# Patient Record
Sex: MALE | Race: WHITE | NOT HISPANIC OR LATINO | Employment: PART TIME | URBAN - METROPOLITAN AREA
[De-identification: names, ages, dates, MRNs, and addresses within clinical notes are randomized per-mention and may not be internally consistent; named-entity substitution may affect disease eponyms.]

---

## 2022-01-03 ENCOUNTER — HOSPITAL ENCOUNTER (INPATIENT)
Facility: HOSPITAL | Age: 57
LOS: 3 days | Discharge: HOME | DRG: 812 | End: 2022-01-06
Attending: FAMILY MEDICINE | Admitting: INTERNAL MEDICINE
Payer: MEDICAID

## 2022-01-03 DIAGNOSIS — D64.9 SYMPTOMATIC ANEMIA: ICD-10-CM

## 2022-01-03 DIAGNOSIS — D64.9 ANEMIA, UNSPECIFIED TYPE: Primary | ICD-10-CM

## 2022-01-03 DIAGNOSIS — R07.9 CHEST PAIN: ICD-10-CM

## 2022-01-03 LAB
ALBUMIN SERPL BCP-MCNC: 3.8 G/DL (ref 3.5–5.2)
ALP SERPL-CCNC: 46 U/L (ref 55–135)
ALT SERPL W/O P-5'-P-CCNC: 17 U/L (ref 10–44)
ANION GAP SERPL CALC-SCNC: 11 MMOL/L (ref 8–16)
ANISOCYTOSIS BLD QL SMEAR: SLIGHT
AST SERPL-CCNC: 16 U/L (ref 10–40)
BASOPHILS # BLD AUTO: 0.04 K/UL (ref 0–0.2)
BASOPHILS NFR BLD: 0.4 % (ref 0–1.9)
BILIRUB SERPL-MCNC: 0.9 MG/DL (ref 0.1–1)
BNP SERPL-MCNC: 102 PG/ML (ref 0–99)
BUN SERPL-MCNC: 19 MG/DL (ref 6–20)
BURR CELLS BLD QL SMEAR: ABNORMAL
CALCIUM SERPL-MCNC: 9.1 MG/DL (ref 8.7–10.5)
CHLORIDE SERPL-SCNC: 106 MMOL/L (ref 95–110)
CO2 SERPL-SCNC: 21 MMOL/L (ref 23–29)
CREAT SERPL-MCNC: 0.9 MG/DL (ref 0.5–1.4)
CTP QC/QA: YES
DIFFERENTIAL METHOD: ABNORMAL
EOSINOPHIL # BLD AUTO: 0 K/UL (ref 0–0.5)
EOSINOPHIL NFR BLD: 0.2 % (ref 0–8)
ERYTHROCYTE [DISTWIDTH] IN BLOOD BY AUTOMATED COUNT: 20.8 % (ref 11.5–14.5)
EST. GFR  (AFRICAN AMERICAN): >60 ML/MIN/1.73 M^2
EST. GFR  (NON AFRICAN AMERICAN): >60 ML/MIN/1.73 M^2
GLUCOSE SERPL-MCNC: 97 MG/DL (ref 70–110)
HCT VFR BLD AUTO: 12.5 % (ref 40–54)
HGB BLD-MCNC: 3 G/DL (ref 14–18)
HYPOCHROMIA BLD QL SMEAR: ABNORMAL
IMM GRANULOCYTES # BLD AUTO: 0.04 K/UL (ref 0–0.04)
IMM GRANULOCYTES NFR BLD AUTO: 0.4 % (ref 0–0.5)
LYMPHOCYTES # BLD AUTO: 2.2 K/UL (ref 1–4.8)
LYMPHOCYTES NFR BLD: 23.9 % (ref 18–48)
MCH RBC QN AUTO: 15 PG (ref 27–31)
MCHC RBC AUTO-ENTMCNC: 24 G/DL (ref 32–36)
MCV RBC AUTO: 63 FL (ref 82–98)
MONOCYTES # BLD AUTO: 0.7 K/UL (ref 0.3–1)
MONOCYTES NFR BLD: 7.7 % (ref 4–15)
NEUTROPHILS # BLD AUTO: 6.2 K/UL (ref 1.8–7.7)
NEUTROPHILS NFR BLD: 67.4 % (ref 38–73)
NRBC BLD-RTO: 1 /100 WBC
OVALOCYTES BLD QL SMEAR: ABNORMAL
PLATELET # BLD AUTO: 358 K/UL (ref 150–450)
PLATELET BLD QL SMEAR: ABNORMAL
PMV BLD AUTO: 10 FL (ref 9.2–12.9)
POIKILOCYTOSIS BLD QL SMEAR: SLIGHT
POTASSIUM SERPL-SCNC: 3.8 MMOL/L (ref 3.5–5.1)
PROT SERPL-MCNC: 7.7 G/DL (ref 6–8.4)
RBC # BLD AUTO: 2 M/UL (ref 4.6–6.2)
SARS-COV-2 RDRP RESP QL NAA+PROBE: NEGATIVE
SODIUM SERPL-SCNC: 138 MMOL/L (ref 136–145)
TARGETS BLD QL SMEAR: ABNORMAL
TROPONIN I SERPL DL<=0.01 NG/ML-MCNC: <0.006 NG/ML (ref 0–0.03)
TROPONIN I SERPL DL<=0.01 NG/ML-MCNC: <0.006 NG/ML (ref 0–0.03)
WBC # BLD AUTO: 9.13 K/UL (ref 3.9–12.7)

## 2022-01-03 PROCEDURE — 93010 EKG 12-LEAD: ICD-10-PCS | Mod: ,,, | Performed by: INTERNAL MEDICINE

## 2022-01-03 PROCEDURE — 86901 BLOOD TYPING SEROLOGIC RH(D): CPT | Performed by: FAMILY MEDICINE

## 2022-01-03 PROCEDURE — 86920 COMPATIBILITY TEST SPIN: CPT | Performed by: FAMILY MEDICINE

## 2022-01-03 PROCEDURE — 83880 ASSAY OF NATRIURETIC PEPTIDE: CPT | Performed by: NURSE PRACTITIONER

## 2022-01-03 PROCEDURE — 83540 ASSAY OF IRON: CPT | Performed by: NURSE PRACTITIONER

## 2022-01-03 PROCEDURE — 85025 COMPLETE CBC W/AUTO DIFF WBC: CPT | Performed by: NURSE PRACTITIONER

## 2022-01-03 PROCEDURE — 93010 ELECTROCARDIOGRAM REPORT: CPT | Mod: ,,, | Performed by: INTERNAL MEDICINE

## 2022-01-03 PROCEDURE — 86900 BLOOD TYPING SEROLOGIC ABO: CPT | Performed by: FAMILY MEDICINE

## 2022-01-03 PROCEDURE — U0002 COVID-19 LAB TEST NON-CDC: HCPCS | Performed by: NURSE PRACTITIONER

## 2022-01-03 PROCEDURE — 99285 EMERGENCY DEPT VISIT HI MDM: CPT | Mod: 25

## 2022-01-03 PROCEDURE — 93005 ELECTROCARDIOGRAM TRACING: CPT

## 2022-01-03 PROCEDURE — 82728 ASSAY OF FERRITIN: CPT | Performed by: NURSE PRACTITIONER

## 2022-01-03 PROCEDURE — 11000001 HC ACUTE MED/SURG PRIVATE ROOM

## 2022-01-03 PROCEDURE — 80053 COMPREHEN METABOLIC PANEL: CPT | Performed by: NURSE PRACTITIONER

## 2022-01-03 PROCEDURE — 36415 COLL VENOUS BLD VENIPUNCTURE: CPT | Performed by: FAMILY MEDICINE

## 2022-01-03 PROCEDURE — 84484 ASSAY OF TROPONIN QUANT: CPT | Mod: 91 | Performed by: NURSE PRACTITIONER

## 2022-01-03 PROCEDURE — 82272 OCCULT BLD FECES 1-3 TESTS: CPT | Performed by: FAMILY MEDICINE

## 2022-01-03 RX ORDER — HYDROCODONE BITARTRATE AND ACETAMINOPHEN 500; 5 MG/1; MG/1
TABLET ORAL
Status: DISCONTINUED | OUTPATIENT
Start: 2022-01-03 | End: 2022-01-06 | Stop reason: HOSPADM

## 2022-01-04 PROBLEM — D64.9 SYMPTOMATIC ANEMIA: Status: ACTIVE | Noted: 2022-01-04

## 2022-01-04 LAB
ABO + RH BLD: NORMAL
ALBUMIN SERPL BCP-MCNC: 3.9 G/DL (ref 3.5–5.2)
ALP SERPL-CCNC: 46 U/L (ref 55–135)
ALT SERPL W/O P-5'-P-CCNC: 13 U/L (ref 10–44)
ANION GAP SERPL CALC-SCNC: 10 MMOL/L (ref 8–16)
AST SERPL-CCNC: 15 U/L (ref 10–40)
BASOPHILS # BLD AUTO: 0.05 K/UL (ref 0–0.2)
BASOPHILS # BLD AUTO: 0.05 K/UL (ref 0–0.2)
BASOPHILS NFR BLD: 0.6 % (ref 0–1.9)
BASOPHILS NFR BLD: 0.7 % (ref 0–1.9)
BILIRUB SERPL-MCNC: 1.8 MG/DL (ref 0.1–1)
BLD GP AB SCN CELLS X3 SERPL QL: NORMAL
BLD PROD TYP BPU: NORMAL
BLOOD UNIT EXPIRATION DATE: NORMAL
BLOOD UNIT TYPE CODE: 5100
BLOOD UNIT TYPE: NORMAL
BUN SERPL-MCNC: 14 MG/DL (ref 6–20)
CALCIUM SERPL-MCNC: 8.5 MG/DL (ref 8.7–10.5)
CHLORIDE SERPL-SCNC: 108 MMOL/L (ref 95–110)
CO2 SERPL-SCNC: 19 MMOL/L (ref 23–29)
CODING SYSTEM: NORMAL
CREAT SERPL-MCNC: 0.8 MG/DL (ref 0.5–1.4)
DIFFERENTIAL METHOD: ABNORMAL
DIFFERENTIAL METHOD: ABNORMAL
DISPENSE STATUS: NORMAL
EOSINOPHIL # BLD AUTO: 0 K/UL (ref 0–0.5)
EOSINOPHIL # BLD AUTO: 0 K/UL (ref 0–0.5)
EOSINOPHIL NFR BLD: 0.4 % (ref 0–8)
EOSINOPHIL NFR BLD: 0.5 % (ref 0–8)
ERYTHROCYTE [DISTWIDTH] IN BLOOD BY AUTOMATED COUNT: 25.5 % (ref 11.5–14.5)
ERYTHROCYTE [DISTWIDTH] IN BLOOD BY AUTOMATED COUNT: 25.8 % (ref 11.5–14.5)
EST. GFR  (AFRICAN AMERICAN): >60 ML/MIN/1.73 M^2
EST. GFR  (NON AFRICAN AMERICAN): >60 ML/MIN/1.73 M^2
FERRITIN SERPL-MCNC: 2 NG/ML (ref 20–300)
GLUCOSE SERPL-MCNC: 86 MG/DL (ref 70–110)
HCT VFR BLD AUTO: 23.3 % (ref 40–54)
HCT VFR BLD AUTO: 23.6 % (ref 40–54)
HGB BLD-MCNC: 6.7 G/DL (ref 14–18)
HGB BLD-MCNC: 6.8 G/DL (ref 14–18)
IMM GRANULOCYTES # BLD AUTO: 0.08 K/UL (ref 0–0.04)
IMM GRANULOCYTES # BLD AUTO: 0.1 K/UL (ref 0–0.04)
IMM GRANULOCYTES NFR BLD AUTO: 1.1 % (ref 0–0.5)
IMM GRANULOCYTES NFR BLD AUTO: 1.3 % (ref 0–0.5)
INR PPP: 1 (ref 0.8–1.2)
IRON SERPL-MCNC: 11 UG/DL (ref 45–160)
LYMPHOCYTES # BLD AUTO: 1.4 K/UL (ref 1–4.8)
LYMPHOCYTES # BLD AUTO: 1.6 K/UL (ref 1–4.8)
LYMPHOCYTES NFR BLD: 18.2 % (ref 18–48)
LYMPHOCYTES NFR BLD: 20.7 % (ref 18–48)
MCH RBC QN AUTO: 21 PG (ref 27–31)
MCH RBC QN AUTO: 21.1 PG (ref 27–31)
MCHC RBC AUTO-ENTMCNC: 28.8 G/DL (ref 32–36)
MCHC RBC AUTO-ENTMCNC: 28.8 G/DL (ref 32–36)
MCV RBC AUTO: 73 FL (ref 82–98)
MCV RBC AUTO: 73 FL (ref 82–98)
MONOCYTES # BLD AUTO: 0.6 K/UL (ref 0.3–1)
MONOCYTES # BLD AUTO: 0.7 K/UL (ref 0.3–1)
MONOCYTES NFR BLD: 8 % (ref 4–15)
MONOCYTES NFR BLD: 9.4 % (ref 4–15)
NEUTROPHILS # BLD AUTO: 5.2 K/UL (ref 1.8–7.7)
NEUTROPHILS # BLD AUTO: 5.4 K/UL (ref 1.8–7.7)
NEUTROPHILS NFR BLD: 69 % (ref 38–73)
NEUTROPHILS NFR BLD: 70.1 % (ref 38–73)
NRBC BLD-RTO: 1 /100 WBC
NRBC BLD-RTO: 1 /100 WBC
NUM UNITS TRANS PACKED RBC: NORMAL
OB PNL STL: NEGATIVE
PLATELET # BLD AUTO: 320 K/UL (ref 150–450)
PLATELET # BLD AUTO: 323 K/UL (ref 150–450)
PMV BLD AUTO: 9.6 FL (ref 9.2–12.9)
PMV BLD AUTO: 9.8 FL (ref 9.2–12.9)
POTASSIUM SERPL-SCNC: 3.9 MMOL/L (ref 3.5–5.1)
PROT SERPL-MCNC: 7.4 G/DL (ref 6–8.4)
PROTHROMBIN TIME: 11.4 SEC (ref 9–12.5)
RBC # BLD AUTO: 3.19 M/UL (ref 4.6–6.2)
RBC # BLD AUTO: 3.23 M/UL (ref 4.6–6.2)
SODIUM SERPL-SCNC: 137 MMOL/L (ref 136–145)
WBC # BLD AUTO: 7.43 K/UL (ref 3.9–12.7)
WBC # BLD AUTO: 7.77 K/UL (ref 3.9–12.7)

## 2022-01-04 PROCEDURE — 25000003 PHARM REV CODE 250: Performed by: PHYSICIAN ASSISTANT

## 2022-01-04 PROCEDURE — 99223 1ST HOSP IP/OBS HIGH 75: CPT | Mod: ,,, | Performed by: INTERNAL MEDICINE

## 2022-01-04 PROCEDURE — 80053 COMPREHEN METABOLIC PANEL: CPT | Performed by: NURSE PRACTITIONER

## 2022-01-04 PROCEDURE — 85025 COMPLETE CBC W/AUTO DIFF WBC: CPT | Mod: 91 | Performed by: NURSE PRACTITIONER

## 2022-01-04 PROCEDURE — 36415 COLL VENOUS BLD VENIPUNCTURE: CPT | Performed by: NURSE PRACTITIONER

## 2022-01-04 PROCEDURE — G0378 HOSPITAL OBSERVATION PER HR: HCPCS

## 2022-01-04 PROCEDURE — 99223 PR INITIAL HOSPITAL CARE,LEVL III: ICD-10-PCS | Mod: ,,, | Performed by: INTERNAL MEDICINE

## 2022-01-04 PROCEDURE — P9016 RBC LEUKOCYTES REDUCED: HCPCS | Performed by: FAMILY MEDICINE

## 2022-01-04 PROCEDURE — 21400001 HC TELEMETRY ROOM

## 2022-01-04 PROCEDURE — 85610 PROTHROMBIN TIME: CPT | Performed by: NURSE PRACTITIONER

## 2022-01-04 RX ORDER — ACETAMINOPHEN 325 MG/1
650 TABLET ORAL EVERY 4 HOURS PRN
Status: DISCONTINUED | OUTPATIENT
Start: 2022-01-04 | End: 2022-01-06 | Stop reason: HOSPADM

## 2022-01-04 RX ORDER — ONDANSETRON 2 MG/ML
4 INJECTION INTRAMUSCULAR; INTRAVENOUS EVERY 8 HOURS PRN
Status: DISCONTINUED | OUTPATIENT
Start: 2022-01-04 | End: 2022-01-06 | Stop reason: HOSPADM

## 2022-01-04 RX ORDER — IPRATROPIUM BROMIDE AND ALBUTEROL SULFATE 2.5; .5 MG/3ML; MG/3ML
3 SOLUTION RESPIRATORY (INHALATION) EVERY 4 HOURS PRN
Status: DISCONTINUED | OUTPATIENT
Start: 2022-01-04 | End: 2022-01-06 | Stop reason: HOSPADM

## 2022-01-04 RX ORDER — TALC
6 POWDER (GRAM) TOPICAL NIGHTLY PRN
Status: DISCONTINUED | OUTPATIENT
Start: 2022-01-04 | End: 2022-01-06 | Stop reason: HOSPADM

## 2022-01-04 RX ORDER — POLYETHYLENE GLYCOL 3350, SODIUM SULFATE ANHYDROUS, SODIUM BICARBONATE, SODIUM CHLORIDE, POTASSIUM CHLORIDE 236; 22.74; 6.74; 5.86; 2.97 G/4L; G/4L; G/4L; G/4L; G/4L
4000 POWDER, FOR SOLUTION ORAL ONCE
Status: COMPLETED | OUTPATIENT
Start: 2022-01-04 | End: 2022-01-04

## 2022-01-04 RX ORDER — SODIUM CHLORIDE 0.9 % (FLUSH) 0.9 %
10 SYRINGE (ML) INJECTION EVERY 8 HOURS PRN
Status: DISCONTINUED | OUTPATIENT
Start: 2022-01-04 | End: 2022-01-06 | Stop reason: HOSPADM

## 2022-01-04 RX ORDER — NALOXONE HCL 0.4 MG/ML
0.02 VIAL (ML) INJECTION
Status: DISCONTINUED | OUTPATIENT
Start: 2022-01-04 | End: 2022-01-06 | Stop reason: HOSPADM

## 2022-01-04 RX ORDER — BISACODYL 5 MG
20 TABLET, DELAYED RELEASE (ENTERIC COATED) ORAL ONCE
Status: COMPLETED | OUTPATIENT
Start: 2022-01-04 | End: 2022-01-04

## 2022-01-04 RX ADMIN — BISACODYL 20 MG: 5 TABLET, COATED ORAL at 12:01

## 2022-01-04 RX ADMIN — POLYETHYLENE GLYCOL 3350, SODIUM SULFATE ANHYDROUS, SODIUM BICARBONATE, SODIUM CHLORIDE, POTASSIUM CHLORIDE 4000 ML: 236; 22.74; 6.74; 5.86; 2.97 POWDER, FOR SOLUTION ORAL at 03:01

## 2022-01-04 NOTE — H&P
[facility]  [facility]  History & Physical    Patient Name: Shwan Carreno  MRN: [id number]  Patient Class: OP- Observation  Admission Date: 1/3/2022  Attending Physician: Nicolasa Freitas MD   Primary Care Provider: No primary care provider on file.         Patient information was obtained from patient and ER records.     Subjective:     Principal Problem:Symptomatic anemia    Chief Complaint:   Chief Complaint   Patient presents with    Shortness of Breath     Pt has been c/o of SOB, neck tightness and bilateral arm tightness for the past three days.         HPI: 55 yo male with no known significant past medical history presented to ED with progressively worsening SOB. Onset 4 days ago associated with generalized weakness, fatigue, chest tightness, dizziness, near syncope and 10-15 lb unintentional weight loss within the last month. No exacerbating/alleviating factors. Pt denies cough, congestion and fever. He reports 1 episode of emesis PTA. No known sick contact. Initial labs remarkable for Hbg/Hct 3/12. Occult stool negative, hemodynamics stable. Pt denies hematemesis. Reports stools appear dark. Pt reports history of lap tevin 4 months ago. He does not have a primary care physician. No previous colonoscopy. 3 units PRBC transfusion initiated in ED. Patient placed in observation for further evaluation and treatment.       No past medical history on file.    No past surgical history on file.    Review of patient's allergies indicates:  No Known Allergies    No current facility-administered medications on file prior to encounter.     No current outpatient medications on file prior to encounter.     Family History    None       Tobacco Use    Smoking status: Not on file    Smokeless tobacco: Not on file   Substance and Sexual Activity    Alcohol use: Not on file    Drug use: Not on file    Sexual activity: Not on file     Review of Systems   Constitutional: Positive for fatigue.  Negative for chills and fever.   Eyes: Negative for photophobia.   Respiratory: Positive for shortness of breath. Negative for cough, chest tightness and wheezing.    Cardiovascular: Positive for chest pain and palpitations. Negative for leg swelling.   Gastrointestinal: Positive for nausea and vomiting. Negative for abdominal pain and diarrhea.   Genitourinary: Negative for dysuria, flank pain and hematuria.   Musculoskeletal: Negative for back pain, myalgias and neck pain.   Skin: Negative for rash and wound.   Neurological: Positive for dizziness and weakness. Negative for syncope and headaches.   Psychiatric/Behavioral: Negative for agitation.     Objective:     Vital Signs (Most Recent):  Temp: 98.1 °F (36.7 °C) (01/04/22 0324)  Pulse: 100 (01/04/22 0324)  Resp: 18 (01/04/22 0324)  BP: 121/63 (01/04/22 0324)  SpO2: 100 % (01/04/22 0324) Vital Signs (24h Range):  Temp:  [97.8 °F (36.6 °C)-98.1 °F (36.7 °C)] 98.1 °F (36.7 °C)  Pulse:  [100-117] 100  Resp:  [18-20] 18  SpO2:  [100 %] 100 %  BP: (113-150)/(59-66) 121/63     Weight:  (needs bed weight)  There is no height or weight on file to calculate BMI.    Physical Exam  Vitals and nursing note reviewed.   Constitutional:       General: He is not in acute distress.  HENT:      Head: Normocephalic and atraumatic.      Nose: No congestion.      Mouth/Throat:      Mouth: Mucous membranes are moist.   Eyes:      Conjunctiva/sclera: Conjunctivae normal.      Pupils: Pupils are equal, round, and reactive to light.   Cardiovascular:      Rate and Rhythm: Normal rate.      Pulses: Normal pulses.   Pulmonary:      Effort: Pulmonary effort is normal.   Abdominal:      General: Bowel sounds are normal. There is no distension.      Tenderness: There is no abdominal tenderness. There is no guarding.   Musculoskeletal:         General: Normal range of motion.      Cervical back: Normal range of motion.   Skin:     General: Skin is warm and dry.      Capillary Refill:  Capillary refill takes less than 2 seconds.      Coloration: Skin is pale.   Neurological:      General: No focal deficit present.      Mental Status: He is alert and oriented to person, place, and time.   Psychiatric:         Mood and Affect: Mood normal.           CRANIAL NERVES     CN III, IV, VI   Pupils are equal, round, and reactive to light.       Significant Labs:   All pertinent labs within the past 24 hours have been reviewed.  CBC:   Recent Labs   Lab 01/03/22 2201   WBC 9.13   HGB 3.0*   HCT 12.5*        CMP:   Recent Labs   Lab 01/03/22 2201      K 3.8      CO2 21*   GLU 97   BUN 19   CREATININE 0.9   CALCIUM 9.1   PROT 7.7   ALBUMIN 3.8   BILITOT 0.9   ALKPHOS 46*   AST 16   ALT 17   ANIONGAP 11   EGFRNONAA >60     Troponin:   Recent Labs   Lab 01/03/22 2201 01/03/22 2256   TROPONINI <0.006 <0.006       Significant Imaging: I have reviewed all pertinent imaging results/findings within the past 24 hours.    Assessment/Plan:     * Symptomatic anemia  - Initial Hbg/Hct 3/12   - Occult stool negative   - Type and screen, transfuse 3 units PRBC   - Keep NPO   - check iron, TIBC and ferritin  - GI eval          VTE Risk Mitigation (From admission, onward)         Ordered     IP VTE LOW RISK PATIENT  Once         01/04/22 0019     Place sequential compression device  Until discontinued         01/04/22 0019                   Shirin Mosely NP  Department of Hospital Medicine   [facility] - Emergency Dept.

## 2022-01-04 NOTE — ED PROVIDER NOTES
SCRIBE #1 NOTE: I, Gladis Fleming, am scribing for, and in the presence of, Nicolasa Freitas MD. I have scribed the entire note.       History     Chief Complaint   Patient presents with    Shortness of Breath     Pt has been c/o of SOB, neck tightness and bilateral arm tightness for the past three days.      Review of patient's allergies indicates:  No Known Allergies      History of Present Illness     HPI    1/3/2022, 10:59 PM  History obtained from the patient      History of Present Illness: Shawn Carreno is a 56 y.o. male patient who presents to the Emergency Department for evaluation of SOB which onset gradually 3 days ago. Symptoms are constant and moderate in severity. No mitigating or exacerbating factors reported. Associated sxs include neck tightness and bilateral arm tightness. Patient denies any cough, congestion, CP, leg swelling, sore throat, fever, chills, n/v, dizziness, HA, and all other sxs at this time. No prior tx reported. No further complaints or concerns at this time.       Arrival mode: Personal vehicle    PCP: To Obtain Unable        Past Medical History:  History reviewed. No pertinent past medical history.    Past Surgical History:  Past Surgical History:   Procedure Laterality Date    CHOLECYSTECTOMY  2020    COLONOSCOPY N/A 1/5/2022    Procedure: COLONOSCOPY;  Surgeon: Hannah Alvarado MD;  Location: [facility];  Service: Endoscopy;  Laterality: N/A;    ESOPHAGOGASTRODUODENOSCOPY N/A 1/5/2022    Procedure: EGD (ESOPHAGOGASTRODUODENOSCOPY);  Surgeon: Hannah Alvarado MD;  Location: [facility];  Service: Endoscopy;  Laterality: N/A;         Family History:  History reviewed. No pertinent family history.    Social History:  Social History     Tobacco Use    Smoking status: Never Smoker    Smokeless tobacco: Never Used   Substance and Sexual Activity    Alcohol use: Never    Drug use: Never    Sexual activity: Not on file        Review of Systems     Review of  Systems   Constitutional: Negative for chills and fever.   HENT: Negative for congestion and sore throat.    Respiratory: Positive for shortness of breath. Negative for cough.    Cardiovascular: Negative for chest pain and leg swelling.   Gastrointestinal: Negative for nausea and vomiting.   Genitourinary: Negative for dysuria.   Musculoskeletal: Negative for back pain.        + neck tightness       + bilateral arm tightness   Skin: Negative for rash.   Neurological: Negative for dizziness, weakness and headaches.   Hematological: Does not bruise/bleed easily.   All other systems reviewed and are negative.     Physical Exam     Initial Vitals [01/03/22 1947]   BP Pulse Resp Temp SpO2   (!) 150/66 (!) 117 20 97.8 °F (36.6 °C) 100 %      MAP       --          Physical Exam  Nursing Notes and Vital Signs Reviewed.  Constitutional: Patient is in no acute distress. Well-developed and well-nourished. Pt is pale.   Head: Atraumatic. Normocephalic.  Eyes: PERRL. EOM intact. Conjunctivae are not pale. No scleral icterus.  ENT: Mucous membranes are pale. Oropharynx is clear and symmetric.    Neck: Supple. Full ROM. No lymphadenopathy.  Cardiovascular: Regular rate. Regular rhythm. No murmurs, rubs, or gallops. Distal pulses are 2+ and symmetric.  Pulmonary/Chest: No respiratory distress. Clear to auscultation bilaterally. No wheezing or rales.  Abdominal: Soft and non-distended.  There is no tenderness.  No rebound, guarding, or rigidity. Good bowel sounds.  Genitourinary: No CVA tenderness  Rectal: Fe/male chaperone present for the duration of the rectal exam.There is no tenderness. No masses or hemorrhoids. Normal sphincter tone. No obvious melena or hematochezia.  Musculoskeletal: Moves all extremities. No obvious deformities. No edema. No calf tenderness.  Skin: Warm and dry.  Neurological:  Alert, awake, and appropriate.  Normal speech.  No acute focal neurological deficits are appreciated.  Psychiatric: Normal affect.  Good eye contact. Appropriate in content.     ED Course   Procedures  ED Vital Signs:  Vitals:    01/05/22 2303 01/05/22 2320 01/05/22 2335 01/06/22 0100   BP: 110/60 (!) 118/55 (!) 117/59    Pulse: 100 99 95 90   Resp: 18 18 18    Temp: 98.9 °F (37.2 °C) 100 °F (37.8 °C) 99.3 °F (37.4 °C)    TempSrc: Oral Oral Oral    SpO2: 95% 96% 96%    Weight:       Height:        01/06/22 0150 01/06/22 0230 01/06/22 0300 01/06/22 0446   BP: 135/77 133/66  127/77   Pulse: 89 87 72 87   Resp: 18 18  18   Temp: 98.5 °F (36.9 °C) 98 °F (36.7 °C)  98.1 °F (36.7 °C)   TempSrc: Oral Oral     SpO2: 99% 100%  98%   Weight:    88.7 kg (195 lb 8.8 oz)   Height:        01/06/22 0500 01/06/22 0705 01/06/22 0749 01/06/22 0900   BP:   120/64    Pulse: 88 82 88 86   Resp:   18    Temp:   97.5 °F (36.4 °C)    TempSrc:       SpO2:   98%    Weight:       Height:        01/06/22 0902 01/06/22 1100 01/06/22 1128   BP:   136/65   Pulse:  84 83   Resp:   18   Temp:      TempSrc:      SpO2: 100%  100%   Weight:      Height:          Abnormal Lab Results:  Labs Reviewed   CBC W/ AUTO DIFFERENTIAL - Abnormal; Notable for the following components:       Result Value    RBC 2.00 (*)     Hemoglobin 3.0 (*)     Hematocrit 12.5 (*)     MCV 63 (*)     MCH 15.0 (*)     MCHC 24.0 (*)     RDW 20.8 (*)     nRBC 1 (*)     All other components within normal limits    Narrative:       HCT HGB  critical result(s) called and verbal readback obtained   from DC ARELLANO RN by BRANDON 01/03/2022 22:46   COMPREHENSIVE METABOLIC PANEL - Abnormal; Notable for the following components:    CO2 21 (*)     Alkaline Phosphatase 46 (*)     All other components within normal limits   B-TYPE NATRIURETIC PEPTIDE - Abnormal; Notable for the following components:     (*)     All other components within normal limits   COMPREHENSIVE METABOLIC PANEL - Abnormal; Notable for the following components:    CO2 19 (*)     Calcium 8.5 (*)     Total Bilirubin 1.8 (*)     Alkaline  Phosphatase 46 (*)     All other components within normal limits   CBC W/ AUTO DIFFERENTIAL - Abnormal; Notable for the following components:    RBC 3.19 (*)     Hemoglobin 6.7 (*)     Hematocrit 23.3 (*)     MCV 73 (*)     MCH 21.0 (*)     MCHC 28.8 (*)     RDW 25.8 (*)     Immature Granulocytes 1.1 (*)     Immature Grans (Abs) 0.08 (*)     nRBC 1 (*)     All other components within normal limits   FERRITIN - Abnormal; Notable for the following components:    Ferritin 2 (*)     All other components within normal limits   CBC W/ AUTO DIFFERENTIAL - Abnormal; Notable for the following components:    RBC 3.23 (*)     Hemoglobin 6.8 (*)     Hematocrit 23.6 (*)     MCV 73 (*)     MCH 21.1 (*)     MCHC 28.8 (*)     RDW 25.5 (*)     Immature Granulocytes 1.3 (*)     Immature Grans (Abs) 0.10 (*)     nRBC 1 (*)     All other components within normal limits   TROPONIN I   TROPONIN I   OCCULT BLOOD X 1, STOOL   FERRITIN   IRON AND TIBC   PROTIME-INR   SARS-COV-2 RDRP GENE    Narrative:     This test utilizes isothermal nucleic acid amplification   technology to detect the SARS-CoV-2 RdRp nucleic acid segment.   The analytical sensitivity (limit of detection) is 125 genome   equivalents/mL.   A POSITIVE result implies infection with the SARS-CoV-2 virus;   the patient is presumed to be contagious.     A NEGATIVE result means that SARS-CoV-2 nucleic acids are not   present above the limit of detection. A NEGATIVE result should be   treated as presumptive. It does not rule out the possibility of   COVID-19 and should not be the sole basis for treatment decisions.   If COVID-19 is strongly suspected based on clinical and exposure   history, re-testing using an alternate molecular assay should be   considered.   This test is only for use under the Food and Drug   Administration s Emergency Use Authorization (EUA).   Commercial kits are provided by [employer].   Performance characteristics of the EUA have been  independently   verified by [facility] Department of   Pathology and Laboratory Medicine.   _________________________________________________________________   The authorized Fact Sheet for Healthcare Providers and the authorized Fact   Sheet for Patients of the ID NOW COVID-19 are available on the FDA   website:     https://www.fda.gov/media/521063/download  https://www.fda.gov/media/571703/download         TYPE & SCREEN   PREPARE RBC SOFT        All Lab Results:  Results for orders placed or performed during the hospital encounter of 01/03/22   CBC auto differential   Result Value Ref Range    WBC 9.13 3.90 - 12.70 K/uL    RBC 2.00 (L) 4.60 - 6.20 M/uL    Hemoglobin 3.0 (LL) 14.0 - 18.0 g/dL    Hematocrit 12.5 (LL) 40.0 - 54.0 %    MCV 63 (L) 82 - 98 fL    MCH 15.0 (L) 27.0 - 31.0 pg    MCHC 24.0 (L) 32.0 - 36.0 g/dL    RDW 20.8 (H) 11.5 - 14.5 %    Platelets 358 150 - 450 K/uL    MPV 10.0 9.2 - 12.9 fL    Immature Granulocytes 0.4 0.0 - 0.5 %    Gran # (ANC) 6.2 1.8 - 7.7 K/uL    Immature Grans (Abs) 0.04 0.00 - 0.04 K/uL    Lymph # 2.2 1.0 - 4.8 K/uL    Mono # 0.7 0.3 - 1.0 K/uL    Eos # 0.0 0.0 - 0.5 K/uL    Baso # 0.04 0.00 - 0.20 K/uL    nRBC 1 (A) 0 /100 WBC    Gran % 67.4 38.0 - 73.0 %    Lymph % 23.9 18.0 - 48.0 %    Mono % 7.7 4.0 - 15.0 %    Eosinophil % 0.2 0.0 - 8.0 %    Basophil % 0.4 0.0 - 1.9 %    Platelet Estimate Appears normal     Aniso Slight     Poik Slight     Hypo Moderate     Ovalocytes Occasional     Target Cells Occasional     Araceli Cells Occasional     Differential Method Automated    Comprehensive metabolic panel   Result Value Ref Range    Sodium 138 136 - 145 mmol/L    Potassium 3.8 3.5 - 5.1 mmol/L    Chloride 106 95 - 110 mmol/L    CO2 21 (L) 23 - 29 mmol/L    Glucose 97 70 - 110 mg/dL    BUN 19 6 - 20 mg/dL    Creatinine 0.9 0.5 - 1.4 mg/dL    Calcium 9.1 8.7 - 10.5 mg/dL    Total Protein 7.7 6.0 - 8.4 g/dL    Albumin 3.8 3.5 - 5.2 g/dL    Total Bilirubin 0.9 0.1 - 1.0  mg/dL    Alkaline Phosphatase 46 (L) 55 - 135 U/L    AST 16 10 - 40 U/L    ALT 17 10 - 44 U/L    Anion Gap 11 8 - 16 mmol/L    eGFR if African American >60 >60 mL/min/1.73 m^2    eGFR if non African American >60 >60 mL/min/1.73 m^2   Troponin I #1   Result Value Ref Range    Troponin I <0.006 0.000 - 0.026 ng/mL   BNP   Result Value Ref Range     (H) 0 - 99 pg/mL   Troponin I #2   Result Value Ref Range    Troponin I <0.006 0.000 - 0.026 ng/mL   Occult blood x 1, stool    Specimen: Stool   Result Value Ref Range    Occult Blood Negative Negative   Protime-INR   Result Value Ref Range    Prothrombin Time 11.4 9.0 - 12.5 sec    INR 1.0 0.8 - 1.2   Comprehensive Metabolic Panel (CMP)   Result Value Ref Range    Sodium 137 136 - 145 mmol/L    Potassium 3.9 3.5 - 5.1 mmol/L    Chloride 108 95 - 110 mmol/L    CO2 19 (L) 23 - 29 mmol/L    Glucose 86 70 - 110 mg/dL    BUN 14 6 - 20 mg/dL    Creatinine 0.8 0.5 - 1.4 mg/dL    Calcium 8.5 (L) 8.7 - 10.5 mg/dL    Total Protein 7.4 6.0 - 8.4 g/dL    Albumin 3.9 3.5 - 5.2 g/dL    Total Bilirubin 1.8 (H) 0.1 - 1.0 mg/dL    Alkaline Phosphatase 46 (L) 55 - 135 U/L    AST 15 10 - 40 U/L    ALT 13 10 - 44 U/L    Anion Gap 10 8 - 16 mmol/L    eGFR if African American >60 >60 mL/min/1.73 m^2    eGFR if non African American >60 >60 mL/min/1.73 m^2   CBC auto differential   Result Value Ref Range    WBC 7.43 3.90 - 12.70 K/uL    RBC 3.19 (L) 4.60 - 6.20 M/uL    Hemoglobin 6.7 (L) 14.0 - 18.0 g/dL    Hematocrit 23.3 (L) 40.0 - 54.0 %    MCV 73 (L) 82 - 98 fL    MCH 21.0 (L) 27.0 - 31.0 pg    MCHC 28.8 (L) 32.0 - 36.0 g/dL    RDW 25.8 (H) 11.5 - 14.5 %    Platelets 320 150 - 450 K/uL    MPV 9.8 9.2 - 12.9 fL    Immature Granulocytes 1.1 (H) 0.0 - 0.5 %    Gran # (ANC) 5.2 1.8 - 7.7 K/uL    Immature Grans (Abs) 0.08 (H) 0.00 - 0.04 K/uL    Lymph # 1.4 1.0 - 4.8 K/uL    Mono # 0.7 0.3 - 1.0 K/uL    Eos # 0.0 0.0 - 0.5 K/uL    Baso # 0.05 0.00 - 0.20 K/uL    nRBC 1 (A) 0 /100 WBC     Gran % 70.1 38.0 - 73.0 %    Lymph % 18.2 18.0 - 48.0 %    Mono % 9.4 4.0 - 15.0 %    Eosinophil % 0.5 0.0 - 8.0 %    Basophil % 0.7 0.0 - 1.9 %    Differential Method Automated    Ferritin   Result Value Ref Range    Ferritin 2 (L) 20.0 - 300.0 ng/mL   Iron   Result Value Ref Range    Iron 11 (L) 45 - 160 ug/dL   CBC auto differential   Result Value Ref Range    WBC 7.77 3.90 - 12.70 K/uL    RBC 3.23 (L) 4.60 - 6.20 M/uL    Hemoglobin 6.8 (L) 14.0 - 18.0 g/dL    Hematocrit 23.6 (L) 40.0 - 54.0 %    MCV 73 (L) 82 - 98 fL    MCH 21.1 (L) 27.0 - 31.0 pg    MCHC 28.8 (L) 32.0 - 36.0 g/dL    RDW 25.5 (H) 11.5 - 14.5 %    Platelets 323 150 - 450 K/uL    MPV 9.6 9.2 - 12.9 fL    Immature Granulocytes 1.3 (H) 0.0 - 0.5 %    Gran # (ANC) 5.4 1.8 - 7.7 K/uL    Immature Grans (Abs) 0.10 (H) 0.00 - 0.04 K/uL    Lymph # 1.6 1.0 - 4.8 K/uL    Mono # 0.6 0.3 - 1.0 K/uL    Eos # 0.0 0.0 - 0.5 K/uL    Baso # 0.05 0.00 - 0.20 K/uL    nRBC 1 (A) 0 /100 WBC    Gran % 69.0 38.0 - 73.0 %    Lymph % 20.7 18.0 - 48.0 %    Mono % 8.0 4.0 - 15.0 %    Eosinophil % 0.4 0.0 - 8.0 %    Basophil % 0.6 0.0 - 1.9 %    Differential Method Automated    Comprehensive Metabolic Panel (CMP)   Result Value Ref Range    Sodium 141 136 - 145 mmol/L    Potassium 3.8 3.5 - 5.1 mmol/L    Chloride 108 95 - 110 mmol/L    CO2 22 (L) 23 - 29 mmol/L    Glucose 80 70 - 110 mg/dL    BUN 13 6 - 20 mg/dL    Creatinine 0.8 0.5 - 1.4 mg/dL    Calcium 8.4 (L) 8.7 - 10.5 mg/dL    Total Protein 6.7 6.0 - 8.4 g/dL    Albumin 3.5 3.5 - 5.2 g/dL    Total Bilirubin 1.2 (H) 0.1 - 1.0 mg/dL    Alkaline Phosphatase 43 (L) 55 - 135 U/L    AST 18 10 - 40 U/L    ALT 14 10 - 44 U/L    Anion Gap 11 8 - 16 mmol/L    eGFR if African American >60 >60 mL/min/1.73 m^2    eGFR if non African American >60 >60 mL/min/1.73 m^2   CBC Auto Differential   Result Value Ref Range    WBC 6.99 3.90 - 12.70 K/uL    RBC 3.08 (L) 4.60 - 6.20 M/uL    Hemoglobin 6.6 (L) 14.0 - 18.0 g/dL    Hematocrit  22.8 (L) 40.0 - 54.0 %    MCV 74 (L) 82 - 98 fL    MCH 21.4 (L) 27.0 - 31.0 pg    MCHC 28.9 (L) 32.0 - 36.0 g/dL    RDW 25.2 (H) 11.5 - 14.5 %    Platelets 306 150 - 450 K/uL    MPV 9.9 9.2 - 12.9 fL    Immature Granulocytes 0.9 (H) 0.0 - 0.5 %    Gran # (ANC) 4.5 1.8 - 7.7 K/uL    Immature Grans (Abs) 0.06 (H) 0.00 - 0.04 K/uL    Lymph # 1.5 1.0 - 4.8 K/uL    Mono # 0.8 0.3 - 1.0 K/uL    Eos # 0.1 0.0 - 0.5 K/uL    Baso # 0.07 0.00 - 0.20 K/uL    nRBC 1 (A) 0 /100 WBC    Gran % 63.9 38.0 - 73.0 %    Lymph % 21.9 18.0 - 48.0 %    Mono % 10.9 4.0 - 15.0 %    Eosinophil % 1.4 0.0 - 8.0 %    Basophil % 1.0 0.0 - 1.9 %    Differential Method Automated    Hemoglobin   Result Value Ref Range    Hemoglobin 7.8 (L) 14.0 - 18.0 g/dL   Hematocrit   Result Value Ref Range    Hematocrit 26.1 (L) 40.0 - 54.0 %   Comprehensive Metabolic Panel (CMP)   Result Value Ref Range    Sodium 141 136 - 145 mmol/L    Potassium 4.0 3.5 - 5.1 mmol/L    Chloride 108 95 - 110 mmol/L    CO2 25 23 - 29 mmol/L    Glucose 82 70 - 110 mg/dL    BUN 12 6 - 20 mg/dL    Creatinine 0.9 0.5 - 1.4 mg/dL    Calcium 8.4 (L) 8.7 - 10.5 mg/dL    Total Protein 7.1 6.0 - 8.4 g/dL    Albumin 3.6 3.5 - 5.2 g/dL    Total Bilirubin 1.1 (H) 0.1 - 1.0 mg/dL    Alkaline Phosphatase 48 (L) 55 - 135 U/L    AST 17 10 - 40 U/L    ALT 14 10 - 44 U/L    Anion Gap 8 8 - 16 mmol/L    eGFR if African American >60 >60 mL/min/1.73 m^2    eGFR if non African American >60 >60 mL/min/1.73 m^2   CBC auto differential   Result Value Ref Range    WBC 7.60 3.90 - 12.70 K/uL    RBC 4.00 (L) 4.60 - 6.20 M/uL    Hemoglobin 9.1 (L) 14.0 - 18.0 g/dL    Hematocrit 30.8 (L) 40.0 - 54.0 %    MCV 77 (L) 82 - 98 fL    MCH 22.8 (L) 27.0 - 31.0 pg    MCHC 29.5 (L) 32.0 - 36.0 g/dL    RDW 24.7 (H) 11.5 - 14.5 %    Platelets 317 150 - 450 K/uL    MPV 9.8 9.2 - 12.9 fL    Immature Granulocytes 0.7 (H) 0.0 - 0.5 %    Gran # (ANC) 4.9 1.8 - 7.7 K/uL    Immature Grans (Abs) 0.05 (H) 0.00 - 0.04 K/uL     Lymph # 1.6 1.0 - 4.8 K/uL    Mono # 0.9 0.3 - 1.0 K/uL    Eos # 0.2 0.0 - 0.5 K/uL    Baso # 0.06 0.00 - 0.20 K/uL    nRBC 1 (A) 0 /100 WBC    Gran % 64.7 38.0 - 73.0 %    Lymph % 20.5 18.0 - 48.0 %    Mono % 11.3 4.0 - 15.0 %    Eosinophil % 2.0 0.0 - 8.0 %    Basophil % 0.8 0.0 - 1.9 %    Differential Method Automated    POCT COVID-19 Rapid Screening   Result Value Ref Range    POC Rapid COVID Negative Negative     Acceptable Yes    Type & Screen   Result Value Ref Range    Group & Rh O POS     Indirect Miguel NEG    Prepare RBC 3 Units; transfusion   Result Value Ref Range    UNIT NUMBER [id number]     Product Code S1339I38     DISPENSE STATUS TRANSFUSED     CODING SYSTEM [id number]     Unit Blood Type Code 5100     Unit Blood Type O POS     Unit Expiration [id number]     UNIT NUMBER [id number]     Product Code M8854I10     DISPENSE STATUS TRANSFUSED     CODING SYSTEM YLRM332     Unit Blood Type Code 5100     Unit Blood Type O POS     Unit Expiration [id number]     UNIT NUMBER [id number]     Product Code O7060N12     DISPENSE STATUS TRANSFUSED     CODING SYSTEM PJPY658     Unit Blood Type Code 5100     Unit Blood Type O POS     Unit Expiration [id number]    Prepare RBC 1 Unit   Result Value Ref Range    UNIT NUMBER [id number]     Product Code Q9568K15     DISPENSE STATUS TRANSFUSED     CODING SYSTEM RIRL992     Unit Blood Type Code 5100     Unit Blood Type O POS     Unit Expiration [id number]    Prepare RBC 1 Unit   Result Value Ref Range    UNIT NUMBER [id number]     Product Code P6553O82     DISPENSE STATUS TRANSFUSED     CODING SYSTEM DKPW072     Unit Blood Type Code 5100     Unit Blood Type O POS     Unit Expiration [id number]        Imaging Results:  Imaging Results          X-Ray Chest AP Portable (Final result)  Result time 01/03/22 20:51:43    Final result by Huseyin Wise MD (01/03/22 20:51:43)                 Impression:      No acute  abnormality.      Electronically signed by: Frandy Fontanez  Date:    01/03/2022  Time:    20:51             Narrative:    EXAMINATION:  XR CHEST AP PORTABLE    CLINICAL HISTORY:  Chest Pain;    TECHNIQUE:  Single frontal view of the chest was performed.    COMPARISON:  None    FINDINGS:  The lungs are clear, with normal appearance of pulmonary vasculature and no pleural effusion or pneumothorax.    The cardiac silhouette is enlarged.  The hilar and mediastinal contours are unremarkable.    Bones are intact.                                 The EKG was ordered, reviewed, and independently interpreted by the ED provider.  Interpretation time: 1946  Rate: 108 BPM  Rhythm: sinus tachycardia  Interpretation: Cannot rule out anterior infarct, age undetermined. No STEMI.           The Emergency Provider reviewed the vital signs and test results, which are outlined above.     ED Discussion     12:16 AM: Discussed case with Shirin Mosley NP ([facility]). Dr. Reid agrees with current care and management of pt and accepts admission.   Admitting Service: [facility]  Admitting Physician: Dr. Reid  Admit to: obs med tele    12:21 AM: Re-evaluated pt. I have discussed test results, shared treatment plan, and the need for admission with patient and family at bedside. Pt and family express understanding at this time and agree with all information. All questions answered. Pt and family have no further questions or concerns at this time. Pt is ready for admit.       Medical Decision Making:   Clinical Tests:   Lab Tests: Ordered and Reviewed  Radiological Study: Ordered and Reviewed  Medical Tests: Ordered and Reviewed           ED Medication(s):  Medications   bisacodyL EC tablet 20 mg (20 mg Oral Given 1/4/22 1244)   polyethylene glycol (GoLYTELY) solution (4,000 mLs Oral Given 1/4/22 9182)       Discharge Medication List as of 1/6/2022 11:52 AM      START taking these medications    Details   ferrous sulfate  324 mg (65 mg iron) TbEC Take 1 tablet (324 mg total) by mouth once daily., Starting Thu 1/6/2022, OTC              Follow-up Information     [facility] [place]  On 1/10/2022.    Why: Establish Care  Appointment scheduled for 1/10/22 at 3PM   Contact information:  [facility] [place]   [address],  [place] [address] [phone]           Hannah Alvarado MD In 1 week.    Specialty: Gastroenterology  Why: capsule endoscopy  Contact information:  [address] [place] [address] [address] [place] [address] [phone]             Rafael Cast MD In 2 weeks.    Specialty: Hematology and Oncology  Why: iron deficiency   Contact information:  [address]  [place] [address] [phone]                             Scribe Attestation:   Scribe #1: I performed the above scribed service and the documentation accurately describes the services I performed. I attest to the accuracy of the note.     Attending:   Physician Attestation Statement for Scribe #1: I, Nicolasa Freitas MD, personally performed the services described in this documentation, as scribed by Gladis Fleming, in my presence, and it is both accurate and complete.           Clinical Impression       ICD-10-CM ICD-9-CM   1. Anemia, unspecified type  D64.9 285.9   2. Chest pain  R07.9 786.50   3. Symptomatic anemia  D64.9 285.9       Disposition:   Disposition: Placed in Observation  Condition: Fair         Nicolasa Freitas MD  01/07/22 6333

## 2022-01-04 NOTE — FIRST PROVIDER EVALUATION
"Medical screening exam completed.  I have conducted a focused provider triage encounter, findings are as follows:    Brief history of present illness:  01/03/2022,7:54 PM  History was obtained from the patient.           History of Present Illness: Shawn Carreno 56 y.o. male patient who presents to Emergency Department for SOB that has worsened over the past 3 days. Pt reports bilateral arm tightness and neck pain. He also reports N/V onset today.       7:54 PM: I, Esperanza Pacheco NP have performed the initial provider exam in triage, which included initial history, focused physical exam, and initial orders. The patinet was informed that there is currently a longer than normal wait time and verbalized agreement with the initial treatment plan. The patient verbalized understanding that they will be seen and dispositioned by the next available physician. I am removing myself from the care of pt and pt will now be assigned to the next available physician.      Esperanza Pacheco NP       Vitals:    01/03/22 1947   BP: (!) 150/66   BP Location: Left arm   Patient Position: Sitting   Pulse: (!) 117   Resp: 20   Temp: 97.8 °F (36.6 °C)   TempSrc: Oral   SpO2: 100%   Weight: Comment: needs bed weight   Height: 5' 7" (1.702 m)         Brief workup plan:  EKG, Labs     Preliminary workup initiated; this workup will be continued and followed by the physician or advanced practice provider that is assigned to the patient when roomed.  "

## 2022-01-04 NOTE — ASSESSMENT & PLAN NOTE
-Discussed with Dr. Carter. Will plan on EGD/colonoscopy tomorrow due to severe anemia.   -NPO midnight  -Continue to monitor H/H  -Has been transfused x 3 units. Transfuse to keep >7.

## 2022-01-04 NOTE — ASSESSMENT & PLAN NOTE
- Initial Hbg/Hct 3/12   - Occult stool negative   - Type and screen, transfuse 3 units PRBC   - Keep NPO   - check iron, TIBC and ferritin  - GI eval

## 2022-01-04 NOTE — SUBJECTIVE & OBJECTIVE
No past medical history on file.    No past surgical history on file.    Review of patient's allergies indicates:  No Known Allergies    No current facility-administered medications on file prior to encounter.     No current outpatient medications on file prior to encounter.     Family History    None       Tobacco Use    Smoking status: Not on file    Smokeless tobacco: Not on file   Substance and Sexual Activity    Alcohol use: Not on file    Drug use: Not on file    Sexual activity: Not on file     Review of Systems   Constitutional: Positive for fatigue. Negative for chills and fever.   Eyes: Negative for photophobia.   Respiratory: Positive for shortness of breath. Negative for cough, chest tightness and wheezing.    Cardiovascular: Positive for chest pain and palpitations. Negative for leg swelling.   Gastrointestinal: Positive for nausea and vomiting. Negative for abdominal pain and diarrhea.   Genitourinary: Negative for dysuria, flank pain and hematuria.   Musculoskeletal: Negative for back pain, myalgias and neck pain.   Skin: Negative for rash and wound.   Neurological: Positive for dizziness and weakness. Negative for syncope and headaches.   Psychiatric/Behavioral: Negative for agitation.     Objective:     Vital Signs (Most Recent):  Temp: 98.1 °F (36.7 °C) (01/04/22 0324)  Pulse: 100 (01/04/22 0324)  Resp: 18 (01/04/22 0324)  BP: 121/63 (01/04/22 0324)  SpO2: 100 % (01/04/22 0324) Vital Signs (24h Range):  Temp:  [97.8 °F (36.6 °C)-98.1 °F (36.7 °C)] 98.1 °F (36.7 °C)  Pulse:  [100-117] 100  Resp:  [18-20] 18  SpO2:  [100 %] 100 %  BP: (113-150)/(59-66) 121/63     Weight:  (needs bed weight)  There is no height or weight on file to calculate BMI.    Physical Exam  Vitals and nursing note reviewed.   Constitutional:       General: He is not in acute distress.  HENT:      Head: Normocephalic and atraumatic.      Nose: No congestion.      Mouth/Throat:      Mouth: Mucous membranes are moist.   Eyes:       Conjunctiva/sclera: Conjunctivae normal.      Pupils: Pupils are equal, round, and reactive to light.   Cardiovascular:      Rate and Rhythm: Normal rate.      Pulses: Normal pulses.   Pulmonary:      Effort: Pulmonary effort is normal.   Abdominal:      General: Bowel sounds are normal. There is no distension.      Tenderness: There is no abdominal tenderness. There is no guarding.   Musculoskeletal:         General: Normal range of motion.      Cervical back: Normal range of motion.   Skin:     General: Skin is warm and dry.      Capillary Refill: Capillary refill takes less than 2 seconds.      Coloration: Skin is pale.   Neurological:      General: No focal deficit present.      Mental Status: He is alert and oriented to person, place, and time.   Psychiatric:         Mood and Affect: Mood normal.           CRANIAL NERVES     CN III, IV, VI   Pupils are equal, round, and reactive to light.       Significant Labs:   All pertinent labs within the past 24 hours have been reviewed.  CBC:   Recent Labs   Lab 01/03/22 2201   WBC 9.13   HGB 3.0*   HCT 12.5*        CMP:   Recent Labs   Lab 01/03/22 2201      K 3.8      CO2 21*   GLU 97   BUN 19   CREATININE 0.9   CALCIUM 9.1   PROT 7.7   ALBUMIN 3.8   BILITOT 0.9   ALKPHOS 46*   AST 16   ALT 17   ANIONGAP 11   EGFRNONAA >60     Troponin:   Recent Labs   Lab 01/03/22 2201 01/03/22  2256   TROPONINI <0.006 <0.006       Significant Imaging: I have reviewed all pertinent imaging results/findings within the past 24 hours.

## 2022-01-04 NOTE — CONSULTS
[facility] - Emergency Dept.  Gastroenterology  Consult Note    Patient Name: Shawn Carreno  MRN: [id number]  Admission Date: 1/3/2022  Hospital Length of Stay: 0 days  Code Status: Full Code   Attending Provider: Alejandro Zhang MD   Consulting Provider: Arianne Cox PA-C  Primary Care Physician: No primary care provider on file.  Principal Problem:Symptomatic anemia    Inpatient consult to Gastroenterology  Consult performed by: Arianne Cox PA-C  Consult ordered by: Nicolasa Freitas MD  Reason for consult: severe anemia        Subjective:     HPI:  Patient is a 55yo male who reported to the ED for worsening SOB over this past week. Upon arrival to the ED, he was noted to have a hemoglobin of 3. Denies any overt bleeding such as hematemesis, hematochezia, rectal bleeding, hematuria, melena, epistaxis. Occult blood in the ED negative. He was transfused 3 units and reports much improvement in symptoms. Denies any current complaints. Denies ever having had scopes in the past. Iron processing. Xray completed and normal.      No past medical history on file.    No past surgical history on file.    Review of patient's allergies indicates:  No Known Allergies  Family History    None       Tobacco Use    Smoking status: Not on file    Smokeless tobacco: Not on file   Substance and Sexual Activity    Alcohol use: Not on file    Drug use: Not on file    Sexual activity: Not on file     Review of Systems   Constitutional: Negative for appetite change, chills, diaphoresis, fatigue, fever and unexpected weight change.   HENT: Negative for trouble swallowing.    Eyes: Negative for visual disturbance.   Respiratory: Positive for shortness of breath (now resolved since transfusion). Negative for cough.    Cardiovascular: Negative for chest pain.   Gastrointestinal: Negative for abdominal distention, abdominal pain, anal bleeding, blood in stool, constipation, diarrhea, nausea and vomiting.   Genitourinary: Negative for  dysuria and hematuria.   Skin: Positive for pallor. Negative for color change.   Neurological: Negative for dizziness, syncope and weakness.   Psychiatric/Behavioral: Negative for confusion and dysphoric mood. The patient is nervous/anxious.      Objective:     Vital Signs (Most Recent):  Temp: 98.1 °F (36.7 °C) (01/04/22 0551)  Pulse: 81 (01/04/22 0932)  Resp: 20 (01/04/22 0932)  BP: 110/60 (01/04/22 0932)  SpO2: 100 % (01/04/22 0932) Vital Signs (24h Range):  Temp:  [97.8 °F (36.6 °C)-98.1 °F (36.7 °C)] 98.1 °F (36.7 °C)  Pulse:  [] 81  Resp:  [14-20] 20  SpO2:  [100 %] 100 %  BP: (104-150)/(56-68) 110/60     Weight:  (needs bed weight) (01/03/22 1947)  There is no height or weight on file to calculate BMI.      Intake/Output Summary (Last 24 hours) at 1/4/2022 1012  Last data filed at 1/4/2022 0743  Gross per 24 hour   Intake 1200 ml   Output --   Net 1200 ml       Lines/Drains/Airways     Peripheral Intravenous Line                 Peripheral IV - Single Lumen 01/03/22 2254 20 G Right Antecubital <1 day                Physical Exam  Constitutional:       General: He is not in acute distress.     Appearance: Normal appearance. He is not ill-appearing, toxic-appearing or diaphoretic.   HENT:      Head: Normocephalic and atraumatic.   Eyes:      General: No scleral icterus.     Extraocular Movements: Extraocular movements intact.   Cardiovascular:      Rate and Rhythm: Normal rate and regular rhythm.   Pulmonary:      Effort: Pulmonary effort is normal. No respiratory distress.   Abdominal:      General: Bowel sounds are normal. There is no distension.      Palpations: Abdomen is soft. There is no mass.      Tenderness: There is no abdominal tenderness. There is no guarding.   Musculoskeletal:         General: Normal range of motion.      Cervical back: Normal range of motion.      Right lower leg: No edema.      Left lower leg: No edema.   Skin:     General: Skin is warm and dry.      Coloration: Skin is  pale. Skin is not jaundiced.   Neurological:      General: No focal deficit present.      Mental Status: He is alert and oriented to person, place, and time.   Psychiatric:         Mood and Affect: Mood normal.         Behavior: Behavior normal.         Thought Content: Thought content normal.          Significant Labs:  CBC:   Recent Labs   Lab 01/03/22  2201 01/04/22  0919   WBC 9.13 7.43   HGB 3.0* 6.7*   HCT 12.5* 23.3*    320     CMP:   Recent Labs   Lab 01/04/22  0919   GLU 86   CALCIUM 8.5*   ALBUMIN 3.9   PROT 7.4      K 3.9   CO2 19*      BUN 14   CREATININE 0.8   ALKPHOS 46*   ALT 13   AST 15   BILITOT 1.8*     Coagulation:   Recent Labs   Lab 01/04/22  0054   INR 1.0       Significant Imaging:  Imaging results within the past 24 hours have been reviewed.    Assessment/Plan:     * Symptomatic anemia  -Discussed with Dr. Carter. Will plan on EGD/colonoscopy tomorrow due to severe anemia.   -NPO midnight  -Continue to monitor H/H  -Has been transfused x 3 units. Transfuse to keep >7.          Thank you for your consult. I will follow-up with patient. Please contact us if you have any additional questions.    Arianne Cox PA-C  Gastroenterology  [facility] - Emergency Dept.

## 2022-01-04 NOTE — HPI
Patient is a 55yo male who reported to the ED for worsening SOB over this past week. Upon arrival to the ED, he was noted to have a hemoglobin of 3. Denies any overt bleeding such as hematemesis, hematochezia, rectal bleeding, hematuria, melena, epistaxis. Occult blood in the ED negative. He was transfused 3 units and reports much improvement in symptoms. Denies any current complaints. Denies ever having had scopes in the past. Iron processing. Xray completed and normal.

## 2022-01-04 NOTE — PHARMACY MED REC
"      Admission Medication History     The home medication history was taken by Sandeep Pinon.    You may go to "Admission" then "Reconcile Home Medications" tabs to review and/or act upon these items.      The home medication list has been updated by the Pharmacy department.    Please read ALL comments highlighted in yellow.    Please address this information as you see fit.     Feel free to contact us if you have any questions or require assistance.      The patient is currently not taking any prescription or over-the-counter medications.    Medications listed below were obtained from: Patient/family and Analytic software- [employer]rion  (Not in a hospital admission)      Potential issues to be addressed PRIOR TO DISCHARGE: NONE      Sandeep Pinon, [facility] [phone]      No current outpatient medications on file prior to encounter.                     .          "

## 2022-01-04 NOTE — HPI
55 yo male with no known significant past medical history presented to ED with progressively worsening SOB. Onset 4 days ago associated with generalized weakness, fatigue, chest tightness, dizziness, near syncope and 10-15 lb unintentional weight loss within the last month. No exacerbating/alleviating factors. Pt denies cough, congestion and fever. He reports 1 episode of emesis PTA. No known sick contact. Initial labs remarkable for Hbg/Hct 3/12. Occult stool negative, hemodynamics stable. Pt denies hematemesis. Reports stools appear dark. Pt reports history of lap tevin 4 months ago. He does not have a primary care physician. No previous colonoscopy. 3 units PRBC transfusion initiated in ED. Patient placed in observation for further evaluation and treatment.

## 2022-01-04 NOTE — SUBJECTIVE & OBJECTIVE
No past medical history on file.    No past surgical history on file.    Review of patient's allergies indicates:  No Known Allergies  Family History    None       Tobacco Use    Smoking status: Not on file    Smokeless tobacco: Not on file   Substance and Sexual Activity    Alcohol use: Not on file    Drug use: Not on file    Sexual activity: Not on file     Review of Systems   Constitutional: Negative for appetite change, chills, diaphoresis, fatigue, fever and unexpected weight change.   HENT: Negative for trouble swallowing.    Eyes: Negative for visual disturbance.   Respiratory: Positive for shortness of breath (now resolved since transfusion). Negative for cough.    Cardiovascular: Negative for chest pain.   Gastrointestinal: Negative for abdominal distention, abdominal pain, anal bleeding, blood in stool, constipation, diarrhea, nausea and vomiting.   Genitourinary: Negative for dysuria and hematuria.   Skin: Positive for pallor. Negative for color change.   Neurological: Negative for dizziness, syncope and weakness.   Psychiatric/Behavioral: Negative for confusion and dysphoric mood. The patient is nervous/anxious.      Objective:     Vital Signs (Most Recent):  Temp: 98.1 °F (36.7 °C) (01/04/22 0551)  Pulse: 81 (01/04/22 0932)  Resp: 20 (01/04/22 0932)  BP: 110/60 (01/04/22 0932)  SpO2: 100 % (01/04/22 0932) Vital Signs (24h Range):  Temp:  [97.8 °F (36.6 °C)-98.1 °F (36.7 °C)] 98.1 °F (36.7 °C)  Pulse:  [] 81  Resp:  [14-20] 20  SpO2:  [100 %] 100 %  BP: (104-150)/(56-68) 110/60     Weight:  (needs bed weight) (01/03/22 1947)  There is no height or weight on file to calculate BMI.      Intake/Output Summary (Last 24 hours) at 1/4/2022 1012  Last data filed at 1/4/2022 0743  Gross per 24 hour   Intake 1200 ml   Output --   Net 1200 ml       Lines/Drains/Airways     Peripheral Intravenous Line                 Peripheral IV - Single Lumen 01/03/22 2254 20 G Right Antecubital <1 day                 Physical Exam  Constitutional:       General: He is not in acute distress.     Appearance: Normal appearance. He is not ill-appearing, toxic-appearing or diaphoretic.   HENT:      Head: Normocephalic and atraumatic.   Eyes:      General: No scleral icterus.     Extraocular Movements: Extraocular movements intact.   Cardiovascular:      Rate and Rhythm: Normal rate and regular rhythm.   Pulmonary:      Effort: Pulmonary effort is normal. No respiratory distress.   Abdominal:      General: Bowel sounds are normal. There is no distension.      Palpations: Abdomen is soft. There is no mass.      Tenderness: There is no abdominal tenderness. There is no guarding.   Musculoskeletal:         General: Normal range of motion.      Cervical back: Normal range of motion.      Right lower leg: No edema.      Left lower leg: No edema.   Skin:     General: Skin is warm and dry.      Coloration: Skin is pale. Skin is not jaundiced.   Neurological:      General: No focal deficit present.      Mental Status: He is alert and oriented to person, place, and time.   Psychiatric:         Mood and Affect: Mood normal.         Behavior: Behavior normal.         Thought Content: Thought content normal.          Significant Labs:  CBC:   Recent Labs   Lab 01/03/22  2201 01/04/22  0919   WBC 9.13 7.43   HGB 3.0* 6.7*   HCT 12.5* 23.3*    320     CMP:   Recent Labs   Lab 01/04/22  0919   GLU 86   CALCIUM 8.5*   ALBUMIN 3.9   PROT 7.4      K 3.9   CO2 19*      BUN 14   CREATININE 0.8   ALKPHOS 46*   ALT 13   AST 15   BILITOT 1.8*     Coagulation:   Recent Labs   Lab 01/04/22  0054   INR 1.0       Significant Imaging:  Imaging results within the past 24 hours have been reviewed.

## 2022-01-04 NOTE — H&P (VIEW-ONLY)
[facility] - Emergency Dept.  Gastroenterology  Consult Note    Patient Name: Shawn Carreno  MRN: [id number]  Admission Date: 1/3/2022  Hospital Length of Stay: 0 days  Code Status: Full Code   Attending Provider: Alejandro Zhang MD   Consulting Provider: Arianne Cox PA-C  Primary Care Physician: No primary care provider on file.  Principal Problem:Symptomatic anemia    Inpatient consult to Gastroenterology  Consult performed by: Arianne Cox PA-C  Consult ordered by: Nicolasa Freitas MD  Reason for consult: severe anemia        Subjective:     HPI:  Patient is a 57yo male who reported to the ED for worsening SOB over this past week. Upon arrival to the ED, he was noted to have a hemoglobin of 3. Denies any overt bleeding such as hematemesis, hematochezia, rectal bleeding, hematuria, melena, epistaxis. Occult blood in the ED negative. He was transfused 3 units and reports much improvement in symptoms. Denies any current complaints. Denies ever having had scopes in the past. Iron processing. Xray completed and normal.      No past medical history on file.    No past surgical history on file.    Review of patient's allergies indicates:  No Known Allergies  Family History    None       Tobacco Use    Smoking status: Not on file    Smokeless tobacco: Not on file   Substance and Sexual Activity    Alcohol use: Not on file    Drug use: Not on file    Sexual activity: Not on file     Review of Systems   Constitutional: Negative for appetite change, chills, diaphoresis, fatigue, fever and unexpected weight change.   HENT: Negative for trouble swallowing.    Eyes: Negative for visual disturbance.   Respiratory: Positive for shortness of breath (now resolved since transfusion). Negative for cough.    Cardiovascular: Negative for chest pain.   Gastrointestinal: Negative for abdominal distention, abdominal pain, anal bleeding, blood in stool, constipation, diarrhea, nausea and vomiting.   Genitourinary: Negative for  dysuria and hematuria.   Skin: Positive for pallor. Negative for color change.   Neurological: Negative for dizziness, syncope and weakness.   Psychiatric/Behavioral: Negative for confusion and dysphoric mood. The patient is nervous/anxious.      Objective:     Vital Signs (Most Recent):  Temp: 98.1 °F (36.7 °C) (01/04/22 0551)  Pulse: 81 (01/04/22 0932)  Resp: 20 (01/04/22 0932)  BP: 110/60 (01/04/22 0932)  SpO2: 100 % (01/04/22 0932) Vital Signs (24h Range):  Temp:  [97.8 °F (36.6 °C)-98.1 °F (36.7 °C)] 98.1 °F (36.7 °C)  Pulse:  [] 81  Resp:  [14-20] 20  SpO2:  [100 %] 100 %  BP: (104-150)/(56-68) 110/60     Weight:  (needs bed weight) (01/03/22 1947)  There is no height or weight on file to calculate BMI.      Intake/Output Summary (Last 24 hours) at 1/4/2022 1012  Last data filed at 1/4/2022 0743  Gross per 24 hour   Intake 1200 ml   Output --   Net 1200 ml       Lines/Drains/Airways     Peripheral Intravenous Line                 Peripheral IV - Single Lumen 01/03/22 2254 20 G Right Antecubital <1 day                Physical Exam  Constitutional:       General: He is not in acute distress.     Appearance: Normal appearance. He is not ill-appearing, toxic-appearing or diaphoretic.   HENT:      Head: Normocephalic and atraumatic.   Eyes:      General: No scleral icterus.     Extraocular Movements: Extraocular movements intact.   Cardiovascular:      Rate and Rhythm: Normal rate and regular rhythm.   Pulmonary:      Effort: Pulmonary effort is normal. No respiratory distress.   Abdominal:      General: Bowel sounds are normal. There is no distension.      Palpations: Abdomen is soft. There is no mass.      Tenderness: There is no abdominal tenderness. There is no guarding.   Musculoskeletal:         General: Normal range of motion.      Cervical back: Normal range of motion.      Right lower leg: No edema.      Left lower leg: No edema.   Skin:     General: Skin is warm and dry.      Coloration: Skin is  pale. Skin is not jaundiced.   Neurological:      General: No focal deficit present.      Mental Status: He is alert and oriented to person, place, and time.   Psychiatric:         Mood and Affect: Mood normal.         Behavior: Behavior normal.         Thought Content: Thought content normal.          Significant Labs:  CBC:   Recent Labs   Lab 01/03/22  2201 01/04/22  0919   WBC 9.13 7.43   HGB 3.0* 6.7*   HCT 12.5* 23.3*    320     CMP:   Recent Labs   Lab 01/04/22  0919   GLU 86   CALCIUM 8.5*   ALBUMIN 3.9   PROT 7.4      K 3.9   CO2 19*      BUN 14   CREATININE 0.8   ALKPHOS 46*   ALT 13   AST 15   BILITOT 1.8*     Coagulation:   Recent Labs   Lab 01/04/22  0054   INR 1.0       Significant Imaging:  Imaging results within the past 24 hours have been reviewed.    Assessment/Plan:     * Symptomatic anemia  -Discussed with Dr. Carter. Will plan on EGD/colonoscopy tomorrow due to severe anemia.   -NPO midnight  -Continue to monitor H/H  -Has been transfused x 3 units. Transfuse to keep >7.          Thank you for your consult. I will follow-up with patient. Please contact us if you have any additional questions.    Arianne Cox PA-C  Gastroenterology  [facility] - Emergency Dept.

## 2022-01-05 ENCOUNTER — ANESTHESIA (OUTPATIENT)
Dept: ENDOSCOPY | Facility: HOSPITAL | Age: 57
DRG: 812 | End: 2022-01-05
Payer: MEDICAID

## 2022-01-05 ENCOUNTER — ANESTHESIA EVENT (OUTPATIENT)
Dept: ENDOSCOPY | Facility: HOSPITAL | Age: 57
DRG: 812 | End: 2022-01-05
Payer: MEDICAID

## 2022-01-05 LAB
ALBUMIN SERPL BCP-MCNC: 3.5 G/DL (ref 3.5–5.2)
ALP SERPL-CCNC: 43 U/L (ref 55–135)
ALT SERPL W/O P-5'-P-CCNC: 14 U/L (ref 10–44)
ANION GAP SERPL CALC-SCNC: 11 MMOL/L (ref 8–16)
AST SERPL-CCNC: 18 U/L (ref 10–40)
BASOPHILS # BLD AUTO: 0.07 K/UL (ref 0–0.2)
BASOPHILS NFR BLD: 1 % (ref 0–1.9)
BILIRUB SERPL-MCNC: 1.2 MG/DL (ref 0.1–1)
BLD PROD TYP BPU: NORMAL
BLD PROD TYP BPU: NORMAL
BLOOD UNIT EXPIRATION DATE: NORMAL
BLOOD UNIT EXPIRATION DATE: NORMAL
BLOOD UNIT TYPE CODE: 5100
BLOOD UNIT TYPE CODE: 5100
BLOOD UNIT TYPE: NORMAL
BLOOD UNIT TYPE: NORMAL
BUN SERPL-MCNC: 13 MG/DL (ref 6–20)
CALCIUM SERPL-MCNC: 8.4 MG/DL (ref 8.7–10.5)
CHLORIDE SERPL-SCNC: 108 MMOL/L (ref 95–110)
CO2 SERPL-SCNC: 22 MMOL/L (ref 23–29)
CODING SYSTEM: NORMAL
CODING SYSTEM: NORMAL
CREAT SERPL-MCNC: 0.8 MG/DL (ref 0.5–1.4)
DIFFERENTIAL METHOD: ABNORMAL
DISPENSE STATUS: NORMAL
DISPENSE STATUS: NORMAL
EOSINOPHIL # BLD AUTO: 0.1 K/UL (ref 0–0.5)
EOSINOPHIL NFR BLD: 1.4 % (ref 0–8)
ERYTHROCYTE [DISTWIDTH] IN BLOOD BY AUTOMATED COUNT: 25.2 % (ref 11.5–14.5)
EST. GFR  (AFRICAN AMERICAN): >60 ML/MIN/1.73 M^2
EST. GFR  (NON AFRICAN AMERICAN): >60 ML/MIN/1.73 M^2
GLUCOSE SERPL-MCNC: 80 MG/DL (ref 70–110)
HCT VFR BLD AUTO: 22.8 % (ref 40–54)
HCT VFR BLD AUTO: 26.1 % (ref 40–54)
HGB BLD-MCNC: 6.6 G/DL (ref 14–18)
HGB BLD-MCNC: 7.8 G/DL (ref 14–18)
IMM GRANULOCYTES # BLD AUTO: 0.06 K/UL (ref 0–0.04)
IMM GRANULOCYTES NFR BLD AUTO: 0.9 % (ref 0–0.5)
LYMPHOCYTES # BLD AUTO: 1.5 K/UL (ref 1–4.8)
LYMPHOCYTES NFR BLD: 21.9 % (ref 18–48)
MCH RBC QN AUTO: 21.4 PG (ref 27–31)
MCHC RBC AUTO-ENTMCNC: 28.9 G/DL (ref 32–36)
MCV RBC AUTO: 74 FL (ref 82–98)
MONOCYTES # BLD AUTO: 0.8 K/UL (ref 0.3–1)
MONOCYTES NFR BLD: 10.9 % (ref 4–15)
NEUTROPHILS # BLD AUTO: 4.5 K/UL (ref 1.8–7.7)
NEUTROPHILS NFR BLD: 63.9 % (ref 38–73)
NRBC BLD-RTO: 1 /100 WBC
NUM UNITS TRANS PACKED RBC: NORMAL
NUM UNITS TRANS PACKED RBC: NORMAL
PLATELET # BLD AUTO: 306 K/UL (ref 150–450)
PMV BLD AUTO: 9.9 FL (ref 9.2–12.9)
POTASSIUM SERPL-SCNC: 3.8 MMOL/L (ref 3.5–5.1)
PROT SERPL-MCNC: 6.7 G/DL (ref 6–8.4)
RBC # BLD AUTO: 3.08 M/UL (ref 4.6–6.2)
SODIUM SERPL-SCNC: 141 MMOL/L (ref 136–145)
WBC # BLD AUTO: 6.99 K/UL (ref 3.9–12.7)

## 2022-01-05 PROCEDURE — 43239 EGD BIOPSY SINGLE/MULTIPLE: CPT | Mod: 51,,, | Performed by: INTERNAL MEDICINE

## 2022-01-05 PROCEDURE — 36415 COLL VENOUS BLD VENIPUNCTURE: CPT | Performed by: NURSE PRACTITIONER

## 2022-01-05 PROCEDURE — 80053 COMPREHEN METABOLIC PANEL: CPT | Performed by: NURSE PRACTITIONER

## 2022-01-05 PROCEDURE — 63600175 PHARM REV CODE 636 W HCPCS: Performed by: FAMILY MEDICINE

## 2022-01-05 PROCEDURE — 88342 IMHCHEM/IMCYTCHM 1ST ANTB: CPT | Performed by: PATHOLOGY

## 2022-01-05 PROCEDURE — 88342 IMHCHEM/IMCYTCHM 1ST ANTB: CPT | Mod: 26,,, | Performed by: PATHOLOGY

## 2022-01-05 PROCEDURE — 45385 COLONOSCOPY W/LESION REMOVAL: CPT | Performed by: INTERNAL MEDICINE

## 2022-01-05 PROCEDURE — 88305 TISSUE EXAM BY PATHOLOGIST: ICD-10-PCS | Mod: 26,,, | Performed by: PATHOLOGY

## 2022-01-05 PROCEDURE — 36430 TRANSFUSION BLD/BLD COMPNT: CPT

## 2022-01-05 PROCEDURE — 37000009 HC ANESTHESIA EA ADD 15 MINS: Performed by: INTERNAL MEDICINE

## 2022-01-05 PROCEDURE — 25000003 PHARM REV CODE 250: Performed by: NURSE ANESTHETIST, CERTIFIED REGISTERED

## 2022-01-05 PROCEDURE — 36415 COLL VENOUS BLD VENIPUNCTURE: CPT | Performed by: FAMILY MEDICINE

## 2022-01-05 PROCEDURE — 21400001 HC TELEMETRY ROOM

## 2022-01-05 PROCEDURE — 85018 HEMOGLOBIN: CPT | Performed by: FAMILY MEDICINE

## 2022-01-05 PROCEDURE — 43239 EGD BIOPSY SINGLE/MULTIPLE: CPT | Performed by: INTERNAL MEDICINE

## 2022-01-05 PROCEDURE — P9016 RBC LEUKOCYTES REDUCED: HCPCS | Performed by: FAMILY MEDICINE

## 2022-01-05 PROCEDURE — 45385 PR COLONOSCOPY,REMV LESN,SNARE: ICD-10-PCS | Mod: ,,, | Performed by: INTERNAL MEDICINE

## 2022-01-05 PROCEDURE — 45385 COLONOSCOPY W/LESION REMOVAL: CPT | Mod: ,,, | Performed by: INTERNAL MEDICINE

## 2022-01-05 PROCEDURE — 63600175 PHARM REV CODE 636 W HCPCS: Performed by: NURSE ANESTHETIST, CERTIFIED REGISTERED

## 2022-01-05 PROCEDURE — 85014 HEMATOCRIT: CPT | Performed by: FAMILY MEDICINE

## 2022-01-05 PROCEDURE — 85025 COMPLETE CBC W/AUTO DIFF WBC: CPT | Performed by: FAMILY MEDICINE

## 2022-01-05 PROCEDURE — 43239 PR EGD, FLEX, W/BIOPSY, SGL/MULTI: ICD-10-PCS | Mod: 51,,, | Performed by: INTERNAL MEDICINE

## 2022-01-05 PROCEDURE — 94761 N-INVAS EAR/PLS OXIMETRY MLT: CPT

## 2022-01-05 PROCEDURE — 37000008 HC ANESTHESIA 1ST 15 MINUTES: Performed by: INTERNAL MEDICINE

## 2022-01-05 PROCEDURE — 88342 CHG IMMUNOCYTOCHEMISTRY: ICD-10-PCS | Mod: 26,,, | Performed by: PATHOLOGY

## 2022-01-05 PROCEDURE — 88305 TISSUE EXAM BY PATHOLOGIST: CPT | Performed by: PATHOLOGY

## 2022-01-05 PROCEDURE — 88305 TISSUE EXAM BY PATHOLOGIST: CPT | Mod: 26,,, | Performed by: PATHOLOGY

## 2022-01-05 PROCEDURE — 27201012 HC FORCEPS, HOT/COLD, DISP: Performed by: INTERNAL MEDICINE

## 2022-01-05 PROCEDURE — 27201089 HC SNARE, DISP (ANY): Performed by: INTERNAL MEDICINE

## 2022-01-05 RX ORDER — PROPOFOL 10 MG/ML
VIAL (ML) INTRAVENOUS
Status: DISCONTINUED | OUTPATIENT
Start: 2022-01-05 | End: 2022-01-05

## 2022-01-05 RX ORDER — LIDOCAINE HYDROCHLORIDE 10 MG/ML
INJECTION, SOLUTION EPIDURAL; INFILTRATION; INTRACAUDAL; PERINEURAL
Status: DISCONTINUED | OUTPATIENT
Start: 2022-01-05 | End: 2022-01-05

## 2022-01-05 RX ORDER — SODIUM CHLORIDE, SODIUM LACTATE, POTASSIUM CHLORIDE, CALCIUM CHLORIDE 600; 310; 30; 20 MG/100ML; MG/100ML; MG/100ML; MG/100ML
INJECTION, SOLUTION INTRAVENOUS CONTINUOUS PRN
Status: DISCONTINUED | OUTPATIENT
Start: 2022-01-05 | End: 2022-01-05

## 2022-01-05 RX ADMIN — PROPOFOL 30 MG: 10 INJECTION, EMULSION INTRAVENOUS at 10:01

## 2022-01-05 RX ADMIN — PROPOFOL 50 MG: 10 INJECTION, EMULSION INTRAVENOUS at 10:01

## 2022-01-05 RX ADMIN — LIDOCAINE HYDROCHLORIDE 50 MG: 10 INJECTION, SOLUTION EPIDURAL; INFILTRATION; INTRACAUDAL; PERINEURAL at 10:01

## 2022-01-05 RX ADMIN — IRON SUCROSE 200 MG: 20 INJECTION, SOLUTION INTRAVENOUS at 10:01

## 2022-01-05 RX ADMIN — SODIUM CHLORIDE, SODIUM LACTATE, POTASSIUM CHLORIDE, AND CALCIUM CHLORIDE: 600; 310; 30; 20 INJECTION, SOLUTION INTRAVENOUS at 10:01

## 2022-01-05 NOTE — PROGRESS NOTES
Upper endoscopy and colonoscopy showed no source of bleeding.  Will plan for outpatient video capsule with the absence of overt bleeding going on I recommend a Hematology consult.  This was removed to the patient and Dr. Valdivia    Will arrange outpatient follow-up in GI clinic as well.    Hannah Alvarado MD  Gastroenterology  Director of Advanced Endoscopy at [facility]

## 2022-01-05 NOTE — PLAN OF CARE
15 minute check complete for blood administration. Patient transferred to procedure room with RN.

## 2022-01-05 NOTE — HOSPITAL COURSE
Patient presented for chest pain and sob. Troponin WNL however patient found severely anemic with Hgb: 3. He was transfused prbcs and workup showed BRITTANI. EGD and C-scope by gi did not reveal source of bleeding. Outpatient capsule endoscopy recommended. Hematology consulted and will f/u with patient outpatient. He was given 2 doses of venofer and po iron was started upon discharge. Patient was stable without any overt signs of bleeding. He was discharged home.

## 2022-01-05 NOTE — PROVATION PATIENT INSTRUCTIONS
Discharge Summary/Instructions after an Endoscopic Procedure  Patient Name: Shawn Carreno  Patient MRN: [id number]  Patient YOB: 1965 Wednesday, January 5, 2022 Hannah Alvarado MD  Dear patient,  As a result of recent federal legislation (The Federal Cures Act), you may   receive lab or pathology results from your procedure in your [facility]   account before your physician is able to contact you. Your physician or   their representative will relay the results to you with their   recommendations at their soonest availability.  Thank you,  RESTRICTIONS:  During your procedure today, you received medications for sedation.  These   medications may affect your judgment, balance and coordination.  Therefore,   for 24 hours, you have the following restrictions:   - DO NOT drive a car, operate machinery, make legal/financial decisions,   sign important papers or drink alcohol.    ACTIVITY:  Today: no heavy lifting, straining or running due to procedural   sedation/anesthesia.  The following day: return to full activity including work.  DIET:  Eat and drink normally unless instructed otherwise.     TREATMENT FOR COMMON SIDE EFFECTS:  - Mild abdominal pain, nausea, belching, bloating or excessive gas:  rest,   eat lightly and use a heating pad.  - Sore Throat: treat with throat lozenges and/or gargle with warm salt   water.  - Because air was used during the procedure, expelling large amounts of air   from your rectum or belching is normal.  - If a bowel prep was taken, you may not have a bowel movement for 1-3 days.    This is normal.  SYMPTOMS TO WATCH FOR AND REPORT TO YOUR PHYSICIAN:  1. Abdominal pain or bloating, other than gas cramps.  2. Chest pain.  3. Back pain.  4. Signs of infection such as: chills or fever occurring within 24 hours   after the procedure.  5. Rectal bleeding, which would show as bright red, maroon, or black stools.   (A tablespoon of blood from the rectum is not serious,  especially if   hemorrhoids are present.)  6. Vomiting.  7. Weakness or dizziness.  GO DIRECTLY TO THE NEAREST EMERGENCY ROOM IF YOU HAVE ANY OF THE FOLLOWING:      Difficulty breathing              Chills and/or fever over 101 F   Persistent vomiting and/or vomiting blood   Severe abdominal pain   Severe chest pain   Black, tarry stools   Bleeding- more than one tablespoon   Any other symptom or condition that you feel may need urgent attention  Your doctor recommends these additional instructions:  If any biopsies were taken, your doctors clinic will contact you in 1 to 2   weeks with any results.  - Discharge patient to home.   - Resume previous diet.   - Continue present medications.   - Await pathology results.   - Repeat colonoscopy in 5 years for surveillance based on pathology   results.  For questions, problems or results please call your physician Hannah Alvarado MD at Work:  [phone]  If you have any questions about the above instructions, call the GI   department at [phone] or call the endoscopy unit at [phone]   from 7am until 3 pm.  [facility], EMERGENCY ROOM PHONE NUMBER:   [phone]  IF A COMPLICATION OR EMERGENCY SITUATION ARISES AND YOU ARE UNABLE TO REACH   YOUR PHYSICIAN - GO DIRECTLY TO THE EMERGENCY ROOM.  I have read or have had read to me these discharge instructions for my   procedure and have received a written copy.  I understand these   instructions and will follow-up with my physician if I have any questions.     __________________________________       _____________________________________  Nurse Signature                                          Patient/Designated   Responsible Party Signature  MD Hannah Rico MD  1/5/2022 11:22:10 AM  This report has been verified and signed electronically.  Dear patient,  As a result of recent federal legislation (The Federal Cures Act), you may    receive lab or pathology results from your procedure in your [facility]   account before your physician is able to contact you. Your physician or   their representative will relay the results to you with their   recommendations at their soonest availability.  Thank you,  PROVATION

## 2022-01-05 NOTE — PROVATION PATIENT INSTRUCTIONS
Discharge Summary/Instructions after an Endoscopic Procedure  Patient Name: Shawn Carreno  Patient MRN: [id number]  Patient YOB: 1965 Wednesday, January 5, 2022 Hannah Alvarado MD  Dear patient,  As a result of recent federal legislation (The Federal Cures Act), you may   receive lab or pathology results from your procedure in your [facility]   account before your physician is able to contact you. Your physician or   their representative will relay the results to you with their   recommendations at their soonest availability.  Thank you,  RESTRICTIONS:  During your procedure today, you received medications for sedation.  These   medications may affect your judgment, balance and coordination.  Therefore,   for 24 hours, you have the following restrictions:   - DO NOT drive a car, operate machinery, make legal/financial decisions,   sign important papers or drink alcohol.    ACTIVITY:  Today: no heavy lifting, straining or running due to procedural   sedation/anesthesia.  The following day: return to full activity including work.  DIET:  Eat and drink normally unless instructed otherwise.     TREATMENT FOR COMMON SIDE EFFECTS:  - Mild abdominal pain, nausea, belching, bloating or excessive gas:  rest,   eat lightly and use a heating pad.  - Sore Throat: treat with throat lozenges and/or gargle with warm salt   water.  - Because air was used during the procedure, expelling large amounts of air   from your rectum or belching is normal.  - If a bowel prep was taken, you may not have a bowel movement for 1-3 days.    This is normal.  SYMPTOMS TO WATCH FOR AND REPORT TO YOUR PHYSICIAN:  1. Abdominal pain or bloating, other than gas cramps.  2. Chest pain.  3. Back pain.  4. Signs of infection such as: chills or fever occurring within 24 hours   after the procedure.  5. Rectal bleeding, which would show as bright red, maroon, or black stools.   (A tablespoon of blood from the rectum is not serious,  especially if   hemorrhoids are present.)  6. Vomiting.  7. Weakness or dizziness.  GO DIRECTLY TO THE NEAREST EMERGENCY ROOM IF YOU HAVE ANY OF THE FOLLOWING:      Difficulty breathing              Chills and/or fever over 101 F   Persistent vomiting and/or vomiting blood   Severe abdominal pain   Severe chest pain   Black, tarry stools   Bleeding- more than one tablespoon   Any other symptom or condition that you feel may need urgent attention  Your doctor recommends these additional instructions:  If any biopsies were taken, your doctors clinic will contact you in 1 to 2   weeks with any results.  - Return patient to hospital moe for ongoing care.   - Resume previous diet.   - Continue present medications.   - Await pathology results.   - Perform a colonoscopy.  For questions, problems or results please call your physician Hannah Alvarado MD at Work:  [phone]  If you have any questions about the above instructions, call the GI   department at [phone] or call the endoscopy unit at [phone]   from 7am until 3 pm.  [facility], EMERGENCY ROOM PHONE NUMBER:   [phone]  IF A COMPLICATION OR EMERGENCY SITUATION ARISES AND YOU ARE UNABLE TO REACH   YOUR PHYSICIAN - GO DIRECTLY TO THE EMERGENCY ROOM.  I have read or have had read to me these discharge instructions for my   procedure and have received a written copy.  I understand these   instructions and will follow-up with my physician if I have any questions.     __________________________________       _____________________________________  Nurse Signature                                          Patient/Designated   Responsible Party Signature  MD Hannah Rico MD  1/5/2022 10:48:09 AM  This report has been verified and signed electronically.  Dear patient,  As a result of recent federal legislation (The Federal Cures Act), you may   receive lab or pathology results  from your procedure in your [facility]   account before your physician is able to contact you. Your physician or   their representative will relay the results to you with their   recommendations at their soonest availability.  Thank you,  PROVATION

## 2022-01-05 NOTE — NURSING
Patient admitted from ED via stretcher with Alejo prep. POC reviewed with patient and he verbalized understanding. Denies pain/discomfort. Safety precautions in place. NPO after midnight for EGD/colonoscopy in AM. Call bell within reach. Reminded to call for assistance.

## 2022-01-05 NOTE — TRANSFER OF CARE
"Anesthesia Transfer of Care Note    Patient: Shawn Carreno    Procedure(s) Performed: Procedure(s) (LRB):  EGD (ESOPHAGOGASTRODUODENOSCOPY) (N/A)  COLONOSCOPY (N/A)    Patient location: GI    Anesthesia Type: MAC    Transport from OR: Transported from OR on room air with adequate spontaneous ventilation    Post pain: adequate analgesia    Post assessment: no apparent anesthetic complications and tolerated procedure well    Post vital signs: stable    Level of consciousness: awake, alert and oriented    Nausea/Vomiting: no nausea/vomiting    Complications: none    Transfer of care protocol was followed      Last vitals:   Visit Vitals  /79   Pulse 100   Temp 36.8 °C (98.3 °F) (Temporal)   Resp 20   Ht 5' 7" (1.702 m)   Wt 88.1 kg (194 lb 3.6 oz)   SpO2 98%   BMI 30.42 kg/m²     "

## 2022-01-05 NOTE — SUBJECTIVE & OBJECTIVE
Interval History: s/p EGD and cscope. Tolerated procedure well. No overt signs of bleeding.     Review of Systems   Constitutional: Positive for fatigue. Negative for fever.   HENT: Negative for sinus pressure.    Eyes: Negative for visual disturbance.   Respiratory: Positive for shortness of breath.    Cardiovascular: Negative for chest pain.   Gastrointestinal: Negative for nausea and vomiting.   Genitourinary: Negative for difficulty urinating.   Musculoskeletal: Negative for back pain.   Skin: Negative for rash.   Neurological: Positive for weakness. Negative for headaches.   Psychiatric/Behavioral: Negative for confusion.     Objective:     Vital Signs (Most Recent):  Temp: 98.8 °F (37.1 °C) (01/05/22 1445)  Pulse: 96 (01/05/22 1445)  Resp: 16 (01/05/22 1445)  BP: (!) 123/56 (01/05/22 1445)  SpO2: 97 % (01/05/22 1445) Vital Signs (24h Range):  Temp:  [97.5 °F (36.4 °C)-98.8 °F (37.1 °C)] 98.8 °F (37.1 °C)  Pulse:  [] 96  Resp:  [16-20] 16  SpO2:  [93 %-100 %] 97 %  BP: (103-153)/(55-85) 123/56     Weight: 88.1 kg (194 lb 3.6 oz)  Body mass index is 30.42 kg/m².    Intake/Output Summary (Last 24 hours) at 1/5/2022 2984  Last data filed at 1/5/2022 1400  Gross per 24 hour   Intake 3590 ml   Output --   Net 3590 ml      Physical Exam  Constitutional:       General: He is not in acute distress.     Appearance: He is well-developed and well-nourished. He is not diaphoretic.   HENT:      Head: Normocephalic and atraumatic.   Eyes:      Pupils: Pupils are equal, round, and reactive to light.      Comments: Pale conjunctivae   Cardiovascular:      Rate and Rhythm: Normal rate and regular rhythm.      Heart sounds: Normal heart sounds. No murmur heard.  No friction rub. No gallop.    Pulmonary:      Effort: Pulmonary effort is normal. No respiratory distress.      Breath sounds: Normal breath sounds. No stridor. No wheezing or rales.   Abdominal:      General: Bowel sounds are normal. There is no distension.       Palpations: Abdomen is soft. There is no mass.      Tenderness: There is no abdominal tenderness. There is no guarding.   Musculoskeletal:      Right lower leg: No edema.      Left lower leg: No edema.   Skin:     General: Skin is warm.      Findings: No erythema.   Neurological:      Mental Status: He is alert and oriented to person, place, and time.         Significant Labs: All pertinent labs within the past 24 hours have been reviewed.    Significant Imaging: I have reviewed all pertinent imaging results/findings within the past 24 hours.

## 2022-01-05 NOTE — INTERVAL H&P NOTE
The patient has been examined and the H&P has been reviewed:    I concur with the findings and no changes have occurred since H&P was written.    Procedure risks, benefits and alternative options discussed and understood by patient/family.          Active Hospital Problems    Diagnosis  POA    *Symptomatic anemia [D64.9]  Yes      Resolved Hospital Problems   No resolved problems to display.

## 2022-01-05 NOTE — ANESTHESIA POSTPROCEDURE EVALUATION
Anesthesia Post Evaluation    Patient: Shawn Carreno    Procedure(s) Performed: Procedure(s) (LRB):  EGD (ESOPHAGOGASTRODUODENOSCOPY) (N/A)  COLONOSCOPY (N/A)    Final Anesthesia Type: MAC      Patient location during evaluation: GI PACU  Patient participation: Yes- Able to Participate  Level of consciousness: awake and alert and oriented  Post-procedure vital signs: reviewed and stable  Pain management: adequate  Airway patency: patent  KJ mitigation strategies: Multimodal analgesia  PONV status at discharge: No PONV  Anesthetic complications: no      Cardiovascular status: hemodynamically stable  Respiratory status: unassisted, spontaneous ventilation and room air  Hydration status: euvolemic  Follow-up not needed.          Vitals Value Taken Time   /65 01/05/22 1106   Temp  01/05/22 1108   Pulse 86 01/05/22 1106   Resp 18 01/05/22 1106   SpO2 94 % 01/05/22 1106         No case tracking events are documented in the log.      Pain/Samia Score: No data recorded

## 2022-01-05 NOTE — PLAN OF CARE
Pt AAO4. VSS. Pt remained free of falls this shift. No complaints of pain or discomfort. Medications administered as ordered. Pt drank go lightly bowel prep. Pt is normal sinus on monitor. Hourly rounding completed. Pt instructed to call for assistance. POC reviewed. Pt verbalized understanding. Pt understood to be NPO after midnight for today's procedure.

## 2022-01-05 NOTE — OR NURSING
Pt adequately sedated. Blood infusing. Timeout completed with all staff. See Anesthesia notes for all vital signs and medications.

## 2022-01-05 NOTE — ANESTHESIA RELEASE NOTE
"Anesthesia Release from PACU Note    Patient: Shawn Carreno    Procedure(s) Performed: Procedure(s) (LRB):  EGD (ESOPHAGOGASTRODUODENOSCOPY) (N/A)  COLONOSCOPY (N/A)    Anesthesia type: MAC    Post pain: Adequate analgesia    Post assessment: no apparent anesthetic complications and tolerated procedure well    Last Vitals:   Visit Vitals  /65 (BP Location: Left arm, Patient Position: Lying)   Pulse 86   Temp 36.8 °C (98.3 °F) (Temporal)   Resp 18   Ht 5' 7" (1.702 m)   Wt 88.1 kg (194 lb 3.6 oz)   SpO2 (!) 94%   BMI 30.42 kg/m²       Post vital signs: stable    Level of consciousness: awake, alert  and oriented    Nausea/Vomiting: no nausea/no vomiting    Complications: none    Airway Patency: patent    Respiratory: unassisted, spontaneous ventilation, room air    Cardiovascular: stable and blood pressure at baseline    Hydration: euvolemic  "

## 2022-01-06 ENCOUNTER — TELEPHONE (OUTPATIENT)
Dept: GASTROENTEROLOGY | Facility: CLINIC | Age: 57
End: 2022-01-06
Payer: MEDICAID

## 2022-01-06 VITALS
OXYGEN SATURATION: 100 % | RESPIRATION RATE: 18 BRPM | BODY MASS INDEX: 30.69 KG/M2 | HEART RATE: 83 BPM | WEIGHT: 195.56 LBS | TEMPERATURE: 98 F | SYSTOLIC BLOOD PRESSURE: 136 MMHG | DIASTOLIC BLOOD PRESSURE: 65 MMHG | HEIGHT: 67 IN

## 2022-01-06 LAB
ALBUMIN SERPL BCP-MCNC: 3.6 G/DL (ref 3.5–5.2)
ALP SERPL-CCNC: 48 U/L (ref 55–135)
ALT SERPL W/O P-5'-P-CCNC: 14 U/L (ref 10–44)
ANION GAP SERPL CALC-SCNC: 8 MMOL/L (ref 8–16)
AST SERPL-CCNC: 17 U/L (ref 10–40)
BASOPHILS # BLD AUTO: 0.06 K/UL (ref 0–0.2)
BASOPHILS NFR BLD: 0.8 % (ref 0–1.9)
BILIRUB SERPL-MCNC: 1.1 MG/DL (ref 0.1–1)
BUN SERPL-MCNC: 12 MG/DL (ref 6–20)
CALCIUM SERPL-MCNC: 8.4 MG/DL (ref 8.7–10.5)
CHLORIDE SERPL-SCNC: 108 MMOL/L (ref 95–110)
CO2 SERPL-SCNC: 25 MMOL/L (ref 23–29)
CREAT SERPL-MCNC: 0.9 MG/DL (ref 0.5–1.4)
DIFFERENTIAL METHOD: ABNORMAL
EOSINOPHIL # BLD AUTO: 0.2 K/UL (ref 0–0.5)
EOSINOPHIL NFR BLD: 2 % (ref 0–8)
ERYTHROCYTE [DISTWIDTH] IN BLOOD BY AUTOMATED COUNT: 24.7 % (ref 11.5–14.5)
EST. GFR  (AFRICAN AMERICAN): >60 ML/MIN/1.73 M^2
EST. GFR  (NON AFRICAN AMERICAN): >60 ML/MIN/1.73 M^2
GLUCOSE SERPL-MCNC: 82 MG/DL (ref 70–110)
HCT VFR BLD AUTO: 30.8 % (ref 40–54)
HGB BLD-MCNC: 9.1 G/DL (ref 14–18)
IMM GRANULOCYTES # BLD AUTO: 0.05 K/UL (ref 0–0.04)
IMM GRANULOCYTES NFR BLD AUTO: 0.7 % (ref 0–0.5)
LYMPHOCYTES # BLD AUTO: 1.6 K/UL (ref 1–4.8)
LYMPHOCYTES NFR BLD: 20.5 % (ref 18–48)
MCH RBC QN AUTO: 22.8 PG (ref 27–31)
MCHC RBC AUTO-ENTMCNC: 29.5 G/DL (ref 32–36)
MCV RBC AUTO: 77 FL (ref 82–98)
MONOCYTES # BLD AUTO: 0.9 K/UL (ref 0.3–1)
MONOCYTES NFR BLD: 11.3 % (ref 4–15)
NEUTROPHILS # BLD AUTO: 4.9 K/UL (ref 1.8–7.7)
NEUTROPHILS NFR BLD: 64.7 % (ref 38–73)
NRBC BLD-RTO: 1 /100 WBC
PLATELET # BLD AUTO: 317 K/UL (ref 150–450)
PMV BLD AUTO: 9.8 FL (ref 9.2–12.9)
POTASSIUM SERPL-SCNC: 4 MMOL/L (ref 3.5–5.1)
PROT SERPL-MCNC: 7.1 G/DL (ref 6–8.4)
RBC # BLD AUTO: 4 M/UL (ref 4.6–6.2)
SODIUM SERPL-SCNC: 141 MMOL/L (ref 136–145)
WBC # BLD AUTO: 7.6 K/UL (ref 3.9–12.7)

## 2022-01-06 PROCEDURE — 94761 N-INVAS EAR/PLS OXIMETRY MLT: CPT

## 2022-01-06 PROCEDURE — 36415 COLL VENOUS BLD VENIPUNCTURE: CPT | Performed by: NURSE PRACTITIONER

## 2022-01-06 PROCEDURE — 99222 1ST HOSP IP/OBS MODERATE 55: CPT | Mod: ,,, | Performed by: INTERNAL MEDICINE

## 2022-01-06 PROCEDURE — 63600175 PHARM REV CODE 636 W HCPCS: Performed by: FAMILY MEDICINE

## 2022-01-06 PROCEDURE — 80053 COMPREHEN METABOLIC PANEL: CPT | Performed by: NURSE PRACTITIONER

## 2022-01-06 PROCEDURE — 36430 TRANSFUSION BLD/BLD COMPNT: CPT

## 2022-01-06 PROCEDURE — 85025 COMPLETE CBC W/AUTO DIFF WBC: CPT | Performed by: FAMILY MEDICINE

## 2022-01-06 PROCEDURE — 99222 PR INITIAL HOSPITAL CARE,LEVL II: ICD-10-PCS | Mod: ,,, | Performed by: INTERNAL MEDICINE

## 2022-01-06 RX ORDER — FERROUS SULFATE 324(65)MG
324 TABLET, DELAYED RELEASE (ENTERIC COATED) ORAL DAILY
Refills: 0 | COMMUNITY
Start: 2022-01-06 | End: 2023-06-28

## 2022-01-06 RX ADMIN — IRON SUCROSE 200 MG: 20 INJECTION, SOLUTION INTRAVENOUS at 10:01

## 2022-01-06 NOTE — PLAN OF CARE
Pt is AOX4. No complaints of pain. Pt received 1 unit of RBC and is scheduled to receive another unit. Safety measures in place. Room air. POC reviewed with pt. Pt verbalizes understanding. Will continue to monitor.

## 2022-01-06 NOTE — TELEPHONE ENCOUNTER
----- Message from Génesis Connor LMSW sent at 1/6/2022 10:38 AM CST -----  Regarding: Hospital Follow Up  Good morning,     Patient needs a hospital follow up scheduled with this provider. MD put his in progress note yesterday that his staff wound arrange a hospital follow up appointment. I'm not sure of the time frame but please schedule appointment.    Thanks,     Génesis Connor LMSW  [facility]  Case Management

## 2022-01-06 NOTE — PLAN OF CARE
[facility] - Telemetry (Hospital)  Initial Discharge Assessment       Primary Care Provider: To Obtain Unable    Admission Diagnosis: Chest pain [R07.9]  Symptomatic anemia [D64.9]  Anemia, unspecified type [D64.9]    Admission Date: 1/3/2022  Expected Discharge Date: 1/6/2022    Discharge Barriers Identified: None    Payor: MEDICAID / Plan: [employer] [facility] / Product Type: Managed Medicaid /     Extended Emergency Contact Information  Primary Emergency Contact: daughter, daughter  Mobile Phone: [phone]  Relation: Daughter  Preferred language: English   needed? No  Secondary Emergency Contact: Son, Son  Mobile Phone: [phone]  Relation: Son  Preferred language: English   needed? No    Discharge Plan A: Home,Home with family       No Pharmacies Listed    Initial Assessment (most recent)     Adult Discharge Assessment - 01/06/22 1027        Discharge Assessment    Assessment Type Discharge Planning Assessment     Confirmed/corrected address, phone number and insurance Yes     Confirmed Demographics Correct on Facesheet     Source of Information patient     When was your last doctors appointment? --   No PCP    Communicated BERNARD with patient/caregiver Date not available/Unable to determine     Reason For Admission Symptomatic anemia     Lives With child(marcio), adult     Facility Arrived From: Home     Do you expect to return to your current living situation? Yes     Do you have help at home or someone to help you manage your care at home? Yes     Who are your caregiver(s) and their phone number(s)? Pt's Ally ca and Shawn     Prior to hospitilization cognitive status: Alert/Oriented     Current cognitive status: Alert/Oriented     Walking or Climbing Stairs Difficulty ambulation difficulty, requires equipment;none     Dressing/Bathing Difficulty none     Equipment Currently Used at Home none     Readmission within 30 days? No     Patient currently being followed  by outpatient case management? No     Do you currently have service(s) that help you manage your care at home? No     Do you take prescription medications? Yes     Do you have prescription coverage? Yes     Do you have any problems affording any of your prescribed medications? No     Is the patient taking medications as prescribed? yes     Who is going to help you get home at discharge? Pt's daughter     How do you get to doctors appointments? car, drives self     Are you on dialysis? No     Do you take coumadin? No     Discharge Plan A Home;Home with family     DME Needed Upon Discharge  none     Discharge Plan discussed with: Patient     Discharge Barriers Identified None               Swer spoke with patient by phone to complete discharge assessment. Patient states that he lives at home with his children. Patient's children will be patient's help at home, if required. Patient previously independent with ADLs. Patient denies the use of any DME. Patient drives self, but states his daughter will provider discharge transportation.     Swer inquired if patient had a PCP. Patient states that he does not. Swer offered to get patient establish with [facility] for primary care. Patient agreeable to get establish with [facility]. Gilbertor scheduled appointment; added to AVS.     No other CM needs for discharge.

## 2022-01-06 NOTE — SUBJECTIVE & OBJECTIVE
Oncology Treatment Plan:   [No treatment plan]    Medications:  Continuous Infusions:  Scheduled Meds:   iron sucrose  200 mg Intravenous Daily     PRN Meds:sodium chloride, acetaminophen, albuterol-ipratropium, melatonin, naloxone, ondansetron, sodium chloride 0.9%     Review of patient's allergies indicates:  No Known Allergies     History reviewed. No pertinent past medical history.  Past Surgical History:   Procedure Laterality Date    CHOLECYSTECTOMY  2020    COLONOSCOPY N/A 1/5/2022    Procedure: COLONOSCOPY;  Surgeon: Hannah Alvarado MD;  Location: [facility];  Service: Endoscopy;  Laterality: N/A;    ESOPHAGOGASTRODUODENOSCOPY N/A 1/5/2022    Procedure: EGD (ESOPHAGOGASTRODUODENOSCOPY);  Surgeon: Hannah Alvarado MD;  Location: [facility];  Service: Endoscopy;  Laterality: N/A;     Family History    None       Tobacco Use    Smoking status: Never Smoker    Smokeless tobacco: Never Used   Substance and Sexual Activity    Alcohol use: Never    Drug use: Never    Sexual activity: Not on file       Review of Systems   Constitutional: Negative for activity change, appetite change, chills, fatigue, fever and unexpected weight change.   HENT: Negative for congestion, mouth sores, nosebleeds, sore throat, trouble swallowing and voice change.    Eyes: Negative for visual disturbance.   Respiratory: Negative for cough, chest tightness, shortness of breath and wheezing.    Cardiovascular: Negative for chest pain and leg swelling.   Gastrointestinal: Negative for abdominal distention, abdominal pain, anal bleeding, blood in stool, constipation, diarrhea, nausea and vomiting.   Genitourinary: Negative for difficulty urinating, dysuria and hematuria.   Musculoskeletal: Negative for arthralgias, back pain and myalgias.   Skin: Negative for pallor, rash and wound.   Neurological: Negative for dizziness, syncope, weakness and headaches.   Hematological: Negative for adenopathy. Does not bruise/bleed  easily.   Psychiatric/Behavioral: The patient is not nervous/anxious.      Objective:     Vital Signs (Most Recent):  Temp: 97.5 °F (36.4 °C) (01/06/22 0749)  Pulse: 88 (01/06/22 0749)  Resp: 18 (01/06/22 0749)  BP: 120/64 (01/06/22 0749)  SpO2: 100 % (01/06/22 0902) Vital Signs (24h Range):  Temp:  [97.5 °F (36.4 °C)-100 °F (37.8 °C)] 97.5 °F (36.4 °C)  Pulse:  [] 88  Resp:  [16-20] 18  SpO2:  [93 %-100 %] 100 %  BP: (103-136)/(55-79) 120/64     Weight: 88.7 kg (195 lb 8.8 oz)  Body mass index is 30.63 kg/m².  Body surface area is 2.05 meters squared.      Intake/Output Summary (Last 24 hours) at 1/6/2022 1057  Last data filed at 1/6/2022 0900  Gross per 24 hour   Intake 1382.5 ml   Output 200 ml   Net 1182.5 ml       Physical Exam  Vitals reviewed.   Constitutional:       Appearance: He is well-developed and well-nourished.   HENT:      Head: Normocephalic.      Right Ear: External ear normal.      Left Ear: External ear normal.      Mouth/Throat:      Mouth: Oropharynx is clear and moist.   Eyes:      General: Lids are normal. No scleral icterus.        Right eye: No discharge.         Left eye: No discharge.      Extraocular Movements: EOM normal.      Conjunctiva/sclera: Conjunctivae normal.   Neck:      Thyroid: No thyroid mass.   Cardiovascular:      Rate and Rhythm: Normal rate and regular rhythm.      Heart sounds: Normal heart sounds. No murmur heard.      Pulmonary:      Effort: Pulmonary effort is normal. No respiratory distress.      Breath sounds: Normal breath sounds. No wheezing or rales.   Abdominal:      General: Bowel sounds are normal. There is no distension.      Palpations: Abdomen is soft.      Tenderness: There is no abdominal tenderness.   Genitourinary:     Comments: deferred  Musculoskeletal:         General: No edema. Normal range of motion.      Cervical back: Normal range of motion.   Skin:     General: Skin is warm, dry and intact.   Neurological:      Mental Status: He is alert  and oriented to person, place, and time.   Psychiatric:         Mood and Affect: Mood and affect normal.         Speech: Speech normal.         Behavior: Behavior normal. Behavior is cooperative.         Thought Content: Thought content normal.         Significant Labs:   BMP:   Recent Labs   Lab 01/05/22 0446 01/06/22 0446   GLU 80 82    141   K 3.8 4.0    108   CO2 22* 25   BUN 13 12   CREATININE 0.8 0.9   CALCIUM 8.4* 8.4*   , CBC:   Recent Labs   Lab 01/04/22  1251 01/04/22  1251 01/05/22  0753 01/05/22  1653 01/06/22 0446   WBC 7.77  --  6.99  --  7.60   HGB 6.8*   < > 6.6* 7.8* 9.1*   HCT 23.6*   < > 22.8* 26.1* 30.8*     --  306  --  317    < > = values in this interval not displayed.   , LFTs:   Recent Labs   Lab 01/05/22 0446 01/06/22 0446   ALT 14 14   AST 18 17   ALKPHOS 43* 48*   BILITOT 1.2* 1.1*   PROT 6.7 7.1   ALBUMIN 3.5 3.6    and All pertinent labs from the last 24 hours have been reviewed.    Diagnostic Results:  I have reviewed all pertinent imaging results/findings within the past 24 hours.

## 2022-01-06 NOTE — PROGRESS NOTES
Patient discharged with belongings in hand. Wheeled down per hospital staff. Reinforced education, pt verbalized understanding. IV taken out, jelco intact. Telemetry monitor taken off. Medications reviewed with pt, verbalized understanding. NADN.   F/U appts made.

## 2022-01-06 NOTE — TELEPHONE ENCOUNTER
----- Message from Rosa June LPN sent at 1/6/2022 11:39 AM CST -----  Regarding: Hospital F/U Appt  Please schedule a hospital f/u appt within 1 week of this d/c date. Thank you.

## 2022-01-06 NOTE — PLAN OF CARE
Problem: Anemia  Goal: Anemia Symptom Improvement  Outcome: Ongoing, Progressing    Plan of care reviewed with patient, pt verbalized understanding.  Pt remains free from falls this shift, fall precautions in place.  Pt remains free from skin breakdown.  AAOx4, VSS, NAD noted at this time.  Pt remained afebrile.  Plan to d/c home today  NSR on the tele monitor.  Pt currently resting comfortably in bed.  Hourly rounding complete.  Will continue to monitor.

## 2022-01-06 NOTE — DISCHARGE INSTRUCTIONS
"Patient Education       Colon Polyps   The Basics   Written by the doctors and editors at [facility]   What are colon polyps? -- Colon polyps are tiny growths that form on the inside of the large intestine (also known as the colon) (figure 1). Polyps are very common. About one-third to one-half of all adults have them by the time they are 50 years old. They do not usually cause symptoms. But some polyps can be or become cancer, so doctors sometimes remove them.  What are the symptoms of colon polyps? -- Colon polyps do not usually cause symptoms.  How do doctors find colon polyps? -- Doctors usually find colon polyps when they are doing screening tests to check for colon or rectal cancer. Cancer screening tests are tests that are done to try and find cancer early, before a person has symptoms. The screening tests for colon and rectal cancer include:  · Colonoscopy - Before having a colonoscopy, you will get medicine to help you relax. Then a doctor will put a thin tube into your anus and advance it into your colon (figure 2). The tube has a camera attached to it, so the doctor can look inside your colon. The tube also has tools on the end, so the doctor can remove pieces of tissue, including polyps. After polyps are removed, they usually go to a lab to be tested for cancer and other problems.  · Sigmoidoscopy - A sigmoidoscopy is very similar to a colonoscopy. The only difference is that this test looks only at the first part of the colon, and a colonoscopy looks at the whole colon.  · CT colonography (also known as virtual colonoscopy) - For a virtual colonoscopy, you have a special kind of X-ray taken, called a "CT scan." This test creates pictures of the colon.  · Stool test - "Stool" is another word for "bowel movements." Stool tests check for blood or abnormal genes in samples of stool. If a stool test indicates that something might be wrong with the colon, doctors usually follow up with a colonoscopy. Then " "doctors find polyps, if they are there.  · Capsule colonoscopy - Rarely, your doctor might do something called a "capsule" colonoscopy. For this test, you swallow a special capsule that contains tiny wireless video cameras.   How are colon polyps treated? -- Doctors remove polyps using the same tools they use for a colonoscopy. They can remove polyps either by snipping them off with a special cutting tool, or by catching the polyps in a noose (figure 3). Most polyps can be removed during a colonoscopy. But sometimes, large polyps need to be removed at a later time, either with another colonoscopy or with surgery.  What happens after I have polyps removed? -- You might need to have a colonoscopy every few years to check for more polyps. In some people polyps come back. And if you had the kind of polyps that could become cancer, your doctor will want to remove them as they appear. Also, if the polyps you had removed were the kind that could become cancer, people in your family might need to be checked for polyps and colon cancer earlier than if you did not have polyps.  Depending on your situation, your doctor might suggest genetic testing. This can show if your polyps are related to a specific gene that runs in families. If this turns out to be the case, they might recommend other tests that can be done to prevent cancer or find it early.  Can colon polyps be prevented? -- To reduce your chances of getting polyps or colon cancer:  · Eat a diet that is low in fat and high in fruits, vegetables, and fiber  · Lose weight, if you are overweight  · Do not smoke  · Limit the amount of alcohol you drink  All topics are updated as new evidence becomes available and our peer review process is complete.  This topic retrieved from [employer] on: Sep 21, 2021.  Topic 14340 Version 8.0  Release: 29.4.2 - C29.263  © 2021 UpToDate, Inc. and/or its affiliates. All rights reserved.  figure 1: Digestive system     This drawing shows the " "organs in the body that process food. Together these organs are called "the digestive system," or "digestive tract." As food travels through this system, the body absorbs nutrients and water.  Graphic 60946 Version 4.0    figure 2: Colonoscopy     During a colonoscopy, you lie on your side and the doctor puts a thin tube with a camera into your anus (from behind). Then the doctor advances the tube into the rectum and colon. The camera sends pictures from inside your colon to a television screen.  Graphic 23161 Version 6.0    figure 3: Removing a colon polyp     One way doctors remove colon polyps is to use a noose as a tool. They loop a wire around the polyp and squeeze the loop tight. When the polyp comes off, the doctor sucks it up into the endoscope, so that it can go to the lab for tests.  Graphic 59974 Version 5.0    Consumer Information Use and Disclaimer   This information is not specific medical advice and does not replace information you receive from your health care provider. This is only a brief summary of general information. It does NOT include all information about conditions, illnesses, injuries, tests, procedures, treatments, therapies, discharge instructions or life-style choices that may apply to you. You must talk with your health care provider for complete information about your health and treatment options. This information should not be used to decide whether or not to accept your health care provider's advice, instructions or recommendations. Only your health care provider has the knowledge and training to provide advice that is right for you. The use of this information is governed by the [employer] End User License Agreement, available at https://www.[employer]/en/solutions/[employer]/about/ashok.The use of [employer] content is governed by the [employer] Terms of Use. ©2021 UpToDate, Inc. All rights reserved.  Copyright   © 2021 UpToDate, Inc. and/or its affiliates. All rights " "reserved.  Patient Education       Diverticulosis   About this topic   Diverticulosis is a problem of the large bowel or colon. The wall of the bowel becomes weak and pushes outward. They form balloon-like pouches called diverticula or "tics." When you have hard stool, you strain to have a bowel movement. This raises the pressure in the bowel and causes pouches or bulges to form. Most often, they do not cause a problem. If they become infected, you have diverticulitis. If you have both bleeding and infection it is diverticular disease.     What are the causes?   Doctors do not know why these pouches form in the large bowel. Pressure may build up if food moves too slowly through the bowel. Fiber in your diet helps your stool move more quickly through your bowels. If you eat a diet low in fiber, your stools become harder and move slower. Then you strain and push more when you have a bowel movement, which may cause the pouches to form.  What can make this more likely to happen?   · A low-fiber diet  · A diet high in meat or protein  · Many hard stools ? Less bowel movements than normal  · Age 40 years or older  · Lack of exercise  · Genetics  · Obesity  · Smoking  · Certain drugs you may be taking  What are the main signs?   Most people with diverticulosis do not have signs. Others have problems with diarrhea, constipation, bloating, or abdominal pain. Your doctor may find this condition when you have a colonoscopy.  How does the doctor diagnose this health problem?   Your doctor will take your history. Your doctor may ask about your bowel movements, belly pain, diet, and drugs you are taking. The doctor may press on your belly to see if you have any pain, especially on the left side. The doctor will listen to your bowel sounds and may do a rectal exam.  The doctor will do an exam and may order:  · Stool test  · Blood tests  · CT scan  · Colonoscopy  · Barium enema  How does the doctor treat this health problem?   Most " often, people with diverticulosis will not need any treatment. Your doctor may suggest you add more fiber to your diet. This will help add to the amount of stool you form. It may also keep you from forming new diverticula.  Are there other health problems to treat?   Other health problems, such as irritable bowel syndrome or IBS, can have these same signs. So can ulcers. Talk with your doctor if you keep having any of the above signs.  What drugs may be needed?   Most often with diverticulosis you will not need to take any drugs.  What problems could happen?   You may develop diverticulitis, which may cause:  · Fever and pain in the left lower part of your abdomen  · Pockets or pouches in your bowel may be infected or filled with pus.  · Hole or tear in your bowel  · Part of your bowel to become narrow  · You to need surgery  What can be done to prevent this health problem?   The best way to keep from having diverticulosis is to keep your bowel movements soft and normal. To keep more pouches from forming:  · Eat more whole grains, vegetables, and fruits.  · Drink 8 to 10 glasses of water each day.  · Be active. Walk, garden, or do something active for 30 minutes or more on most days of the week.  · Talk with your doctor about adding an over-the-counter (OTC) fiber product to keep your stools soft.  · Limit how much pain drugs you take. Overuse of some pain drugs can cause hard stools. Talk with your doctor about this.  Where can I learn more?   FamilyDoctor.org  http://familydoctor.org/familydoctor/en/diseases-conditions/diverticular-disease.html   NHS  https://www.nhs.uk/conditions/diverticular-disease-and-diverticulitis/   Last Reviewed Date   2021-04-13  Consumer Information Use and Disclaimer   This information is not specific medical advice and does not replace information you receive from your health care provider. This is only a brief summary of general information. It does NOT include all information about  conditions, illnesses, injuries, tests, procedures, treatments, therapies, discharge instructions or life-style choices that may apply to you. You must talk with your health care provider for complete information about your health and treatment options. This information should not be used to decide whether or not to accept your health care providers advice, instructions or recommendations. Only your health care provider has the knowledge and training to provide advice that is right for you.  Copyright   Copyright © 2021 UpToDate, Inc. and its affiliates and/or licensors. All rights reserved.      Patient Education       Anemia Caused by Low Iron Discharge Instructions, Adult   About this topic   Anemia means you have too few red blood cells. Your red blood cells are the cells that carry oxygen to all parts of your body.  The most common reason for anemia is a low level of iron in your body. Your body uses iron to make red blood cells. You may have low iron because you have lost blood over time or your body may have trouble taking in the iron from the food you eat. The doctors will work to find out what is causing your anemia and help you get more iron if needed.  What care is needed at home?   · Ask your doctor what you need to do when you go home. Make sure you ask questions if you do not understand what the doctor says.  · If the doctor suggested you take iron pills, be sure you know the strength and how often to take them. The best way to take iron is once every other day or Monday, Wednesday, and Friday of each week. Be sure you can keep track of when you are to take your iron. You may have to take the pills for a few months.  · Store your iron pills safely if you have them. Take extra care that children cannot get them. A child can get seriously poisoned if they take iron pills on accident. If a child takes your iron pills, call poison control right away.  · Feeling tired or short of breath is a sign of  anemia. You may need to rest more often until your red blood cell counts increase. You may also have other symptoms like having very little energy, restless legs, and craving ice. It may take a few weeks until the anemia starts to get better.  What follow-up care is needed?   · Your doctor may ask you to make visits to the office to check on your progress. Be sure to keep these visits.  · You may need to have some blood tests.  · If your red blood cell counts are too low, you may need to get a blood transfusion.  · You may need to have tests to see if your body is absorbing all the iron you are taking in.  · You may need other tests to see if you are bleeding slowly from inside your body.  What drugs may be needed?   The doctor may order drugs to:  · Replace the iron in your body. These are iron supplements.  · Help your body absorb iron. This is vitamin C.  · Stool softeners to help control possible constipation.  Take your drug as ordered by your doctor.  What changes to diet are needed?   Eat food rich in iron, such as:  · Meats and proteins like: Eggs (especially egg yolks), liver, lean red meat (especially beef), oysters, clams, poultry, salmon, tuna, shrimp, tofu  · Iron-fortified breads and grains like: Breads, pastas, cornmeal, white rice, cereals, and whole grains  · Fruits like: Dried fruits such as prunes, raisins, and apricots; prune juice  · Vegetables like: Spinach and other dark green leafy vegetables; dried beans; white, red, and baked beans; soybeans; peas; lentils; chickpeas  Also eat foods rich in vitamin C such as:  · Fruits like: Oranges, tangerines, kiwi, strawberries, cantaloupe  · Vegetables like: Broccoli, cauliflower, bell peppers, tomatoes, cabbage, sweet potatoes  You may not absorb as much iron from your food if you drink black or pekoe tea or excessive amounts of milk. Limit drinking these with meals. Drugs for heartburn may also limit how much iron your body takes in. Talk to your  doctor if you take these kind of drugs.     What can be done to prevent this health problem?   · Make sure you eat foods rich in iron each day. Also, make sure you are getting vitamin C to help the body take up iron.  · Ask if you should take an iron supplement.  When do I need to call the doctor?   · You develop chest pain or severe trouble breathing.  · You throw up blood or something that looks like coffee grounds.  · Your stools are black or tar-colored.  · You feel weak or you pass out.  Helpful tips   Do not take more iron pills than ordered. Large amounts of iron can be harmful. Take extra iron only as ordered by your doctor.  Teach Back: Helping You Understand   The Teach Back Method helps you understand the information we are giving you. After you talk with the staff, tell them in your own words what you learned. This helps to make sure the staff has described each thing clearly. It also helps to explain things that may have been confusing. Before going home, make sure you can do these:  · I can tell you about my condition.  · I can tell you what changes I need to make with my diet or drugs.  · I can tell you what I will do if I have blood in my stool or if my stool is black or tarry looking.  Where can I learn more?   American Society of Hematology  https://www.hematology.org/education/patients/anemia/iron-deficiency   NHS Choices  https://www.nhs.uk/conditions/iron-deficiency-anaemia/   Last Reviewed Date   2021-06-08  Consumer Information Use and Disclaimer   This information is not specific medical advice and does not replace information you receive from your health care provider. This is only a brief summary of general information. It does NOT include all information about conditions, illnesses, injuries, tests, procedures, treatments, therapies, discharge instructions or life-style choices that may apply to you. You must talk with your health care provider for complete information about your health  and treatment options. This information should not be used to decide whether or not to accept your health care providers advice, instructions or recommendations. Only your health care provider has the knowledge and training to provide advice that is right for you.  Copyright   Copyright © 2021 [employer] and its affiliates and/or licensors. All rights reserved.

## 2022-01-06 NOTE — PLAN OF CARE
Pt AAOx4. VSS. Pt is on room air. Pt remained free of falls this shift. No complaints of pain or discomfort. Medications administered as ordered. Administered IV iron. Pt is normal sinus on monitor.  Hourly rounding completed. Pt instructed to call for assistance. POC reviewed. Pt verbalized understanding. Administered one unit of PRBC last night. According to this 0445 CBC H/H: 9.1/7.8.

## 2022-01-06 NOTE — HPI
56 y.o male who presented to [facility] ER with c/o gradually worsening SOB. Associated symptoms include generalized weakness, fatigue, chest tightness, dizziness, near syncope and 10-15 lb unintentional weight loss within the last month. ER labs revealed: Hbg/Hct 3/12. Occult stool negative, hemodynamics stable. Pt denies hematemesis. Reports stools appear dark.. Patient underwent EGD/Colonoscopy unrevealing to bleeding source. He has received blood transfusion support and IV Venofer x 1.

## 2022-01-06 NOTE — ASSESSMENT & PLAN NOTE
--Initial hg 3.0. s/p EGD/Colonoscopy unrevealing to bleeding source. Hemoglobin stable following 3 units PRBCs. S/p Venofer IV x 1. Patient to initiate oral iron supplementation. Discussed in detail best absorption and constipation prevention. Recommend outpatient VCE. Will arrange outpatient Hematology f/u for further management of BRITTANI

## 2022-01-06 NOTE — ASSESSMENT & PLAN NOTE
- Initial Hbg/Hct 3/12   - Occult stool negative   - Type and screen, transfuse 3 units PRBC   - Keep NPO   - check iron, TIBC and ferritin  - GI eval    1/5:  S/p EGD and c-scope without overt signs of bleeding  Outpatient f/u with GI for capsule endoscopy  Will transfuse 1 unit prbc as repeat hbg 7.8  Hematology consulted on case  Iron deficiency noted  venofer started  Plan to dc with po ferrous sulfate  Dc in am if stable

## 2022-01-06 NOTE — PLAN OF CARE
[facility] - Telemetry (Hospital)  Discharge Final Note    Primary Care Provider: To Obtain Unable    Expected Discharge Date: 1/6/2022    Final Discharge Note (most recent)     Final Note - 01/06/22 1146        Final Note    Assessment Type Final Discharge Note     Anticipated Discharge Disposition Home or Self Care     What phone number can be called within the next 1-3 days to see how you are doing after discharge? --   [phone]    Hospital Resources/Appts/Education Provided Appointments scheduled and added to AVS        Post-Acute Status    Discharge Delays None known at this time               Appointments scheduled and added to AVS.      Pt discharge with no additional CM needs.    Important Message from Medicare             Contact Info     [facility] [place]     [facility] [place]   [address] [place] [address]  [phone]       Next Steps: Follow up on 1/10/2022    Instructions: Establish Care  Appointment scheduled for 1/10/22 at 3PM     Hannah Alvarado MD   Specialty: Gastroenterology    [address] [place] [address] [address] [place] [address]   Phone: [phone]       Next Steps: Follow up in 1 week(s)    Instructions: capsule endoscopy    Rafael Cast MD   Specialty: Hematology and Oncology    [address] [facility]  [place] [address]   Phone: [phone]       Next Steps: Follow up in 2 week(s)    Instructions: iron deficiency

## 2022-01-06 NOTE — PROGRESS NOTES
[facility] Medicine  Progress Note    Patient Name: Shawn Carreno  MRN: [id number]  Patient Class: IP- Inpatient   Admission Date: 1/3/2022  Length of Stay: 0 days  Attending Physician: Alejandro Zhang MD  Primary Care Provider: To Obtain Unable        Subjective:     Principal Problem:Symptomatic anemia        HPI:  55 yo male with no known significant past medical history presented to ED with progressively worsening SOB. Onset 4 days ago associated with generalized weakness, fatigue, chest tightness, dizziness, near syncope and 10-15 lb unintentional weight loss within the last month. No exacerbating/alleviating factors. Pt denies cough, congestion and fever. He reports 1 episode of emesis PTA. No known sick contact. Initial labs remarkable for Hbg/Hct 3/12. Occult stool negative, hemodynamics stable. Pt denies hematemesis. Reports stools appear dark. Pt reports history of lap tevin 4 months ago. He does not have a primary care physician. No previous colonoscopy. 3 units PRBC transfusion initiated in ED. Patient placed in observation for further evaluation and treatment.       Overview/Hospital Course:  1/5: s/p EGD and c-scope without overt signs of bleeding. Repeat h/h post procedure 7.8. Will transfuse 1 more unit of prbc due to symptomatic anemia. Iron deficiency noted. Venofer ordered. Hematology consulted on case.       Interval History: s/p EGD and cscope. Tolerated procedure well. No overt signs of bleeding.     Review of Systems   Constitutional: Positive for fatigue. Negative for fever.   HENT: Negative for sinus pressure.    Eyes: Negative for visual disturbance.   Respiratory: Positive for shortness of breath.    Cardiovascular: Negative for chest pain.   Gastrointestinal: Negative for nausea and vomiting.   Genitourinary: Negative for difficulty urinating.   Musculoskeletal: Negative for back pain.   Skin: Negative for rash.   Neurological: Positive for weakness. Negative for  headaches.   Psychiatric/Behavioral: Negative for confusion.     Objective:     Vital Signs (Most Recent):  Temp: 98.8 °F (37.1 °C) (01/05/22 1445)  Pulse: 96 (01/05/22 1445)  Resp: 16 (01/05/22 1445)  BP: (!) 123/56 (01/05/22 1445)  SpO2: 97 % (01/05/22 1445) Vital Signs (24h Range):  Temp:  [97.5 °F (36.4 °C)-98.8 °F (37.1 °C)] 98.8 °F (37.1 °C)  Pulse:  [] 96  Resp:  [16-20] 16  SpO2:  [93 %-100 %] 97 %  BP: (103-153)/(55-85) 123/56     Weight: 88.1 kg (194 lb 3.6 oz)  Body mass index is 30.42 kg/m².    Intake/Output Summary (Last 24 hours) at 1/5/2022 8732  Last data filed at 1/5/2022 1400  Gross per 24 hour   Intake 3590 ml   Output --   Net 3590 ml      Physical Exam  Constitutional:       General: He is not in acute distress.     Appearance: He is well-developed and well-nourished. He is not diaphoretic.   HENT:      Head: Normocephalic and atraumatic.   Eyes:      Pupils: Pupils are equal, round, and reactive to light.      Comments: Pale conjunctivae   Cardiovascular:      Rate and Rhythm: Normal rate and regular rhythm.      Heart sounds: Normal heart sounds. No murmur heard.  No friction rub. No gallop.    Pulmonary:      Effort: Pulmonary effort is normal. No respiratory distress.      Breath sounds: Normal breath sounds. No stridor. No wheezing or rales.   Abdominal:      General: Bowel sounds are normal. There is no distension.      Palpations: Abdomen is soft. There is no mass.      Tenderness: There is no abdominal tenderness. There is no guarding.   Musculoskeletal:      Right lower leg: No edema.      Left lower leg: No edema.   Skin:     General: Skin is warm.      Findings: No erythema.   Neurological:      Mental Status: He is alert and oriented to person, place, and time.         Significant Labs: All pertinent labs within the past 24 hours have been reviewed.    Significant Imaging: I have reviewed all pertinent imaging results/findings within the past 24 hours.      Assessment/Plan:       * Symptomatic anemia  - Initial Hbg/Hct 3/12   - Occult stool negative   - Type and screen, transfuse 3 units PRBC   - Keep NPO   - check iron, TIBC and ferritin  - GI eval    1/5:  S/p EGD and c-scope without overt signs of bleeding  Outpatient f/u with GI for capsule endoscopy  Will transfuse 1 unit prbc as repeat hbg 7.8  Hematology consulted on case  Iron deficiency noted  venofer started  Plan to dc with po ferrous sulfate  Dc in am if stable       VTE Risk Mitigation (From admission, onward)         Ordered     IP VTE LOW RISK PATIENT  Once         01/04/22 0019     Place sequential compression device  Until discontinued         01/04/22 0019                Discharge Planning   BERNARD:      Code Status: Full Code   Is the patient medically ready for discharge?:     Reason for patient still in hospital (select all that apply): Patient trending condition                     Alejandro Zhang MD  Department of Hospital Medicine   [facility] ([facility])

## 2022-01-06 NOTE — CONSULTS
[facility])  Hematology/Oncology  Consult Note    Patient Name: Shawn Carreno  MRN: [id number]  Admission Date: 1/3/2022  Hospital Length of Stay: 1 days  Code Status: Full Code   Attending Provider: Alejandro Zhang MD  Consulting Provider: Romy Griffith NP  Primary Care Physician: To Obtain Unable  Principal Problem:Symptomatic anemia    Inpatient consult to Hematology/Oncology  Consult performed by: Romy Griffith NP  Consult ordered by: Alejandro Zhang MD        Subjective:     HPI:  56 y.o male who presented to [facility] ER with c/o gradually worsening SOB. Associated symptoms include generalized weakness, fatigue, chest tightness, dizziness, near syncope and 10-15 lb unintentional weight loss within the last month. ER labs revealed: Hbg/Hct 3/12. Occult stool negative, hemodynamics stable. Pt denies hematemesis. Reports stools appear dark.. Patient underwent EGD/Colonoscopy unrevealing to bleeding source. He has received blood transfusion support and IV Venofer x 1.       Oncology Treatment Plan:   [No treatment plan]    Medications:  Continuous Infusions:  Scheduled Meds:   iron sucrose  200 mg Intravenous Daily     PRN Meds:sodium chloride, acetaminophen, albuterol-ipratropium, melatonin, naloxone, ondansetron, sodium chloride 0.9%     Review of patient's allergies indicates:  No Known Allergies     History reviewed. No pertinent past medical history.  Past Surgical History:   Procedure Laterality Date    CHOLECYSTECTOMY 2020    COLONOSCOPY N/A 1/5/2022    Procedure: COLONOSCOPY;  Surgeon: Hannah Alvarado MD;  Location: [facility];  Service: Endoscopy;  Laterality: N/A;    ESOPHAGOGASTRODUODENOSCOPY N/A 1/5/2022    Procedure: EGD (ESOPHAGOGASTRODUODENOSCOPY);  Surgeon: Hannah Alvarado MD;  Location: [facility];  Service: Endoscopy;  Laterality: N/A;     Family History    None       Tobacco Use    Smoking status: Never Smoker    Smokeless tobacco: Never Used   Substance and  Sexual Activity    Alcohol use: Never    Drug use: Never    Sexual activity: Not on file       Review of Systems   Constitutional: Negative for activity change, appetite change, chills, fatigue, fever and unexpected weight change.   HENT: Negative for congestion, mouth sores, nosebleeds, sore throat, trouble swallowing and voice change.    Eyes: Negative for visual disturbance.   Respiratory: Negative for cough, chest tightness, shortness of breath and wheezing.    Cardiovascular: Negative for chest pain and leg swelling.   Gastrointestinal: Negative for abdominal distention, abdominal pain, anal bleeding, blood in stool, constipation, diarrhea, nausea and vomiting.   Genitourinary: Negative for difficulty urinating, dysuria and hematuria.   Musculoskeletal: Negative for arthralgias, back pain and myalgias.   Skin: Negative for pallor, rash and wound.   Neurological: Negative for dizziness, syncope, weakness and headaches.   Hematological: Negative for adenopathy. Does not bruise/bleed easily.   Psychiatric/Behavioral: The patient is not nervous/anxious.      Objective:     Vital Signs (Most Recent):  Temp: 97.5 °F (36.4 °C) (01/06/22 0749)  Pulse: 88 (01/06/22 0749)  Resp: 18 (01/06/22 0749)  BP: 120/64 (01/06/22 0749)  SpO2: 100 % (01/06/22 0902) Vital Signs (24h Range):  Temp:  [97.5 °F (36.4 °C)-100 °F (37.8 °C)] 97.5 °F (36.4 °C)  Pulse:  [] 88  Resp:  [16-20] 18  SpO2:  [93 %-100 %] 100 %  BP: (103-136)/(55-79) 120/64     Weight: 88.7 kg (195 lb 8.8 oz)  Body mass index is 30.63 kg/m².  Body surface area is 2.05 meters squared.      Intake/Output Summary (Last 24 hours) at 1/6/2022 1057  Last data filed at 1/6/2022 0900  Gross per 24 hour   Intake 1382.5 ml   Output 200 ml   Net 1182.5 ml       Physical Exam  Vitals reviewed.   Constitutional:       Appearance: He is well-developed and well-nourished.   HENT:      Head: Normocephalic.      Right Ear: External ear normal.      Left Ear: External ear  normal.      Mouth/Throat:      Mouth: Oropharynx is clear and moist.   Eyes:      General: Lids are normal. No scleral icterus.        Right eye: No discharge.         Left eye: No discharge.      Extraocular Movements: EOM normal.      Conjunctiva/sclera: Conjunctivae normal.   Neck:      Thyroid: No thyroid mass.   Cardiovascular:      Rate and Rhythm: Normal rate and regular rhythm.      Heart sounds: Normal heart sounds. No murmur heard.      Pulmonary:      Effort: Pulmonary effort is normal. No respiratory distress.      Breath sounds: Normal breath sounds. No wheezing or rales.   Abdominal:      General: Bowel sounds are normal. There is no distension.      Palpations: Abdomen is soft.      Tenderness: There is no abdominal tenderness.   Genitourinary:     Comments: deferred  Musculoskeletal:         General: No edema. Normal range of motion.      Cervical back: Normal range of motion.   Skin:     General: Skin is warm, dry and intact.   Neurological:      Mental Status: He is alert and oriented to person, place, and time.   Psychiatric:         Mood and Affect: Mood and affect normal.         Speech: Speech normal.         Behavior: Behavior normal. Behavior is cooperative.         Thought Content: Thought content normal.         Significant Labs:   BMP:   Recent Labs   Lab 01/05/22  0446 01/06/22 0446   GLU 80 82    141   K 3.8 4.0    108   CO2 22* 25   BUN 13 12   CREATININE 0.8 0.9   CALCIUM 8.4* 8.4*   , CBC:   Recent Labs   Lab 01/04/22  1251 01/04/22  1251 01/05/22  0753 01/05/22  1653 01/06/22 0446   WBC 7.77  --  6.99  --  7.60   HGB 6.8*   < > 6.6* 7.8* 9.1*   HCT 23.6*   < > 22.8* 26.1* 30.8*     --  306  --  317    < > = values in this interval not displayed.   , LFTs:   Recent Labs   Lab 01/05/22  0446 01/06/22 0446   ALT 14 14   AST 18 17   ALKPHOS 43* 48*   BILITOT 1.2* 1.1*   PROT 6.7 7.1   ALBUMIN 3.5 3.6    and All pertinent labs from the last 24 hours have been  reviewed.    Diagnostic Results:  I have reviewed all pertinent imaging results/findings within the past 24 hours.    Assessment/Plan:     * Symptomatic anemia  --Initial hg 3.0. s/p EGD/Colonoscopy unrevealing to bleeding source. Hemoglobin stable following 3 units PRBCs. S/p Venofer IV x 1. Patient to initiate oral iron supplementation. Discussed in detail best absorption and constipation prevention. Recommend outpatient VCE. Will arrange outpatient Hematology f/u for further management of BRITTANI        Thank you for your consult. I will follow-up with patient. Please contact us if you have any additional questions.    Romy Griffith NP  Hematology/Oncology  [facility] ([facility])

## 2022-01-07 NOTE — DISCHARGE SUMMARY
[facility] Medicine  Discharge Summary      Patient Name: Shawn Carreno  MRN: [id number]  Patient Class: IP- Inpatient  Admission Date: 1/3/2022  Hospital Length of Stay: 1 days  Discharge Date and Time: 1/6/2022 12:33 PM  Attending Physician: No att. providers found   Discharging Provider: Alejandro Zhang MD  Primary Care Provider: To Obtain Unable      HPI:   57 yo male with no known significant past medical history presented to ED with progressively worsening SOB. Onset 4 days ago associated with generalized weakness, fatigue, chest tightness, dizziness, near syncope and 10-15 lb unintentional weight loss within the last month. No exacerbating/alleviating factors. Pt denies cough, congestion and fever. He reports 1 episode of emesis PTA. No known sick contact. Initial labs remarkable for Hbg/Hct 3/12. Occult stool negative, hemodynamics stable. Pt denies hematemesis. Reports stools appear dark. Pt reports history of lap tevin 4 months ago. He does not have a primary care physician. No previous colonoscopy. 3 units PRBC transfusion initiated in ED. Patient placed in observation for further evaluation and treatment.       Procedure(s) (LRB):  EGD (ESOPHAGOGASTRODUODENOSCOPY) (N/A)  COLONOSCOPY (N/A)      Hospital Course:   Patient presented for chest pain and sob. Troponin WNL however patient found severely anemic with Hgb: 3. He was transfused prbcs and workup showed BRITTANI. EGD and C-scope by gi did not reveal source of bleeding. Outpatient capsule endoscopy recommended. Hematology consulted and will f/u with patient outpatient. He was given 2 doses of venofer and po iron was started upon discharge. Patient was stable without any overt signs of bleeding. He was discharged home.            Goals of Care Treatment Preferences:  Code Status: Full Code      Consults:   Consults (From admission, onward)        Status Ordering Provider     Inpatient consult to Hematology/Oncology  Once         Provider:  (Not yet assigned)    Completed NEDRA KEMP     Inpatient consult to Gastroenterology  Once        Provider:  (Not yet assigned)    Completed HALLE BROWN          No new Assessment & Plan notes have been filed under this hospital service since the last note was generated.  Service: Hospital Medicine    Final Active Diagnoses:    Diagnosis Date Noted POA    PRINCIPAL PROBLEM:  Symptomatic anemia [D64.9] 01/04/2022 Yes      Problems Resolved During this Admission:       Discharged Condition: good    Disposition: Home or Self Care    Follow Up:   Follow-up Information     [facility]  On 1/10/2022.    Why: Establish Care  Appointment scheduled for 1/10/22 at 3PM   Contact information:  [facility] [place] [address] [place] [address] [phone]           Hannah Alvarado MD In 1 week.    Specialty: Gastroenterology  Why: capsule endoscopy  Contact information:  [address] [place] [address] [address] [place] [address] [phone]             Rafael Cast MD In 2 weeks.    Specialty: Hematology and Oncology  Why: iron deficiency   Contact information:  [id number] [place] [address]  [place] [address] [phone]                       Patient Instructions:   No discharge procedures on file.    Significant Diagnostic Studies: Labs:   BMP:   Recent Labs   Lab 01/05/22  0446 01/06/22  0446   GLU 80 82    141   K 3.8 4.0    108   CO2 22* 25   BUN 13 12   CREATININE 0.8 0.9   CALCIUM 8.4* 8.4*    and CBC   Recent Labs   Lab 01/05/22  0753 01/05/22  0753 01/05/22  1653 01/05/22  1653 01/06/22  0446   WBC 6.99  --   --   --  7.60   HGB 6.6*  --  7.8*  --  9.1*   HCT 22.8*   < > 26.1*   < > 30.8*     --   --   --  317    < > = values in this interval not displayed.       Pending Diagnostic Studies:     Procedure Component Value Units Date/Time    Specimen to Pathology, Surgery Gastrointestinal tract [[id number]] Collected: 01/05/22 1103    Order  Status: Sent Lab Status: In process Updated: 01/05/22 1323         Medications:  Reconciled Home Medications:      Medication List      START taking these medications    ferrous sulfate 324 mg (65 mg iron) Tbec  Take 1 tablet (324 mg total) by mouth once daily.            Indwelling Lines/Drains at time of discharge:   Lines/Drains/Airways     None                 Time spent on the discharge of patient: 36 minutes         Alejandro Zhang MD  Department of Hospital Medicine  '[facility] ([facility])

## 2022-01-12 LAB
FINAL PATHOLOGIC DIAGNOSIS: NORMAL
GROSS: NORMAL
Lab: NORMAL

## 2022-01-14 DIAGNOSIS — D64.9 SYMPTOMATIC ANEMIA: Primary | ICD-10-CM

## 2022-01-14 NOTE — PROGRESS NOTES
Polyp removed was a hyperplastic polyp. Repeat colonoscopy for screening in 10 years  Instead of 5 is now recommended. This was updated on health maintenance    Hannah Alvarado MD  Gastroenterology  Director of Advanced Endoscopy at [facility]

## 2022-01-18 ENCOUNTER — LAB VISIT (OUTPATIENT)
Dept: LAB | Facility: HOSPITAL | Age: 57
End: 2022-01-18
Attending: PHYSICIAN ASSISTANT
Payer: MEDICAID

## 2022-01-18 ENCOUNTER — OFFICE VISIT (OUTPATIENT)
Dept: GASTROENTEROLOGY | Facility: CLINIC | Age: 57
End: 2022-01-18
Payer: MEDICAID

## 2022-01-18 VITALS
BODY MASS INDEX: 29.86 KG/M2 | HEART RATE: 75 BPM | HEIGHT: 67 IN | DIASTOLIC BLOOD PRESSURE: 72 MMHG | WEIGHT: 190.25 LBS | SYSTOLIC BLOOD PRESSURE: 110 MMHG | OXYGEN SATURATION: 99 %

## 2022-01-18 DIAGNOSIS — D64.9 SYMPTOMATIC ANEMIA: ICD-10-CM

## 2022-01-18 DIAGNOSIS — D64.9 SYMPTOMATIC ANEMIA: Primary | ICD-10-CM

## 2022-01-18 PROCEDURE — 85025 COMPLETE CBC W/AUTO DIFF WBC: CPT | Performed by: PHYSICIAN ASSISTANT

## 2022-01-18 PROCEDURE — 3078F PR MOST RECENT DIASTOLIC BLOOD PRESSURE < 80 MM HG: ICD-10-PCS | Mod: CPTII,,, | Performed by: PHYSICIAN ASSISTANT

## 2022-01-18 PROCEDURE — 3008F PR BODY MASS INDEX (BMI) DOCUMENTED: ICD-10-PCS | Mod: CPTII,,, | Performed by: PHYSICIAN ASSISTANT

## 2022-01-18 PROCEDURE — 1159F MED LIST DOCD IN RCRD: CPT | Mod: CPTII,,, | Performed by: PHYSICIAN ASSISTANT

## 2022-01-18 PROCEDURE — 36415 COLL VENOUS BLD VENIPUNCTURE: CPT | Performed by: PHYSICIAN ASSISTANT

## 2022-01-18 PROCEDURE — 99999 PR PBB SHADOW E&M-EST. PATIENT-LVL III: ICD-10-PCS | Mod: PBBFAC,,, | Performed by: PHYSICIAN ASSISTANT

## 2022-01-18 PROCEDURE — 1111F DSCHRG MED/CURRENT MED MERGE: CPT | Mod: CPTII,,, | Performed by: PHYSICIAN ASSISTANT

## 2022-01-18 PROCEDURE — 3074F SYST BP LT 130 MM HG: CPT | Mod: CPTII,,, | Performed by: PHYSICIAN ASSISTANT

## 2022-01-18 PROCEDURE — 99213 OFFICE O/P EST LOW 20 MIN: CPT | Mod: PBBFAC | Performed by: PHYSICIAN ASSISTANT

## 2022-01-18 PROCEDURE — 3078F DIAST BP <80 MM HG: CPT | Mod: CPTII,,, | Performed by: PHYSICIAN ASSISTANT

## 2022-01-18 PROCEDURE — 1159F PR MEDICATION LIST DOCUMENTED IN MEDICAL RECORD: ICD-10-PCS | Mod: CPTII,,, | Performed by: PHYSICIAN ASSISTANT

## 2022-01-18 PROCEDURE — 1111F PR DISCHARGE MEDS RECONCILED W/ CURRENT OUTPATIENT MED LIST: ICD-10-PCS | Mod: CPTII,,, | Performed by: PHYSICIAN ASSISTANT

## 2022-01-18 PROCEDURE — 99214 PR OFFICE/OUTPT VISIT, EST, LEVL IV, 30-39 MIN: ICD-10-PCS | Mod: S$PBB,,, | Performed by: PHYSICIAN ASSISTANT

## 2022-01-18 PROCEDURE — 99999 PR PBB SHADOW E&M-EST. PATIENT-LVL III: CPT | Mod: PBBFAC,,, | Performed by: PHYSICIAN ASSISTANT

## 2022-01-18 PROCEDURE — 3074F PR MOST RECENT SYSTOLIC BLOOD PRESSURE < 130 MM HG: ICD-10-PCS | Mod: CPTII,,, | Performed by: PHYSICIAN ASSISTANT

## 2022-01-18 PROCEDURE — 99214 OFFICE O/P EST MOD 30 MIN: CPT | Mod: S$PBB,,, | Performed by: PHYSICIAN ASSISTANT

## 2022-01-18 PROCEDURE — 3008F BODY MASS INDEX DOCD: CPT | Mod: CPTII,,, | Performed by: PHYSICIAN ASSISTANT

## 2022-01-18 NOTE — PROGRESS NOTES
Clinic Consult:  [facility] Consultation Note    Reason for Consult:  The encounter diagnosis was Symptomatic anemia.    PCP: To Obtain Unable       CC: Anemia (Hospital follow up)      HPI:  This is a 56 y.o. male here for hospital follow up of the above. Reported to the ED initially for worsening shortness of breath. Hemoglobin was found to be 3, although he denied any overt bleeding. Transfused 5 units. EGD and colonoscopy performed inpatient with no findings of bleed. He is scheduled for video capsule 1/26/22. Most recent lab work shows hemoglobin trending up at 9.1 (1/6/22). 26th he is scheduled for the VCE.  Occasional nausea. Appetite is decreased but he is eating. Feels improvement after eating. Has noticed stools are much darker and green - started iron supplement after discharge. Occasional diarrhea - mostly when he is only drinking liquids and not eating as much. 4 lb weight loss since admit to hospital. Denies SOB, dizziness, weakness, lightheadedness.    Review of Systems   Constitutional: Positive for appetite change. Negative for activity change, chills, diaphoresis, fatigue, fever and unexpected weight change.   HENT: Negative for trouble swallowing.    Respiratory: Negative for cough and shortness of breath.    Cardiovascular: Negative for chest pain.   Gastrointestinal: Positive for diarrhea (occasional) and nausea. Negative for abdominal distention, abdominal pain, constipation and vomiting.        Notes dark stools - started oral iron   Genitourinary: Negative for dysuria and hematuria.   Skin: Negative for color change and pallor.   Neurological: Negative for dizziness, syncope, weakness and light-headedness.   Psychiatric/Behavioral: Negative for dysphoric mood. The patient is nervous/anxious.         Medical History:   No past medical history on file.    Surgical History:   Past Surgical History:   Procedure Laterality Date    CHOLECYSTECTOMY  2020    COLONOSCOPY N/A 1/5/2022  "   Procedure: COLONOSCOPY;  Surgeon: Hannah Alvarado MD;  Location: [facility];  Service: Endoscopy;  Laterality: N/A;    ESOPHAGOGASTRODUODENOSCOPY N/A 1/5/2022    Procedure: EGD (ESOPHAGOGASTRODUODENOSCOPY);  Surgeon: Hannah Alvarado MD;  Location: [facility];  Service: Endoscopy;  Laterality: N/A;       Family History:    No family history on file.    Social History:   Social History     Socioeconomic History    Marital status:    Tobacco Use    Smoking status: Never Smoker    Smokeless tobacco: Never Used   Substance and Sexual Activity    Alcohol use: Never    Drug use: Never    Sexual activity: Not Currently       Allergies:   Review of patient's allergies indicates:  No Known Allergies    Home Medications:   Current Outpatient Medications on File Prior to Visit   Medication Sig Dispense Refill    ferrous sulfate 324 mg (65 mg iron) TbEC Take 1 tablet (324 mg total) by mouth once daily.  0     No current facility-administered medications on file prior to visit.       Physical Exam:  /72   Pulse 75   Ht 5' 7" (1.702 m)   Wt 86.3 kg (190 lb 4.1 oz)   SpO2 99%   BMI 29.80 kg/m²   Body mass index is 29.8 kg/m².  Physical Exam  Constitutional:       General: He is not in acute distress.     Appearance: Normal appearance. He is not ill-appearing, toxic-appearing or diaphoretic.   HENT:      Head: Normocephalic and atraumatic.   Eyes:      General: No scleral icterus.     Extraocular Movements: Extraocular movements intact.   Cardiovascular:      Rate and Rhythm: Normal rate and regular rhythm.   Pulmonary:      Effort: Pulmonary effort is normal. No respiratory distress.      Breath sounds: Normal breath sounds. No wheezing.   Abdominal:      General: Bowel sounds are normal. There is no distension.      Palpations: Abdomen is soft.      Tenderness: There is no abdominal tenderness. There is no guarding.   Musculoskeletal:         General: Normal range of motion.      " Cervical back: Normal range of motion.   Skin:     General: Skin is warm and dry.      Capillary Refill: Capillary refill takes 2 to 3 seconds.      Coloration: Skin is pale. Skin is not jaundiced.   Neurological:      General: No focal deficit present.      Mental Status: He is alert and oriented to person, place, and time.   Psychiatric:         Mood and Affect: Mood normal.         Behavior: Behavior normal.         Thought Content: Thought content normal.           Labs: Pertinent labs reviewed.  Endoscopy: Jan 2021  CRC Screening: Jan 2021  Anticoagulation: none    Assessment:  1. Symptomatic anemia         Recommendations:  -Repeat CBC today. If <7 will need transfusion.  -If stable, will keep VCE as scheduled. If hgb dropping, will contact endo to see if VCE can be moved up.  -Has follow up scheduled with heme onc.  -EGD/colon unrevealing.  -Suspect dark stool due to oral iron intake.    Symptomatic anemia  -     CBC Auto Differential; Future; Expected date: 01/18/2022        No follow-ups on file.    Thank you so much for allowing me to participate in the care of Shawn Cox PA-C

## 2022-01-19 ENCOUNTER — TELEPHONE (OUTPATIENT)
Dept: GASTROENTEROLOGY | Facility: CLINIC | Age: 57
End: 2022-01-19
Payer: MEDICAID

## 2022-01-19 LAB
ANISOCYTOSIS BLD QL SMEAR: SLIGHT
BASOPHILS # BLD AUTO: 0.02 K/UL (ref 0–0.2)
BASOPHILS NFR BLD: 0.4 % (ref 0–1.9)
BURR CELLS BLD QL SMEAR: ABNORMAL
DIFFERENTIAL METHOD: ABNORMAL
EOSINOPHIL # BLD AUTO: 0.1 K/UL (ref 0–0.5)
EOSINOPHIL NFR BLD: 1.7 % (ref 0–8)
ERYTHROCYTE [DISTWIDTH] IN BLOOD BY AUTOMATED COUNT: 29 % (ref 11.5–14.5)
HCT VFR BLD AUTO: 37.1 % (ref 40–54)
HGB BLD-MCNC: 10.8 G/DL (ref 14–18)
HYPOCHROMIA BLD QL SMEAR: ABNORMAL
IMM GRANULOCYTES # BLD AUTO: 0 K/UL (ref 0–0.04)
IMM GRANULOCYTES NFR BLD AUTO: 0 % (ref 0–0.5)
LYMPHOCYTES # BLD AUTO: 1.5 K/UL (ref 1–4.8)
LYMPHOCYTES NFR BLD: 27.4 % (ref 18–48)
MCH RBC QN AUTO: 25.1 PG (ref 27–31)
MCHC RBC AUTO-ENTMCNC: 29.1 G/DL (ref 32–36)
MCV RBC AUTO: 86 FL (ref 82–98)
MONOCYTES # BLD AUTO: 0.5 K/UL (ref 0.3–1)
MONOCYTES NFR BLD: 8.7 % (ref 4–15)
NEUTROPHILS # BLD AUTO: 3.4 K/UL (ref 1.8–7.7)
NEUTROPHILS NFR BLD: 61.8 % (ref 38–73)
NRBC BLD-RTO: 0 /100 WBC
OVALOCYTES BLD QL SMEAR: ABNORMAL
PLATELET # BLD AUTO: 271 K/UL (ref 150–450)
PLATELET BLD QL SMEAR: ABNORMAL
PMV BLD AUTO: ABNORMAL FL (ref 9.2–12.9)
POIKILOCYTOSIS BLD QL SMEAR: SLIGHT
RBC # BLD AUTO: 4.3 M/UL (ref 4.6–6.2)
WBC # BLD AUTO: 5.43 K/UL (ref 3.9–12.7)

## 2022-01-19 NOTE — TELEPHONE ENCOUNTER
----- Message from Arianne Cox PA-C sent at 1/19/2022  1:05 PM CST -----  Contact: Patient, [phone]  Already resulted. He is requesting results. He does not have portal.    ----- Message -----  From: Lucretia Al  Sent: 1/19/2022  11:38 AM CST  To: Arianne Cox PA-C    Calling to get test results.  Name of test (lab, x-ray): Lab  Date of test: 01/18/2022  Where was the test performed: [facility]  Would you like a call back, or a response through your [facility] portal?:   Call back  Comments: Please call him. Thanks.

## 2022-01-21 ENCOUNTER — LAB VISIT (OUTPATIENT)
Dept: LAB | Facility: HOSPITAL | Age: 57
End: 2022-01-21
Attending: INTERNAL MEDICINE
Payer: MEDICAID

## 2022-01-21 ENCOUNTER — PATIENT MESSAGE (OUTPATIENT)
Dept: HEPATOLOGY | Facility: CLINIC | Age: 57
End: 2022-01-21
Payer: MEDICAID

## 2022-01-21 ENCOUNTER — OFFICE VISIT (OUTPATIENT)
Dept: HEMATOLOGY/ONCOLOGY | Facility: CLINIC | Age: 57
End: 2022-01-21
Payer: MEDICAID

## 2022-01-21 VITALS
TEMPERATURE: 98 F | OXYGEN SATURATION: 100 % | DIASTOLIC BLOOD PRESSURE: 64 MMHG | HEART RATE: 84 BPM | SYSTOLIC BLOOD PRESSURE: 107 MMHG | HEIGHT: 67 IN | BODY MASS INDEX: 30.31 KG/M2 | WEIGHT: 193.13 LBS

## 2022-01-21 DIAGNOSIS — D50.0 IRON DEFICIENCY ANEMIA DUE TO CHRONIC BLOOD LOSS: ICD-10-CM

## 2022-01-21 DIAGNOSIS — D64.9 SYMPTOMATIC ANEMIA: ICD-10-CM

## 2022-01-21 DIAGNOSIS — D50.0 IRON DEFICIENCY ANEMIA DUE TO CHRONIC BLOOD LOSS: Primary | ICD-10-CM

## 2022-01-21 LAB
ANISOCYTOSIS BLD QL SMEAR: SLIGHT
BASOPHILS # BLD AUTO: 0.06 K/UL (ref 0–0.2)
BASOPHILS NFR BLD: 0.9 % (ref 0–1.9)
DIFFERENTIAL METHOD: ABNORMAL
EOSINOPHIL # BLD AUTO: 0.2 K/UL (ref 0–0.5)
EOSINOPHIL NFR BLD: 3 % (ref 0–8)
ERYTHROCYTE [DISTWIDTH] IN BLOOD BY AUTOMATED COUNT: 27.7 % (ref 11.5–14.5)
HCT VFR BLD AUTO: 33.3 % (ref 40–54)
HGB BLD-MCNC: 10.1 G/DL (ref 14–18)
HYPOCHROMIA BLD QL SMEAR: ABNORMAL
IMM GRANULOCYTES # BLD AUTO: 0.02 K/UL (ref 0–0.04)
IMM GRANULOCYTES NFR BLD AUTO: 0.3 % (ref 0–0.5)
IRON SERPL-MCNC: 130 UG/DL (ref 45–160)
IRON SERPL-MCNC: 130 UG/DL (ref 45–160)
LYMPHOCYTES # BLD AUTO: 1.7 K/UL (ref 1–4.8)
LYMPHOCYTES NFR BLD: 26.6 % (ref 18–48)
MCH RBC QN AUTO: 25.4 PG (ref 27–31)
MCHC RBC AUTO-ENTMCNC: 30.3 G/DL (ref 32–36)
MCV RBC AUTO: 84 FL (ref 82–98)
MONOCYTES # BLD AUTO: 0.6 K/UL (ref 0.3–1)
MONOCYTES NFR BLD: 8.9 % (ref 4–15)
NEUTROPHILS # BLD AUTO: 3.9 K/UL (ref 1.8–7.7)
NEUTROPHILS NFR BLD: 60.3 % (ref 38–73)
NRBC BLD-RTO: 0 /100 WBC
OVALOCYTES BLD QL SMEAR: ABNORMAL
PLATELET # BLD AUTO: 392 K/UL (ref 150–450)
PLATELET BLD QL SMEAR: ABNORMAL
PMV BLD AUTO: 9.9 FL (ref 9.2–12.9)
POIKILOCYTOSIS BLD QL SMEAR: SLIGHT
RBC # BLD AUTO: 3.97 M/UL (ref 4.6–6.2)
RETICS/RBC NFR AUTO: 1.6 % (ref 0.4–2)
SATURATED IRON: 35 % (ref 20–50)
SATURATED IRON: 35 % (ref 20–50)
TARGETS BLD QL SMEAR: ABNORMAL
TOTAL IRON BINDING CAPACITY: 374 UG/DL (ref 250–450)
TOTAL IRON BINDING CAPACITY: 374 UG/DL (ref 250–450)
TRANSFERRIN SERPL-MCNC: 253 MG/DL (ref 200–375)
TRANSFERRIN SERPL-MCNC: 253 MG/DL (ref 200–375)
WBC # BLD AUTO: 6.39 K/UL (ref 3.9–12.7)

## 2022-01-21 PROCEDURE — 99213 OFFICE O/P EST LOW 20 MIN: CPT | Mod: PBBFAC | Performed by: INTERNAL MEDICINE

## 2022-01-21 PROCEDURE — 3078F DIAST BP <80 MM HG: CPT | Mod: CPTII,,, | Performed by: INTERNAL MEDICINE

## 2022-01-21 PROCEDURE — 3074F SYST BP LT 130 MM HG: CPT | Mod: CPTII,,, | Performed by: INTERNAL MEDICINE

## 2022-01-21 PROCEDURE — 99999 PR PBB SHADOW E&M-EST. PATIENT-LVL III: ICD-10-PCS | Mod: PBBFAC,,, | Performed by: INTERNAL MEDICINE

## 2022-01-21 PROCEDURE — 1160F PR REVIEW ALL MEDS BY PRESCRIBER/CLIN PHARMACIST DOCUMENTED: ICD-10-PCS | Mod: CPTII,,, | Performed by: INTERNAL MEDICINE

## 2022-01-21 PROCEDURE — 1111F DSCHRG MED/CURRENT MED MERGE: CPT | Mod: CPTII,,, | Performed by: INTERNAL MEDICINE

## 2022-01-21 PROCEDURE — 36415 COLL VENOUS BLD VENIPUNCTURE: CPT | Performed by: INTERNAL MEDICINE

## 2022-01-21 PROCEDURE — 3008F BODY MASS INDEX DOCD: CPT | Mod: CPTII,,, | Performed by: INTERNAL MEDICINE

## 2022-01-21 PROCEDURE — 84466 ASSAY OF TRANSFERRIN: CPT | Performed by: INTERNAL MEDICINE

## 2022-01-21 PROCEDURE — 1159F PR MEDICATION LIST DOCUMENTED IN MEDICAL RECORD: ICD-10-PCS | Mod: CPTII,,, | Performed by: INTERNAL MEDICINE

## 2022-01-21 PROCEDURE — 1111F PR DISCHARGE MEDS RECONCILED W/ CURRENT OUTPATIENT MED LIST: ICD-10-PCS | Mod: CPTII,,, | Performed by: INTERNAL MEDICINE

## 2022-01-21 PROCEDURE — 1159F MED LIST DOCD IN RCRD: CPT | Mod: CPTII,,, | Performed by: INTERNAL MEDICINE

## 2022-01-21 PROCEDURE — 99213 OFFICE O/P EST LOW 20 MIN: CPT | Mod: S$PBB,,, | Performed by: INTERNAL MEDICINE

## 2022-01-21 PROCEDURE — 85045 AUTOMATED RETICULOCYTE COUNT: CPT | Performed by: INTERNAL MEDICINE

## 2022-01-21 PROCEDURE — 3078F PR MOST RECENT DIASTOLIC BLOOD PRESSURE < 80 MM HG: ICD-10-PCS | Mod: CPTII,,, | Performed by: INTERNAL MEDICINE

## 2022-01-21 PROCEDURE — 1160F RVW MEDS BY RX/DR IN RCRD: CPT | Mod: CPTII,,, | Performed by: INTERNAL MEDICINE

## 2022-01-21 PROCEDURE — 3008F PR BODY MASS INDEX (BMI) DOCUMENTED: ICD-10-PCS | Mod: CPTII,,, | Performed by: INTERNAL MEDICINE

## 2022-01-21 PROCEDURE — 3074F PR MOST RECENT SYSTOLIC BLOOD PRESSURE < 130 MM HG: ICD-10-PCS | Mod: CPTII,,, | Performed by: INTERNAL MEDICINE

## 2022-01-21 PROCEDURE — 99213 PR OFFICE/OUTPT VISIT, EST, LEVL III, 20-29 MIN: ICD-10-PCS | Mod: S$PBB,,, | Performed by: INTERNAL MEDICINE

## 2022-01-21 PROCEDURE — 85025 COMPLETE CBC W/AUTO DIFF WBC: CPT | Performed by: INTERNAL MEDICINE

## 2022-01-21 PROCEDURE — 99999 PR PBB SHADOW E&M-EST. PATIENT-LVL III: CPT | Mod: PBBFAC,,, | Performed by: INTERNAL MEDICINE

## 2022-01-21 NOTE — PROGRESS NOTES
Subjective:       Patient ID: Shawn Carreno is a 56 y.o. male.    Chief Complaint: Results and Coagulation Disorder    HPI 56-year-old male history of iron deficiency anemia patient presented to [facility] with hemoglobin of 3 g. And dramatic iron deficiency underwent upper lower endoscopy transfusion and iron is currently on oral iron supplementation ECOG status 1    History reviewed. No pertinent past medical history.  History reviewed. No pertinent family history.  Social History     Socioeconomic History    Marital status:    Tobacco Use    Smoking status: Never Smoker    Smokeless tobacco: Never Used   Substance and Sexual Activity    Alcohol use: Never    Drug use: Never    Sexual activity: Not Currently     Past Surgical History:   Procedure Laterality Date    CHOLECYSTECTOMY  2020    COLONOSCOPY N/A 1/5/2022    Procedure: COLONOSCOPY;  Surgeon: Hannah Alvarado MD;  Location: [facility];  Service: Endoscopy;  Laterality: N/A;    ESOPHAGOGASTRODUODENOSCOPY N/A 1/5/2022    Procedure: EGD (ESOPHAGOGASTRODUODENOSCOPY);  Surgeon: Hannah Alvarado MD;  Location: [facility];  Service: Endoscopy;  Laterality: N/A;       Labs:  Lab Results   Component Value Date    WBC 5.43 01/18/2022    HGB 10.8 (L) 01/18/2022    HCT 37.1 (L) 01/18/2022    MCV 86 01/18/2022     01/18/2022     BMP  Lab Results   Component Value Date     01/06/2022    K 4.0 01/06/2022     01/06/2022    CO2 25 01/06/2022    BUN 12 01/06/2022    CREATININE 0.9 01/06/2022    CALCIUM 8.4 (L) 01/06/2022    ANIONGAP 8 01/06/2022    ESTGFRAFRICA >60 01/06/2022    EGFRNONAA >60 01/06/2022     Lab Results   Component Value Date    ALT 14 01/06/2022    AST 17 01/06/2022    ALKPHOS 48 (L) 01/06/2022    BILITOT 1.1 (H) 01/06/2022       Lab Results   Component Value Date    IRON 11 (L) 01/03/2022    FERRITIN 2 (L) 01/03/2022     No results found for: KLHQHPPB68  No results found for: FOLATE  No  results found for: TSH      Review of Systems   Constitutional: Positive for fatigue. Negative for activity change, appetite change, chills, diaphoresis, fever and unexpected weight change.   HENT: Negative for congestion, dental problem, drooling, ear discharge, ear pain, facial swelling, hearing loss, mouth sores, nosebleeds, postnasal drip, rhinorrhea, sinus pressure, sneezing, sore throat, tinnitus, trouble swallowing and voice change.    Eyes: Negative for photophobia, pain, discharge, redness, itching and visual disturbance.   Respiratory: Negative for apnea, cough, choking, chest tightness, shortness of breath, wheezing and stridor.    Cardiovascular: Negative for chest pain, palpitations and leg swelling.   Gastrointestinal: Negative for abdominal distention, abdominal pain, anal bleeding, blood in stool, constipation, diarrhea, nausea, rectal pain and vomiting.   Endocrine: Negative for cold intolerance, heat intolerance, polydipsia, polyphagia and polyuria.   Genitourinary: Negative for decreased urine volume, difficulty urinating, dysuria, enuresis, flank pain, frequency, genital sores, hematuria, penile discharge, penile pain, penile swelling, scrotal swelling, testicular pain and urgency.   Musculoskeletal: Negative for arthralgias, back pain, gait problem, joint swelling, myalgias, neck pain and neck stiffness.   Skin: Negative for color change, pallor, rash and wound.   Allergic/Immunologic: Negative for environmental allergies, food allergies and immunocompromised state.   Neurological: Positive for weakness. Negative for dizziness, tremors, seizures, syncope, facial asymmetry, speech difficulty, light-headedness, numbness and headaches.   Hematological: Negative for adenopathy. Does not bruise/bleed easily.   Psychiatric/Behavioral: Positive for dysphoric mood. Negative for agitation, behavioral problems, confusion, decreased concentration, hallucinations, self-injury, sleep disturbance and suicidal  ideas. The patient is nervous/anxious. The patient is not hyperactive.        Objective:      Physical Exam  Vitals reviewed.   Constitutional:       General: He is not in acute distress.     Appearance: He is well-developed and well-nourished. He is not diaphoretic.   HENT:      Head: Normocephalic.      Right Ear: External ear normal.      Left Ear: External ear normal.      Nose: Nose normal.      Right Sinus: No maxillary sinus tenderness or frontal sinus tenderness.      Left Sinus: No maxillary sinus tenderness or frontal sinus tenderness.      Mouth/Throat:      Mouth: Oropharynx is clear and moist.      Pharynx: No oropharyngeal exudate.   Eyes:      General: Lids are normal. No scleral icterus.        Right eye: No discharge.         Left eye: No discharge.      Extraocular Movements: EOM normal.      Right eye: Normal extraocular motion.      Left eye: Normal extraocular motion.      Conjunctiva/sclera:      Right eye: Right conjunctiva is not injected. No hemorrhage.     Left eye: Left conjunctiva is not injected. No hemorrhage.     Pupils: Pupils are equal, round, and reactive to light.   Neck:      Thyroid: No thyromegaly.      Vascular: No JVD.      Trachea: No tracheal deviation.   Cardiovascular:      Rate and Rhythm: Normal rate.   Pulmonary:      Effort: Pulmonary effort is normal. No respiratory distress.      Breath sounds: No stridor.   Chest:   Breasts:      Right: No supraclavicular adenopathy.      Left: No supraclavicular adenopathy.       Abdominal:      General: Bowel sounds are normal.      Palpations: Abdomen is soft. There is no hepatomegaly, splenomegaly, hepatosplenomegaly or mass.      Tenderness: There is no abdominal tenderness.   Musculoskeletal:         General: No tenderness or edema. Normal range of motion.      Cervical back: Normal range of motion and neck supple.   Lymphadenopathy:      Head:      Right side of head: No posterior auricular or occipital adenopathy.      Left  side of head: No posterior auricular or occipital adenopathy.      Cervical: No cervical adenopathy.      Right cervical: No superficial, deep or posterior cervical adenopathy.     Left cervical: No superficial, deep or posterior cervical adenopathy.      Upper Body:   No axillary adenopathy present.     Right upper body: No supraclavicular adenopathy.      Left upper body: No supraclavicular adenopathy.   Skin:     General: Skin is dry.      Findings: No erythema or rash.      Nails: There is no clubbing or cyanosis.   Neurological:      Mental Status: He is alert and oriented to person, place, and time.      Cranial Nerves: No cranial nerve deficit.      Coordination: Coordination normal.      Deep Tendon Reflexes: Strength normal.   Psychiatric:         Mood and Affect: Mood and affect normal.         Behavior: Behavior normal.         Thought Content: Thought content normal.         Cognition and Memory: Cognition and memory normal.         Judgment: Judgment normal.             Assessment:      1. Iron deficiency anemia due to chronic blood loss    2. Symptomatic anemia           Plan:     Patient having dramatic response to oral iron will have baseline CBC iron status retic count turn 3 month schedule a video capsule endoscopy upper lower endoscopy revealed no clear etiology.  Reassurance given follow-up as detailed communicate through portal        Kris Cesar Jr, MD FACP

## 2022-01-26 ENCOUNTER — HOSPITAL ENCOUNTER (OUTPATIENT)
Facility: HOSPITAL | Age: 57
Discharge: HOME | End: 2022-01-26
Attending: INTERNAL MEDICINE | Admitting: INTERNAL MEDICINE
Payer: MEDICAID

## 2022-01-26 PROCEDURE — 91110 GI TRC IMG INTRAL ESOPH-ILE: CPT | Mod: 26,,, | Performed by: INTERNAL MEDICINE

## 2022-01-26 PROCEDURE — 91110 GI TRC IMG INTRAL ESOPH-ILE: CPT

## 2022-01-26 PROCEDURE — 91110 PR GI TRACT CAPSULE ENDOSCOPY: ICD-10-PCS | Mod: 26,,, | Performed by: INTERNAL MEDICINE

## 2022-01-26 NOTE — OR NURSING
Patient arrived for Video Capsule Endoscopy. Two patient identifier verified. Patient verbalized adequate prep. Reviewed the procedure w/ the patient, provided instructions, and answered all questions. Patient verbalized understanding. Dietary restrictions explained. PillCam swallowed at  0705.  Lot# [id number], Capsule ID# [id number], Exp: 5/16/2023. Pt instructed to return at 1505. PillCam visualized in the pt's stomach prior to discharge. No distress noted.

## 2022-02-08 NOTE — PROVATION PATIENT INSTRUCTIONS
Discharge Summary/Instructions after an Endoscopic Procedure  Patient Name: Shawn Carreno  Patient MRN: [id number]  Patient YOB: 1965 Wednesday, January 26, 2022 Vira Castaneda MD  Dear patient,  As a result of recent federal legislation (The Federal Cures Act), you may   receive lab or pathology results from your procedure in your [facility]   account before your physician is able to contact you. Your physician or   their representative will relay the results to you with their   recommendations at their soonest availability.  Thank you,  RESTRICTIONS:  During your procedure today, you received medications for sedation.  These   medications may affect your judgment, balance and coordination.  Therefore,   for 24 hours, you have the following restrictions:   - DO NOT drive a car, operate machinery, make legal/financial decisions,   sign important papers or drink alcohol.    ACTIVITY:  Today: no heavy lifting, straining or running due to procedural   sedation/anesthesia.  The following day: return to full activity including work.  DIET:  Eat and drink normally unless instructed otherwise.     TREATMENT FOR COMMON SIDE EFFECTS:  - Mild abdominal pain, nausea, belching, bloating or excessive gas:  rest,   eat lightly and use a heating pad.  - Sore Throat: treat with throat lozenges and/or gargle with warm salt   water.  - Because air was used during the procedure, expelling large amounts of air   from your rectum or belching is normal.  - If a bowel prep was taken, you may not have a bowel movement for 1-3 days.    This is normal.  SYMPTOMS TO WATCH FOR AND REPORT TO YOUR PHYSICIAN:  1. Abdominal pain or bloating, other than gas cramps.  2. Chest pain.  3. Back pain.  4. Signs of infection such as: chills or fever occurring within 24 hours   after the procedure.  5. Rectal bleeding, which would show as bright red, maroon, or black stools.   (A tablespoon of blood from the rectum is not serious,  especially if   hemorrhoids are present.)  6. Vomiting.  7. Weakness or dizziness.  GO DIRECTLY TO THE NEAREST EMERGENCY ROOM IF YOU HAVE ANY OF THE FOLLOWING:      Difficulty breathing              Chills and/or fever over 101 F   Persistent vomiting and/or vomiting blood   Severe abdominal pain   Severe chest pain   Black, tarry stools   Bleeding- more than one tablespoon   Any other symptom or condition that you feel may need urgent attention  Your doctor recommends these additional instructions:  If any biopsies were taken, your doctors clinic will contact you in 1 to 2   weeks with any results.  - Follow up with referring physician.  - Consider repeat VCE with extended bowel prep.  For questions, problems or results please call your physician Vira Castanead MD at Work:  [phone]  If you have any questions about the above instructions, call the GI   department at [phone] or call the endoscopy unit at [phone]   from 7am until 3 pm.  [facility], EMERGENCY ROOM PHONE NUMBER:   [phone]  IF A COMPLICATION OR EMERGENCY SITUATION ARISES AND YOU ARE UNABLE TO REACH   YOUR PHYSICIAN - GO DIRECTLY TO THE EMERGENCY ROOM.  I have read or have had read to me these discharge instructions for my   procedure and have received a written copy.  I understand these   instructions and will follow-up with my physician if I have any questions.     __________________________________       _____________________________________  Nurse Signature                                          Patient/Designated   Responsible Party Signature  MD Vira Newell MD  2/8/2022 4:03:10 PM  This report has been verified and signed electronically.  Dear patient,  As a result of recent federal legislation (The Federal Cures Act), you may   receive lab or pathology results from your procedure in your [facility]   account before your physician is able to contact you. Your physician or    their representative will relay the results to you with their   recommendations at their soonest availability.  Thank you,  [employer]OVATION

## 2022-02-09 DIAGNOSIS — D64.9 SYMPTOMATIC ANEMIA: Primary | ICD-10-CM

## 2022-02-22 ENCOUNTER — HOSPITAL ENCOUNTER (OUTPATIENT)
Facility: HOSPITAL | Age: 57
Discharge: HOME | End: 2022-02-22
Attending: INTERNAL MEDICINE | Admitting: INTERNAL MEDICINE
Payer: MEDICAID

## 2022-02-22 PROCEDURE — 91110 GI TRC IMG INTRAL ESOPH-ILE: CPT | Performed by: INTERNAL MEDICINE

## 2022-02-22 PROCEDURE — 91110 PR GI TRACT CAPSULE ENDOSCOPY: ICD-10-PCS | Mod: 26,,, | Performed by: INTERNAL MEDICINE

## 2022-02-22 PROCEDURE — 91110 GI TRC IMG INTRAL ESOPH-ILE: CPT | Mod: 26,,, | Performed by: INTERNAL MEDICINE

## 2022-02-22 NOTE — OR NURSING
Patient arrived for Video Capsule Endoscopy. Two patient identifier verified. Patient verbalized adequate prep. Reviewed the procedure w/ the patient, provided instructions, and answered all questions. Patient verbalized understanding. Dietary restrictions explained. PillCam swallowed at  0719.  Lot# [id number], Capsule ID# [id number], Exp: 6/20/2023. Pt instructed to return at 1520. PillCam visualized in the pt's stomach prior to discharge. No distress noted.

## 2022-02-24 NOTE — PROVATION PATIENT INSTRUCTIONS
Discharge Summary/Instructions after an Endoscopic Procedure  Patient Name: Shawn Carreno  Patient MRN: [id number]  Patient YOB: 1965 Tuesday, February 22, 2022 Sally Gottlieb MD  Dear patient,  As a result of recent federal legislation (The Federal Cures Act), you may   receive lab or pathology results from your procedure in your [facility]   account before your physician is able to contact you. Your physician or   their representative will relay the results to you with their   recommendations at their soonest availability.  Thank you,  RESTRICTIONS:  During your procedure today, you received medications for sedation.  These   medications may affect your judgment, balance and coordination.  Therefore,   for 24 hours, you have the following restrictions:   - DO NOT drive a car, operate machinery, make legal/financial decisions,   sign important papers or drink alcohol.    ACTIVITY:  Today: no heavy lifting, straining or running due to procedural   sedation/anesthesia.  The following day: return to full activity including work.  DIET:  Eat and drink normally unless instructed otherwise.     TREATMENT FOR COMMON SIDE EFFECTS:  - Mild abdominal pain, nausea, belching, bloating or excessive gas:  rest,   eat lightly and use a heating pad.  - Sore Throat: treat with throat lozenges and/or gargle with warm salt   water.  - Because air was used during the procedure, expelling large amounts of air   from your rectum or belching is normal.  - If a bowel prep was taken, you may not have a bowel movement for 1-3 days.    This is normal.  SYMPTOMS TO WATCH FOR AND REPORT TO YOUR PHYSICIAN:  1. Abdominal pain or bloating, other than gas cramps.  2. Chest pain.  3. Back pain.  4. Signs of infection such as: chills or fever occurring within 24 hours   after the procedure.  5. Rectal bleeding, which would show as bright red, maroon, or black stools.   (A tablespoon of blood from the rectum is not serious,  especially if   hemorrhoids are present.)  6. Vomiting.  7. Weakness or dizziness.  GO DIRECTLY TO THE NEAREST EMERGENCY ROOM IF YOU HAVE ANY OF THE FOLLOWING:      Difficulty breathing              Chills and/or fever over 101 F   Persistent vomiting and/or vomiting blood   Severe abdominal pain   Severe chest pain   Black, tarry stools   Bleeding- more than one tablespoon   Any other symptom or condition that you feel may need urgent attention  Your doctor recommends these additional instructions:  If any biopsies were taken, your doctors clinic will contact you in 1 to 2   weeks with any results.  - Recommend push enteroscopy for further evaluation  For questions, problems or results please call your physician Sally Gottlieb MD at Work:  [phone]  If you have any questions about the above instructions, call the GI   department at [phone] or call the endoscopy unit at [phone]   from 7am until 3 pm.  [facility], EMERGENCY ROOM PHONE NUMBER:   [phone]  IF A COMPLICATION OR EMERGENCY SITUATION ARISES AND YOU ARE UNABLE TO REACH   YOUR PHYSICIAN - GO DIRECTLY TO THE EMERGENCY ROOM.  I have read or have had read to me these discharge instructions for my   procedure and have received a written copy.  I understand these   instructions and will follow-up with my physician if I have any questions.     __________________________________       _____________________________________  Nurse Signature                                          Patient/Designated   Responsible Party Signature  MD Sally Naranjo MD  2/24/2022 11:51:18 AM  [employer]

## 2022-03-10 DIAGNOSIS — K55.20 AVM (ARTERIOVENOUS MALFORMATION) OF SMALL BOWEL, ACQUIRED: ICD-10-CM

## 2022-03-10 DIAGNOSIS — D64.9 SYMPTOMATIC ANEMIA: Primary | ICD-10-CM

## 2022-03-14 ENCOUNTER — PATIENT MESSAGE (OUTPATIENT)
Dept: ENDOSCOPY | Facility: HOSPITAL | Age: 57
End: 2022-03-14
Payer: MEDICAID

## 2022-04-04 ENCOUNTER — ANESTHESIA EVENT (OUTPATIENT)
Dept: ENDOSCOPY | Facility: HOSPITAL | Age: 57
End: 2022-04-04
Payer: MEDICAID

## 2022-04-04 NOTE — ANESTHESIA PREPROCEDURE EVALUATION
04/04/2022  Shawn Carreno is a 56 y.o., male.  ER SOB 1/22, 3 units PRBC given, H&H 3 & 12.5  AV malformation bowel  No past medical history on file.  Past Surgical History:   Procedure Laterality Date    CHOLECYSTECTOMY  2020    COLONOSCOPY N/A 1/5/2022    Procedure: COLONOSCOPY;  Surgeon: Hannah Alvarado MD;  Location: [facility];  Service: Endoscopy;  Laterality: N/A;    ESOPHAGOGASTRODUODENOSCOPY N/A 1/5/2022    Procedure: EGD (ESOPHAGOGASTRODUODENOSCOPY);  Surgeon: Hannah Alvarado MD;  Location: [facility];  Service: Endoscopy;  Laterality: N/A;    INTRALUMINAL GASTROINTESTINAL TRACT IMAGING VIA CAPSULE N/A 1/26/2022    Procedure: IMAGING PROCEDURE, GI TRACT, INTRALUMINAL, VIA CAPSULE;  Surgeon: Baltazar Chaves RN;  Location: [facility];  Service: Endoscopy;  Laterality: N/A;    INTRALUMINAL GASTROINTESTINAL TRACT IMAGING VIA CAPSULE N/A 2/22/2022    Procedure: IMAGING PROCEDURE, GI TRACT, INTRALUMINAL, VIA CAPSULE;  Surgeon: Baltazar Chaves RN;  Location: [facility];  Service: Endoscopy;  Laterality: N/A;   No current facility-administered medications for this encounter.    Current Outpatient Medications:     ferrous sulfate 324 mg (65 mg iron) TbEC, Take 1 tablet (324 mg total) by mouth once daily., Disp: , Rfl: 0    Sinus tachycardia   Cannot rule out Anterior infarct ,age undetermined   Abnormal ECG   No previous ECGs available   Confirmed by CHERYL BOYD MD ([id number]) on 1/4/2022 3:34:48 PM     Pre-op Assessment    I have reviewed the Patient Summary Reports.     I have reviewed the Nursing Notes. I have reviewed the NPO Status.   I have reviewed the Medications.     Review of Systems  Anesthesia Hx:  No problems with previous Anesthesia  Neg history of prior surgery. Denies Family Hx of Anesthesia complications.   Denies Personal Hx of Anesthesia complications.   Social:  Non-Smoker,  Social Alcohol Use    Hematology/Oncology:         -- Anemia (symptomatic):   Cardiovascular:   Exercise tolerance: good        Physical Exam  General: Well nourished, Cooperative, Alert and Oriented    Airway:  Mallampati: II / II  Mouth Opening: Normal  TM Distance: Normal  Tongue: Normal  Neck ROM: Normal ROM    Dental:  Intact    Chest/Lungs:  Clear to auscultation, Normal Respiratory Rate    Heart:  Rate: Normal  Sounds: Normal    Abdomen:  Normal, Soft, Nontender        Anesthesia Plan  Type of Anesthesia, risks & benefits discussed:    Anesthesia Type: Gen Natural Airway  Intra-op Monitoring Plan: Standard ASA Monitors  Post Op Pain Control Plan: multimodal analgesia  Induction:  IV  Informed Consent: Informed consent signed with the Patient and all parties understand the risks and agree with anesthesia plan.  All questions answered.   ASA Score: 2  Day of Surgery Review of History & Physical: H&P Update referred to the surgeon/provider.    Ready For Surgery From Anesthesia Perspective.     .

## 2022-04-06 ENCOUNTER — HOSPITAL ENCOUNTER (OUTPATIENT)
Facility: HOSPITAL | Age: 57
Discharge: HOME | End: 2022-04-06
Attending: INTERNAL MEDICINE | Admitting: INTERNAL MEDICINE
Payer: MEDICAID

## 2022-04-06 ENCOUNTER — ANESTHESIA (OUTPATIENT)
Dept: ENDOSCOPY | Facility: HOSPITAL | Age: 57
End: 2022-04-06
Payer: MEDICAID

## 2022-04-06 VITALS
HEIGHT: 67 IN | HEART RATE: 75 BPM | DIASTOLIC BLOOD PRESSURE: 84 MMHG | RESPIRATION RATE: 18 BRPM | SYSTOLIC BLOOD PRESSURE: 130 MMHG | OXYGEN SATURATION: 98 % | BODY MASS INDEX: 31.88 KG/M2 | WEIGHT: 203.13 LBS | TEMPERATURE: 98 F

## 2022-04-06 DIAGNOSIS — D50.0 IRON DEFICIENCY ANEMIA DUE TO CHRONIC BLOOD LOSS: Primary | ICD-10-CM

## 2022-04-06 DIAGNOSIS — D50.9 IRON DEFICIENCY ANEMIA, UNSPECIFIED IRON DEFICIENCY ANEMIA TYPE: Primary | ICD-10-CM

## 2022-04-06 LAB
CTP QC/QA: YES
SARS-COV-2 AG RESP QL IA.RAPID: NEGATIVE

## 2022-04-06 PROCEDURE — 43236 UPPR GI SCOPE W/SUBMUC INJ: CPT | Mod: ,,, | Performed by: INTERNAL MEDICINE

## 2022-04-06 PROCEDURE — 43236 UPPR GI SCOPE W/SUBMUC INJ: CPT | Performed by: INTERNAL MEDICINE

## 2022-04-06 PROCEDURE — 63600175 PHARM REV CODE 636 W HCPCS: Performed by: INTERNAL MEDICINE

## 2022-04-06 PROCEDURE — 00731 ANES UPR GI NDSC PX NOS: CPT | Performed by: INTERNAL MEDICINE

## 2022-04-06 PROCEDURE — D9220A PRA ANESTHESIA: ICD-10-PCS | Mod: ANES,,, | Performed by: ANESTHESIOLOGY

## 2022-04-06 PROCEDURE — D9220A PRA ANESTHESIA: Mod: ANES,,, | Performed by: ANESTHESIOLOGY

## 2022-04-06 PROCEDURE — 25000003 PHARM REV CODE 250: Performed by: NURSE ANESTHETIST, CERTIFIED REGISTERED

## 2022-04-06 PROCEDURE — 27202363 HC INJECTION AGENT, SUBMUCOSAL, ANY: Performed by: INTERNAL MEDICINE

## 2022-04-06 PROCEDURE — 37000009 HC ANESTHESIA EA ADD 15 MINS: Performed by: INTERNAL MEDICINE

## 2022-04-06 PROCEDURE — 63600175 PHARM REV CODE 636 W HCPCS: Performed by: NURSE ANESTHETIST, CERTIFIED REGISTERED

## 2022-04-06 PROCEDURE — 43236 PR EGD, FLEX, W/SUBMUC INJECTION(S), ANY SUBSTANCE: ICD-10-PCS | Mod: ,,, | Performed by: INTERNAL MEDICINE

## 2022-04-06 PROCEDURE — 27201028 HC NEEDLE, SCLERO: Performed by: INTERNAL MEDICINE

## 2022-04-06 PROCEDURE — 37000008 HC ANESTHESIA 1ST 15 MINUTES: Performed by: INTERNAL MEDICINE

## 2022-04-06 PROCEDURE — D9220A PRA ANESTHESIA: Mod: CRNA,,, | Performed by: NURSE ANESTHETIST, CERTIFIED REGISTERED

## 2022-04-06 PROCEDURE — D9220A PRA ANESTHESIA: ICD-10-PCS | Mod: CRNA,,, | Performed by: NURSE ANESTHETIST, CERTIFIED REGISTERED

## 2022-04-06 RX ORDER — PROPOFOL 10 MG/ML
VIAL (ML) INTRAVENOUS
Status: DISCONTINUED | OUTPATIENT
Start: 2022-04-06 | End: 2022-04-06

## 2022-04-06 RX ORDER — SODIUM CHLORIDE, SODIUM LACTATE, POTASSIUM CHLORIDE, CALCIUM CHLORIDE 600; 310; 30; 20 MG/100ML; MG/100ML; MG/100ML; MG/100ML
INJECTION, SOLUTION INTRAVENOUS CONTINUOUS
Status: ACTIVE | OUTPATIENT
Start: 2022-04-06

## 2022-04-06 RX ORDER — LIDOCAINE HYDROCHLORIDE 20 MG/ML
INJECTION, SOLUTION EPIDURAL; INFILTRATION; INTRACAUDAL; PERINEURAL
Status: DISCONTINUED | OUTPATIENT
Start: 2022-04-06 | End: 2022-04-06

## 2022-04-06 RX ADMIN — PROPOFOL 20 MG: 10 INJECTION, EMULSION INTRAVENOUS at 11:04

## 2022-04-06 RX ADMIN — PROPOFOL 50 MG: 10 INJECTION, EMULSION INTRAVENOUS at 11:04

## 2022-04-06 RX ADMIN — PROPOFOL 100 MG: 10 INJECTION, EMULSION INTRAVENOUS at 11:04

## 2022-04-06 RX ADMIN — LIDOCAINE HYDROCHLORIDE 50 MG: 20 INJECTION, SOLUTION EPIDURAL; INFILTRATION; INTRACAUDAL; PERINEURAL at 11:04

## 2022-04-06 RX ADMIN — SODIUM CHLORIDE, SODIUM LACTATE, POTASSIUM CHLORIDE, AND CALCIUM CHLORIDE: .6; .31; .03; .02 INJECTION, SOLUTION INTRAVENOUS at 09:04

## 2022-04-06 NOTE — PROVATION PATIENT INSTRUCTIONS
Discharge Summary/Instructions after an Endoscopic Procedure  Patient Name: Shawn Carreno  Patient MRN: [id number]  Patient YOB: 1965 Wednesday, April 6, 2022  Vira Castaneda MD  Dear patient,  As a result of recent federal legislation (The Federal Cures Act), you may   receive lab or pathology results from your procedure in your [facility]   account before your physician is able to contact you. Your physician or   their representative will relay the results to you with their   recommendations at their soonest availability.  Thank you,  RESTRICTIONS:  During your procedure today, you received medications for sedation.  These   medications may affect your judgment, balance and coordination.  Therefore,   for 24 hours, you have the following restrictions:   - DO NOT drive a car, operate machinery, make legal/financial decisions,   sign important papers or drink alcohol.    ACTIVITY:  Today: no heavy lifting, straining or running due to procedural   sedation/anesthesia.  The following day: return to full activity including work.  DIET:  Eat and drink normally unless instructed otherwise.     TREATMENT FOR COMMON SIDE EFFECTS:  - Mild abdominal pain, nausea, belching, bloating or excessive gas:  rest,   eat lightly and use a heating pad.  - Sore Throat: treat with throat lozenges and/or gargle with warm salt   water.  - Because air was used during the procedure, expelling large amounts of air   from your rectum or belching is normal.  - If a bowel prep was taken, you may not have a bowel movement for 1-3 days.    This is normal.  SYMPTOMS TO WATCH FOR AND REPORT TO YOUR PHYSICIAN:  1. Abdominal pain or bloating, other than gas cramps.  2. Chest pain.  3. Back pain.  4. Signs of infection such as: chills or fever occurring within 24 hours   after the procedure.  5. Rectal bleeding, which would show as bright red, maroon, or black stools.   (A tablespoon of blood from the rectum is not serious, especially  if   hemorrhoids are present.)  6. Vomiting.  7. Weakness or dizziness.  GO DIRECTLY TO THE NEAREST EMERGENCY ROOM IF YOU HAVE ANY OF THE FOLLOWING:      Difficulty breathing              Chills and/or fever over 101 F   Persistent vomiting and/or vomiting blood   Severe abdominal pain   Severe chest pain   Black, tarry stools   Bleeding- more than one tablespoon   Any other symptom or condition that you feel may need urgent attention  Your doctor recommends these additional instructions:  If any biopsies were taken, your doctors clinic will contact you in 1 to 2   weeks with any results.  - Patient has a contact number available for emergencies.  The signs and   symptoms of potential delayed complications were discussed with the   patient.  Return to normal activities tomorrow.  Written discharge   instructions were provided to the patient.   - Discharge patient to home (via wheelchair).   - Resume previous diet today.   - Continue present medications.   - Continue iron supplement.  For questions, problems or results please call your physician Vira Castaneda MD at Work:  [phone]  If you have any questions about the above instructions, call the GI   department at [phone] or call the endoscopy unit at [phone]   from 7am until 3 pm.  [facility], EMERGENCY ROOM PHONE NUMBER:   [phone]  IF A COMPLICATION OR EMERGENCY SITUATION ARISES AND YOU ARE UNABLE TO REACH   YOUR PHYSICIAN - GO DIRECTLY TO THE EMERGENCY ROOM.  I have read or have had read to me these discharge instructions for my   procedure and have received a written copy.  I understand these   instructions and will follow-up with my physician if I have any questions.     __________________________________       _____________________________________  Nurse Signature                                          Patient/Designated   Responsible Party Signature  MD Vira Newell MD  4/6/2022  11:31:00 AM  This report has been verified and signed electronically.  Dear patient,  As a result of recent federal legislation (The Federal Cures Act), you may   receive lab or pathology results from your procedure in your [facility]   account before your physician is able to contact you. Your physician or   their representative will relay the results to you with their   recommendations at their soonest availability.  Thank you,  [employer]OVATION

## 2022-04-06 NOTE — TRANSFER OF CARE
"Anesthesia Transfer of Care Note    Patient: Shawn Carreno    Procedure(s) Performed: Procedure(s) (LRB):  EGD (ESOPHAGOGASTRODUODENOSCOPY) with push (N/A)    Patient location: PACU    Anesthesia Type: general    Transport from OR: Transported from OR on room air with adequate spontaneous ventilation    Post pain: adequate analgesia    Post assessment: no apparent anesthetic complications    Post vital signs: stable    Level of consciousness: sedated    Nausea/Vomiting: no nausea/vomiting    Complications: none    Transfer of care protocol was followed      Last vitals:   Visit Vitals  BP (!) 140/85 (BP Location: Right arm, Patient Position: Sitting)   Pulse 85   Temp 36.6 °C (97.8 °F) (Temporal)   Resp 18   Ht 5' 7" (1.702 m)   Wt 92.1 kg (203 lb 2.5 oz)   SpO2 99%   BMI 31.82 kg/m²     "

## 2022-04-06 NOTE — ANESTHESIA POSTPROCEDURE EVALUATION
Anesthesia Post Evaluation    Patient: Shawn Carreno    Procedure(s) Performed: Procedure(s) (LRB):  EGD (ESOPHAGOGASTRODUODENOSCOPY) with push (N/A)    Final Anesthesia Type: general      Patient location during evaluation: PACU  Patient participation: Yes- Able to Participate  Level of consciousness: awake and alert and oriented  Post-procedure vital signs: reviewed and stable  Pain management: adequate  Airway patency: patent    PONV status at discharge: No PONV  Anesthetic complications: no      Cardiovascular status: blood pressure returned to baseline, stable and hemodynamically stable  Respiratory status: unassisted  Hydration status: euvolemic  Follow-up not needed.          Vitals Value Taken Time   /84 04/06/22 1200     04/06/22 1426   Pulse 73 04/06/22 1201   Resp 17 04/06/22 1201   SpO2 98 % 04/06/22 1201   Vitals shown include unvalidated device data.      Event Time   Out of Recovery 11:58:00         Pain/Samia Score: Samia Score: 10 (4/6/2022 12:00 PM)

## 2022-04-06 NOTE — PLAN OF CARE
Awaiting MD to speak with patient. Discharge instructions reviewed with patient and visitor. Handouts given & verbalized understanding with no further questions at this time. VSS on RA, no pain or nausea noted, tolerating po fluids, no complaints noted. Fall precautions reviewed, consents in chart, PIV removed at this time.

## 2022-04-06 NOTE — H&P
PRE PROCEDURE H&P    Patient Name: Shawn Carreno  MRN: [id number]  : 1965  Date of Procedure:  2022  Referring Physician: Arianne Cox PA-C  Primary Physician: To Obtain Unable  Procedure Physician: Vira Castaneda MD       Planned Procedure: EGD  Diagnosis: AVMS  Chief Complaint: Same as above    HPI: Patient is an 56 y.o. male is here for the above.  Dark stool after starting iron but no reports of melena    Anticoagulation: none    Past Medical History:   History reviewed. No pertinent past medical history.     Past Surgical History:  Past Surgical History:   Procedure Laterality Date    CHOLECYSTECTOMY      COLONOSCOPY N/A 2022    Procedure: COLONOSCOPY;  Surgeon: Hannah Alvarado MD;  Location: [facility];  Service: Endoscopy;  Laterality: N/A;    ESOPHAGOGASTRODUODENOSCOPY N/A 2022    Procedure: EGD (ESOPHAGOGASTRODUODENOSCOPY);  Surgeon: Hannah Alvarado MD;  Location: [facility];  Service: Endoscopy;  Laterality: N/A;    INTRALUMINAL GASTROINTESTINAL TRACT IMAGING VIA CAPSULE N/A 2022    Procedure: IMAGING PROCEDURE, GI TRACT, INTRALUMINAL, VIA CAPSULE;  Surgeon: Baltazar Chaves RN;  Location: [facility];  Service: Endoscopy;  Laterality: N/A;    INTRALUMINAL GASTROINTESTINAL TRACT IMAGING VIA CAPSULE N/A 2022    Procedure: IMAGING PROCEDURE, GI TRACT, INTRALUMINAL, VIA CAPSULE;  Surgeon: Baltazar Chaves RN;  Location: [facility];  Service: Endoscopy;  Laterality: N/A;        Home Medications:  Prior to Admission medications    Medication Sig Start Date End Date Taking? Authorizing Provider   ferrous sulfate 324 mg (65 mg iron) TbEC Take 1 tablet (324 mg total) by mouth once daily. 22   Alejandro Zhang MD        Allergies:  Review of patient's allergies indicates:  No Known Allergies     Social History:   Social History     Socioeconomic History    Marital status:    Tobacco Use    Smoking status: Never Smoker    Smokeless tobacco: Never Used  "  Substance and Sexual Activity    Alcohol use: Never    Drug use: Never    Sexual activity: Not Currently       Family History:  History reviewed. No pertinent family history.    ROS: No acute cardiac events, no acute respiratory complaints.     Physical Exam (all patients):    BP (!) 140/85 (BP Location: Right arm, Patient Position: Sitting)   Pulse 85   Temp 97.8 °F (36.6 °C) (Temporal)   Resp 18   Ht 5' 7" (1.702 m)   Wt 92.1 kg (203 lb 2.5 oz)   SpO2 99%   BMI 31.82 kg/m²   Lungs: Clear to auscultation bilaterally, respirations unlabored  Heart: Regular rate and rhythm, S1 and S2 normal, no obvious murmurs  Abdomen:         Soft, non-tender, bowel sounds normal, no masses, no organomegaly    Lab Results   Component Value Date    WBC 6.39 01/21/2022    MCV 84 01/21/2022    RDW 27.7 (H) 01/21/2022     01/21/2022    INR 1.0 01/04/2022    GLU 82 01/06/2022    BUN 12 01/06/2022     01/06/2022    K 4.0 01/06/2022     01/06/2022        SEDATION PLAN: per anesthesia      History reviewed, vital signs satisfactory, cardiopulmonary status satisfactory, sedation options, risks and plans have been discussed with the patient  All their questions were answered and the patient agrees to the sedation procedures as planned and the patient is deemed an appropriate candidate for the sedation as planned.    Procedure explained to patient, informed consent obtained and placed in chart.    Vira Castaneda  4/6/2022  11:09 AM   "

## 2022-04-06 NOTE — PLAN OF CARE
Dr. Castaneda spoke to pt at bedside, reviewed procedure and findings, answered questions. Made aware they are awaiting biopsy results with MD telephone number provided per AVS sheet. Patient discharged to lobby with , his son, Shawn at this time.

## 2022-04-06 NOTE — PLAN OF CARE
Pt in recovery, pt sleeping and snoring, SPO2 91% on room air, oxygen applied at 2 L/min via nasal cannula. Will continue to monitor.

## 2022-06-02 ENCOUNTER — LAB VISIT (OUTPATIENT)
Dept: LAB | Facility: HOSPITAL | Age: 57
End: 2022-06-02
Attending: INTERNAL MEDICINE
Payer: MEDICAID

## 2022-06-02 DIAGNOSIS — D50.0 IRON DEFICIENCY ANEMIA DUE TO CHRONIC BLOOD LOSS: ICD-10-CM

## 2022-06-02 LAB
BASOPHILS # BLD AUTO: 0.06 K/UL (ref 0–0.2)
BASOPHILS NFR BLD: 0.8 % (ref 0–1.9)
DIFFERENTIAL METHOD: ABNORMAL
EOSINOPHIL # BLD AUTO: 0.3 K/UL (ref 0–0.5)
EOSINOPHIL NFR BLD: 4.3 % (ref 0–8)
ERYTHROCYTE [DISTWIDTH] IN BLOOD BY AUTOMATED COUNT: 13.7 % (ref 11.5–14.5)
FERRITIN SERPL-MCNC: 22 NG/ML (ref 20–300)
HCT VFR BLD AUTO: 38 % (ref 40–54)
HGB BLD-MCNC: 12.2 G/DL (ref 14–18)
IMM GRANULOCYTES # BLD AUTO: 0.02 K/UL (ref 0–0.04)
IMM GRANULOCYTES NFR BLD AUTO: 0.3 % (ref 0–0.5)
LYMPHOCYTES # BLD AUTO: 2.3 K/UL (ref 1–4.8)
LYMPHOCYTES NFR BLD: 32.2 % (ref 18–48)
MCH RBC QN AUTO: 29.5 PG (ref 27–31)
MCHC RBC AUTO-ENTMCNC: 32.1 G/DL (ref 32–36)
MCV RBC AUTO: 92 FL (ref 82–98)
MONOCYTES # BLD AUTO: 0.8 K/UL (ref 0.3–1)
MONOCYTES NFR BLD: 11.1 % (ref 4–15)
NEUTROPHILS # BLD AUTO: 3.7 K/UL (ref 1.8–7.7)
NEUTROPHILS NFR BLD: 51.3 % (ref 38–73)
NRBC BLD-RTO: 0 /100 WBC
PLATELET # BLD AUTO: 285 K/UL (ref 150–450)
PMV BLD AUTO: 9.1 FL (ref 9.2–12.9)
RBC # BLD AUTO: 4.13 M/UL (ref 4.6–6.2)
RETICS/RBC NFR AUTO: 2.5 % (ref 0.4–2)
WBC # BLD AUTO: 7.23 K/UL (ref 3.9–12.7)

## 2022-06-02 PROCEDURE — 85025 COMPLETE CBC W/AUTO DIFF WBC: CPT | Performed by: INTERNAL MEDICINE

## 2022-06-02 PROCEDURE — 36415 COLL VENOUS BLD VENIPUNCTURE: CPT | Performed by: INTERNAL MEDICINE

## 2022-06-02 PROCEDURE — 82728 ASSAY OF FERRITIN: CPT | Performed by: INTERNAL MEDICINE

## 2022-06-02 PROCEDURE — 85045 AUTOMATED RETICULOCYTE COUNT: CPT | Performed by: INTERNAL MEDICINE

## 2022-06-06 ENCOUNTER — PATIENT MESSAGE (OUTPATIENT)
Dept: HEMATOLOGY/ONCOLOGY | Facility: CLINIC | Age: 57
End: 2022-06-06

## 2022-06-06 ENCOUNTER — OFFICE VISIT (OUTPATIENT)
Dept: HEMATOLOGY/ONCOLOGY | Facility: CLINIC | Age: 57
End: 2022-06-06
Payer: MEDICAID

## 2022-06-06 VITALS
OXYGEN SATURATION: 97 % | HEIGHT: 67 IN | WEIGHT: 209.88 LBS | SYSTOLIC BLOOD PRESSURE: 120 MMHG | TEMPERATURE: 98 F | HEART RATE: 101 BPM | DIASTOLIC BLOOD PRESSURE: 77 MMHG | BODY MASS INDEX: 32.94 KG/M2

## 2022-06-06 DIAGNOSIS — D64.9 SYMPTOMATIC ANEMIA: ICD-10-CM

## 2022-06-06 DIAGNOSIS — I35.0 AORTIC VALVE STENOSIS, ETIOLOGY OF CARDIAC VALVE DISEASE UNSPECIFIED: ICD-10-CM

## 2022-06-06 DIAGNOSIS — D50.0 IRON DEFICIENCY ANEMIA DUE TO CHRONIC BLOOD LOSS: Primary | ICD-10-CM

## 2022-06-06 PROCEDURE — 3078F PR MOST RECENT DIASTOLIC BLOOD PRESSURE < 80 MM HG: ICD-10-PCS | Mod: CPTII,,, | Performed by: INTERNAL MEDICINE

## 2022-06-06 PROCEDURE — 99999 PR PBB SHADOW E&M-EST. PATIENT-LVL III: ICD-10-PCS | Mod: PBBFAC,,, | Performed by: INTERNAL MEDICINE

## 2022-06-06 PROCEDURE — 3078F DIAST BP <80 MM HG: CPT | Mod: CPTII,,, | Performed by: INTERNAL MEDICINE

## 2022-06-06 PROCEDURE — 99214 PR OFFICE/OUTPT VISIT, EST, LEVL IV, 30-39 MIN: ICD-10-PCS | Mod: S$PBB,,, | Performed by: INTERNAL MEDICINE

## 2022-06-06 PROCEDURE — 3074F SYST BP LT 130 MM HG: CPT | Mod: CPTII,,, | Performed by: INTERNAL MEDICINE

## 2022-06-06 PROCEDURE — 99213 OFFICE O/P EST LOW 20 MIN: CPT | Mod: PBBFAC | Performed by: INTERNAL MEDICINE

## 2022-06-06 PROCEDURE — 1160F PR REVIEW ALL MEDS BY PRESCRIBER/CLIN PHARMACIST DOCUMENTED: ICD-10-PCS | Mod: CPTII,,, | Performed by: INTERNAL MEDICINE

## 2022-06-06 PROCEDURE — 99214 OFFICE O/P EST MOD 30 MIN: CPT | Mod: S$PBB,,, | Performed by: INTERNAL MEDICINE

## 2022-06-06 PROCEDURE — 1160F RVW MEDS BY RX/DR IN RCRD: CPT | Mod: CPTII,,, | Performed by: INTERNAL MEDICINE

## 2022-06-06 PROCEDURE — 99999 PR PBB SHADOW E&M-EST. PATIENT-LVL III: CPT | Mod: PBBFAC,,, | Performed by: INTERNAL MEDICINE

## 2022-06-06 PROCEDURE — 3074F PR MOST RECENT SYSTOLIC BLOOD PRESSURE < 130 MM HG: ICD-10-PCS | Mod: CPTII,,, | Performed by: INTERNAL MEDICINE

## 2022-06-06 PROCEDURE — 3008F BODY MASS INDEX DOCD: CPT | Mod: CPTII,,, | Performed by: INTERNAL MEDICINE

## 2022-06-06 PROCEDURE — 1159F PR MEDICATION LIST DOCUMENTED IN MEDICAL RECORD: ICD-10-PCS | Mod: CPTII,,, | Performed by: INTERNAL MEDICINE

## 2022-06-06 PROCEDURE — 3008F PR BODY MASS INDEX (BMI) DOCUMENTED: ICD-10-PCS | Mod: CPTII,,, | Performed by: INTERNAL MEDICINE

## 2022-06-06 PROCEDURE — 1159F MED LIST DOCD IN RCRD: CPT | Mod: CPTII,,, | Performed by: INTERNAL MEDICINE

## 2022-06-06 RX ORDER — SODIUM CHLORIDE 0.9 % (FLUSH) 0.9 %
10 SYRINGE (ML) INJECTION
Status: CANCELLED | OUTPATIENT
Start: 2022-06-14

## 2022-06-06 RX ORDER — HEPARIN 100 UNIT/ML
500 SYRINGE INTRAVENOUS
Status: CANCELLED | OUTPATIENT
Start: 2022-06-06

## 2022-06-06 RX ORDER — SODIUM CHLORIDE 0.9 % (FLUSH) 0.9 %
10 SYRINGE (ML) INJECTION
Status: CANCELLED | OUTPATIENT
Start: 2022-06-06

## 2022-06-06 RX ORDER — HEPARIN 100 UNIT/ML
500 SYRINGE INTRAVENOUS
Status: CANCELLED | OUTPATIENT
Start: 2022-06-14

## 2022-06-06 NOTE — PROGRESS NOTES
Subjective:       Patient ID: Shawn Carreno is a 56 y.o. male.    Chief Complaint: Results and Anemia    HPI  56-year-old male with recurrent iron deficiency anemia extensive GI evaluation completed with no etiology of source.  Patient returns for review    History reviewed. No pertinent past medical history.  History reviewed. No pertinent family history.  Social History     Socioeconomic History    Marital status: Single   Tobacco Use    Smoking status: Never Smoker    Smokeless tobacco: Never Used   Substance and Sexual Activity    Alcohol use: Never    Drug use: Never    Sexual activity: Not Currently     Past Surgical History:   Procedure Laterality Date    CHOLECYSTECTOMY 2020    COLONOSCOPY N/A 1/5/2022    Procedure: COLONOSCOPY;  Surgeon: Hannha Alvarado MD;  Location: [facility];  Service: Endoscopy;  Laterality: N/A;    ESOPHAGOGASTRODUODENOSCOPY N/A 1/5/2022    Procedure: EGD (ESOPHAGOGASTRODUODENOSCOPY);  Surgeon: Hannah Alvarado MD;  Location: [facility];  Service: Endoscopy;  Laterality: N/A;    ESOPHAGOGASTRODUODENOSCOPY N/A 4/6/2022    Procedure: EGD (ESOPHAGOGASTRODUODENOSCOPY) with push;  Surgeon: Vira Castaneda MD;  Location: [facility];  Service: Endoscopy;  Laterality: N/A;    INTRALUMINAL GASTROINTESTINAL TRACT IMAGING VIA CAPSULE N/A 1/26/2022    Procedure: IMAGING PROCEDURE, GI TRACT, INTRALUMINAL, VIA CAPSULE;  Surgeon: Baltazar Chaves RN;  Location: [facility];  Service: Endoscopy;  Laterality: N/A;    INTRALUMINAL GASTROINTESTINAL TRACT IMAGING VIA CAPSULE N/A 2/22/2022    Procedure: IMAGING PROCEDURE, GI TRACT, INTRALUMINAL, VIA CAPSULE;  Surgeon: Baltazar Chaves RN;  Location: [facility];  Service: Endoscopy;  Laterality: N/A;       Labs:  Lab Results   Component Value Date    WBC 7.23 06/02/2022    HGB 12.2 (L) 06/02/2022    HCT 38.0 (L) 06/02/2022    MCV 92 06/02/2022     06/02/2022     BMP  Lab Results   Component Value Date     01/06/2022    K  4.0 01/06/2022     01/06/2022    CO2 25 01/06/2022    BUN 12 01/06/2022    CREATININE 0.9 01/06/2022    CALCIUM 8.4 (L) 01/06/2022    ANIONGAP 8 01/06/2022    ESTGFRAFRICA >60 01/06/2022    EGFRNONAA >60 01/06/2022     Lab Results   Component Value Date    ALT 14 01/06/2022    AST 17 01/06/2022    ALKPHOS 48 (L) 01/06/2022    BILITOT 1.1 (H) 01/06/2022       Lab Results   Component Value Date    IRON 130 01/21/2022    IRON 130 01/21/2022    TIBC 374 01/21/2022    TIBC 374 01/21/2022    FERRITIN 22 06/02/2022     No results found for: [id number]  No results found for: FOLATE  No results found for: TSH      Review of Systems   Constitutional: Positive for fatigue. Negative for activity change, appetite change, chills, diaphoresis, fever and unexpected weight change.   HENT: Negative for congestion, dental problem, drooling, ear discharge, ear pain, facial swelling, hearing loss, mouth sores, nosebleeds, postnasal drip, rhinorrhea, sinus pressure, sneezing, sore throat, tinnitus, trouble swallowing and voice change.    Eyes: Negative for photophobia, pain, discharge, redness, itching and visual disturbance.   Respiratory: Negative for apnea, cough, choking, chest tightness, shortness of breath, wheezing and stridor.    Cardiovascular: Negative for chest pain, palpitations and leg swelling.   Gastrointestinal: Negative for abdominal distention, abdominal pain, anal bleeding, blood in stool, constipation, diarrhea, nausea, rectal pain and vomiting.   Endocrine: Negative for cold intolerance, heat intolerance, polydipsia, polyphagia and polyuria.   Genitourinary: Negative for decreased urine volume, difficulty urinating, dysuria, enuresis, flank pain, frequency, genital sores, hematuria, penile discharge, penile pain, penile swelling, scrotal swelling, testicular pain and urgency.   Musculoskeletal: Negative for arthralgias, back pain, gait problem, joint swelling, myalgias, neck pain and neck stiffness.   Skin:  Negative for color change, pallor, rash and wound.   Allergic/Immunologic: Negative for environmental allergies, food allergies and immunocompromised state.   Neurological: Positive for weakness. Negative for dizziness, tremors, seizures, syncope, facial asymmetry, speech difficulty, light-headedness, numbness and headaches.   Hematological: Negative for adenopathy. Does not bruise/bleed easily.   Psychiatric/Behavioral: Positive for dysphoric mood. Negative for agitation, behavioral problems, confusion, decreased concentration, hallucinations, self-injury, sleep disturbance and suicidal ideas. The patient is nervous/anxious. The patient is not hyperactive.        Objective:      Physical Exam  Vitals reviewed.   Constitutional:       General: He is not in acute distress.     Appearance: He is well-developed. He is not diaphoretic.   HENT:      Head: Normocephalic.      Right Ear: External ear normal.      Left Ear: External ear normal.      Nose: Nose normal.      Right Sinus: No maxillary sinus tenderness or frontal sinus tenderness.      Left Sinus: No maxillary sinus tenderness or frontal sinus tenderness.      Mouth/Throat:      Pharynx: No oropharyngeal exudate.   Eyes:      General: Lids are normal. No scleral icterus.        Right eye: No discharge.         Left eye: No discharge.      Extraocular Movements:      Right eye: Normal extraocular motion.      Left eye: Normal extraocular motion.      Conjunctiva/sclera:      Right eye: Right conjunctiva is not injected. No hemorrhage.     Left eye: Left conjunctiva is not injected. No hemorrhage.     Pupils: Pupils are equal, round, and reactive to light.   Neck:      Thyroid: No thyromegaly.      Vascular: No JVD.      Trachea: No tracheal deviation.   Cardiovascular:      Rate and Rhythm: Normal rate.      Heart sounds: Murmur heard.      Comments: 2/6 systolic ejection murmur  Pulmonary:      Effort: Pulmonary effort is normal. No respiratory distress.       Breath sounds: No stridor.   Chest:   Breasts:      Right: No supraclavicular adenopathy.      Left: No supraclavicular adenopathy.       Abdominal:      General: Bowel sounds are normal.      Palpations: Abdomen is soft. There is no hepatomegaly, splenomegaly or mass.      Tenderness: There is no abdominal tenderness.   Musculoskeletal:         General: No tenderness. Normal range of motion.      Cervical back: Normal range of motion and neck supple.   Lymphadenopathy:      Head:      Right side of head: No posterior auricular or occipital adenopathy.      Left side of head: No posterior auricular or occipital adenopathy.      Cervical: No cervical adenopathy.      Right cervical: No superficial, deep or posterior cervical adenopathy.     Left cervical: No superficial, deep or posterior cervical adenopathy.      Upper Body:      Right upper body: No supraclavicular adenopathy.      Left upper body: No supraclavicular adenopathy.   Skin:     General: Skin is dry.      Findings: No erythema or rash.      Nails: There is no clubbing.   Neurological:      Mental Status: He is alert and oriented to person, place, and time.      Cranial Nerves: No cranial nerve deficit.      Motor: Weakness present.      Coordination: Coordination normal.   Psychiatric:         Behavior: Behavior normal.         Thought Content: Thought content normal.         Judgment: Judgment normal.             Assessment:      1. Iron deficiency anemia due to chronic blood loss    2. Aortic valve stenosis, etiology of cardiac valve disease unspecified    3. Symptomatic anemia           Plan:      recurrent iron deficiency anemia.  Patient has aortic flow murmur.  2D echo ordered.  Will treat with additional intravenous iron return in 4 months with repeat CBC iron status prior discussed implications answered questions explain rationale for iron deficiency anemia and workup communicate results through 2D echo through portal        Kris Cesar Jr, MD  FACP

## 2022-06-13 ENCOUNTER — INFUSION (OUTPATIENT)
Dept: INFUSION THERAPY | Facility: HOSPITAL | Age: 57
End: 2022-06-13
Attending: INTERNAL MEDICINE
Payer: MEDICAID

## 2022-06-13 VITALS
OXYGEN SATURATION: 96 % | TEMPERATURE: 98 F | RESPIRATION RATE: 18 BRPM | SYSTOLIC BLOOD PRESSURE: 122 MMHG | DIASTOLIC BLOOD PRESSURE: 74 MMHG | HEART RATE: 88 BPM

## 2022-06-13 DIAGNOSIS — D50.0 IRON DEFICIENCY ANEMIA DUE TO CHRONIC BLOOD LOSS: Primary | ICD-10-CM

## 2022-06-13 PROCEDURE — 96365 THER/PROPH/DIAG IV INF INIT: CPT

## 2022-06-13 PROCEDURE — 63600175 PHARM REV CODE 636 W HCPCS: Mod: JG | Performed by: INTERNAL MEDICINE

## 2022-06-13 PROCEDURE — 25000003 PHARM REV CODE 250: Performed by: INTERNAL MEDICINE

## 2022-06-13 RX ADMIN — SODIUM CHLORIDE: 9 INJECTION, SOLUTION INTRAVENOUS at 02:06

## 2022-06-13 RX ADMIN — FERRIC CARBOXYMALTOSE INJECTION 750 MG: 50 INJECTION, SOLUTION INTRAVENOUS at 02:06

## 2022-06-13 NOTE — PLAN OF CARE
Discussed plan of care with pt. Addressed any and ongoing concerns. Pt denies    Problem: Adult Inpatient Plan of Care  Goal: Plan of Care Review  Outcome: Ongoing, Progressing  Flowsheets (Taken 6/13/2022 1511)  Plan of Care Reviewed With: patient  Goal: Patient-Specific Goal (Individualized)  Outcome: Ongoing, Progressing  Goal: Absence of Hospital-Acquired Illness or Injury  Outcome: Ongoing, Progressing  Intervention: Identify and Manage Fall Risk  Flowsheets (Taken 6/13/2022 1511)  Safety Promotion/Fall Prevention:   in recliner, wheels locked   room near unit station  Intervention: Prevent Infection  Flowsheets (Taken 6/13/2022 1511)  Infection Prevention:   hand hygiene promoted   equipment surfaces disinfected  Goal: Optimal Comfort and Wellbeing  Outcome: Ongoing, Progressing  Intervention: Monitor Pain and Promote Comfort  Flowsheets (Taken 6/13/2022 1511)  Pain Management Interventions: quiet environment facilitated  Intervention: Provide Person-Centered Care  Flowsheets (Taken 6/13/2022 1511)  Trust Relationship/Rapport:   questions answered   empathic listening provided

## 2022-06-16 ENCOUNTER — HOSPITAL ENCOUNTER (OUTPATIENT)
Dept: CARDIOLOGY | Facility: HOSPITAL | Age: 57
Discharge: HOME | End: 2022-06-16
Attending: INTERNAL MEDICINE
Payer: MEDICAID

## 2022-06-16 ENCOUNTER — PATIENT MESSAGE (OUTPATIENT)
Dept: HEMATOLOGY/ONCOLOGY | Facility: CLINIC | Age: 57
End: 2022-06-16
Payer: MEDICAID

## 2022-06-16 VITALS
SYSTOLIC BLOOD PRESSURE: 120 MMHG | HEIGHT: 67 IN | BODY MASS INDEX: 32.8 KG/M2 | DIASTOLIC BLOOD PRESSURE: 77 MMHG | WEIGHT: 209 LBS

## 2022-06-16 DIAGNOSIS — I35.0 AORTIC VALVE STENOSIS, ETIOLOGY OF CARDIAC VALVE DISEASE UNSPECIFIED: ICD-10-CM

## 2022-06-16 LAB
AORTIC ROOT ANNULUS: 4.04 CM
AV INDEX (PROSTH): 0.82
AV MEAN GRADIENT: 4 MMHG
AV PEAK GRADIENT: 6 MMHG
AV VALVE AREA: 2.56 CM2
AV VELOCITY RATIO: 0.81
BSA FOR ECHO PROCEDURE: 2.12 M2
CV ECHO LV RWT: 0.4 CM
DOP CALC AO PEAK VEL: 1.27 M/S
DOP CALC AO VTI: 26.7 CM
DOP CALC LVOT AREA: 3.1 CM2
DOP CALC LVOT DIAMETER: 2 CM
DOP CALC LVOT PEAK VEL: 1.03 M/S
DOP CALC LVOT STROKE VOLUME: 68.45 CM3
DOP CALC RVOT PEAK VEL: 1.05 M/S
DOP CALC RVOT VTI: 19.8 CM
DOP CALCLVOT PEAK VEL VTI: 21.8 CM
E WAVE DECELERATION TIME: 143.4 MSEC
E/A RATIO: 0.76
E/E' RATIO: 6.67 M/S
ECHO LV POSTERIOR WALL: 0.93 CM (ref 0.6–1.1)
EJECTION FRACTION: 60 %
FRACTIONAL SHORTENING: 33 % (ref 28–44)
INTERVENTRICULAR SEPTUM: 1.1 CM (ref 0.6–1.1)
IVRT: 91.34 MSEC
LA MAJOR: 4.65 CM
LA MINOR: 3.32 CM
LA WIDTH: 3.3 CM
LEFT ATRIUM SIZE: 3.14 CM
LEFT ATRIUM VOLUME INDEX MOD: 19.8 ML/M2
LEFT ATRIUM VOLUME INDEX: 16.6 ML/M2
LEFT ATRIUM VOLUME MOD: 40.89 CM3
LEFT ATRIUM VOLUME: 34.12 CM3
LEFT INTERNAL DIMENSION IN SYSTOLE: 3.13 CM (ref 2.1–4)
LEFT VENTRICLE DIASTOLIC VOLUME INDEX: 49.25 ML/M2
LEFT VENTRICLE DIASTOLIC VOLUME: 101.46 ML
LEFT VENTRICLE MASS INDEX: 81 G/M2
LEFT VENTRICLE SYSTOLIC VOLUME INDEX: 18.8 ML/M2
LEFT VENTRICLE SYSTOLIC VOLUME: 38.68 ML
LEFT VENTRICULAR INTERNAL DIMENSION IN DIASTOLE: 4.68 CM (ref 3.5–6)
LEFT VENTRICULAR MASS: 166.67 G
LV LATERAL E/E' RATIO: 6.36 M/S
LV SEPTAL E/E' RATIO: 7 M/S
LVOT MG: 2.42 MMHG
LVOT MV: 0.72 CM/S
MV PEAK A VEL: 0.92 M/S
MV PEAK E VEL: 0.7 M/S
MV STENOSIS PRESSURE HALF TIME: 41.59 MS
MV VALVE AREA P 1/2 METHOD: 5.29 CM2
PISA TR MAX VEL: 2.5 M/S
PULM VEIN S/D RATIO: 1.5
PV MEAN GRADIENT: 2.38 MMHG
PV MV: 1.11 M/S
PV PEAK D VEL: 0.41 M/S
PV PEAK S VEL: 0.62 M/S
PV PEAK VELOCITY: 1.57 CM/S
RA MAJOR: 3.98 CM
RA PRESSURE: 3 MMHG
RA WIDTH: 2.6 CM
RIGHT VENTRICULAR END-DIASTOLIC DIMENSION: 2.2 CM
SINUS: 3.02 CM
STJ: 2.85 CM
TDI LATERAL: 0.11 M/S
TDI SEPTAL: 0.1 M/S
TDI: 0.11 M/S
TR MAX PG: 25 MMHG
TRICUSPID ANNULAR PLANE SYSTOLIC EXCURSION: 2.43 CM
TV REST PULMONARY ARTERY PRESSURE: 28 MMHG

## 2022-06-16 PROCEDURE — 93306 ECHO (CUPID ONLY): ICD-10-PCS | Mod: 26,,, | Performed by: INTERNAL MEDICINE

## 2022-06-16 PROCEDURE — 93306 TTE W/DOPPLER COMPLETE: CPT | Mod: PO

## 2022-06-16 PROCEDURE — 93306 TTE W/DOPPLER COMPLETE: CPT | Mod: 26,,, | Performed by: INTERNAL MEDICINE

## 2022-06-20 ENCOUNTER — INFUSION (OUTPATIENT)
Dept: INFUSION THERAPY | Facility: HOSPITAL | Age: 57
End: 2022-06-20
Attending: INTERNAL MEDICINE
Payer: MEDICAID

## 2022-06-20 VITALS
HEART RATE: 80 BPM | OXYGEN SATURATION: 99 % | TEMPERATURE: 98 F | RESPIRATION RATE: 18 BRPM | DIASTOLIC BLOOD PRESSURE: 81 MMHG | SYSTOLIC BLOOD PRESSURE: 124 MMHG

## 2022-06-20 DIAGNOSIS — D50.0 IRON DEFICIENCY ANEMIA DUE TO CHRONIC BLOOD LOSS: Primary | ICD-10-CM

## 2022-06-20 PROCEDURE — 25000003 PHARM REV CODE 250: Performed by: INTERNAL MEDICINE

## 2022-06-20 PROCEDURE — 63600175 PHARM REV CODE 636 W HCPCS: Mod: JG | Performed by: INTERNAL MEDICINE

## 2022-06-20 PROCEDURE — 96365 THER/PROPH/DIAG IV INF INIT: CPT

## 2022-06-20 RX ADMIN — FERRIC CARBOXYMALTOSE INJECTION 750 MG: 50 INJECTION, SOLUTION INTRAVENOUS at 03:06

## 2022-06-20 NOTE — PLAN OF CARE
Discussed plan of care with pt. Addressed any and ongoing concerns. Pt denies    Problem: Adult Inpatient Plan of Care  Goal: Plan of Care Review  Outcome: Ongoing, Progressing  Flowsheets (Taken 6/20/2022 1530)  Plan of Care Reviewed With: patient  Goal: Patient-Specific Goal (Individualized)  Outcome: Ongoing, Progressing  Goal: Absence of Hospital-Acquired Illness or Injury  Outcome: Ongoing, Progressing  Intervention: Identify and Manage Fall Risk  Flowsheets (Taken 6/20/2022 1530)  Safety Promotion/Fall Prevention:   in recliner, wheels locked   room near unit station  Intervention: Prevent Infection  Flowsheets (Taken 6/20/2022 1530)  Infection Prevention:   hand hygiene promoted   equipment surfaces disinfected  Goal: Optimal Comfort and Wellbeing  Outcome: Ongoing, Progressing  Intervention: Monitor Pain and Promote Comfort  Flowsheets (Taken 6/20/2022 1530)  Pain Management Interventions:   warm blanket provided   quiet environment facilitated  Intervention: Provide Person-Centered Care  Flowsheets (Taken 6/20/2022 1530)  Trust Relationship/Rapport:   care explained   reassurance provided   thoughts/feelings acknowledged   choices provided   emotional support provided   empathic listening provided   questions answered   questions encouraged

## 2022-08-25 ENCOUNTER — NURSE TRIAGE (OUTPATIENT)
Dept: ADMINISTRATIVE | Facility: CLINIC | Age: 57
End: 2022-08-25
Payer: MEDICAID

## 2022-08-25 NOTE — TELEPHONE ENCOUNTER
[place]    PCP:  Unknown/Dr. Kris Cesar ([facility])    He reports hands, face, and feet are pale again and hands are numb again.  He reports symptoms are not as bad as before but he is having symptoms of anemia again.  Denies difficulty breathing, active bleeding, and passed out.  He reports stools are dark.  He reports dizziness and weakness when he first wakes up in the morning until he gets moving around then symptoms resolves.  He is taking iron pills since Monday.  Per protocol, care advised is go to ED now.  Pt refuses care advised.  Care advice reinforced.  Pt continues to refuse care advised.  He states that he is not back to the point that he was at before but doesn't want to get back to that point so he'll call Dr. Cesar tomorrow to talk to them about it.  Advised that NOC will also route high priority to MD for further recommendations.  Instructed to call for worsening/questions/concerns.  VU.    Reason for Disposition   Weak, dizzy, or lightheaded    Additional Information   Negative: Shock suspected (e.g., cold/pale/clammy skin, too weak to stand, low BP, rapid pulse)   Negative: Difficult to awaken or acting confused (e.g., disoriented, slurred speech)   Negative: Unable to walk, or can only walk with assistance (e.g., requires support)   Negative: Fainted   Negative: Difficulty breathing   Negative: Overdose (accidental or intentional) of medications   Negative: Sounds like a life-threatening emergency to the triager    Protocols used: PALE SKIN-A-

## 2022-08-26 DIAGNOSIS — D50.0 IRON DEFICIENCY ANEMIA DUE TO CHRONIC BLOOD LOSS: Primary | ICD-10-CM

## 2022-09-13 ENCOUNTER — LAB VISIT (OUTPATIENT)
Dept: LAB | Facility: HOSPITAL | Age: 57
End: 2022-09-13
Attending: INTERNAL MEDICINE
Payer: MEDICAID

## 2022-09-13 DIAGNOSIS — D50.0 IRON DEFICIENCY ANEMIA DUE TO CHRONIC BLOOD LOSS: ICD-10-CM

## 2022-09-13 LAB
ANION GAP SERPL CALC-SCNC: 7 MMOL/L (ref 8–16)
BASOPHILS # BLD AUTO: 0.06 K/UL (ref 0–0.2)
BASOPHILS NFR BLD: 1.3 % (ref 0–1.9)
BUN SERPL-MCNC: 15 MG/DL (ref 6–20)
CALCIUM SERPL-MCNC: 8.8 MG/DL (ref 8.7–10.5)
CHLORIDE SERPL-SCNC: 108 MMOL/L (ref 95–110)
CO2 SERPL-SCNC: 25 MMOL/L (ref 23–29)
CREAT SERPL-MCNC: 0.8 MG/DL (ref 0.5–1.4)
DIFFERENTIAL METHOD: ABNORMAL
EOSINOPHIL # BLD AUTO: 0.2 K/UL (ref 0–0.5)
EOSINOPHIL NFR BLD: 5.1 % (ref 0–8)
ERYTHROCYTE [DISTWIDTH] IN BLOOD BY AUTOMATED COUNT: 16 % (ref 11.5–14.5)
EST. GFR  (NO RACE VARIABLE): >60 ML/MIN/1.73 M^2
FERRITIN SERPL-MCNC: 27 NG/ML (ref 20–300)
GLUCOSE SERPL-MCNC: 138 MG/DL (ref 70–110)
HCT VFR BLD AUTO: 32.8 % (ref 40–54)
HGB BLD-MCNC: 9.9 G/DL (ref 14–18)
IMM GRANULOCYTES # BLD AUTO: 0.01 K/UL (ref 0–0.04)
IMM GRANULOCYTES NFR BLD AUTO: 0.2 % (ref 0–0.5)
IRON SERPL-MCNC: 101 UG/DL (ref 45–160)
LYMPHOCYTES # BLD AUTO: 1.5 K/UL (ref 1–4.8)
LYMPHOCYTES NFR BLD: 32.8 % (ref 18–48)
MCH RBC QN AUTO: 28.2 PG (ref 27–31)
MCHC RBC AUTO-ENTMCNC: 30.2 G/DL (ref 32–36)
MCV RBC AUTO: 93 FL (ref 82–98)
MONOCYTES # BLD AUTO: 0.4 K/UL (ref 0.3–1)
MONOCYTES NFR BLD: 9 % (ref 4–15)
NEUTROPHILS # BLD AUTO: 2.4 K/UL (ref 1.8–7.7)
NEUTROPHILS NFR BLD: 51.6 % (ref 38–73)
NRBC BLD-RTO: 0 /100 WBC
PLATELET # BLD AUTO: 288 K/UL (ref 150–450)
PMV BLD AUTO: 9 FL (ref 9.2–12.9)
POTASSIUM SERPL-SCNC: 4.5 MMOL/L (ref 3.5–5.1)
RBC # BLD AUTO: 3.51 M/UL (ref 4.6–6.2)
SATURATED IRON: 22 % (ref 20–50)
SODIUM SERPL-SCNC: 140 MMOL/L (ref 136–145)
TOTAL IRON BINDING CAPACITY: 456 UG/DL (ref 250–450)
TRANSFERRIN SERPL-MCNC: 308 MG/DL (ref 200–375)
WBC # BLD AUTO: 4.69 K/UL (ref 3.9–12.7)

## 2022-09-13 PROCEDURE — 84466 ASSAY OF TRANSFERRIN: CPT | Performed by: INTERNAL MEDICINE

## 2022-09-13 PROCEDURE — 85025 COMPLETE CBC W/AUTO DIFF WBC: CPT | Performed by: INTERNAL MEDICINE

## 2022-09-13 PROCEDURE — 36415 COLL VENOUS BLD VENIPUNCTURE: CPT | Performed by: INTERNAL MEDICINE

## 2022-09-13 PROCEDURE — 82728 ASSAY OF FERRITIN: CPT | Performed by: INTERNAL MEDICINE

## 2022-09-13 PROCEDURE — 80048 BASIC METABOLIC PNL TOTAL CA: CPT | Performed by: INTERNAL MEDICINE

## 2022-09-15 ENCOUNTER — OFFICE VISIT (OUTPATIENT)
Dept: HEMATOLOGY/ONCOLOGY | Facility: CLINIC | Age: 57
End: 2022-09-15
Payer: MEDICAID

## 2022-09-15 ENCOUNTER — LAB VISIT (OUTPATIENT)
Dept: LAB | Facility: HOSPITAL | Age: 57
End: 2022-09-15
Payer: MEDICAID

## 2022-09-15 VITALS
TEMPERATURE: 99 F | SYSTOLIC BLOOD PRESSURE: 129 MMHG | OXYGEN SATURATION: 98 % | WEIGHT: 208.56 LBS | HEIGHT: 67 IN | DIASTOLIC BLOOD PRESSURE: 80 MMHG | BODY MASS INDEX: 32.73 KG/M2 | HEART RATE: 89 BPM

## 2022-09-15 DIAGNOSIS — D64.9 SYMPTOMATIC ANEMIA: ICD-10-CM

## 2022-09-15 DIAGNOSIS — D50.0 IRON DEFICIENCY ANEMIA DUE TO CHRONIC BLOOD LOSS: Primary | ICD-10-CM

## 2022-09-15 LAB
CRP SERPL-MCNC: 0.72 MG/L (ref 0–3.19)
FOLATE SERPL-MCNC: 12.2 NG/ML (ref 4–24)
HIV 1+2 AB+HIV1 P24 AG SERPL QL IA: NORMAL
IGA SERPL-MCNC: 127 MG/DL (ref 40–350)
IGG SERPL-MCNC: 2175 MG/DL (ref 650–1600)
IGM SERPL-MCNC: 63 MG/DL (ref 50–300)
MAGNESIUM SERPL-MCNC: 1.8 MG/DL (ref 1.6–2.6)
PHOSPHATE SERPL-MCNC: 2.3 MG/DL (ref 2.7–4.5)
RETICS/RBC NFR AUTO: 3.6 % (ref 0.4–2)
TSH SERPL DL<=0.005 MIU/L-ACNC: 2.05 UIU/ML (ref 0.4–4)
VIT B12 SERPL-MCNC: 283 PG/ML (ref 210–950)

## 2022-09-15 PROCEDURE — 83520 IMMUNOASSAY QUANT NOS NONAB: CPT | Mod: 59

## 2022-09-15 PROCEDURE — 83735 ASSAY OF MAGNESIUM: CPT

## 2022-09-15 PROCEDURE — 82746 ASSAY OF FOLIC ACID SERUM: CPT

## 2022-09-15 PROCEDURE — 82607 VITAMIN B-12: CPT

## 2022-09-15 PROCEDURE — 86334 PATHOLOGIST INTERPRETATION IFE: ICD-10-PCS | Mod: 26,,, | Performed by: PATHOLOGY

## 2022-09-15 PROCEDURE — 86141 C-REACTIVE PROTEIN HS: CPT

## 2022-09-15 PROCEDURE — 1159F PR MEDICATION LIST DOCUMENTED IN MEDICAL RECORD: ICD-10-PCS | Mod: CPTII,,,

## 2022-09-15 PROCEDURE — 3074F PR MOST RECENT SYSTOLIC BLOOD PRESSURE < 130 MM HG: ICD-10-PCS | Mod: CPTII,,,

## 2022-09-15 PROCEDURE — 99999 PR PBB SHADOW E&M-EST. PATIENT-LVL III: CPT | Mod: PBBFAC,,,

## 2022-09-15 PROCEDURE — 82784 ASSAY IGA/IGD/IGG/IGM EACH: CPT

## 2022-09-15 PROCEDURE — 84100 ASSAY OF PHOSPHORUS: CPT

## 2022-09-15 PROCEDURE — 87389 HIV-1 AG W/HIV-1&-2 AB AG IA: CPT

## 2022-09-15 PROCEDURE — 84165 PROTEIN E-PHORESIS SERUM: CPT

## 2022-09-15 PROCEDURE — 99999 PR PBB SHADOW E&M-EST. PATIENT-LVL III: ICD-10-PCS | Mod: PBBFAC,,,

## 2022-09-15 PROCEDURE — 3079F PR MOST RECENT DIASTOLIC BLOOD PRESSURE 80-89 MM HG: ICD-10-PCS | Mod: CPTII,,,

## 2022-09-15 PROCEDURE — 36415 COLL VENOUS BLD VENIPUNCTURE: CPT

## 2022-09-15 PROCEDURE — 86334 IMMUNOFIX E-PHORESIS SERUM: CPT

## 2022-09-15 PROCEDURE — 3074F SYST BP LT 130 MM HG: CPT | Mod: CPTII,,,

## 2022-09-15 PROCEDURE — 99213 OFFICE O/P EST LOW 20 MIN: CPT | Mod: PBBFAC

## 2022-09-15 PROCEDURE — 84165 PATHOLOGIST INTERPRETATION SPE: ICD-10-PCS | Mod: 26,,, | Performed by: PATHOLOGY

## 2022-09-15 PROCEDURE — 3008F BODY MASS INDEX DOCD: CPT | Mod: CPTII,,,

## 2022-09-15 PROCEDURE — 99214 PR OFFICE/OUTPT VISIT, EST, LEVL IV, 30-39 MIN: ICD-10-PCS | Mod: S$PBB,,,

## 2022-09-15 PROCEDURE — 84443 ASSAY THYROID STIM HORMONE: CPT

## 2022-09-15 PROCEDURE — 85045 AUTOMATED RETICULOCYTE COUNT: CPT

## 2022-09-15 PROCEDURE — 84165 PROTEIN E-PHORESIS SERUM: CPT | Mod: 26,,, | Performed by: PATHOLOGY

## 2022-09-15 PROCEDURE — 1159F MED LIST DOCD IN RCRD: CPT | Mod: CPTII,,,

## 2022-09-15 PROCEDURE — 86334 IMMUNOFIX E-PHORESIS SERUM: CPT | Mod: 26,,, | Performed by: PATHOLOGY

## 2022-09-15 PROCEDURE — 3008F PR BODY MASS INDEX (BMI) DOCUMENTED: ICD-10-PCS | Mod: CPTII,,,

## 2022-09-15 PROCEDURE — 3079F DIAST BP 80-89 MM HG: CPT | Mod: CPTII,,,

## 2022-09-15 PROCEDURE — 99214 OFFICE O/P EST MOD 30 MIN: CPT | Mod: S$PBB,,,

## 2022-09-15 PROCEDURE — 83010 ASSAY OF HAPTOGLOBIN QUANT: CPT

## 2022-09-15 NOTE — PROGRESS NOTES
Subjective:       Patient ID: Shawn Carreno is a 56 y.o. male.    Chief Complaint: Anemia    HPI: Mr. Carreno is a 56 year old male who is following up for his iron def anemia. He is s/p 2 doses of injectafer, last dose being 6/20/22. He had EGD on 4/6/22 normal, VCE 2/22/22 showing angioectasia non-bleeding, and colonoscopy 1/5/22 with polyp and diverticulosis, recommended 5 year follow up. He has been taking oral iron for a couple of weeks because he started to feel symptomatic anemia again. Previously went to ER in 1/2022 for symptomatic anemia where he has needed multiple blood transfusions. No etiology of anemia has been found.   Pmhx: murmur-echo normal    Today: patient is nervous about bleeding somewhere. He has had some pain in his stomach area, causing him to be nauseous. He states he has never had this previously. He is sometimes fatigued. He denies sob, bleeding, chest pain, pica, night sweats, weight loss, v/d/c. He noticed his hands and feet have become more pale/losing color. He denies blood thinners but takes aspirin 1-2x/month for headaches. He had his gallbladder taken out.  Grandmother-ovarian cx  Father and mother-heart disease  No other autoimmune or cancer hx    Social History     Socioeconomic History    Marital status: Single   Tobacco Use    Smoking status: Never    Smokeless tobacco: Never   Substance and Sexual Activity    Alcohol use: Never    Drug use: Never    Sexual activity: Not Currently       History reviewed. No pertinent past medical history.    History reviewed. No pertinent family history.    Past Surgical History:   Procedure Laterality Date    CHOLECYSTECTOMY 2020    COLONOSCOPY N/A 1/5/2022    Procedure: COLONOSCOPY;  Surgeon: Hannah Alvarado MD;  Location: [facility];  Service: Endoscopy;  Laterality: N/A;    ESOPHAGOGASTRODUODENOSCOPY N/A 1/5/2022    Procedure: EGD (ESOPHAGOGASTRODUODENOSCOPY);  Surgeon: Hannah Alvarado MD;  Location: [facility];   Service: Endoscopy;  Laterality: N/A;    ESOPHAGOGASTRODUODENOSCOPY N/A 4/6/2022    Procedure: EGD (ESOPHAGOGASTRODUODENOSCOPY) with push;  Surgeon: Vira Castaneda MD;  Location: [facility];  Service: Endoscopy;  Laterality: N/A;    INTRALUMINAL GASTROINTESTINAL TRACT IMAGING VIA CAPSULE N/A 1/26/2022    Procedure: IMAGING PROCEDURE, GI TRACT, INTRALUMINAL, VIA CAPSULE;  Surgeon: Baltazar Chaves RN;  Location: [facility] ENDO;  Service: Endoscopy;  Laterality: N/A;    INTRALUMINAL GASTROINTESTINAL TRACT IMAGING VIA CAPSULE N/A 2/22/2022    Procedure: IMAGING PROCEDURE, GI TRACT, INTRALUMINAL, VIA CAPSULE;  Surgeon: Baltazar Chaves RN;  Location: [facility] ENDO;  Service: Endoscopy;  Laterality: N/A;       Review of Systems   Constitutional:  Positive for fatigue. Negative for activity change, appetite change, chills, diaphoresis, fever and unexpected weight change.   HENT:  Negative for congestion, nosebleeds and rhinorrhea.    Respiratory:  Negative for cough and shortness of breath.    Cardiovascular:  Negative for chest pain, palpitations and leg swelling.   Gastrointestinal:  Positive for nausea. Negative for abdominal pain, blood in stool, constipation, diarrhea and vomiting.        Dark stool-taking oral iron   Genitourinary:  Negative for hematuria.   Musculoskeletal:  Negative for arthralgias and myalgias.   Skin:  Positive for pallor.   Neurological:  Negative for dizziness, weakness, light-headedness, numbness and headaches.   Hematological:  Does not bruise/bleed easily.   Psychiatric/Behavioral:  The patient is nervous/anxious.        Medication List with Changes/Refills   Current Medications    FERROUS SULFATE 324 MG (65 MG IRON) TBEC    Take 1 tablet (324 mg total) by mouth once daily.     Objective:     Vitals:    09/15/22 1514   BP: 129/80   Pulse: 89   Temp: 98.8 °F (37.1 °C)       Physical Exam  Vitals reviewed.   Constitutional:       General: He is not in acute distress.     Appearance: He is not ill-appearing,  toxic-appearing or diaphoretic.   HENT:      Head: Normocephalic and atraumatic.   Eyes:      Conjunctiva/sclera: Conjunctivae normal.   Pulmonary:      Effort: Pulmonary effort is normal.   Abdominal:      General: Bowel sounds are normal.   Musculoskeletal:      Right lower leg: No edema.      Left lower leg: No edema.   Skin:     General: Skin is warm.      Coloration: Skin is not jaundiced or pale.      Findings: No bruising, erythema, lesion or rash.   Neurological:      Mental Status: He is alert.      Motor: No weakness.      Gait: Gait normal.   Psychiatric:         Mood and Affect: Mood normal.         Behavior: Behavior normal.         Thought Content: Thought content normal.          Labs/Results:  Lab Results   Component Value Date    WBC 4.69 09/13/2022    RBC 3.51 (L) 09/13/2022    HGB 9.9 (L) 09/13/2022    HCT 32.8 (L) 09/13/2022    MCV 93 09/13/2022    MCH 28.2 09/13/2022    MCHC 30.2 (L) 09/13/2022    RDW 16.0 (H) 09/13/2022     09/13/2022    MPV 9.0 (L) 09/13/2022    GRAN 2.4 09/13/2022    GRAN 51.6 09/13/2022    LYMPH 1.5 09/13/2022    LYMPH 32.8 09/13/2022    MONO 0.4 09/13/2022    MONO 9.0 09/13/2022    EOS 0.2 09/13/2022    BASO 0.06 09/13/2022    EOSINOPHIL 5.1 09/13/2022    BASOPHIL 1.3 09/13/2022     CMP  Sodium   Date Value Ref Range Status   09/13/2022 140 136 - 145 mmol/L Final     Potassium   Date Value Ref Range Status   09/13/2022 4.5 3.5 - 5.1 mmol/L Final     Chloride   Date Value Ref Range Status   09/13/2022 108 95 - 110 mmol/L Final     CO2   Date Value Ref Range Status   09/13/2022 25 23 - 29 mmol/L Final     Glucose   Date Value Ref Range Status   09/13/2022 138 (H) 70 - 110 mg/dL Final     BUN   Date Value Ref Range Status   09/13/2022 15 6 - 20 mg/dL Final     Creatinine   Date Value Ref Range Status   09/13/2022 0.8 0.5 - 1.4 mg/dL Final     Calcium   Date Value Ref Range Status   09/13/2022 8.8 8.7 - 10.5 mg/dL Final     Total Protein   Date Value Ref Range Status    01/06/2022 7.1 6.0 - 8.4 g/dL Final     Albumin   Date Value Ref Range Status   01/06/2022 3.6 3.5 - 5.2 g/dL Final     Total Bilirubin   Date Value Ref Range Status   01/06/2022 1.1 (H) 0.1 - 1.0 mg/dL Final     Comment:     For infants and newborns, interpretation of results should be based  on gestational age, weight and in agreement with clinical  observations.    Premature Infant recommended reference ranges:  Up to 24 hours.............<8.0 mg/dL  Up to 48 hours............<12.0 mg/dL  3-5 days..................<15.0 mg/dL  6-29 days.................<15.0 mg/dL       Alkaline Phosphatase   Date Value Ref Range Status   01/06/2022 48 (L) 55 - 135 U/L Final     AST   Date Value Ref Range Status   01/06/2022 17 10 - 40 U/L Final     ALT   Date Value Ref Range Status   01/06/2022 14 10 - 44 U/L Final     Anion Gap   Date Value Ref Range Status   09/13/2022 7 (L) 8 - 16 mmol/L Final     eGFR if    Date Value Ref Range Status   01/06/2022 >60 >60 mL/min/1.73 m^2 Final     eGFR if non    Date Value Ref Range Status   01/06/2022 >60 >60 mL/min/1.73 m^2 Final     Comment:     Calculation used to obtain the estimated glomerular filtration  rate (eGFR) is the CKD-EPI equation.         Latest Reference Range & Units 06/02/22 10:30   Retic 0.4 - 2.0 % 2.5 (H)      Latest Reference Range & Units 09/13/22 10:02   Iron 45 - 160 ug/dL 101   TIBC 250 - 450 ug/dL 456 (H)   Saturated Iron 20 - 50 % 22   Transferrin 200 - 375 mg/dL 308   Ferritin 20.0 - 300.0 ng/mL 27     Assessment:     Problem List Items Addressed This Visit          Oncology    Symptomatic anemia    Relevant Orders    Folate    Haptoglobin    HIV 1/2 Ag/Ab (4th Gen)    Immunofixation Electrophoresis    Vitamin B12    TSH    Reticulocytes    Protein Electrophoresis, Serum    Phosphorus    Magnesium    Immunoglobulin Free LT Chains Blood    Immunoglobulins (IgG, IgA, IgM) Quantitative    CRP, High Sensitivity    Iron deficiency  anemia due to chronic blood loss - Primary     Plan:     Iron deficiency anemia due to chronic blood loss  --colonoscopy 1/2022-diverticulosis and polpy-recommended 5 year follow up   --continue to follow with GI  --s/p IV iron injectafer, last one 6/20/22  --iron: 101, sat: 22%, ferritin: 27, tibc: 456-no iron def.   --hgb: 9.9-anemic. Decreased from 12.2  --MCV: 93  --unclear etiology of anemia.  --if no etiology found, would recommend bone marrow bx      Follow-Up: Ct c/a/p without contrast + needs full work up of anemia. Assess for need of GI workup again ir bone marrow bx.     MELISSA SharmaC  Hematology Oncology

## 2022-09-16 LAB
ALBUMIN SERPL ELPH-MCNC: 4.23 G/DL (ref 3.35–5.55)
ALPHA1 GLOB SERPL ELPH-MCNC: 0.28 G/DL (ref 0.17–0.41)
ALPHA2 GLOB SERPL ELPH-MCNC: 0.57 G/DL (ref 0.43–0.99)
B-GLOBULIN SERPL ELPH-MCNC: 0.72 G/DL (ref 0.5–1.1)
GAMMA GLOB SERPL ELPH-MCNC: 1.91 G/DL (ref 0.67–1.58)
HAPTOGLOB SERPL-MCNC: 109 MG/DL (ref 30–250)
INTERPRETATION SERPL IFE-IMP: NORMAL
KAPPA LC SER QL IA: 2.93 MG/DL (ref 0.33–1.94)
KAPPA LC/LAMBDA SER IA: 1.93 (ref 0.26–1.65)
LAMBDA LC SER QL IA: 1.52 MG/DL (ref 0.57–2.63)
PROT SERPL-MCNC: 7.7 G/DL (ref 6–8.4)

## 2022-09-19 LAB
PATHOLOGIST INTERPRETATION IFE: NORMAL
PATHOLOGIST INTERPRETATION SPE: NORMAL

## 2022-09-20 DIAGNOSIS — D47.2 MGUS (MONOCLONAL GAMMOPATHY OF UNKNOWN SIGNIFICANCE): Primary | ICD-10-CM

## 2022-09-22 ENCOUNTER — TELEPHONE (OUTPATIENT)
Dept: HEMATOLOGY/ONCOLOGY | Facility: CLINIC | Age: 57
End: 2022-09-22
Payer: MEDICAID

## 2022-09-22 NOTE — TELEPHONE ENCOUNTER
Patient was trying to contact Karin to scheduled Bone Bx and CT for Monday. Spoke with Karin to contact patient back to schedule appointments.

## 2022-09-22 NOTE — TELEPHONE ENCOUNTER
----- Message from Laura Ledesma sent at 9/22/2022  9:24 AM CDT -----  Contact: [phone]  Type:  Patient Returning Call    Who Called:Shawn   Who Left Message for Patient:nurse  Does the patient know what this is regarding?:appt   Would the patient rather a call back or a response via [facility]? Call back   Best Call Back Number:[phone]  Additional Information:

## 2022-09-23 ENCOUNTER — TELEPHONE (OUTPATIENT)
Dept: RADIOLOGY | Facility: HOSPITAL | Age: 57
End: 2022-09-23
Payer: MEDICAID

## 2022-09-23 DIAGNOSIS — D64.9 SYMPTOMATIC ANEMIA: Primary | ICD-10-CM

## 2022-09-23 DIAGNOSIS — D68.9 COAGULATION DEFECT: ICD-10-CM

## 2022-09-23 NOTE — TELEPHONE ENCOUNTER
Called patient and confirmed radiology procedure appointment for 9/26/22 at 10:30. Instructed pt to be NPO after midnight Sunday night,  arrive to [facility] on [address] at 09:30, and have a ride home post procedure. Pt verbalized understanding and had all questions answered. Pt confirmed no blood thinners are being taken.

## 2022-09-26 ENCOUNTER — HOSPITAL ENCOUNTER (OUTPATIENT)
Dept: RADIOLOGY | Facility: HOSPITAL | Age: 57
Discharge: HOME | End: 2022-09-26
Payer: MEDICAID

## 2022-09-26 ENCOUNTER — HOSPITAL ENCOUNTER (OUTPATIENT)
Dept: RADIOLOGY | Facility: HOSPITAL | Age: 57
Discharge: HOME | End: 2022-09-26
Attending: INTERNAL MEDICINE
Payer: MEDICAID

## 2022-09-26 VITALS
HEIGHT: 67 IN | BODY MASS INDEX: 31.39 KG/M2 | OXYGEN SATURATION: 97 % | DIASTOLIC BLOOD PRESSURE: 71 MMHG | HEART RATE: 67 BPM | SYSTOLIC BLOOD PRESSURE: 115 MMHG | RESPIRATION RATE: 20 BRPM | WEIGHT: 200 LBS

## 2022-09-26 DIAGNOSIS — D64.9 SYMPTOMATIC ANEMIA: ICD-10-CM

## 2022-09-26 DIAGNOSIS — D47.2 MGUS (MONOCLONAL GAMMOPATHY OF UNKNOWN SIGNIFICANCE): ICD-10-CM

## 2022-09-26 LAB
CTP QC/QA: YES
SARS-COV-2 AG RESP QL IA.RAPID: NEGATIVE

## 2022-09-26 PROCEDURE — 88341 PR IHC OR ICC EACH ADD'L SINGLE ANTIBODY  STAINPR: ICD-10-PCS | Mod: 26,,, | Performed by: PATHOLOGY

## 2022-09-26 PROCEDURE — 88341 IMHCHEM/IMCYTCHM EA ADD ANTB: CPT | Mod: 26,,, | Performed by: PATHOLOGY

## 2022-09-26 PROCEDURE — 88313 SPECIAL STAINS GROUP 2: CPT | Performed by: PATHOLOGY

## 2022-09-26 PROCEDURE — 88311 DECALCIFY TISSUE: CPT | Performed by: PATHOLOGY

## 2022-09-26 PROCEDURE — 88291 CYTO/MOLECULAR REPORT: CPT

## 2022-09-26 PROCEDURE — 88341 IMHCHEM/IMCYTCHM EA ADD ANTB: CPT | Mod: 59 | Performed by: PATHOLOGY

## 2022-09-26 PROCEDURE — 88311 PR  DECALCIFY TISSUE: ICD-10-PCS | Mod: 26,,, | Performed by: PATHOLOGY

## 2022-09-26 PROCEDURE — 74176 CT ABD & PELVIS W/O CONTRAST: CPT | Mod: TC

## 2022-09-26 PROCEDURE — 88305 TISSUE EXAM BY PATHOLOGIST: CPT | Performed by: PATHOLOGY

## 2022-09-26 PROCEDURE — 88271 CYTOGENETICS DNA PROBE: CPT | Mod: 59 | Performed by: INTERNAL MEDICINE

## 2022-09-26 PROCEDURE — 88264 CHROMOSOME ANALYSIS 20-25: CPT | Performed by: INTERNAL MEDICINE

## 2022-09-26 PROCEDURE — 88185 FLOWCYTOMETRY/TC ADD-ON: CPT | Mod: 59 | Performed by: PATHOLOGY

## 2022-09-26 PROCEDURE — 88189 PR  FLOWCYTOMETRY/READ, 16 & > MARKERS: ICD-10-PCS | Mod: ,,, | Performed by: PATHOLOGY

## 2022-09-26 PROCEDURE — 63600175 PHARM REV CODE 636 W HCPCS: Performed by: RADIOLOGY

## 2022-09-26 PROCEDURE — 38221 DX BONE MARROW BIOPSIES: CPT

## 2022-09-26 PROCEDURE — 88313 PR  SPECIAL STAINS,GROUP II: ICD-10-PCS | Mod: 26,,, | Performed by: PATHOLOGY

## 2022-09-26 PROCEDURE — 88184 FLOWCYTOMETRY/ TC 1 MARKER: CPT | Performed by: PATHOLOGY

## 2022-09-26 PROCEDURE — 85097 PR  BONE MARROW,SMEAR INTERPRETATION: ICD-10-PCS | Mod: ,,, | Performed by: PATHOLOGY

## 2022-09-26 PROCEDURE — 88237 TISSUE CULTURE BONE MARROW: CPT | Performed by: INTERNAL MEDICINE

## 2022-09-26 PROCEDURE — 88311 DECALCIFY TISSUE: CPT | Mod: 26,,, | Performed by: PATHOLOGY

## 2022-09-26 PROCEDURE — 88305 TISSUE EXAM BY PATHOLOGIST: CPT | Mod: 26,,, | Performed by: PATHOLOGY

## 2022-09-26 PROCEDURE — 88299 UNLISTED CYTOGENETIC STUDY: CPT | Performed by: INTERNAL MEDICINE

## 2022-09-26 PROCEDURE — 88342 IMHCHEM/IMCYTCHM 1ST ANTB: CPT | Mod: 26,59,, | Performed by: PATHOLOGY

## 2022-09-26 PROCEDURE — 85097 BONE MARROW INTERPRETATION: CPT | Mod: ,,, | Performed by: PATHOLOGY

## 2022-09-26 PROCEDURE — 88342 IMHCHEM/IMCYTCHM 1ST ANTB: CPT | Mod: 59 | Performed by: PATHOLOGY

## 2022-09-26 PROCEDURE — 88305 TISSUE EXAM BY PATHOLOGIST: ICD-10-PCS | Mod: 26,,, | Performed by: PATHOLOGY

## 2022-09-26 PROCEDURE — 88342 CHG IMMUNOCYTOCHEMISTRY: ICD-10-PCS | Mod: 26,59,, | Performed by: PATHOLOGY

## 2022-09-26 PROCEDURE — 77012 CT SCAN FOR NEEDLE BIOPSY: CPT | Mod: TC

## 2022-09-26 PROCEDURE — 88313 SPECIAL STAINS GROUP 2: CPT | Mod: 26,,, | Performed by: PATHOLOGY

## 2022-09-26 PROCEDURE — 88275 CYTOGENETICS 100-300: CPT | Mod: 59

## 2022-09-26 PROCEDURE — 88189 FLOWCYTOMETRY/READ 16 & >: CPT | Mod: ,,, | Performed by: PATHOLOGY

## 2022-09-26 PROCEDURE — 30000890 HC MISC. SEND OUT TEST

## 2022-09-26 RX ORDER — FENTANYL CITRATE 50 UG/ML
INJECTION, SOLUTION INTRAMUSCULAR; INTRAVENOUS
Status: DISCONTINUED | OUTPATIENT
Start: 2022-09-26 | End: 2022-09-27 | Stop reason: HOSPADM

## 2022-09-26 RX ORDER — MIDAZOLAM HYDROCHLORIDE 1 MG/ML
INJECTION INTRAMUSCULAR; INTRAVENOUS
Status: DISCONTINUED | OUTPATIENT
Start: 2022-09-26 | End: 2022-09-27 | Stop reason: HOSPADM

## 2022-09-26 RX ADMIN — MIDAZOLAM HYDROCHLORIDE 1 MG: 1 INJECTION INTRAMUSCULAR; INTRAVENOUS at 12:09

## 2022-09-26 RX ADMIN — FENTANYL CITRATE 50 MCG: 50 INJECTION, SOLUTION INTRAMUSCULAR; INTRAVENOUS at 12:09

## 2022-09-26 NOTE — PLAN OF CARE
Band aid to lower back C/D/I with no bleeding/redness/swelling noted. VSS, NADN, and pt meets criteria for discharge. Discharge instructions given to and reviewed with pt, and pt verbalized understanding of all. Pt discharged to home, taken out via wheelchair and driven home by daughter (Andrez).

## 2022-09-26 NOTE — PLAN OF CARE
PT AMBULATED TO ROOM. NADN.  PLACED IN HOSPITAL GOWN ON STRETCHER IN SUPINE POSITION.  DAUGHTER (CHRISTOFER) IN CAR WAITING.

## 2022-09-26 NOTE — PLAN OF CARE
PT IN RECOVERY ON STRETCHER IN SUPINE POSITION.  DRESSING TO LOWER BACK CDI.  NADN.  VSS.  PT DENIES PAIN, HA, AND NAUSEA.

## 2022-09-26 NOTE — DISCHARGE INSTRUCTIONS
Please return to ER if any of these symptoms occur:  Fever over 101 degrees,  Any purulent drainage from site (pus, yellow or has foul odor), or any redness or swelling to site  Bleeding from the puncture site not controlled, If bleeding occurs at site hold pressure for 5 mins.  If bleeding continues go to ER  Pain not controlled with Aleve or Tylenol,    No driving for 24 hours after procedure due to sedation given during procedure.     Do not submerge in standing water for 2 days after biopsy but you may shower.    May change bandage if it becomes soiled and bandage may be removed in 2 days.    Resume home medications and diet    Biopsy results will be with Dr. Cesar in 5-7 days, please follow up with him for results and any other questions or concerns that you may have.

## 2022-09-26 NOTE — DISCHARGE SUMMARY
[facility])  Discharge Note  Short Stay    * No procedures listed *    OUTCOME: Patient tolerated treatment/procedure well without complication and is now ready for discharge.    DISPOSITION: Home or Self Care    FINAL DIAGNOSIS:  <principal problem not specified>    FOLLOWUP: In clinic    DISCHARGE INSTRUCTIONS:  No discharge procedures on file.      Clinical Reference Documents Added to Patient Instructions         Document    BONE MARROW ASPIRATION OR BIOPSY (ENGLISH)    PROCEDURAL SEDATION, ADULT ED (ENGLISH)              TIME SPENT ON DISCHARGE: 15 minutes    Pre Op Diagnosis: Anemia     Post Op Diagnosis: same     Procedure:  Bone marrow biopsy     Procedure performed by: Mello LOZANO, Armando GARCÍA     Written Informed Consent Obtained: Yes     Specimen Removed:  yes     Estimated Blood Loss:  minimal     Findings: Local anesthesia and moderate sedation were used.     The patient tolerated the procedure well and there were no complications.      Disposition:  F/U in clinic    Discharge instructions:  Light activity for 24 hours.  Remove band aid in 24 hours.  No baths (showers are appropriate).    F/U with ordering physician    Sterile technique was performed in the right iliac, lidocaine was used as a local anesthetic.  Multiple samples taken percutaneously from the right iliac bone.  Pt tolerated the procedure well without immediate complications.  Please see radiologist report for details. F/u with PCP and/or ordering physician.     Time Spent on discharge:  15 mins

## 2022-09-27 LAB
BODY SITE - BONE MARROW: NORMAL
CLINICAL DIAGNOSIS - BONE MARROW: NORMAL
FLOW CYTOMETRY ANTIBODIES ANALYZED - BONE MARROW: NORMAL
FLOW CYTOMETRY COMMENT - BONE MARROW: NORMAL
FLOW CYTOMETRY INTERPRETATION - BONE MARROW: NORMAL

## 2022-09-28 ENCOUNTER — PATIENT MESSAGE (OUTPATIENT)
Dept: HEMATOLOGY/ONCOLOGY | Facility: CLINIC | Age: 57
End: 2022-09-28
Payer: MEDICAID

## 2022-09-29 LAB
COMMENT: NORMAL
FINAL PATHOLOGIC DIAGNOSIS: NORMAL
GROSS: NORMAL
Lab: NORMAL
MICROSCOPIC EXAM: NORMAL

## 2022-09-30 ENCOUNTER — TELEPHONE (OUTPATIENT)
Dept: HEMATOLOGY/ONCOLOGY | Facility: CLINIC | Age: 57
End: 2022-09-30
Payer: MEDICAID

## 2022-09-30 LAB
DNA/RNA EXTRACT AND HOLD RESULT: NORMAL
DNA/RNA EXTRACTION: NORMAL
EXHR SPECIMEN TYPE: NORMAL

## 2022-09-30 NOTE — TELEPHONE ENCOUNTER
----- Message from Lakshmi Rivers sent at 9/30/2022  2:15 PM CDT -----  Type:  Test Results    Who Called:  pt   Name of Test (Lab/Mammo/Etc): bone marrow test   Date of Test:  09/26  Ordering Provider: Heide Anaya   Where the test was performed: [facility]   Would the patient rather a call back or a response via [facility]? phone  Best Call Back Number:  [phone]  Additional Information:

## 2022-09-30 NOTE — TELEPHONE ENCOUNTER
Nurse spoke with pt in regards to biopsy results. Nurse explained to the pt that he will need to see MD to discuss results.. pt declined sooner appt and will see  on10/12. Verbalized understanding

## 2022-10-04 LAB
CHROM BANDING METHOD: NORMAL
CHROMOSOME ANALYSIS BM ADDITIONAL INFORMATION: NORMAL
CHROMOSOME ANALYSIS BM RELEASED BY: NORMAL
CHROMOSOME ANALYSIS BM RESULT SUMMARY: NORMAL
CLINICAL CYTOGENETICIST REVIEW: NORMAL
KARYOTYP MAR: NORMAL
REASON FOR REFERRAL (NARRATIVE): NORMAL
REF LAB TEST METHOD: NORMAL
SPECIMEN SOURCE: NORMAL
SPECIMEN: NORMAL

## 2022-10-11 LAB — MAYO MISCELLANEOUS RESULT (REF): NORMAL

## 2022-10-12 ENCOUNTER — OFFICE VISIT (OUTPATIENT)
Dept: HEMATOLOGY/ONCOLOGY | Facility: CLINIC | Age: 57
End: 2022-10-12
Payer: MEDICAID

## 2022-10-12 VITALS
SYSTOLIC BLOOD PRESSURE: 122 MMHG | OXYGEN SATURATION: 99 % | TEMPERATURE: 98 F | WEIGHT: 203.69 LBS | BODY MASS INDEX: 31.97 KG/M2 | RESPIRATION RATE: 18 BRPM | DIASTOLIC BLOOD PRESSURE: 71 MMHG | HEART RATE: 66 BPM | HEIGHT: 67 IN

## 2022-10-12 DIAGNOSIS — C90.00 MULTIPLE MYELOMA NOT HAVING ACHIEVED REMISSION: Primary | ICD-10-CM

## 2022-10-12 DIAGNOSIS — D50.0 IRON DEFICIENCY ANEMIA DUE TO CHRONIC BLOOD LOSS: ICD-10-CM

## 2022-10-12 DIAGNOSIS — D64.9 SYMPTOMATIC ANEMIA: ICD-10-CM

## 2022-10-12 PROCEDURE — 3008F BODY MASS INDEX DOCD: CPT | Mod: CPTII,,, | Performed by: INTERNAL MEDICINE

## 2022-10-12 PROCEDURE — 1160F PR REVIEW ALL MEDS BY PRESCRIBER/CLIN PHARMACIST DOCUMENTED: ICD-10-PCS | Mod: CPTII,,, | Performed by: INTERNAL MEDICINE

## 2022-10-12 PROCEDURE — 99213 OFFICE O/P EST LOW 20 MIN: CPT | Mod: PBBFAC | Performed by: INTERNAL MEDICINE

## 2022-10-12 PROCEDURE — 3074F SYST BP LT 130 MM HG: CPT | Mod: CPTII,,, | Performed by: INTERNAL MEDICINE

## 2022-10-12 PROCEDURE — 99215 PR OFFICE/OUTPT VISIT, EST, LEVL V, 40-54 MIN: ICD-10-PCS | Mod: S$PBB,,, | Performed by: INTERNAL MEDICINE

## 2022-10-12 PROCEDURE — 3008F PR BODY MASS INDEX (BMI) DOCUMENTED: ICD-10-PCS | Mod: CPTII,,, | Performed by: INTERNAL MEDICINE

## 2022-10-12 PROCEDURE — 1159F MED LIST DOCD IN RCRD: CPT | Mod: CPTII,,, | Performed by: INTERNAL MEDICINE

## 2022-10-12 PROCEDURE — 3078F DIAST BP <80 MM HG: CPT | Mod: CPTII,,, | Performed by: INTERNAL MEDICINE

## 2022-10-12 PROCEDURE — 1160F RVW MEDS BY RX/DR IN RCRD: CPT | Mod: CPTII,,, | Performed by: INTERNAL MEDICINE

## 2022-10-12 PROCEDURE — 99999 PR PBB SHADOW E&M-EST. PATIENT-LVL III: ICD-10-PCS | Mod: PBBFAC,,, | Performed by: INTERNAL MEDICINE

## 2022-10-12 PROCEDURE — 99215 OFFICE O/P EST HI 40 MIN: CPT | Mod: S$PBB,,, | Performed by: INTERNAL MEDICINE

## 2022-10-12 PROCEDURE — 1159F PR MEDICATION LIST DOCUMENTED IN MEDICAL RECORD: ICD-10-PCS | Mod: CPTII,,, | Performed by: INTERNAL MEDICINE

## 2022-10-12 PROCEDURE — 3074F PR MOST RECENT SYSTOLIC BLOOD PRESSURE < 130 MM HG: ICD-10-PCS | Mod: CPTII,,, | Performed by: INTERNAL MEDICINE

## 2022-10-12 PROCEDURE — 99999 PR PBB SHADOW E&M-EST. PATIENT-LVL III: CPT | Mod: PBBFAC,,, | Performed by: INTERNAL MEDICINE

## 2022-10-12 PROCEDURE — 3078F PR MOST RECENT DIASTOLIC BLOOD PRESSURE < 80 MM HG: ICD-10-PCS | Mod: CPTII,,, | Performed by: INTERNAL MEDICINE

## 2022-10-12 NOTE — PROGRESS NOTES
Subjective:       Patient ID: Shawn Carreno is a 56 y.o. male.    Chief Complaint: Results and Multiple Myeloma    HPI:  56-year-old male referred for evaluation of symptomatic anemia patient underwent a bone marrow biopsy and aspirate which reveals between 20 in 25% plasma cells.  Patient returns for review MGUS 1.46 slight elevation of free light chain ratio.  ECOG status 1    Past Medical History:   Diagnosis Date    Multiple myeloma not having achieved remission 10/12/2022     History reviewed. No pertinent family history.  Social History     Socioeconomic History    Marital status: Single   Tobacco Use    Smoking status: Never    Smokeless tobacco: Never   Substance and Sexual Activity    Alcohol use: Never    Drug use: Never    Sexual activity: Not Currently     Past Surgical History:   Procedure Laterality Date    CHOLECYSTECTOMY 2020    COLONOSCOPY N/A 1/5/2022    Procedure: COLONOSCOPY;  Surgeon: Hannah Alvarado MD;  Location: [facility];  Service: Endoscopy;  Laterality: N/A;    ESOPHAGOGASTRODUODENOSCOPY N/A 1/5/2022    Procedure: EGD (ESOPHAGOGASTRODUODENOSCOPY);  Surgeon: Hannah Alvarado MD;  Location: [facility];  Service: Endoscopy;  Laterality: N/A;    ESOPHAGOGASTRODUODENOSCOPY N/A 4/6/2022    Procedure: EGD (ESOPHAGOGASTRODUODENOSCOPY) with push;  Surgeon: Vira Castaneda MD;  Location: [facility];  Service: Endoscopy;  Laterality: N/A;    INTRALUMINAL GASTROINTESTINAL TRACT IMAGING VIA CAPSULE N/A 1/26/2022    Procedure: IMAGING PROCEDURE, GI TRACT, INTRALUMINAL, VIA CAPSULE;  Surgeon: Baltazar Chaves RN;  Location: [facility];  Service: Endoscopy;  Laterality: N/A;    INTRALUMINAL GASTROINTESTINAL TRACT IMAGING VIA CAPSULE N/A 2/22/2022    Procedure: IMAGING PROCEDURE, GI TRACT, INTRALUMINAL, VIA CAPSULE;  Surgeon: Baltazar Chaves RN;  Location: [facility];  Service: Endoscopy;  Laterality: N/A;       Labs:  Lab Results   Component Value Date    WBC 4.69 09/13/2022     HGB 9.9 (L) 09/13/2022    HCT 32.8 (L) 09/13/2022    MCV 93 09/13/2022     09/13/2022     BMP  Lab Results   Component Value Date     09/13/2022    K 4.5 09/13/2022     09/13/2022    CO2 25 09/13/2022    BUN 15 09/13/2022    CREATININE 0.8 09/13/2022    CALCIUM 8.8 09/13/2022    ANIONGAP 7 (L) 09/13/2022    ESTGFRAFRICA >60 01/06/2022    EGFRNONAA >60 01/06/2022     Lab Results   Component Value Date    ALT 14 01/06/2022    AST 17 01/06/2022    ALKPHOS 48 (L) 01/06/2022    BILITOT 1.1 (H) 01/06/2022       Lab Results   Component Value Date    IRON 101 09/13/2022    TIBC 456 (H) 09/13/2022    FERRITIN 27 09/13/2022     Lab Results   Component Value Date    GBDETGBA61 283 09/15/2022     Lab Results   Component Value Date    FOLATE 12.2 09/15/2022     Lab Results   Component Value Date    TSH 2.047 09/15/2022         Review of Systems   Constitutional:  Positive for fatigue. Negative for activity change, appetite change, chills, diaphoresis, fever and unexpected weight change.   HENT:  Negative for congestion, dental problem, drooling, ear discharge, ear pain, facial swelling, hearing loss, mouth sores, nosebleeds, postnasal drip, rhinorrhea, sinus pressure, sneezing, sore throat, tinnitus, trouble swallowing and voice change.    Eyes:  Negative for photophobia, pain, discharge, redness, itching and visual disturbance.   Respiratory:  Negative for apnea, cough, choking, chest tightness, shortness of breath, wheezing and stridor.    Cardiovascular:  Negative for chest pain, palpitations and leg swelling.   Gastrointestinal:  Negative for abdominal distention, abdominal pain, anal bleeding, blood in stool, constipation, diarrhea, nausea, rectal pain and vomiting.   Endocrine: Negative for cold intolerance, heat intolerance, polydipsia, polyphagia and polyuria.   Genitourinary:  Negative for decreased urine volume, difficulty urinating, dysuria, enuresis, flank pain, frequency, genital sores, hematuria,  penile discharge, penile pain, penile swelling, scrotal swelling, testicular pain and urgency.   Musculoskeletal:  Negative for arthralgias, back pain, gait problem, joint swelling, myalgias, neck pain and neck stiffness.   Skin:  Negative for color change, pallor, rash and wound.   Allergic/Immunologic: Negative for environmental allergies, food allergies and immunocompromised state.   Neurological:  Positive for weakness. Negative for dizziness, tremors, seizures, syncope, facial asymmetry, speech difficulty, light-headedness, numbness and headaches.   Hematological:  Negative for adenopathy. Does not bruise/bleed easily.   Psychiatric/Behavioral:  Positive for dysphoric mood. Negative for agitation, behavioral problems, confusion, decreased concentration, hallucinations, self-injury, sleep disturbance and suicidal ideas. The patient is nervous/anxious. The patient is not hyperactive.      Objective:      Physical Exam  Vitals reviewed.   Constitutional:       General: He is not in acute distress.     Appearance: He is well-developed. He is not diaphoretic.   HENT:      Head: Normocephalic.      Right Ear: External ear normal.      Left Ear: External ear normal.      Nose: Nose normal.      Right Sinus: No maxillary sinus tenderness or frontal sinus tenderness.      Left Sinus: No maxillary sinus tenderness or frontal sinus tenderness.      Mouth/Throat:      Pharynx: No oropharyngeal exudate.   Eyes:      General: Lids are normal. No scleral icterus.        Right eye: No discharge.         Left eye: No discharge.      Extraocular Movements:      Right eye: Normal extraocular motion.      Left eye: Normal extraocular motion.      Conjunctiva/sclera:      Right eye: Right conjunctiva is not injected. No hemorrhage.     Left eye: Left conjunctiva is not injected. No hemorrhage.     Pupils: Pupils are equal, round, and reactive to light.   Neck:      Thyroid: No thyromegaly.      Vascular: No JVD.      Trachea: No  tracheal deviation.   Cardiovascular:      Rate and Rhythm: Normal rate.   Pulmonary:      Effort: Pulmonary effort is normal. No respiratory distress.      Breath sounds: No stridor.   Abdominal:      General: Bowel sounds are normal.      Palpations: Abdomen is soft. There is no hepatomegaly, splenomegaly or mass.      Tenderness: There is no abdominal tenderness.   Musculoskeletal:         General: No tenderness. Normal range of motion.      Cervical back: Normal range of motion and neck supple.   Lymphadenopathy:      Head:      Right side of head: No posterior auricular or occipital adenopathy.      Left side of head: No posterior auricular or occipital adenopathy.      Cervical: No cervical adenopathy.      Right cervical: No superficial, deep or posterior cervical adenopathy.     Left cervical: No superficial, deep or posterior cervical adenopathy.      Upper Body:      Right upper body: No supraclavicular adenopathy.      Left upper body: No supraclavicular adenopathy.   Skin:     General: Skin is dry.      Findings: No erythema or rash.      Nails: There is no clubbing.   Neurological:      Mental Status: He is alert and oriented to person, place, and time.      Cranial Nerves: No cranial nerve deficit.      Motor: Weakness present.      Coordination: Coordination normal.   Psychiatric:         Behavior: Behavior normal.         Thought Content: Thought content normal.         Judgment: Judgment normal.           Assessment:      1. Multiple myeloma not having achieved remission    2. Symptomatic anemia    3. Iron deficiency anemia due to chronic blood loss           Plan:     Extensive conversation with patient as well as daughter by phone.  At this point the patient has symptomatic anemia underwent a bone marrow biopsy and aspirate which is consistent with plasma cell dyscrasia.  Will complete workup with 24 hour urine electrophoresis as well as PET scan to determine whether not patient meets criteria for  initiation of systemic therapy or whether close follow-up with diagnosis of smoldering myeloma.  Discussed implications answered questions.  Family is overwhelmed both in presence and on phone with information I have followed to them multiple articles on multiple myeloma smoldering myeloma for both physicians as well as patients.  I will see patient back after review of information to review and discuss with them treatment recommendations.  Total time 40 minutes          Kris Cesar Jr, MD FACP

## 2022-10-13 ENCOUNTER — LAB VISIT (OUTPATIENT)
Dept: LAB | Facility: HOSPITAL | Age: 57
End: 2022-10-13
Attending: INTERNAL MEDICINE
Payer: MEDICAID

## 2022-10-13 DIAGNOSIS — C90.00 MULTIPLE MYELOMA NOT HAVING ACHIEVED REMISSION: ICD-10-CM

## 2022-10-13 LAB
PROT 24H UR-MRATE: NORMAL MG/SPEC (ref 0–100)
PROT UR-MCNC: <7 MG/DL (ref 0–15)
URINE COLLECTION DURATION: 24 HR
URINE VOLUME: 1675 ML

## 2022-10-13 PROCEDURE — 84156 ASSAY OF PROTEIN URINE: CPT | Performed by: INTERNAL MEDICINE

## 2022-10-13 PROCEDURE — 84166 PATHOLOGIST INTERPRETATION UPE: ICD-10-PCS | Mod: 26,,, | Performed by: PATHOLOGY

## 2022-10-13 PROCEDURE — 84166 PROTEIN E-PHORESIS/URINE/CSF: CPT | Performed by: INTERNAL MEDICINE

## 2022-10-13 PROCEDURE — 84166 PROTEIN E-PHORESIS/URINE/CSF: CPT | Mod: 26,,, | Performed by: PATHOLOGY

## 2022-10-17 LAB — PROT PATTERN UR ELPH-IMP: NORMAL

## 2022-10-19 ENCOUNTER — HOSPITAL ENCOUNTER (OUTPATIENT)
Dept: RADIOLOGY | Facility: HOSPITAL | Age: 57
Discharge: HOME | End: 2022-10-19
Attending: INTERNAL MEDICINE
Payer: MEDICAID

## 2022-10-19 DIAGNOSIS — C90.00 MULTIPLE MYELOMA NOT HAVING ACHIEVED REMISSION: ICD-10-CM

## 2022-10-19 LAB — PATHOLOGIST INTERPRETATION UPE: NORMAL

## 2022-10-19 PROCEDURE — 78816 PET IMAGE W/CT FULL BODY: CPT | Mod: 26,PI,, | Performed by: RADIOLOGY

## 2022-10-19 PROCEDURE — 78816 NM PET CT WHOLE BODY: ICD-10-PCS | Mod: 26,PI,, | Performed by: RADIOLOGY

## 2022-10-19 PROCEDURE — 78816 PET IMAGE W/CT FULL BODY: CPT | Mod: TC

## 2022-11-28 ENCOUNTER — OFFICE VISIT (OUTPATIENT)
Dept: HEMATOLOGY/ONCOLOGY | Facility: CLINIC | Age: 57
End: 2022-11-28
Payer: MEDICAID

## 2022-11-28 ENCOUNTER — LAB VISIT (OUTPATIENT)
Dept: LAB | Facility: HOSPITAL | Age: 57
End: 2022-11-28
Attending: INTERNAL MEDICINE
Payer: MEDICAID

## 2022-11-28 VITALS
TEMPERATURE: 98 F | SYSTOLIC BLOOD PRESSURE: 115 MMHG | HEIGHT: 67 IN | HEART RATE: 91 BPM | DIASTOLIC BLOOD PRESSURE: 72 MMHG | OXYGEN SATURATION: 99 % | BODY MASS INDEX: 30.45 KG/M2 | WEIGHT: 194 LBS

## 2022-11-28 DIAGNOSIS — C90.00 MULTIPLE MYELOMA NOT HAVING ACHIEVED REMISSION: ICD-10-CM

## 2022-11-28 DIAGNOSIS — D64.9 SYMPTOMATIC ANEMIA: ICD-10-CM

## 2022-11-28 DIAGNOSIS — C90.00 MULTIPLE MYELOMA NOT HAVING ACHIEVED REMISSION: Primary | ICD-10-CM

## 2022-11-28 LAB
ABO + RH BLD: NORMAL
BASOPHILS # BLD AUTO: 0.06 K/UL (ref 0–0.2)
BASOPHILS NFR BLD: 0.8 % (ref 0–1.9)
BLD GP AB SCN CELLS X3 SERPL QL: NORMAL
DIFFERENTIAL METHOD: ABNORMAL
EOSINOPHIL # BLD AUTO: 0.2 K/UL (ref 0–0.5)
EOSINOPHIL NFR BLD: 2.9 % (ref 0–8)
ERYTHROCYTE [DISTWIDTH] IN BLOOD BY AUTOMATED COUNT: 15.9 % (ref 11.5–14.5)
HCT VFR BLD AUTO: 25.2 % (ref 40–54)
HGB BLD-MCNC: 7.1 G/DL (ref 14–18)
IMM GRANULOCYTES # BLD AUTO: 0.02 K/UL (ref 0–0.04)
IMM GRANULOCYTES NFR BLD AUTO: 0.3 % (ref 0–0.5)
LYMPHOCYTES # BLD AUTO: 2.1 K/UL (ref 1–4.8)
LYMPHOCYTES NFR BLD: 28.1 % (ref 18–48)
MCH RBC QN AUTO: 22.7 PG (ref 27–31)
MCHC RBC AUTO-ENTMCNC: 28.2 G/DL (ref 32–36)
MCV RBC AUTO: 81 FL (ref 82–98)
MONOCYTES # BLD AUTO: 0.5 K/UL (ref 0.3–1)
MONOCYTES NFR BLD: 7.1 % (ref 4–15)
NEUTROPHILS # BLD AUTO: 4.4 K/UL (ref 1.8–7.7)
NEUTROPHILS NFR BLD: 60.8 % (ref 38–73)
NRBC BLD-RTO: 0 /100 WBC
PLATELET # BLD AUTO: 312 K/UL (ref 150–450)
PMV BLD AUTO: 9 FL (ref 9.2–12.9)
RBC # BLD AUTO: 3.13 M/UL (ref 4.6–6.2)
WBC # BLD AUTO: 7.29 K/UL (ref 3.9–12.7)

## 2022-11-28 PROCEDURE — 3078F DIAST BP <80 MM HG: CPT | Mod: CPTII,,, | Performed by: INTERNAL MEDICINE

## 2022-11-28 PROCEDURE — 99999 PR PBB SHADOW E&M-EST. PATIENT-LVL IV: CPT | Mod: PBBFAC,,, | Performed by: INTERNAL MEDICINE

## 2022-11-28 PROCEDURE — 85025 COMPLETE CBC W/AUTO DIFF WBC: CPT | Performed by: INTERNAL MEDICINE

## 2022-11-28 PROCEDURE — 3074F SYST BP LT 130 MM HG: CPT | Mod: CPTII,,, | Performed by: INTERNAL MEDICINE

## 2022-11-28 PROCEDURE — 1160F RVW MEDS BY RX/DR IN RCRD: CPT | Mod: CPTII,,, | Performed by: INTERNAL MEDICINE

## 2022-11-28 PROCEDURE — 3074F PR MOST RECENT SYSTOLIC BLOOD PRESSURE < 130 MM HG: ICD-10-PCS | Mod: CPTII,,, | Performed by: INTERNAL MEDICINE

## 2022-11-28 PROCEDURE — 99215 PR OFFICE/OUTPT VISIT, EST, LEVL V, 40-54 MIN: ICD-10-PCS | Mod: S$PBB,,, | Performed by: INTERNAL MEDICINE

## 2022-11-28 PROCEDURE — 99214 OFFICE O/P EST MOD 30 MIN: CPT | Mod: PBBFAC | Performed by: INTERNAL MEDICINE

## 2022-11-28 PROCEDURE — 1160F PR REVIEW ALL MEDS BY PRESCRIBER/CLIN PHARMACIST DOCUMENTED: ICD-10-PCS | Mod: CPTII,,, | Performed by: INTERNAL MEDICINE

## 2022-11-28 PROCEDURE — 36415 COLL VENOUS BLD VENIPUNCTURE: CPT | Performed by: INTERNAL MEDICINE

## 2022-11-28 PROCEDURE — 3008F PR BODY MASS INDEX (BMI) DOCUMENTED: ICD-10-PCS | Mod: CPTII,,, | Performed by: INTERNAL MEDICINE

## 2022-11-28 PROCEDURE — 99215 OFFICE O/P EST HI 40 MIN: CPT | Mod: S$PBB,,, | Performed by: INTERNAL MEDICINE

## 2022-11-28 PROCEDURE — 86920 COMPATIBILITY TEST SPIN: CPT | Performed by: INTERNAL MEDICINE

## 2022-11-28 PROCEDURE — 3078F PR MOST RECENT DIASTOLIC BLOOD PRESSURE < 80 MM HG: ICD-10-PCS | Mod: CPTII,,, | Performed by: INTERNAL MEDICINE

## 2022-11-28 PROCEDURE — 86850 RBC ANTIBODY SCREEN: CPT | Performed by: INTERNAL MEDICINE

## 2022-11-28 PROCEDURE — 99999 PR PBB SHADOW E&M-EST. PATIENT-LVL IV: ICD-10-PCS | Mod: PBBFAC,,, | Performed by: INTERNAL MEDICINE

## 2022-11-28 PROCEDURE — 3008F BODY MASS INDEX DOCD: CPT | Mod: CPTII,,, | Performed by: INTERNAL MEDICINE

## 2022-11-28 PROCEDURE — 1159F PR MEDICATION LIST DOCUMENTED IN MEDICAL RECORD: ICD-10-PCS | Mod: CPTII,,, | Performed by: INTERNAL MEDICINE

## 2022-11-28 PROCEDURE — 1159F MED LIST DOCD IN RCRD: CPT | Mod: CPTII,,, | Performed by: INTERNAL MEDICINE

## 2022-11-28 RX ORDER — HYDROCODONE BITARTRATE AND ACETAMINOPHEN 500; 5 MG/1; MG/1
TABLET ORAL ONCE
Status: CANCELLED | OUTPATIENT
Start: 2022-11-28 | End: 2022-11-28

## 2022-11-28 RX ORDER — DIPHENHYDRAMINE HCL 25 MG
25 CAPSULE ORAL
Status: CANCELLED | OUTPATIENT
Start: 2022-11-28

## 2022-11-28 RX ORDER — ACETAMINOPHEN 325 MG/1
650 TABLET ORAL
Status: CANCELLED | OUTPATIENT
Start: 2022-11-28

## 2022-11-28 NOTE — PLAN OF CARE
START ON PATHWAY REGIMEN - Multiple Myeloma and Other Plasma Cell Dyscrasias    [id number]        Lenalidomide (Revlimid)       Dexamethasone       Bortezomib (Velcade)       Daratumumab (Darzalex)           Additional Orders: Refer to PI for recommended pre- and post-daratumumab   medications. Initiate antiviral prophylaxis within 1 week after starting   daratumumab and continue for 3 months following daratumumab. Thromboprophylaxis   is recommended with lenalidomide. In the MARY trial, montelukast was used to   decrease daratumumab-related reactions. In this trial, all patients received 4   cycles of induction prior to stem cell harvest, 2 cycles of consolidation, given    days post-autologous stem cell transplant, and up to two years of   maintenance therapy. Maintenance began within 14 days after a post-consolidation   disease evaluation. Dexamethasone could be reduced to 20 mg per week, given   prior to daratumumab, after induction cycle 4.          Lenalidomide (Revlimid)       Dexamethasone       Bortezomib (Velcade)       Daratumumab (Darzalex)           Additional Orders: Refer to PI for recommended pre- and post-daratumumab   medications. Initiate antiviral prophylaxis within 1 week after starting   daratumumab and continue for 3 months following daratumumab. Thromboprophylaxis   is recommended with lenalidomide. In the MARY trial, montelukast was used to   decrease daratumumab-related reactions. In this trial, all patients received 4   cycles of induction prior to stem cell harvest, 2 cycles of consolidation, given    days post-autologous stem cell transplant, and up to two years of   maintenance therapy. Maintenance began within 14 days after a post-consolidation   disease evaluation. Dexamethasone could be reduced to 20 mg per week, given   prior to daratumumab, after induction cycle 4.    **Always confirm dose/schedule in your pharmacy ordering system**    Patient  Characteristics:  Multiple Myeloma, Newly Diagnosed, Transplant Eligible, Standard Risk  Disease Classification: Multiple Myeloma  R-ISS Staging: II  Therapeutic Status: Newly Diagnosed  Is Patient Eligible for Transplant<= Transplant Eligible  Risk Status: Standard Risk  Intent of Therapy:  Non-Curative / Palliative Intent, Discussed with Patient

## 2022-11-28 NOTE — PROGRESS NOTES
Subjective:       Patient ID: Shawn Carreno is a 57 y.o. male.    Chief Complaint: Results, Anemia, and Multiple Myeloma    HPI:  57-year-old male returns.  Patient underwent 24 hour urine electrophoresis revealing no paraprotein patient results of PET scan demonstrates no evidence of bony involvement.  Patient states that he feels is becoming increasingly symptomatic I have talked to the patient about the spectrum of diseases from monoclonal gammopathy to great multiple myeloma I believe the patient with current plasma cell infiltrative bone marrow is approaching a point where treatment would be warranted the patient is been very reluctant as well as his family very apprehensive in this regard    Past Medical History:   Diagnosis Date    Multiple myeloma not having achieved remission 10/12/2022     History reviewed. No pertinent family history.  Social History     Socioeconomic History    Marital status: Single   Tobacco Use    Smoking status: Never    Smokeless tobacco: Never   Substance and Sexual Activity    Alcohol use: Never    Drug use: Never    Sexual activity: Not Currently     Past Surgical History:   Procedure Laterality Date    CHOLECYSTECTOMY  2020    COLONOSCOPY N/A 1/5/2022    Procedure: COLONOSCOPY;  Surgeon: Hannah Alvarado MD;  Location: [facility];  Service: Endoscopy;  Laterality: N/A;    ESOPHAGOGASTRODUODENOSCOPY N/A 1/5/2022    Procedure: EGD (ESOPHAGOGASTRODUODENOSCOPY);  Surgeon: Hannah Alvarado MD;  Location: [facility];  Service: Endoscopy;  Laterality: N/A;    ESOPHAGOGASTRODUODENOSCOPY N/A 4/6/2022    Procedure: EGD (ESOPHAGOGASTRODUODENOSCOPY) with push;  Surgeon: Vira Castaneda MD;  Location: [facility];  Service: Endoscopy;  Laterality: N/A;    INTRALUMINAL GASTROINTESTINAL TRACT IMAGING VIA CAPSULE N/A 1/26/2022    Procedure: IMAGING PROCEDURE, GI TRACT, INTRALUMINAL, VIA CAPSULE;  Surgeon: Baltazar Chaves RN;  Location: [facility];  Service: Endoscopy;   Laterality: N/A;    INTRALUMINAL GASTROINTESTINAL TRACT IMAGING VIA CAPSULE N/A 2/22/2022    Procedure: IMAGING PROCEDURE, GI TRACT, INTRALUMINAL, VIA CAPSULE;  Surgeon: Baltazar Chaves RN;  Location: [facility];  Service: Endoscopy;  Laterality: N/A;       Labs:  Lab Results   Component Value Date    WBC 4.69 09/13/2022    HGB 9.9 (L) 09/13/2022    HCT 32.8 (L) 09/13/2022    MCV 93 09/13/2022     09/13/2022     BMP  Lab Results   Component Value Date     09/13/2022    K 4.5 09/13/2022     09/13/2022    CO2 25 09/13/2022    BUN 15 09/13/2022    CREATININE 0.8 09/13/2022    CALCIUM 8.8 09/13/2022    ANIONGAP 7 (L) 09/13/2022    ESTGFRAFRICA >60 01/06/2022    EGFRNONAA >60 01/06/2022     Lab Results   Component Value Date    ALT 14 01/06/2022    AST 17 01/06/2022    ALKPHOS 48 (L) 01/06/2022    BILITOT 1.1 (H) 01/06/2022       Lab Results   Component Value Date    IRON 101 09/13/2022    TIBC 456 (H) 09/13/2022    FERRITIN 27 09/13/2022     Lab Results   Component Value Date    [id number] 283 09/15/2022     Lab Results   Component Value Date    FOLATE 12.2 09/15/2022     Lab Results   Component Value Date    TSH 2.047 09/15/2022         Review of Systems   Constitutional:  Positive for activity change, appetite change and fatigue. Negative for chills, diaphoresis, fever and unexpected weight change.   HENT:  Negative for congestion, dental problem, drooling, ear discharge, ear pain, facial swelling, hearing loss, mouth sores, nosebleeds, postnasal drip, rhinorrhea, sinus pressure, sneezing, sore throat, tinnitus, trouble swallowing and voice change.    Eyes:  Negative for photophobia, pain, discharge, redness, itching and visual disturbance.   Respiratory:  Negative for apnea, cough, choking, chest tightness, shortness of breath, wheezing and stridor.    Cardiovascular:  Negative for chest pain, palpitations and leg swelling.   Gastrointestinal:  Negative for abdominal distention, abdominal pain, anal  bleeding, blood in stool, constipation, diarrhea, nausea, rectal pain and vomiting.   Endocrine: Negative for cold intolerance, heat intolerance, polydipsia, polyphagia and polyuria.   Genitourinary:  Negative for decreased urine volume, difficulty urinating, dysuria, enuresis, flank pain, frequency, genital sores, hematuria, penile discharge, penile pain, penile swelling, scrotal swelling, testicular pain and urgency.   Musculoskeletal:  Negative for arthralgias, back pain, gait problem, joint swelling, myalgias, neck pain and neck stiffness.   Skin:  Negative for color change, pallor, rash and wound.   Allergic/Immunologic: Negative for environmental allergies, food allergies and immunocompromised state.   Neurological:  Positive for weakness. Negative for dizziness, tremors, seizures, syncope, facial asymmetry, speech difficulty, light-headedness, numbness and headaches.   Hematological:  Negative for adenopathy. Does not bruise/bleed easily.   Psychiatric/Behavioral:  Positive for dysphoric mood. Negative for agitation, behavioral problems, confusion, decreased concentration, hallucinations, self-injury, sleep disturbance and suicidal ideas. The patient is nervous/anxious. The patient is not hyperactive.      Objective:      Physical Exam  Vitals reviewed.   Constitutional:       General: He is not in acute distress.     Appearance: He is well-developed. He is ill-appearing. He is not diaphoretic.   HENT:      Head: Normocephalic.      Right Ear: External ear normal.      Left Ear: External ear normal.      Nose: Nose normal.      Right Sinus: No maxillary sinus tenderness or frontal sinus tenderness.      Left Sinus: No maxillary sinus tenderness or frontal sinus tenderness.      Mouth/Throat:      Pharynx: No oropharyngeal exudate.   Eyes:      General: Lids are normal. No scleral icterus.        Right eye: No discharge.         Left eye: No discharge.      Extraocular Movements:      Right eye: Normal  extraocular motion.      Left eye: Normal extraocular motion.      Conjunctiva/sclera:      Right eye: Right conjunctiva is not injected. No hemorrhage.     Left eye: Left conjunctiva is not injected. No hemorrhage.     Pupils: Pupils are equal, round, and reactive to light.   Neck:      Thyroid: No thyromegaly.      Vascular: No JVD.      Trachea: No tracheal deviation.   Cardiovascular:      Rate and Rhythm: Normal rate.   Pulmonary:      Effort: Pulmonary effort is normal. No respiratory distress.      Breath sounds: No stridor.   Abdominal:      General: Bowel sounds are normal.      Palpations: Abdomen is soft. There is no hepatomegaly, splenomegaly or mass.      Tenderness: There is no abdominal tenderness.   Musculoskeletal:         General: No tenderness. Normal range of motion.      Cervical back: Normal range of motion and neck supple.   Lymphadenopathy:      Head:      Right side of head: No posterior auricular or occipital adenopathy.      Left side of head: No posterior auricular or occipital adenopathy.      Cervical: No cervical adenopathy.      Right cervical: No superficial, deep or posterior cervical adenopathy.     Left cervical: No superficial, deep or posterior cervical adenopathy.      Upper Body:      Right upper body: No supraclavicular adenopathy.      Left upper body: No supraclavicular adenopathy.   Skin:     General: Skin is dry.      Findings: No erythema or rash.      Nails: There is no clubbing.   Neurological:      Mental Status: He is alert and oriented to person, place, and time.      Cranial Nerves: No cranial nerve deficit.      Motor: Weakness present.      Coordination: Coordination normal.   Psychiatric:         Behavior: Behavior normal.         Thought Content: Thought content normal.         Judgment: Judgment normal.           Assessment:      1. Multiple myeloma not having achieved remission    2. Symptomatic anemia           Plan:   Extensive conversation with the  patient.  I have had extensive conversation with patient.  At this particular point I believe the patient will agree for consideration of treatment.  He is very reluctant and has been so the last several months.  This time results of 24 hour urine as well as PET scan were reviewed.  I have taken the liberty of ordering type and screen today.  If his hemoglobin is below 8 recommend transfusion like to patient's seen by leukemia multiple myeloma section [place] prior to initiation of systemic therapy.  Discussed implications answered questions with him.  Total time 40 minutes          Kris Cesar Jr, MD FACP

## 2022-11-30 ENCOUNTER — DOCUMENTATION ONLY (OUTPATIENT)
Dept: HEMATOLOGY/ONCOLOGY | Facility: CLINIC | Age: 57
End: 2022-11-30
Payer: MEDICAID

## 2022-11-30 ENCOUNTER — PATIENT MESSAGE (OUTPATIENT)
Dept: HEMATOLOGY/ONCOLOGY | Facility: CLINIC | Age: 57
End: 2022-11-30
Payer: MEDICAID

## 2022-11-30 ENCOUNTER — TELEPHONE (OUTPATIENT)
Dept: HEMATOLOGY/ONCOLOGY | Facility: CLINIC | Age: 57
End: 2022-11-30
Payer: MEDICAID

## 2022-11-30 ENCOUNTER — INFUSION (OUTPATIENT)
Dept: INFUSION THERAPY | Facility: HOSPITAL | Age: 57
End: 2022-11-30
Attending: INTERNAL MEDICINE
Payer: MEDICAID

## 2022-11-30 VITALS
RESPIRATION RATE: 16 BRPM | DIASTOLIC BLOOD PRESSURE: 70 MMHG | TEMPERATURE: 98 F | HEART RATE: 64 BPM | SYSTOLIC BLOOD PRESSURE: 120 MMHG | OXYGEN SATURATION: 100 %

## 2022-11-30 DIAGNOSIS — C90.00 MULTIPLE MYELOMA NOT HAVING ACHIEVED REMISSION: ICD-10-CM

## 2022-11-30 LAB
BLD PROD TYP BPU: NORMAL
BLOOD UNIT EXPIRATION DATE: NORMAL
BLOOD UNIT TYPE CODE: 9500
BLOOD UNIT TYPE: NORMAL
CODING SYSTEM: NORMAL
DISPENSE STATUS: NORMAL
NUM UNITS TRANS PACKED RBC: NORMAL

## 2022-11-30 PROCEDURE — 36430 TRANSFUSION BLD/BLD COMPNT: CPT

## 2022-11-30 PROCEDURE — P9040 RBC LEUKOREDUCED IRRADIATED: HCPCS | Performed by: INTERNAL MEDICINE

## 2022-11-30 PROCEDURE — 25000003 PHARM REV CODE 250: Performed by: INTERNAL MEDICINE

## 2022-11-30 RX ORDER — DIPHENHYDRAMINE HCL 25 MG
25 CAPSULE ORAL
Status: COMPLETED | OUTPATIENT
Start: 2022-11-30 | End: 2022-11-30

## 2022-11-30 RX ORDER — HYDROCODONE BITARTRATE AND ACETAMINOPHEN 500; 5 MG/1; MG/1
TABLET ORAL ONCE
Status: COMPLETED | OUTPATIENT
Start: 2022-11-30 | End: 2022-11-30

## 2022-11-30 RX ORDER — ACETAMINOPHEN 325 MG/1
650 TABLET ORAL
Status: COMPLETED | OUTPATIENT
Start: 2022-11-30 | End: 2022-11-30

## 2022-11-30 RX ADMIN — SODIUM CHLORIDE: 0.9 INJECTION, SOLUTION INTRAVENOUS at 07:11

## 2022-11-30 RX ADMIN — ACETAMINOPHEN 650 MG: 325 TABLET ORAL at 07:11

## 2022-11-30 RX ADMIN — DIPHENHYDRAMINE HYDROCHLORIDE 25 MG: 25 CAPSULE ORAL at 07:11

## 2022-11-30 NOTE — DISCHARGE INSTRUCTIONS
.[facility]  [address] [address]  [address]  [phone] phone     [phone] fax  Hours of Operation: Monday- Friday 8:00am- 5:00pm  After hours phone  [phone]  Hematology / Oncology Physicians on call    Dr. Arsenio Turner      Nurse Practitioners:    Marimar Lombardi, KRISTIE Garcia, KRISTIE Griffith, KRISTIE Moreno, KRISTIE Stevenson, ANA Orozco      Please don't hesitate to call if you have any concerns.

## 2022-11-30 NOTE — NURSING
Blood consent signed. Nurse at chairside for first 15 minutes of infusion. Blood administered per protocol. No transfusion reaction suspected. Patient DC'd in no distress noted.

## 2022-11-30 NOTE — TELEPHONE ENCOUNTER
SW intern called Pt regarding distress score 5. Pt reports this was done by error and is not experiencing distress at the present time. SW intern will remain available.

## 2022-11-30 NOTE — PROGRESS NOTES
Called and spoke with pt over the phone about referral to BMT and starting chemotherapy. Reviewed my role as NN, explained process for [facility], insurance benefits investigation, REM[facility] program, time frame of 2-6 weeks. We discussed chemotherapy teaching by [facility] for oral meds which takes place via phone and in person for infusions. Asked pt if interested in vv with MM specialist, per Dr. Cesar request. Pt states yes he prefers Mon or Wed am. Told him I will get him scheduled, will f/u after the visit to move forward with recommended plan, pt in agreement with plan. Sent mychart msg to pt with appt 12/5 @ 830am  Oncology Navigation   Intake  Date of Diagnosis: 09/26/22  Cancer Type: Myeloma  Date of Referral: 11/30/22  Initial Nurse Navigator Contact: 11/30/22  Referral to Initial Contact Timeline (days): 0  Date Worked: 11/30/22     Treatment  Current Status: Active (pending concurrence from [facility] BMT provider)       Medical Oncologist: Dr. Cesar  Consult Date: 11/28/22  Chemotherapy: Planned  Chemotherapy Regimen: Velcade  Immunotherapy: Planned  Immunotherapy Name: Daratumumab  Oral Therapy: Planned                       Acuity      Follow Up  Follow up in about 12 days (around 12/12/2022) for chemo education.     , included my call back number should he have questions/concerns.

## 2022-12-07 ENCOUNTER — OFFICE VISIT (OUTPATIENT)
Dept: HEMATOLOGY/ONCOLOGY | Facility: CLINIC | Age: 57
End: 2022-12-07
Payer: MEDICAID

## 2022-12-07 DIAGNOSIS — C90.00 MULTIPLE MYELOMA NOT HAVING ACHIEVED REMISSION: ICD-10-CM

## 2022-12-07 DIAGNOSIS — D50.0 IRON DEFICIENCY ANEMIA DUE TO CHRONIC BLOOD LOSS: Primary | ICD-10-CM

## 2022-12-07 DIAGNOSIS — D64.9 SYMPTOMATIC ANEMIA: ICD-10-CM

## 2022-12-07 PROBLEM — D49.89 PLASMA CELL NEOPLASM: Status: ACTIVE | Noted: 2022-10-12

## 2022-12-07 PROCEDURE — 99214 PR OFFICE/OUTPT VISIT, EST, LEVL IV, 30-39 MIN: ICD-10-PCS | Mod: 95,,, | Performed by: INTERNAL MEDICINE

## 2022-12-07 PROCEDURE — 99214 OFFICE O/P EST MOD 30 MIN: CPT | Mod: 95,,, | Performed by: INTERNAL MEDICINE

## 2022-12-07 RX ORDER — HEPARIN 100 UNIT/ML
500 SYRINGE INTRAVENOUS
Status: CANCELLED | OUTPATIENT
Start: 2022-12-21

## 2022-12-07 RX ORDER — SODIUM CHLORIDE 0.9 % (FLUSH) 0.9 %
10 SYRINGE (ML) INJECTION
Status: CANCELLED | OUTPATIENT
Start: 2022-12-14

## 2022-12-07 RX ORDER — SODIUM CHLORIDE 0.9 % (FLUSH) 0.9 %
10 SYRINGE (ML) INJECTION
Status: CANCELLED | OUTPATIENT
Start: 2022-12-21

## 2022-12-07 RX ORDER — HEPARIN 100 UNIT/ML
500 SYRINGE INTRAVENOUS
Status: CANCELLED | OUTPATIENT
Start: 2022-12-14

## 2022-12-07 NOTE — PROGRESS NOTES
The patient location is: home  The chief complaint leading to consultation is: plasma ell neoplasm    Visit type: audiovisual    Face to Face time with patient: 30 minutes  45 minutes of total time spent on the encounter, which includes face to face time and non-face to face time preparing to see the patient (eg, review of tests), Obtaining and/or reviewing separately obtained history, Documenting clinical information in the electronic or other health record, Independently interpreting results (not separately reported) and communicating results to the patient/family/caregiver, or Care coordination (not separately reported).         Each patient to whom he or she provides medical services by telemedicine is:  (1) informed of the relationship between the physician and patient and the respective role of any other health care provider with respect to management of the patient; and (2) notified that he or she may decline to receive medical services by telemedicine and may withdraw from such care at any time.    Notes:      Patient ID: Shawn Carreno is a 57 y.o. male with plasma cell neoplasm here for hematology/SCT consultation.     Chief Complaint: No chief complaint on file.    HPI:  57-year-old male is here for virtual visit. He is a patient of Dr.Burke Cesar.  He has no significant medical problems except for anemia. He presented with severe anemia in January 2022. Her received multiple PRBCs at the time. No overt bleeding. Stool OB was negative in Jan 2022.  He was severely iron deficient, with a ferritin of 2ng/ml.  He was previously treated with 2 doses of injectafer, last dose on 6/20/22. He had EGD on 4/6/22 that was normal, VCE 2/22/22 showed angioectasia that was non-bleeding, and colonoscopy 1/5/22 with polyp and diverticulosis. It was , recommended that he have 5 year follow up. He has been taking oral iron .   He again required PRBCs on 11/28/22 for a Hgb of 7.1g/dl.    He denies bone pain/ back  pain. He has some recent onset weight loss and diminished appetite.   He had a bone marrow biopsy, 24hr UPEP/UIFE, PET CT.       Past Medical History:   Diagnosis Date    Multiple myeloma not having achieved remission 10/12/2022       Past Surgical History:   Procedure Laterality Date    CHOLECYSTECTOMY  2020    COLONOSCOPY N/A 1/5/2022    Procedure: COLONOSCOPY;  Surgeon: Hannah Alvarado MD;  Location: [facility];  Service: Endoscopy;  Laterality: N/A;    ESOPHAGOGASTRODUODENOSCOPY N/A 1/5/2022    Procedure: EGD (ESOPHAGOGASTRODUODENOSCOPY);  Surgeon: Hannah Alvarado MD;  Location: [facility];  Service: Endoscopy;  Laterality: N/A;    ESOPHAGOGASTRODUODENOSCOPY N/A 4/6/2022    Procedure: EGD (ESOPHAGOGASTRODUODENOSCOPY) with push;  Surgeon: Vira Castaneda MD;  Location: [facility];  Service: Endoscopy;  Laterality: N/A;    INTRALUMINAL GASTROINTESTINAL TRACT IMAGING VIA CAPSULE N/A 1/26/2022    Procedure: IMAGING PROCEDURE, GI TRACT, INTRALUMINAL, VIA CAPSULE;  Surgeon: Baltazar Chaves RN;  Location: [facility];  Service: Endoscopy;  Laterality: N/A;    INTRALUMINAL GASTROINTESTINAL TRACT IMAGING VIA CAPSULE N/A 2/22/2022    Procedure: IMAGING PROCEDURE, GI TRACT, INTRALUMINAL, VIA CAPSULE;  Surgeon: Baltazar Chaves RN;  Location: [facility];  Service: Endoscopy;  Laterality: N/A;       Social History     Socioeconomic History    Marital status: Single   Tobacco Use    Smoking status: Never    Smokeless tobacco: Never   Substance and Sexual Activity    Alcohol use: Never    Drug use: Never    Sexual activity: Not Currently       Review of patient's allergies indicates:  No Known Allergies    Current Outpatient Medications   Medication Sig    ferrous sulfate 324 mg (65 mg iron) TbEC Take 1 tablet (324 mg total) by mouth once daily.     No current facility-administered medications for this visit.     Facility-Administered Medications Ordered in Other Visits   Medication    lactated ringers  infusion          Labs:  Component      Latest Ref Rng & Units 9/15/2022   [place] Free Light Chains      0.33 - 1.94 mg/dL 2.93 (H)   Lambda Free Light Chains      0.57 - 2.63 mg/dL 1.52   Kappa/Lambda FLC Ratio      0.26 - 1.65 1.93 (H)   IgG      650 - 1600 mg/dL 2175 (H)   IgA      40 - 350 mg/dL 127   IgM      50 - 300 mg/dL 63   Folate      4.0 - 24.0 ng/mL 12.2   Haptoglobin      30 - 250 mg/dL 109   HIV 1/2 Ag/Ab      Non-reactive Non-reactive   Vitamin B-12      210 - 950 pg/mL 283   TSH      0.400 - 4.000 uIU/mL 2.047   Retic      0.4 - 2.0 % 3.6 (H)     9/15/22 Normal total protein. Normal gamma globulins are decreased. There is a paraprotein band in gamma = 1.46 g/dL.   9/15/22 sIFE: IgG kappa specific monoclonal band present    9/26/22 RIGHT ILIAC CREST BONE MARROW ASPIRATE, BONE MARROW CLOT, AND BONE MARROW CORE BIOPSY WITH:     CELLULARITY=70-80%, TRILINEAGE HEMATOPOIETIC ACTIVITY (M/E=1.2:1).   CONSISTENT WITH PLASMA CELL NEOPLASM (25-30%).  SEE COMMENT.   GRADE 1 RETICULAR FIBROSIS.   CONGO RED NEGATIVE.   STORAGE IRON NOT IDENTIFIED.   ADEQUATE NUMBER OF MEGAKARYOCYTES.    Flow cytometric analysis of bone marrow detects a kappa restricted plasma cell population that expresses , CD20 (partial and dim), and CD38.  CD56   is positive.  CD19 is negative.   Flow differential:  Lymphocytes 15.1%, Monocytes 4.2%, Granulocytes  72.3%, Blast  2.2%, Debris/nRBC 4.3%,  Viability 98.4%.   Immunohistochemical studies were performed on the core biopsy for greater sensitivity and further architecture evaluation with adequate positive and   negative controls.  Scattered  T cells  are CD3 positive.  About 25-30% plasma cells ( positive, CD20 focal positive, cyclin D1 negative) are noted.   Special stain for Congo red is negative with adequate positive and negative controls.   Findings are consistent with plasma cell neoplasm.  Correlate clinically and with a cytogenetics report.     Myeloma Fixed Cell, High  Risk, FISH     Result Summary        :       Normal                           Interpretation    :   The result is within normal limits for 1q duplication, TP53 deletion and IGH rearrangement.       10/13/22 UIFE: No definitive monoclonal peaks identified    10/19/22 WB PET CT    COMPARISON:  None.     FINDINGS:  Head/neck: There is normal physiologic uptake noted within the visualized brain parenchyma. No FDG avid adenopathy within the neck.     Chest: No FDG avid pulmonary nodules/masses. No FDG avid mediastinal, hilar or axillary lymph nodes.     Abdomen/Pelvis: Normal physiologic uptake noted within the liver, spleen, urinary tract, and bowel.The adrenals are unremarkable in appearance.  The patient is s/p cholecystectomy.  No FDG avid lymph nodes identified.  There is mild diverticulosis of the sigmoid colon.     Skeletal: No FDG avid osseus lesions identified.     Impression:     1. No suspicious FDG uptake identified.       Lab Results   Component Value Date    WBC 7.29 11/28/2022    HGB 7.1 (L) 11/28/2022    HCT 25.2 (L) 11/28/2022    MCV 81 (L) 11/28/2022     11/28/2022     BMP  Lab Results   Component Value Date     09/13/2022    K 4.5 09/13/2022     09/13/2022    CO2 25 09/13/2022    BUN 15 09/13/2022    CREATININE 0.8 09/13/2022    CALCIUM 8.8 09/13/2022    ANIONGAP 7 (L) 09/13/2022    ESTGFRAFRICA >60 01/06/2022    EGFRNONAA >60 01/06/2022     Lab Results   Component Value Date    ALT 14 01/06/2022    AST 17 01/06/2022    ALKPHOS 48 (L) 01/06/2022    BILITOT 1.1 (H) 01/06/2022       Lab Results   Component Value Date    IRON 101 09/13/2022    TIBC 456 (H) 09/13/2022    FERRITIN 27 09/13/2022     Lab Results   Component Value Date    [id number] 283 09/15/2022     Lab Results   Component Value Date    FOLATE 12.2 09/15/2022     Lab Results   Component Value Date    TSH 2.047 09/15/2022         Review of Systems   Constitutional:  Positive for activity change, appetite change and fatigue.  Negative for chills, diaphoresis, fever and unexpected weight change.   HENT:  Negative for congestion, dental problem, drooling, ear discharge, ear pain, facial swelling, hearing loss, mouth sores, nosebleeds, postnasal drip, rhinorrhea, sinus pressure, sneezing, sore throat, tinnitus, trouble swallowing and voice change.    Eyes:  Negative for photophobia, pain, discharge, redness, itching and visual disturbance.   Respiratory:  Negative for apnea, cough, choking, chest tightness, shortness of breath, wheezing and stridor.    Cardiovascular:  Negative for chest pain, palpitations and leg swelling.   Gastrointestinal:  Negative for abdominal distention, abdominal pain, anal bleeding, blood in stool, constipation, diarrhea, nausea, rectal pain and vomiting.   Endocrine: Negative for cold intolerance, heat intolerance, polydipsia, polyphagia and polyuria.   Genitourinary:  Negative for decreased urine volume, difficulty urinating, dysuria, enuresis, flank pain, frequency, genital sores, hematuria, penile discharge, penile pain, penile swelling, scrotal swelling, testicular pain and urgency.   Musculoskeletal:  Negative for arthralgias, back pain, gait problem, joint swelling, myalgias, neck pain and neck stiffness.   Skin:  Negative for color change, pallor, rash and wound.   Allergic/Immunologic: Negative for environmental allergies, food allergies and immunocompromised state.   Neurological:  Positive for weakness. Negative for dizziness, tremors, seizures, syncope, facial asymmetry, speech difficulty, light-headedness, numbness and headaches.   Hematological:  Negative for adenopathy. Does not bruise/bleed easily.   Psychiatric/Behavioral:  Negative for agitation, behavioral problems, confusion, decreased concentration, dysphoric mood, hallucinations, self-injury, sleep disturbance and suicidal ideas. The patient is nervous/anxious. The patient is not hyperactive.             Plan:     1. Plasma cell neoplasm  2.  Microcytic anemia  3. Fatigue  4. Iron deficiency      Plan:    1,2,3: SPEP/MALI showed a IgG kappa monoclonal protein on 9/25/22, measuring 1.46g/dl. sFLC ratio was mildly elevated at 1.93.  He has 25-30% clonal plasma cells in bone marrow biopsy done on 9/26/22. Normal renal function and normal serum creatinine. No hypermetabolic /lytic lesions on PET CT done on 10/19/22. 24hr UPEP/UIFE negative for monoclonal protein.   He has one CRAB criteria , that is anemia. However, his most recent CBC on 11/28/22 showed a microcytic picture, suggesting iron deficiency.  He received 1 unit PRBC last week. He will receive 2 iv injectafer infusions ( 1500mg iron) and have CBC, CMP, myeloma labs repeated in 8 weeks.  He will also have MRI spine and hips to evaluate for any  osseous process that was not detectable on PET CT.     All his questions were answered. 45 minutes were spent on this encounter, including time spent on reviewing imaging, laboratory studies, bone marrow biopsy.           BMT Chart Routing      Follow up with physician 2 months.   Follow up with [facility]    Provider visit type    Infusion scheduling note    Injection scheduling note iv injecatfer at [facility] in 1-2 weeks   Labs CBC, CMP, iron and TIBC, ferritin, SPEP and free light chains   Lab interval:     Imaging MRI   mri cervical, thoracic , lumbar spine; mri right and left hips at [facility] in 1-2 weeks   Pharmacy appointment    Other referrals

## 2022-12-08 RX ORDER — HEPARIN 100 UNIT/ML
500 SYRINGE INTRAVENOUS
Status: CANCELLED | OUTPATIENT
Start: 2022-12-08

## 2022-12-08 RX ORDER — SODIUM CHLORIDE 0.9 % (FLUSH) 0.9 %
10 SYRINGE (ML) INJECTION
Status: CANCELLED | OUTPATIENT
Start: 2022-12-08

## 2022-12-14 ENCOUNTER — TELEPHONE (OUTPATIENT)
Dept: HEMATOLOGY/ONCOLOGY | Facility: CLINIC | Age: 57
End: 2022-12-14
Payer: MEDICAID

## 2022-12-14 NOTE — TELEPHONE ENCOUNTER
Returned patient phone call and he wanted to know when were next appointments. I informed patient that he is still scheduled to start tx on 12/28 as before. Next tx date will be 01/04. Patient verbalized understanding and had no other issues at this time.

## 2022-12-14 NOTE — TELEPHONE ENCOUNTER
----- Message from Mary Beverly MA sent at 12/14/2022  7:48 AM CST -----  Contact: [phone]  Right now he is scheduled for 1/04 to  gage Cesar  ----- Message -----  From: Yary Ruiz LPN  Sent: 12/14/2022   7:35 AM CST  To: Mary Beverly MA    Good morning,    Could you give me a estimated time when this patient will return to see Dr. Cesar?    Thanks  ----- Message -----  From: Yary Ruiz LPN  Sent: 12/13/2022   4:11 PM CST  To: Yary Ruiz LPN      ----- Message -----  From: Kris Cesar MD  Sent: 12/13/2022   4:09 PM CST  To: Yary Ruiz LPN, #    This patient should be receiving vanc for weekly and after 3 or 4 doses I should see back with a CBC and iron studies.  To review  ----- Message -----  From: Yary Ruiz LPN  Sent: 12/13/2022   3:54 PM CST  To: Kris Cesar MD      ----- Message -----  From: Kelly Hassan  Sent: 12/13/2022   3:03 PM CST  To: Arsenio MALHOTRA Staff    Patient would like to consult with a nurse in regards to infusions. He stated he would like to know if he will be needing more infusions. Please call back at [phone]. Thanks altagracia

## 2022-12-28 ENCOUNTER — INFUSION (OUTPATIENT)
Dept: INFUSION THERAPY | Facility: HOSPITAL | Age: 57
End: 2022-12-28
Attending: INTERNAL MEDICINE
Payer: MEDICAID

## 2022-12-28 VITALS
HEART RATE: 77 BPM | HEIGHT: 67 IN | OXYGEN SATURATION: 99 % | TEMPERATURE: 98 F | WEIGHT: 192.88 LBS | SYSTOLIC BLOOD PRESSURE: 101 MMHG | RESPIRATION RATE: 18 BRPM | DIASTOLIC BLOOD PRESSURE: 67 MMHG | BODY MASS INDEX: 30.27 KG/M2

## 2022-12-28 DIAGNOSIS — D50.0 IRON DEFICIENCY ANEMIA DUE TO CHRONIC BLOOD LOSS: Primary | ICD-10-CM

## 2022-12-28 PROCEDURE — 63600175 PHARM REV CODE 636 W HCPCS: Performed by: INTERNAL MEDICINE

## 2022-12-28 PROCEDURE — 96365 THER/PROPH/DIAG IV INF INIT: CPT

## 2022-12-28 PROCEDURE — 25000003 PHARM REV CODE 250: Performed by: INTERNAL MEDICINE

## 2022-12-28 RX ORDER — HEPARIN 100 UNIT/ML
500 SYRINGE INTRAVENOUS
Status: CANCELLED | OUTPATIENT
Start: 2023-01-04

## 2022-12-28 RX ORDER — SODIUM CHLORIDE 0.9 % (FLUSH) 0.9 %
10 SYRINGE (ML) INJECTION
Status: DISCONTINUED | OUTPATIENT
Start: 2022-12-28 | End: 2022-12-28 | Stop reason: HOSPADM

## 2022-12-28 RX ORDER — SODIUM CHLORIDE 0.9 % (FLUSH) 0.9 %
10 SYRINGE (ML) INJECTION
Status: CANCELLED | OUTPATIENT
Start: 2023-01-04

## 2022-12-28 RX ADMIN — IRON SUCROSE 200 MG: 20 INJECTION, SOLUTION INTRAVENOUS at 07:12

## 2022-12-28 NOTE — NURSING
Infusion Venofer    Recent labs? Reviewed    Premeds? None    S/S of current or recent infections in the past 14 days? None/Denies    Recent Surgery/invasive procedures? None/Denies     Any recent vaccines ? None/Denies    Venofer 200 mg administered IV at a 60 minute rate per orders; see MAR and vitals for more  Details.

## 2023-01-04 ENCOUNTER — TELEPHONE (OUTPATIENT)
Dept: HEMATOLOGY/ONCOLOGY | Facility: CLINIC | Age: 58
End: 2023-01-04
Payer: MEDICAID

## 2023-01-04 ENCOUNTER — TELEPHONE (OUTPATIENT)
Dept: HEMATOLOGY/ONCOLOGY | Facility: CLINIC | Age: 58
End: 2023-01-04

## 2023-01-04 ENCOUNTER — INFUSION (OUTPATIENT)
Dept: INFUSION THERAPY | Facility: HOSPITAL | Age: 58
End: 2023-01-04
Attending: INTERNAL MEDICINE
Payer: MEDICAID

## 2023-01-04 ENCOUNTER — OFFICE VISIT (OUTPATIENT)
Dept: HEMATOLOGY/ONCOLOGY | Facility: CLINIC | Age: 58
End: 2023-01-04
Payer: MEDICAID

## 2023-01-04 VITALS
WEIGHT: 190.06 LBS | SYSTOLIC BLOOD PRESSURE: 122 MMHG | SYSTOLIC BLOOD PRESSURE: 109 MMHG | HEART RATE: 73 BPM | HEART RATE: 79 BPM | HEIGHT: 67 IN | TEMPERATURE: 98 F | OXYGEN SATURATION: 99 % | RESPIRATION RATE: 18 BRPM | BODY MASS INDEX: 29.83 KG/M2 | DIASTOLIC BLOOD PRESSURE: 67 MMHG | TEMPERATURE: 98 F | OXYGEN SATURATION: 100 % | RESPIRATION RATE: 16 BRPM | DIASTOLIC BLOOD PRESSURE: 66 MMHG

## 2023-01-04 DIAGNOSIS — D50.0 IRON DEFICIENCY ANEMIA DUE TO CHRONIC BLOOD LOSS: Primary | ICD-10-CM

## 2023-01-04 DIAGNOSIS — C90.00 MULTIPLE MYELOMA NOT HAVING ACHIEVED REMISSION: Primary | ICD-10-CM

## 2023-01-04 DIAGNOSIS — D64.9 SYMPTOMATIC ANEMIA: ICD-10-CM

## 2023-01-04 DIAGNOSIS — D50.0 IRON DEFICIENCY ANEMIA DUE TO CHRONIC BLOOD LOSS: ICD-10-CM

## 2023-01-04 DIAGNOSIS — D49.89 PLASMA CELL NEOPLASM: ICD-10-CM

## 2023-01-04 PROCEDURE — 1159F MED LIST DOCD IN RCRD: CPT | Mod: CPTII,,, | Performed by: INTERNAL MEDICINE

## 2023-01-04 PROCEDURE — 25000003 PHARM REV CODE 250: Performed by: INTERNAL MEDICINE

## 2023-01-04 PROCEDURE — 99999 PR PBB SHADOW E&M-EST. PATIENT-LVL III: ICD-10-PCS | Mod: PBBFAC,,, | Performed by: INTERNAL MEDICINE

## 2023-01-04 PROCEDURE — 3074F PR MOST RECENT SYSTOLIC BLOOD PRESSURE < 130 MM HG: ICD-10-PCS | Mod: CPTII,,, | Performed by: INTERNAL MEDICINE

## 2023-01-04 PROCEDURE — 3008F PR BODY MASS INDEX (BMI) DOCUMENTED: ICD-10-PCS | Mod: CPTII,,, | Performed by: INTERNAL MEDICINE

## 2023-01-04 PROCEDURE — 3078F DIAST BP <80 MM HG: CPT | Mod: CPTII,,, | Performed by: INTERNAL MEDICINE

## 2023-01-04 PROCEDURE — 63600175 PHARM REV CODE 636 W HCPCS: Performed by: INTERNAL MEDICINE

## 2023-01-04 PROCEDURE — 3008F BODY MASS INDEX DOCD: CPT | Mod: CPTII,,, | Performed by: INTERNAL MEDICINE

## 2023-01-04 PROCEDURE — 1160F RVW MEDS BY RX/DR IN RCRD: CPT | Mod: CPTII,,, | Performed by: INTERNAL MEDICINE

## 2023-01-04 PROCEDURE — 1159F PR MEDICATION LIST DOCUMENTED IN MEDICAL RECORD: ICD-10-PCS | Mod: CPTII,,, | Performed by: INTERNAL MEDICINE

## 2023-01-04 PROCEDURE — A4216 STERILE WATER/SALINE, 10 ML: HCPCS | Performed by: INTERNAL MEDICINE

## 2023-01-04 PROCEDURE — 1160F PR REVIEW ALL MEDS BY PRESCRIBER/CLIN PHARMACIST DOCUMENTED: ICD-10-PCS | Mod: CPTII,,, | Performed by: INTERNAL MEDICINE

## 2023-01-04 PROCEDURE — 99213 OFFICE O/P EST LOW 20 MIN: CPT | Mod: PBBFAC,25 | Performed by: INTERNAL MEDICINE

## 2023-01-04 PROCEDURE — 3074F SYST BP LT 130 MM HG: CPT | Mod: CPTII,,, | Performed by: INTERNAL MEDICINE

## 2023-01-04 PROCEDURE — 96365 THER/PROPH/DIAG IV INF INIT: CPT

## 2023-01-04 PROCEDURE — 99999 PR PBB SHADOW E&M-EST. PATIENT-LVL III: CPT | Mod: PBBFAC,,, | Performed by: INTERNAL MEDICINE

## 2023-01-04 PROCEDURE — 99214 OFFICE O/P EST MOD 30 MIN: CPT | Mod: S$PBB,,, | Performed by: INTERNAL MEDICINE

## 2023-01-04 PROCEDURE — 3078F PR MOST RECENT DIASTOLIC BLOOD PRESSURE < 80 MM HG: ICD-10-PCS | Mod: CPTII,,, | Performed by: INTERNAL MEDICINE

## 2023-01-04 PROCEDURE — 99214 PR OFFICE/OUTPT VISIT, EST, LEVL IV, 30-39 MIN: ICD-10-PCS | Mod: S$PBB,,, | Performed by: INTERNAL MEDICINE

## 2023-01-04 RX ORDER — HYDROCODONE BITARTRATE AND ACETAMINOPHEN 500; 5 MG/1; MG/1
TABLET ORAL ONCE
Status: CANCELLED | OUTPATIENT
Start: 2023-01-04 | End: 2023-01-04

## 2023-01-04 RX ORDER — ACETAMINOPHEN 325 MG/1
650 TABLET ORAL
Status: CANCELLED | OUTPATIENT
Start: 2023-01-04

## 2023-01-04 RX ORDER — SODIUM CHLORIDE 0.9 % (FLUSH) 0.9 %
10 SYRINGE (ML) INJECTION
Status: CANCELLED | OUTPATIENT
Start: 2023-01-11

## 2023-01-04 RX ORDER — SODIUM CHLORIDE 0.9 % (FLUSH) 0.9 %
10 SYRINGE (ML) INJECTION
Status: DISCONTINUED | OUTPATIENT
Start: 2023-01-04 | End: 2023-01-04 | Stop reason: HOSPADM

## 2023-01-04 RX ORDER — DIPHENHYDRAMINE HCL 25 MG
25 CAPSULE ORAL
Status: CANCELLED | OUTPATIENT
Start: 2023-01-04

## 2023-01-04 RX ORDER — HEPARIN 100 UNIT/ML
500 SYRINGE INTRAVENOUS
Status: CANCELLED | OUTPATIENT
Start: 2023-01-11

## 2023-01-04 RX ADMIN — IRON SUCROSE 200 MG: 20 INJECTION, SOLUTION INTRAVENOUS at 08:01

## 2023-01-04 RX ADMIN — Medication 10 ML: at 07:01

## 2023-01-04 NOTE — PROGRESS NOTES
Subjective:       Patient ID: Shawn Carreno is a 57 y.o. male.    Chief Complaint: Results, Anemia, and Multiple Myeloma    HPI:  57-year-old male scheduled for intravenous iron treatment today.  The patient's hemoglobin is 4.8 receive 1 dose of intravenous iron.  And is now here for review.  Daughter on phone ECOG status 1 increasing fatigue and shortness of breath    Past Medical History:   Diagnosis Date    Multiple myeloma not having achieved remission 10/12/2022     History reviewed. No pertinent family history.  Social History     Socioeconomic History    Marital status: Single   Tobacco Use    Smoking status: Never    Smokeless tobacco: Never   Substance and Sexual Activity    Alcohol use: Never    Drug use: Never    Sexual activity: Not Currently     Past Surgical History:   Procedure Laterality Date    CHOLECYSTECTOMY  2020    COLONOSCOPY N/A 1/5/2022    Procedure: COLONOSCOPY;  Surgeon: Hannah Alvarado MD;  Location: [facility];  Service: Endoscopy;  Laterality: N/A;    ESOPHAGOGASTRODUODENOSCOPY N/A 1/5/2022    Procedure: EGD (ESOPHAGOGASTRODUODENOSCOPY);  Surgeon: Hannah Alvarado MD;  Location: [facility];  Service: Endoscopy;  Laterality: N/A;    ESOPHAGOGASTRODUODENOSCOPY N/A 4/6/2022    Procedure: EGD (ESOPHAGOGASTRODUODENOSCOPY) with push;  Surgeon: Vira Castaneda MD;  Location: [facility];  Service: Endoscopy;  Laterality: N/A;    INTRALUMINAL GASTROINTESTINAL TRACT IMAGING VIA CAPSULE N/A 1/26/2022    Procedure: IMAGING PROCEDURE, GI TRACT, INTRALUMINAL, VIA CAPSULE;  Surgeon: Baltazar Chaves RN;  Location: [facility];  Service: Endoscopy;  Laterality: N/A;    INTRALUMINAL GASTROINTESTINAL TRACT IMAGING VIA CAPSULE N/A 2/22/2022    Procedure: IMAGING PROCEDURE, GI TRACT, INTRALUMINAL, VIA CAPSULE;  Surgeon: Baltazar Chaves RN;  Location: [facility];  Service: Endoscopy;  Laterality: N/A;       Labs:  Lab Results   Component Value Date    WBC 4.23 01/04/2023    HGB 4.8 (LL) 01/04/2023     HCT 18.3 (LL) 01/04/2023    MCV 84 01/04/2023     01/04/2023     BMP  Lab Results   Component Value Date     01/04/2023    K 4.5 01/04/2023     01/04/2023    CO2 22 (L) 01/04/2023    BUN 14 01/04/2023    CREATININE 0.9 01/04/2023    CALCIUM 8.5 (L) 01/04/2023    ANIONGAP 7 (L) 01/04/2023    ESTGFRAFRICA >60 01/06/2022    EGFRNONAA >60 01/06/2022     Lab Results   Component Value Date    ALT 15 01/04/2023    AST 14 01/04/2023    ALKPHOS 39 (L) 01/04/2023    BILITOT 0.5 01/04/2023       Lab Results   Component Value Date    IRON 101 09/13/2022    TIBC 456 (H) 09/13/2022    FERRITIN 27 09/13/2022     Lab Results   Component Value Date    BFRWMCDW15 283 09/15/2022     Lab Results   Component Value Date    FOLATE 12.2 09/15/2022     Lab Results   Component Value Date    TSH 2.047 09/15/2022         Review of Systems   Constitutional:  Positive for fatigue. Negative for activity change, appetite change, chills, diaphoresis, fever and unexpected weight change.   HENT:  Negative for congestion, dental problem, drooling, ear discharge, ear pain, facial swelling, hearing loss, mouth sores, nosebleeds, postnasal drip, rhinorrhea, sinus pressure, sneezing, sore throat, tinnitus, trouble swallowing and voice change.    Eyes:  Negative for photophobia, pain, discharge, redness, itching and visual disturbance.   Respiratory:  Positive for shortness of breath. Negative for apnea, cough, choking, chest tightness, wheezing and stridor.    Cardiovascular:  Negative for chest pain, palpitations and leg swelling.   Gastrointestinal:  Negative for abdominal distention, abdominal pain, anal bleeding, blood in stool, constipation, diarrhea, nausea, rectal pain and vomiting.   Endocrine: Negative for cold intolerance, heat intolerance, polydipsia, polyphagia and polyuria.   Genitourinary:  Negative for decreased urine volume, difficulty urinating, dysuria, enuresis, flank pain, frequency, genital sores, hematuria,  penile discharge, penile pain, penile swelling, scrotal swelling, testicular pain and urgency.   Musculoskeletal:  Negative for arthralgias, back pain, gait problem, joint swelling, myalgias, neck pain and neck stiffness.   Skin:  Negative for color change, pallor, rash and wound.   Allergic/Immunologic: Negative for environmental allergies, food allergies and immunocompromised state.   Neurological:  Positive for weakness. Negative for dizziness, tremors, seizures, syncope, facial asymmetry, speech difficulty, light-headedness, numbness and headaches.   Hematological:  Negative for adenopathy. Does not bruise/bleed easily.   Psychiatric/Behavioral:  Positive for dysphoric mood. Negative for agitation, behavioral problems, confusion, decreased concentration, hallucinations, self-injury, sleep disturbance and suicidal ideas. The patient is nervous/anxious. The patient is not hyperactive.      Objective:      Physical Exam  Vitals reviewed.   Constitutional:       General: He is not in acute distress.     Appearance: He is well-developed. He is ill-appearing. He is not diaphoretic.   HENT:      Head: Normocephalic.      Right Ear: External ear normal.      Left Ear: External ear normal.      Nose: Nose normal.      Right Sinus: No maxillary sinus tenderness or frontal sinus tenderness.      Left Sinus: No maxillary sinus tenderness or frontal sinus tenderness.      Mouth/Throat:      Pharynx: No oropharyngeal exudate.   Eyes:      General: Lids are normal. No scleral icterus.        Right eye: No discharge.         Left eye: No discharge.      Extraocular Movements:      Right eye: Normal extraocular motion.      Left eye: Normal extraocular motion.      Conjunctiva/sclera:      Right eye: Right conjunctiva is not injected. No hemorrhage.     Left eye: Left conjunctiva is not injected. No hemorrhage.     Pupils: Pupils are equal, round, and reactive to light.      Comments: Very pale sclera   Neck:      Thyroid: No  thyromegaly.      Vascular: No JVD.      Trachea: No tracheal deviation.   Cardiovascular:      Rate and Rhythm: Normal rate.   Pulmonary:      Effort: Pulmonary effort is normal. No respiratory distress.      Breath sounds: No stridor.   Abdominal:      General: Bowel sounds are normal.      Palpations: Abdomen is soft. There is no hepatomegaly, splenomegaly or mass.      Tenderness: There is no abdominal tenderness.   Musculoskeletal:         General: No tenderness. Normal range of motion.      Cervical back: Normal range of motion and neck supple.   Lymphadenopathy:      Head:      Right side of head: No posterior auricular or occipital adenopathy.      Left side of head: No posterior auricular or occipital adenopathy.      Cervical: No cervical adenopathy.      Right cervical: No superficial, deep or posterior cervical adenopathy.     Left cervical: No superficial, deep or posterior cervical adenopathy.      Upper Body:      Right upper body: No supraclavicular adenopathy.      Left upper body: No supraclavicular adenopathy.   Skin:     General: Skin is dry.      Findings: No erythema or rash.      Nails: There is no clubbing.   Neurological:      Mental Status: He is alert and oriented to person, place, and time.      Cranial Nerves: No cranial nerve deficit.      Motor: Weakness present.      Coordination: Coordination normal.   Psychiatric:         Behavior: Behavior normal.         Thought Content: Thought content normal.         Judgment: Judgment normal.           Assessment:      1. Multiple myeloma not having achieved remission    2. Plasma cell neoplasm    3. Symptomatic anemia    4. Iron deficiency anemia due to chronic blood loss           Plan:     Reviewed information with patient hemoglobin of 4.8 would recommend transfusion of 2 units of irradiated packed red cells.  I spoken to the patient as well as his daughter by phone.  And will communicate with leukemia lymphoma myeloma service.  SPEP and  free light chain beta 2 microglobulin to be drawn in completed.  But high likelihood progression of multiple myeloma.  Will discuss with them whether not repeat bone marrow is warranted last performed in September 2020.  At this point patient appears to be reluctant to understand the nature of his progression progression of what I think is rapidly progressive multiple myeloma in need for initiation of systemic therapy.  Discussed implications and answered questions consent for transfusion signed in orders written for 2 units packed red cells hopefully be able to given over the next 24 hours.        Kris Cesar Jr, MD FACP

## 2023-01-04 NOTE — TELEPHONE ENCOUNTER
----- Message from Johnny Duncan sent at 1/4/2023 10:45 AM CST -----  Contact: Patient  Shawn Carreno would like a call back from Yary @[phone], in regards to his appointment on today.

## 2023-01-04 NOTE — PLAN OF CARE
Plan of care reviewed with patient. Discussed if there are any new or ongoing concerns. Denies.   Problem: Adult Inpatient Plan of Care  Goal: Plan of Care Review  Outcome: Ongoing, Progressing  Flowsheets (Taken 1/4/2023 0807)  Plan of Care Reviewed With: patient  Goal: Patient-Specific Goal (Individualized)  Outcome: Ongoing, Progressing  Goal: Absence of Hospital-Acquired Illness or Injury  Outcome: Ongoing, Progressing  Intervention: Identify and Manage Fall Risk  Flowsheets (Taken 1/4/2023 0807)  Safety Promotion/Fall Prevention: in recliner, wheels locked  Goal: Optimal Comfort and Wellbeing  Outcome: Ongoing, Progressing  Intervention: Provide Person-Centered Care  Flowsheets (Taken 1/4/2023 0807)  Trust Relationship/Rapport:   care explained   reassurance provided   choices provided   thoughts/feelings acknowledged   emotional support provided   empathic listening provided   questions answered   questions encouraged

## 2023-01-04 NOTE — TELEPHONE ENCOUNTER
Returned patient phone call. Patient wants to know how long infusion will take and if he can come earlier than scheduled time at 8a on 01/06. Informed patient that I will have to speak with infusion to answer those questions. Patient is also requesting a phone call from infusion to clarify. Informed patient that I would send a message. Patient verbalized understanding and had no other issues at this time.

## 2023-01-04 NOTE — NURSING
Infusion # 2 - Venofer 200 mg  Last dose- 12/28/22      Recent labs? 1/4/23  Last Hem/Onc provider visit- Seen by Dr. Cesar on 1/4/23     Premeds-none     Vernfer 200 mg administered IV at a 60 minute rate per orders; see MAR and vitals for more details. Tolerated well without adverse events, discharged and ambulatory out of clinic.

## 2023-01-05 ENCOUNTER — PATIENT MESSAGE (OUTPATIENT)
Dept: HEMATOLOGY/ONCOLOGY | Facility: CLINIC | Age: 58
End: 2023-01-05
Payer: MEDICAID

## 2023-01-05 ENCOUNTER — LAB VISIT (OUTPATIENT)
Dept: LAB | Facility: HOSPITAL | Age: 58
End: 2023-01-05
Attending: INTERNAL MEDICINE
Payer: MEDICAID

## 2023-01-05 DIAGNOSIS — C90.00 MULTIPLE MYELOMA NOT HAVING ACHIEVED REMISSION: ICD-10-CM

## 2023-01-05 LAB
ABO + RH BLD: NORMAL
B2 MICROGLOB SERPL-MCNC: 0.6 UG/ML (ref 0–2.5)
BLD GP AB SCN CELLS X3 SERPL QL: NORMAL

## 2023-01-05 PROCEDURE — 82232 ASSAY OF BETA-2 PROTEIN: CPT | Performed by: INTERNAL MEDICINE

## 2023-01-05 PROCEDURE — 84165 PATHOLOGIST INTERPRETATION SPE: ICD-10-PCS | Mod: 26,,, | Performed by: PATHOLOGY

## 2023-01-05 PROCEDURE — 86920 COMPATIBILITY TEST SPIN: CPT | Performed by: INTERNAL MEDICINE

## 2023-01-05 PROCEDURE — 86900 BLOOD TYPING SEROLOGIC ABO: CPT | Performed by: INTERNAL MEDICINE

## 2023-01-05 PROCEDURE — 84165 PROTEIN E-PHORESIS SERUM: CPT | Mod: 26,,, | Performed by: PATHOLOGY

## 2023-01-05 PROCEDURE — 36415 COLL VENOUS BLD VENIPUNCTURE: CPT | Performed by: INTERNAL MEDICINE

## 2023-01-05 PROCEDURE — 84165 PROTEIN E-PHORESIS SERUM: CPT | Performed by: INTERNAL MEDICINE

## 2023-01-06 ENCOUNTER — INFUSION (OUTPATIENT)
Dept: INFUSION THERAPY | Facility: HOSPITAL | Age: 58
End: 2023-01-06
Attending: INTERNAL MEDICINE
Payer: MEDICAID

## 2023-01-06 VITALS
OXYGEN SATURATION: 99 % | SYSTOLIC BLOOD PRESSURE: 115 MMHG | HEART RATE: 74 BPM | TEMPERATURE: 98 F | DIASTOLIC BLOOD PRESSURE: 66 MMHG | RESPIRATION RATE: 18 BRPM

## 2023-01-06 DIAGNOSIS — C90.00 MULTIPLE MYELOMA NOT HAVING ACHIEVED REMISSION: ICD-10-CM

## 2023-01-06 LAB
ALBUMIN SERPL ELPH-MCNC: 4.07 G/DL (ref 3.35–5.55)
ALPHA1 GLOB SERPL ELPH-MCNC: 0.34 G/DL (ref 0.17–0.41)
ALPHA2 GLOB SERPL ELPH-MCNC: 0.53 G/DL (ref 0.43–0.99)
B-GLOBULIN SERPL ELPH-MCNC: 0.74 G/DL (ref 0.5–1.1)
BLD PROD TYP BPU: NORMAL
BLOOD UNIT EXPIRATION DATE: NORMAL
BLOOD UNIT TYPE CODE: 5100
BLOOD UNIT TYPE: NORMAL
CODING SYSTEM: NORMAL
DISPENSE STATUS: NORMAL
GAMMA GLOB SERPL ELPH-MCNC: 1.91 G/DL (ref 0.67–1.58)
NUM UNITS TRANS PACKED RBC: NORMAL
PROT SERPL-MCNC: 7.6 G/DL (ref 6–8.4)

## 2023-01-06 PROCEDURE — 25000003 PHARM REV CODE 250: Performed by: INTERNAL MEDICINE

## 2023-01-06 PROCEDURE — 36430 TRANSFUSION BLD/BLD COMPNT: CPT

## 2023-01-06 PROCEDURE — P9016 RBC LEUKOCYTES REDUCED: HCPCS | Performed by: INTERNAL MEDICINE

## 2023-01-06 RX ORDER — ACETAMINOPHEN 325 MG/1
650 TABLET ORAL
Status: DISCONTINUED | OUTPATIENT
Start: 2023-01-06 | End: 2023-01-06 | Stop reason: HOSPADM

## 2023-01-06 RX ORDER — HYDROCODONE BITARTRATE AND ACETAMINOPHEN 500; 5 MG/1; MG/1
TABLET ORAL ONCE
Status: COMPLETED | OUTPATIENT
Start: 2023-01-06 | End: 2023-01-06

## 2023-01-06 RX ORDER — DIPHENHYDRAMINE HCL 25 MG
25 CAPSULE ORAL
Status: DISCONTINUED | OUTPATIENT
Start: 2023-01-06 | End: 2023-01-06 | Stop reason: HOSPADM

## 2023-01-06 RX ADMIN — SODIUM CHLORIDE: 0.9 INJECTION, SOLUTION INTRAVENOUS at 08:01

## 2023-01-06 NOTE — DISCHARGE INSTRUCTIONS
.[facility]  [address] [address] [address]  [address]  [phone] phone     [phone] fax  Hours of Operation: Monday- Friday 8:00am- 5:00pm  After hours phone  [phone]  Hematology / Oncology Physicians on call    Dr. Arsenio Turner      Nurse Practitioners:    Marimar Lombardi, KRISTIE Garcia, KRISTIE Griffith, KRISTIE Moreno, KRISTIE Stevenson, ANA Orozco      Please don't hesitate to call if you have any concerns.

## 2023-01-09 ENCOUNTER — HOSPITAL ENCOUNTER (OUTPATIENT)
Dept: RADIOLOGY | Facility: HOSPITAL | Age: 58
Discharge: HOME | End: 2023-01-09
Attending: INTERNAL MEDICINE
Payer: MEDICAID

## 2023-01-09 DIAGNOSIS — C90.00 MULTIPLE MYELOMA NOT HAVING ACHIEVED REMISSION: ICD-10-CM

## 2023-01-09 LAB — PATHOLOGIST INTERPRETATION SPE: NORMAL

## 2023-01-09 PROCEDURE — A9585 GADOBUTROL INJECTION: HCPCS | Mod: PN | Performed by: INTERNAL MEDICINE

## 2023-01-09 PROCEDURE — 73723 MRI JOINT LWR EXTR W/O&W/DYE: CPT | Mod: TC,PN,LT

## 2023-01-09 PROCEDURE — 73723 MRI JOINT LWR EXTR W/O&W/DYE: CPT | Mod: TC,PN,RT

## 2023-01-09 PROCEDURE — 73723 MRI HIP W WO CONTRAST LEFT: ICD-10-PCS | Mod: 26,LT,, | Performed by: RADIOLOGY

## 2023-01-09 PROCEDURE — 73723 MRI HIP W WO CONTRAST RIGHT: ICD-10-PCS | Mod: 26,RT,, | Performed by: RADIOLOGY

## 2023-01-09 PROCEDURE — 73723 MRI JOINT LWR EXTR W/O&W/DYE: CPT | Mod: 26,LT,, | Performed by: RADIOLOGY

## 2023-01-09 PROCEDURE — 73723 MRI JOINT LWR EXTR W/O&W/DYE: CPT | Mod: 26,RT,, | Performed by: RADIOLOGY

## 2023-01-09 PROCEDURE — 25500020 PHARM REV CODE 255: Mod: PN | Performed by: INTERNAL MEDICINE

## 2023-01-09 RX ORDER — GADOBUTROL 604.72 MG/ML
9 INJECTION INTRAVENOUS
Status: COMPLETED | OUTPATIENT
Start: 2023-01-09 | End: 2023-01-09

## 2023-01-09 RX ADMIN — GADOBUTROL 9 ML: 604.72 INJECTION INTRAVENOUS at 10:01

## 2023-01-12 ENCOUNTER — TELEPHONE (OUTPATIENT)
Dept: HEMATOLOGY/ONCOLOGY | Facility: CLINIC | Age: 58
End: 2023-01-12
Payer: MEDICAID

## 2023-01-12 ENCOUNTER — LAB VISIT (OUTPATIENT)
Dept: LAB | Facility: HOSPITAL | Age: 58
End: 2023-01-12
Attending: INTERNAL MEDICINE
Payer: MEDICAID

## 2023-01-12 ENCOUNTER — OFFICE VISIT (OUTPATIENT)
Dept: HEMATOLOGY/ONCOLOGY | Facility: CLINIC | Age: 58
End: 2023-01-12
Payer: MEDICAID

## 2023-01-12 VITALS
DIASTOLIC BLOOD PRESSURE: 77 MMHG | TEMPERATURE: 97 F | WEIGHT: 189.81 LBS | OXYGEN SATURATION: 100 % | HEIGHT: 67 IN | SYSTOLIC BLOOD PRESSURE: 123 MMHG | HEART RATE: 73 BPM | BODY MASS INDEX: 29.79 KG/M2

## 2023-01-12 DIAGNOSIS — C90.00 MULTIPLE MYELOMA NOT HAVING ACHIEVED REMISSION: ICD-10-CM

## 2023-01-12 DIAGNOSIS — D64.9 SYMPTOMATIC ANEMIA: ICD-10-CM

## 2023-01-12 DIAGNOSIS — D50.0 IRON DEFICIENCY ANEMIA DUE TO CHRONIC BLOOD LOSS: ICD-10-CM

## 2023-01-12 DIAGNOSIS — C90.00 MULTIPLE MYELOMA NOT HAVING ACHIEVED REMISSION: Primary | ICD-10-CM

## 2023-01-12 DIAGNOSIS — D49.89 PLASMA CELL NEOPLASM: ICD-10-CM

## 2023-01-12 LAB
ALBUMIN SERPL BCP-MCNC: 3.8 G/DL (ref 3.5–5.2)
ALP SERPL-CCNC: 39 U/L (ref 55–135)
ALT SERPL W/O P-5'-P-CCNC: 13 U/L (ref 10–44)
ANION GAP SERPL CALC-SCNC: 9 MMOL/L (ref 8–16)
ANISOCYTOSIS BLD QL SMEAR: SLIGHT
AST SERPL-CCNC: 14 U/L (ref 10–40)
BASOPHILS # BLD AUTO: 0.04 K/UL (ref 0–0.2)
BASOPHILS NFR BLD: 0.8 % (ref 0–1.9)
BILIRUB SERPL-MCNC: 0.6 MG/DL (ref 0.1–1)
BUN SERPL-MCNC: 21 MG/DL (ref 6–20)
CALCIUM SERPL-MCNC: 8.7 MG/DL (ref 8.7–10.5)
CHLORIDE SERPL-SCNC: 107 MMOL/L (ref 95–110)
CO2 SERPL-SCNC: 22 MMOL/L (ref 23–29)
CREAT SERPL-MCNC: 0.9 MG/DL (ref 0.5–1.4)
DACRYOCYTES BLD QL SMEAR: ABNORMAL
DIFFERENTIAL METHOD: ABNORMAL
EOSINOPHIL # BLD AUTO: 0.2 K/UL (ref 0–0.5)
EOSINOPHIL NFR BLD: 3 % (ref 0–8)
ERYTHROCYTE [DISTWIDTH] IN BLOOD BY AUTOMATED COUNT: 20.2 % (ref 11.5–14.5)
EST. GFR  (NO RACE VARIABLE): >60 ML/MIN/1.73 M^2
GLUCOSE SERPL-MCNC: 87 MG/DL (ref 70–110)
HCT VFR BLD AUTO: 21.8 % (ref 40–54)
HGB BLD-MCNC: 5.8 G/DL (ref 14–18)
HYPOCHROMIA BLD QL SMEAR: ABNORMAL
IMM GRANULOCYTES # BLD AUTO: 0.02 K/UL (ref 0–0.04)
IMM GRANULOCYTES NFR BLD AUTO: 0.4 % (ref 0–0.5)
LYMPHOCYTES # BLD AUTO: 1.6 K/UL (ref 1–4.8)
LYMPHOCYTES NFR BLD: 31 % (ref 18–48)
MCH RBC QN AUTO: 22.5 PG (ref 27–31)
MCHC RBC AUTO-ENTMCNC: 26.6 G/DL (ref 32–36)
MCV RBC AUTO: 85 FL (ref 82–98)
MONOCYTES # BLD AUTO: 0.5 K/UL (ref 0.3–1)
MONOCYTES NFR BLD: 9.3 % (ref 4–15)
NEUTROPHILS # BLD AUTO: 2.8 K/UL (ref 1.8–7.7)
NEUTROPHILS NFR BLD: 55.5 % (ref 38–73)
NRBC BLD-RTO: 0 /100 WBC
OVALOCYTES BLD QL SMEAR: ABNORMAL
PLATELET # BLD AUTO: 261 K/UL (ref 150–450)
PLATELET BLD QL SMEAR: ABNORMAL
PMV BLD AUTO: 10 FL (ref 9.2–12.9)
POIKILOCYTOSIS BLD QL SMEAR: SLIGHT
POTASSIUM SERPL-SCNC: 4.3 MMOL/L (ref 3.5–5.1)
PROT SERPL-MCNC: 7.6 G/DL (ref 6–8.4)
RBC # BLD AUTO: 2.58 M/UL (ref 4.6–6.2)
SODIUM SERPL-SCNC: 138 MMOL/L (ref 136–145)
TARGETS BLD QL SMEAR: ABNORMAL
WBC # BLD AUTO: 5.07 K/UL (ref 3.9–12.7)

## 2023-01-12 PROCEDURE — 99215 PR OFFICE/OUTPT VISIT, EST, LEVL V, 40-54 MIN: ICD-10-PCS | Mod: S$PBB,,, | Performed by: INTERNAL MEDICINE

## 2023-01-12 PROCEDURE — 85025 COMPLETE CBC W/AUTO DIFF WBC: CPT | Performed by: INTERNAL MEDICINE

## 2023-01-12 PROCEDURE — 99999 PR PBB SHADOW E&M-EST. PATIENT-LVL IV: CPT | Mod: PBBFAC,,, | Performed by: INTERNAL MEDICINE

## 2023-01-12 PROCEDURE — 99215 OFFICE O/P EST HI 40 MIN: CPT | Mod: S$PBB,,, | Performed by: INTERNAL MEDICINE

## 2023-01-12 PROCEDURE — 1160F PR REVIEW ALL MEDS BY PRESCRIBER/CLIN PHARMACIST DOCUMENTED: ICD-10-PCS | Mod: CPTII,,, | Performed by: INTERNAL MEDICINE

## 2023-01-12 PROCEDURE — 3074F SYST BP LT 130 MM HG: CPT | Mod: CPTII,,, | Performed by: INTERNAL MEDICINE

## 2023-01-12 PROCEDURE — 3008F BODY MASS INDEX DOCD: CPT | Mod: CPTII,,, | Performed by: INTERNAL MEDICINE

## 2023-01-12 PROCEDURE — 99999 PR PBB SHADOW E&M-EST. PATIENT-LVL IV: ICD-10-PCS | Mod: PBBFAC,,, | Performed by: INTERNAL MEDICINE

## 2023-01-12 PROCEDURE — 1159F PR MEDICATION LIST DOCUMENTED IN MEDICAL RECORD: ICD-10-PCS | Mod: CPTII,,, | Performed by: INTERNAL MEDICINE

## 2023-01-12 PROCEDURE — 3008F PR BODY MASS INDEX (BMI) DOCUMENTED: ICD-10-PCS | Mod: CPTII,,, | Performed by: INTERNAL MEDICINE

## 2023-01-12 PROCEDURE — 3078F DIAST BP <80 MM HG: CPT | Mod: CPTII,,, | Performed by: INTERNAL MEDICINE

## 2023-01-12 PROCEDURE — 3074F PR MOST RECENT SYSTOLIC BLOOD PRESSURE < 130 MM HG: ICD-10-PCS | Mod: CPTII,,, | Performed by: INTERNAL MEDICINE

## 2023-01-12 PROCEDURE — 99214 OFFICE O/P EST MOD 30 MIN: CPT | Mod: PBBFAC | Performed by: INTERNAL MEDICINE

## 2023-01-12 PROCEDURE — 36415 COLL VENOUS BLD VENIPUNCTURE: CPT | Performed by: INTERNAL MEDICINE

## 2023-01-12 PROCEDURE — 1159F MED LIST DOCD IN RCRD: CPT | Mod: CPTII,,, | Performed by: INTERNAL MEDICINE

## 2023-01-12 PROCEDURE — 3078F PR MOST RECENT DIASTOLIC BLOOD PRESSURE < 80 MM HG: ICD-10-PCS | Mod: CPTII,,, | Performed by: INTERNAL MEDICINE

## 2023-01-12 PROCEDURE — 80053 COMPREHEN METABOLIC PANEL: CPT | Performed by: INTERNAL MEDICINE

## 2023-01-12 PROCEDURE — 1160F RVW MEDS BY RX/DR IN RCRD: CPT | Mod: CPTII,,, | Performed by: INTERNAL MEDICINE

## 2023-01-12 RX ORDER — DIPHENHYDRAMINE HCL 25 MG
25 CAPSULE ORAL
Status: CANCELLED | OUTPATIENT
Start: 2023-01-12

## 2023-01-12 RX ORDER — ACETAMINOPHEN 325 MG/1
650 TABLET ORAL
Status: CANCELLED | OUTPATIENT
Start: 2023-01-12

## 2023-01-12 RX ORDER — HYDROCODONE BITARTRATE AND ACETAMINOPHEN 500; 5 MG/1; MG/1
TABLET ORAL ONCE
Status: CANCELLED | OUTPATIENT
Start: 2023-01-12 | End: 2023-01-12

## 2023-01-12 NOTE — TELEPHONE ENCOUNTER
called to notify that pt had critical lab value// MA informed Dr. Cesar via staff message of lab value// artur

## 2023-01-12 NOTE — PROGRESS NOTES
Subjective:       Patient ID: Shawn Carreno is a 57 y.o. male.    Chief Complaint: Results, Multiple Myeloma, and Anemia    HPI:  57-year-old male returns patient transfuse 1 unit of packed red cells.  Patient's hemoglobin is 5 at this point the patient has an MRI done of his hip which demonstrates red marrow conversion.  I do believe with slight increase in free light chain ratio and slight increase MGUS that patient has progressed to multiple myeloma will ask that repeat bone marrow be done and discussed case with Dr. Figueroa in stem cell transplant ECOG status 1    Past Medical History:   Diagnosis Date    Multiple myeloma not having achieved remission 10/12/2022     History reviewed. No pertinent family history.  Social History     Socioeconomic History    Marital status: Single   Tobacco Use    Smoking status: Never    Smokeless tobacco: Never   Substance and Sexual Activity    Alcohol use: Never    Drug use: Never    Sexual activity: Not Currently     Past Surgical History:   Procedure Laterality Date    CHOLECYSTECTOMY 2020    COLONOSCOPY N/A 1/5/2022    Procedure: COLONOSCOPY;  Surgeon: Hannah Alvarado MD;  Location: [facility];  Service: Endoscopy;  Laterality: N/A;    ESOPHAGOGASTRODUODENOSCOPY N/A 1/5/2022    Procedure: EGD (ESOPHAGOGASTRODUODENOSCOPY);  Surgeon: Hannah Alvarado MD;  Location: [facility];  Service: Endoscopy;  Laterality: N/A;    ESOPHAGOGASTRODUODENOSCOPY N/A 4/6/2022    Procedure: EGD (ESOPHAGOGASTRODUODENOSCOPY) with push;  Surgeon: Vira Castaneda MD;  Location: [facility];  Service: Endoscopy;  Laterality: N/A;    INTRALUMINAL GASTROINTESTINAL TRACT IMAGING VIA CAPSULE N/A 1/26/2022    Procedure: IMAGING PROCEDURE, GI TRACT, INTRALUMINAL, VIA CAPSULE;  Surgeon: Baltazar Chaves RN;  Location: [facility];  Service: Endoscopy;  Laterality: N/A;    INTRALUMINAL GASTROINTESTINAL TRACT IMAGING VIA CAPSULE N/A 2/22/2022    Procedure: IMAGING PROCEDURE, GI TRACT,  INTRALUMINAL, VIA CAPSULE;  Surgeon: Baltazar Chaves RN;  Location: [facility];  Service: Endoscopy;  Laterality: N/A;       Labs:  Lab Results   Component Value Date    WBC 5.07 01/12/2023    HGB 5.8 (LL) 01/12/2023    HCT 21.8 (L) 01/12/2023    MCV 85 01/12/2023     01/12/2023     BMP  Lab Results   Component Value Date     01/12/2023    K 4.3 01/12/2023     01/12/2023    CO2 22 (L) 01/12/2023    BUN 21 (H) 01/12/2023    CREATININE 0.9 01/12/2023    CALCIUM 8.7 01/12/2023    ANIONGAP 9 01/12/2023    ESTGFRAFRICA >60 01/06/2022    EGFRNONAA >60 01/06/2022     Lab Results   Component Value Date    ALT 13 01/12/2023    AST 14 01/12/2023    ALKPHOS 39 (L) 01/12/2023    BILITOT 0.6 01/12/2023       Lab Results   Component Value Date    IRON 101 09/13/2022    TIBC 456 (H) 09/13/2022    FERRITIN 27 09/13/2022     Lab Results   Component Value Date    OQKNDRWM88 283 09/15/2022     Lab Results   Component Value Date    FOLATE 12.2 09/15/2022     Lab Results   Component Value Date    TSH 2.047 09/15/2022         Review of Systems   Constitutional:  Positive for activity change, appetite change and fatigue. Negative for chills, diaphoresis, fever and unexpected weight change.   HENT:  Negative for congestion, dental problem, drooling, ear discharge, ear pain, facial swelling, hearing loss, mouth sores, nosebleeds, postnasal drip, rhinorrhea, sinus pressure, sneezing, sore throat, tinnitus, trouble swallowing and voice change.    Eyes:  Negative for photophobia, pain, discharge, redness, itching and visual disturbance.   Respiratory:  Negative for apnea, cough, choking, chest tightness, shortness of breath, wheezing and stridor.    Cardiovascular:  Negative for chest pain, palpitations and leg swelling.   Gastrointestinal:  Negative for abdominal distention, abdominal pain, anal bleeding, blood in stool, constipation, diarrhea, nausea, rectal pain and vomiting.   Endocrine: Negative for cold intolerance, heat  intolerance, polydipsia, polyphagia and polyuria.   Genitourinary:  Negative for decreased urine volume, difficulty urinating, dysuria, enuresis, flank pain, frequency, genital sores, hematuria, penile discharge, penile pain, penile swelling, scrotal swelling, testicular pain and urgency.   Musculoskeletal:  Positive for arthralgias and gait problem. Negative for back pain, joint swelling, myalgias, neck pain and neck stiffness.   Skin:  Negative for color change, pallor, rash and wound.   Allergic/Immunologic: Negative for environmental allergies, food allergies and immunocompromised state.   Neurological:  Positive for weakness. Negative for dizziness, tremors, seizures, syncope, facial asymmetry, speech difficulty, light-headedness, numbness and headaches.   Hematological:  Negative for adenopathy. Does not bruise/bleed easily.   Psychiatric/Behavioral:  Positive for dysphoric mood. Negative for agitation, behavioral problems, confusion, decreased concentration, hallucinations, self-injury, sleep disturbance and suicidal ideas. The patient is nervous/anxious. The patient is not hyperactive.      Objective:      Physical Exam  Vitals reviewed.   Constitutional:       General: He is not in acute distress.     Appearance: He is well-developed. He is ill-appearing. He is not diaphoretic.   HENT:      Head: Normocephalic.      Right Ear: External ear normal.      Left Ear: External ear normal.      Nose: Nose normal.      Right Sinus: No maxillary sinus tenderness or frontal sinus tenderness.      Left Sinus: No maxillary sinus tenderness or frontal sinus tenderness.      Mouth/Throat:      Pharynx: No oropharyngeal exudate.   Eyes:      General: Lids are normal. No scleral icterus.        Right eye: No discharge.         Left eye: No discharge.      Extraocular Movements:      Right eye: Normal extraocular motion.      Left eye: Normal extraocular motion.      Conjunctiva/sclera:      Right eye: Right conjunctiva is  not injected. No hemorrhage.     Left eye: Left conjunctiva is not injected. No hemorrhage.     Pupils: Pupils are equal, round, and reactive to light.   Neck:      Thyroid: No thyromegaly.      Vascular: No JVD.      Trachea: No tracheal deviation.   Cardiovascular:      Rate and Rhythm: Normal rate.   Pulmonary:      Effort: Pulmonary effort is normal. No respiratory distress.      Breath sounds: No stridor.   Abdominal:      General: Bowel sounds are normal.      Palpations: Abdomen is soft. There is no hepatomegaly, splenomegaly or mass.      Tenderness: There is no abdominal tenderness.   Musculoskeletal:         General: No tenderness. Normal range of motion.      Cervical back: Normal range of motion and neck supple.   Lymphadenopathy:      Head:      Right side of head: No posterior auricular or occipital adenopathy.      Left side of head: No posterior auricular or occipital adenopathy.      Cervical: No cervical adenopathy.      Right cervical: No superficial, deep or posterior cervical adenopathy.     Left cervical: No superficial, deep or posterior cervical adenopathy.      Upper Body:      Right upper body: No supraclavicular adenopathy.      Left upper body: No supraclavicular adenopathy.   Skin:     General: Skin is dry.      Findings: No erythema or rash.      Nails: There is no clubbing.   Neurological:      Mental Status: He is alert and oriented to person, place, and time.      Cranial Nerves: No cranial nerve deficit.      Motor: Weakness present.      Coordination: Coordination normal.   Psychiatric:         Mood and Affect: Mood is anxious and depressed.         Behavior: Behavior normal.         Thought Content: Thought content normal.         Judgment: Judgment normal.           Assessment:      1. Multiple myeloma not having achieved remission    2. Plasma cell neoplasm    3. Symptomatic anemia    4. Iron deficiency anemia due to chronic blood loss           Plan:     Extensive conversation  with patient at this time I will ask nurse navigation help coordinate 1 unit packed red cells over the next several days the patient is aware he is type O national shortage of type O patients in trying to maintain at symptomatic improvement recommend repeat bone marrow biopsy and aspirate an appointment to be seen video visit with Dr. Figueroa stem cell transplant discussed implications answered questions I spent an extensive amount of time discussing with him the nature of multiple myeloma treatment options systemic chemotherapy stem cell transplant coordination actually offered him to meet 1 of our patients who was in the office this afternoon who this process 10 years ago but he declined very overwhelmed with the information at this point standing so total time spent 40 minutes        Kris Cesar Jr, MD FACP

## 2023-01-14 ENCOUNTER — LAB VISIT (OUTPATIENT)
Dept: LAB | Facility: HOSPITAL | Age: 58
End: 2023-01-14
Attending: INTERNAL MEDICINE
Payer: MEDICAID

## 2023-01-14 DIAGNOSIS — C90.00 MULTIPLE MYELOMA NOT HAVING ACHIEVED REMISSION: ICD-10-CM

## 2023-01-14 LAB
ABO + RH BLD: NORMAL
BLD GP AB SCN CELLS X3 SERPL QL: NORMAL
FERRITIN SERPL-MCNC: 26 NG/ML (ref 20–300)
HAPTOGLOB SERPL-MCNC: 105 MG/DL (ref 30–250)
IRON SERPL-MCNC: 21 UG/DL (ref 45–160)
IRON SERPL-MCNC: 22 UG/DL (ref 45–160)
LDH SERPL L TO P-CCNC: 167 U/L (ref 110–260)
SATURATED IRON: 4 % (ref 20–50)
SATURATED IRON: 5 % (ref 20–50)
TOTAL IRON BINDING CAPACITY: 469 UG/DL (ref 250–450)
TOTAL IRON BINDING CAPACITY: 474 UG/DL (ref 250–450)
TRANSFERRIN SERPL-MCNC: 317 MG/DL (ref 200–375)
TRANSFERRIN SERPL-MCNC: 320 MG/DL (ref 200–375)

## 2023-01-14 PROCEDURE — P9016 RBC LEUKOCYTES REDUCED: HCPCS | Performed by: INTERNAL MEDICINE

## 2023-01-14 PROCEDURE — 83010 ASSAY OF HAPTOGLOBIN QUANT: CPT | Performed by: INTERNAL MEDICINE

## 2023-01-14 PROCEDURE — 83615 LACTATE (LD) (LDH) ENZYME: CPT | Performed by: INTERNAL MEDICINE

## 2023-01-14 PROCEDURE — 36415 COLL VENOUS BLD VENIPUNCTURE: CPT | Performed by: INTERNAL MEDICINE

## 2023-01-14 PROCEDURE — 82728 ASSAY OF FERRITIN: CPT | Performed by: INTERNAL MEDICINE

## 2023-01-14 PROCEDURE — 86900 BLOOD TYPING SEROLOGIC ABO: CPT | Performed by: INTERNAL MEDICINE

## 2023-01-14 PROCEDURE — 86920 COMPATIBILITY TEST SPIN: CPT | Performed by: INTERNAL MEDICINE

## 2023-01-14 PROCEDURE — 84466 ASSAY OF TRANSFERRIN: CPT | Mod: 91 | Performed by: INTERNAL MEDICINE

## 2023-01-17 ENCOUNTER — INFUSION (OUTPATIENT)
Dept: INFUSION THERAPY | Facility: HOSPITAL | Age: 58
End: 2023-01-17
Attending: INTERNAL MEDICINE
Payer: MEDICAID

## 2023-01-17 ENCOUNTER — PATIENT MESSAGE (OUTPATIENT)
Dept: HEMATOLOGY/ONCOLOGY | Facility: CLINIC | Age: 58
End: 2023-01-17
Payer: MEDICAID

## 2023-01-17 VITALS
TEMPERATURE: 98 F | OXYGEN SATURATION: 100 % | DIASTOLIC BLOOD PRESSURE: 76 MMHG | SYSTOLIC BLOOD PRESSURE: 116 MMHG | RESPIRATION RATE: 18 BRPM | HEART RATE: 63 BPM

## 2023-01-17 DIAGNOSIS — C90.00 MULTIPLE MYELOMA NOT HAVING ACHIEVED REMISSION: ICD-10-CM

## 2023-01-17 LAB
BLD PROD TYP BPU: NORMAL
BLOOD UNIT EXPIRATION DATE: NORMAL
BLOOD UNIT TYPE CODE: 5100
BLOOD UNIT TYPE: NORMAL
CODING SYSTEM: NORMAL
DISPENSE STATUS: NORMAL
NUM UNITS TRANS PACKED RBC: NORMAL

## 2023-01-17 PROCEDURE — P9016 RBC LEUKOCYTES REDUCED: HCPCS | Performed by: INTERNAL MEDICINE

## 2023-01-17 PROCEDURE — 36430 TRANSFUSION BLD/BLD COMPNT: CPT

## 2023-01-17 PROCEDURE — 25000003 PHARM REV CODE 250: Performed by: INTERNAL MEDICINE

## 2023-01-17 RX ORDER — ACETAMINOPHEN 325 MG/1
650 TABLET ORAL
Status: DISCONTINUED | OUTPATIENT
Start: 2023-01-17 | End: 2023-01-17 | Stop reason: HOSPADM

## 2023-01-17 RX ORDER — DIPHENHYDRAMINE HCL 25 MG
25 CAPSULE ORAL
Status: DISCONTINUED | OUTPATIENT
Start: 2023-01-17 | End: 2023-01-17 | Stop reason: HOSPADM

## 2023-01-17 RX ORDER — HYDROCODONE BITARTRATE AND ACETAMINOPHEN 500; 5 MG/1; MG/1
TABLET ORAL ONCE
Status: COMPLETED | OUTPATIENT
Start: 2023-01-17 | End: 2023-01-17

## 2023-01-17 RX ADMIN — SODIUM CHLORIDE: 9 INJECTION, SOLUTION INTRAVENOUS at 07:01

## 2023-01-17 NOTE — PLAN OF CARE
Problem: Adult Inpatient Plan of Care  Goal: Plan of Care Review  Outcome: Ongoing, Progressing  Flowsheets (Taken 1/17/2023 0706)  Plan of Care Reviewed With: patient  Goal: Optimal Comfort and Wellbeing  Outcome: Ongoing, Progressing  Intervention: Provide Person-Centered Care  Flowsheets (Taken 1/17/2023 0706)  Trust Relationship/Rapport:   care explained   questions encouraged   choices provided   reassurance provided   emotional support provided   thoughts/feelings acknowledged   empathic listening provided   questions answered

## 2023-01-17 NOTE — TELEPHONE ENCOUNTER
Your still iron deficient we will continue to treat with intravenous iron as we evaluate the multiple myeloma

## 2023-01-18 ENCOUNTER — HOSPITAL ENCOUNTER (OUTPATIENT)
Dept: RADIOLOGY | Facility: HOSPITAL | Age: 58
Discharge: HOME | End: 2023-01-18
Attending: INTERNAL MEDICINE
Payer: MEDICAID

## 2023-01-18 ENCOUNTER — OFFICE VISIT (OUTPATIENT)
Dept: HEMATOLOGY/ONCOLOGY | Facility: CLINIC | Age: 58
End: 2023-01-18
Payer: MEDICAID

## 2023-01-18 ENCOUNTER — PATIENT MESSAGE (OUTPATIENT)
Dept: HEMATOLOGY/ONCOLOGY | Facility: CLINIC | Age: 58
End: 2023-01-18
Payer: MEDICAID

## 2023-01-18 DIAGNOSIS — D49.89 PLASMA CELL NEOPLASM: ICD-10-CM

## 2023-01-18 DIAGNOSIS — C90.00 MULTIPLE MYELOMA NOT HAVING ACHIEVED REMISSION: ICD-10-CM

## 2023-01-18 DIAGNOSIS — E85.9 MYELOMA ASSOCIATED AMYLOIDOSIS: ICD-10-CM

## 2023-01-18 DIAGNOSIS — C90.00 MULTIPLE MYELOMA NOT HAVING ACHIEVED REMISSION: Primary | ICD-10-CM

## 2023-01-18 DIAGNOSIS — R53.82 CHRONIC FATIGUE: ICD-10-CM

## 2023-01-18 DIAGNOSIS — C90.00 MYELOMA ASSOCIATED AMYLOIDOSIS: ICD-10-CM

## 2023-01-18 DIAGNOSIS — D50.0 IRON DEFICIENCY ANEMIA DUE TO CHRONIC BLOOD LOSS: Primary | ICD-10-CM

## 2023-01-18 PROCEDURE — 25500020 PHARM REV CODE 255: Mod: PN | Performed by: INTERNAL MEDICINE

## 2023-01-18 PROCEDURE — 99214 OFFICE O/P EST MOD 30 MIN: CPT | Mod: 95,,, | Performed by: INTERNAL MEDICINE

## 2023-01-18 PROCEDURE — 72156 MRI CERVICAL SPINE W WO CONTRAST: ICD-10-PCS | Mod: 26,,, | Performed by: RADIOLOGY

## 2023-01-18 PROCEDURE — 72156 MRI NECK SPINE W/O & W/DYE: CPT | Mod: TC,PN

## 2023-01-18 PROCEDURE — 72157 MRI CHEST SPINE W/O & W/DYE: CPT | Mod: TC,PN

## 2023-01-18 PROCEDURE — 72157 MRI THORACIC SPINE W WO CONTRAST: ICD-10-PCS | Mod: 26,,, | Performed by: RADIOLOGY

## 2023-01-18 PROCEDURE — 72157 MRI CHEST SPINE W/O & W/DYE: CPT | Mod: 26,,, | Performed by: RADIOLOGY

## 2023-01-18 PROCEDURE — 72158 MRI LUMBAR SPINE W/O & W/DYE: CPT | Mod: 26,,, | Performed by: RADIOLOGY

## 2023-01-18 PROCEDURE — 72158 MRI LUMBAR SPINE W/O & W/DYE: CPT | Mod: TC,PN

## 2023-01-18 PROCEDURE — 72158 MRI LUMBAR SPINE W WO CONTRAST: ICD-10-PCS | Mod: 26,,, | Performed by: RADIOLOGY

## 2023-01-18 PROCEDURE — 99214 PR OFFICE/OUTPT VISIT, EST, LEVL IV, 30-39 MIN: ICD-10-PCS | Mod: 95,,, | Performed by: INTERNAL MEDICINE

## 2023-01-18 PROCEDURE — 72156 MRI NECK SPINE W/O & W/DYE: CPT | Mod: 26,,, | Performed by: RADIOLOGY

## 2023-01-18 PROCEDURE — A9585 GADOBUTROL INJECTION: HCPCS | Mod: PN | Performed by: INTERNAL MEDICINE

## 2023-01-18 RX ORDER — GADOBUTROL 604.72 MG/ML
9 INJECTION INTRAVENOUS
Status: COMPLETED | OUTPATIENT
Start: 2023-01-18 | End: 2023-01-18

## 2023-01-18 RX ADMIN — GADOBUTROL 9 ML: 604.72 INJECTION INTRAVENOUS at 11:01

## 2023-01-18 NOTE — PROGRESS NOTES
The patient location is: home  The chief complaint leading to consultation is: plasma cell neoplasm, follow up    Visit type: audiovisual    Face to Face time with patient: 30 minutes  45 minutes of total time spent on the encounter, which includes face to face time and non-face to face time preparing to see the patient (eg, review of tests), Obtaining and/or reviewing separately obtained history, Documenting clinical information in the electronic or other health record, Independently interpreting results (not separately reported) and communicating results to the patient/family/caregiver, or Care coordination (not separately reported).         Each patient to whom he or she provides medical services by telemedicine is:  (1) informed of the relationship between the physician and patient and the respective role of any other health care provider with respect to management of the patient; and (2) notified that he or she may decline to receive medical services by telemedicine and may withdraw from such care at any time.    Notes:      Patient ID: Shawn Carreno is a 57 y.o. male with plasma cell neoplasm here for hematology follow up  Chief Complaint: No chief complaint on file.    HPI:   is a 57-year-old male is here for virtual visit. He is a patient of Dr.Burke Cesar.  He has no significant medical problems except for anemia. He presented with severe anemia in January 2022. Her received multiple PRBCs at the time. No overt bleeding. Stool OB was negative in Jan 2022.  He was severely iron deficient, with a ferritin of 2ng/ml.  He was previously treated with 2 doses of injectafer, last dose on 6/20/22. He had EGD on 4/6/22 that was normal, VCE 2/22/22 showed angioectasia that was non-bleeding, and colonoscopy 1/5/22 with polyp and diverticulosis. It was , recommended that he have 5 year follow up. He has been taking oral iron .   He again required PRBCs on 11/28/22 for a Hgb of 7.1g/dl.    He denies  bone pain/ back pain. He has some recent onset weight loss and diminished appetite.   He had a bone marrow biopsy, 24hr UPEP/UIFE, PET CT.     Interval History: He has fatigue. He denies overt bleeding. He is eating fairly well. He received 2units PRBC on 1/5/23 for a hemoglobin of 4.8, and another unit on 1/14/23 for Hgb of 5.8    Past Medical History:   Diagnosis Date    Multiple myeloma not having achieved remission 10/12/2022       Past Surgical History:   Procedure Laterality Date    CHOLECYSTECTOMY  2020    COLONOSCOPY N/A 1/5/2022    Procedure: COLONOSCOPY;  Surgeon: Hannah Alvarado MD;  Location: [facility];  Service: Endoscopy;  Laterality: N/A;    ESOPHAGOGASTRODUODENOSCOPY N/A 1/5/2022    Procedure: EGD (ESOPHAGOGASTRODUODENOSCOPY);  Surgeon: Hannah Alvarado MD;  Location: [facility];  Service: Endoscopy;  Laterality: N/A;    ESOPHAGOGASTRODUODENOSCOPY N/A 4/6/2022    Procedure: EGD (ESOPHAGOGASTRODUODENOSCOPY) with push;  Surgeon: Vira Castaneda MD;  Location: [facility];  Service: Endoscopy;  Laterality: N/A;    INTRALUMINAL GASTROINTESTINAL TRACT IMAGING VIA CAPSULE N/A 1/26/2022    Procedure: IMAGING PROCEDURE, GI TRACT, INTRALUMINAL, VIA CAPSULE;  Surgeon: Baltazar Chaves RN;  Location: [facility];  Service: Endoscopy;  Laterality: N/A;    INTRALUMINAL GASTROINTESTINAL TRACT IMAGING VIA CAPSULE N/A 2/22/2022    Procedure: IMAGING PROCEDURE, GI TRACT, INTRALUMINAL, VIA CAPSULE;  Surgeon: Baltazar Chaves RN;  Location: [facility];  Service: Endoscopy;  Laterality: N/A;       Social History     Socioeconomic History    Marital status: Single   Tobacco Use    Smoking status: Never    Smokeless tobacco: Never   Substance and Sexual Activity    Alcohol use: Never    Drug use: Never    Sexual activity: Not Currently       Review of patient's allergies indicates:  No Known Allergies    Current Outpatient Medications   Medication Sig    ferrous sulfate 324 mg (65 mg iron) TbEC Take 1 tablet (324 mg  total) by mouth once daily.     No current facility-administered medications for this visit.     Facility-Administered Medications Ordered in Other Visits   Medication    lactated ringers infusion        Review of Systems   Constitutional:  Positive for activity change, appetite change and fatigue. Negative for chills, diaphoresis, fever and unexpected weight change.   HENT:  Negative for congestion, dental problem, drooling, ear discharge, ear pain, facial swelling, hearing loss, mouth sores, nosebleeds, postnasal drip, rhinorrhea, sinus pressure, sneezing, sore throat, tinnitus, trouble swallowing and voice change.    Eyes:  Negative for photophobia, pain, discharge, redness, itching and visual disturbance.   Respiratory:  Negative for apnea, cough, choking, chest tightness, shortness of breath, wheezing and stridor.    Cardiovascular:  Negative for chest pain, palpitations and leg swelling.   Gastrointestinal:  Negative for abdominal distention, abdominal pain, anal bleeding, blood in stool, constipation, diarrhea, nausea, rectal pain and vomiting.   Endocrine: Negative for cold intolerance, heat intolerance, polydipsia, polyphagia and polyuria.   Genitourinary:  Negative for decreased urine volume, difficulty urinating, dysuria, enuresis, flank pain, frequency, genital sores, hematuria, penile discharge, penile pain, penile swelling, scrotal swelling, testicular pain and urgency.   Musculoskeletal:  Negative for arthralgias, back pain, gait problem, joint swelling, myalgias, neck pain and neck stiffness.   Skin:  Negative for color change, pallor, rash and wound.   Allergic/Immunologic: Negative for environmental allergies, food allergies and immunocompromised state.   Neurological:  Positive for weakness. Negative for dizziness, tremors, seizures, syncope, facial asymmetry, speech difficulty, light-headedness, numbness and headaches.   Hematological:  Negative for adenopathy. Does not bruise/bleed easily.    Psychiatric/Behavioral:  Negative for agitation, behavioral problems, confusion, decreased concentration, dysphoric mood, hallucinations, self-injury, sleep disturbance and suicidal ideas. The patient is nervous/anxious. The patient is not hyperactive.          Labs:  Component      Latest Ref Rng & Units 9/15/2022   [place] Free Light Chains      0.33 - 1.94 mg/dL 2.93 (H)   Lambda Free Light Chains      0.57 - 2.63 mg/dL 1.52   Kappa/Lambda FLC Ratio      0.26 - 1.65 1.93 (H)   IgG      650 - 1600 mg/dL 2175 (H)   IgA      40 - 350 mg/dL 127   IgM      50 - 300 mg/dL 63   Folate      4.0 - 24.0 ng/mL 12.2   Haptoglobin      30 - 250 mg/dL 109   HIV 1/2 Ag/Ab      Non-reactive Non-reactive   Vitamin B-12      210 - 950 pg/mL 283   TSH      0.400 - 4.000 uIU/mL 2.047   Retic      0.4 - 2.0 % 3.6 (H)     9/15/22 Normal total protein. Normal gamma globulins are decreased. There is a paraprotein band in gamma = 1.46 g/dL.   9/15/22 sIFE: IgG kappa specific monoclonal band present    9/26/22 RIGHT ILIAC CREST BONE MARROW ASPIRATE, BONE MARROW CLOT, AND BONE MARROW CORE BIOPSY WITH:     CELLULARITY=70-80%, TRILINEAGE HEMATOPOIETIC ACTIVITY (M/E=1.2:1).   CONSISTENT WITH PLASMA CELL NEOPLASM (25-30%).  SEE COMMENT.   GRADE 1 RETICULAR FIBROSIS.   CONGO RED NEGATIVE.   STORAGE IRON NOT IDENTIFIED.   ADEQUATE NUMBER OF MEGAKARYOCYTES.    Flow cytometric analysis of bone marrow detects a kappa restricted plasma cell population that expresses , CD20 (partial and dim), and CD38.  CD56   is positive.  CD19 is negative.   Flow differential:  Lymphocytes 15.1%, Monocytes 4.2%, Granulocytes  72.3%, Blast  2.2%, Debris/nRBC 4.3%,  Viability 98.4%.   Immunohistochemical studies were performed on the core biopsy for greater sensitivity and further architecture evaluation with adequate positive and   negative controls.  Scattered  T cells  are CD3 positive.  About 25-30% plasma cells ( positive, CD20 focal positive, cyclin  D1 negative) are noted.   Special stain for Congo red is negative with adequate positive and negative controls.   Findings are consistent with plasma cell neoplasm.  Correlate clinically and with a cytogenetics report.     Myeloma Fixed Cell, High Risk, FISH     Result Summary        :       Normal                           Interpretation    :   The result is within normal limits for 1q duplication, TP53 deletion and IGH rearrangement.       10/13/22 UIFE: No definitive monoclonal peaks identified    10/19/22 WB PET CT    COMPARISON:  None.     FINDINGS:  Head/neck: There is normal physiologic uptake noted within the visualized brain parenchyma. No FDG avid adenopathy within the neck.     Chest: No FDG avid pulmonary nodules/masses. No FDG avid mediastinal, hilar or axillary lymph nodes.     Abdomen/Pelvis: Normal physiologic uptake noted within the liver, spleen, urinary tract, and bowel.The adrenals are unremarkable in appearance.  The patient is s/p cholecystectomy.  No FDG avid lymph nodes identified.  There is mild diverticulosis of the sigmoid colon.     Skeletal: No FDG avid osseus lesions identified.     Impression:     1. No suspicious FDG uptake identified.       Component      Latest Ref Rng & Units 1/14/2023 1/14/2023 1/12/2023           7:25 AM  7:25 AM    WBC      3.90 - 12.70 K/uL   5.07   RBC      4.60 - 6.20 M/uL   2.58 (L)   Hemoglobin      14.0 - 18.0 g/dL   5.8 (LL)   Hematocrit      40.0 - 54.0 %   21.8 (L)   MCV      82 - 98 fL   85   MCH      27.0 - 31.0 pg   22.5 (L)   MCHC      32.0 - 36.0 g/dL   26.6 (L)   RDW      11.5 - 14.5 %   20.2 (H)   Platelets      150 - 450 K/uL   261   MPV      9.2 - 12.9 fL   10.0   Immature Granulocytes      0.0 - 0.5 %   0.4   Gran # (ANC)      1.8 - 7.7 K/uL   2.8   Immature Grans (Abs)      0.00 - 0.04 K/uL   0.02   Lymph #      1.0 - 4.8 K/uL   1.6   Mono #      0.3 - 1.0 K/uL   0.5   Eos #      0.0 - 0.5 K/uL   0.2   Baso #      0.00 - 0.20 K/uL   0.04    nRBC      0 /100 WBC   0   Gran %      38.0 - 73.0 %   55.5   Lymph %      18.0 - 48.0 %   31.0   Mono %      4.0 - 15.0 %   9.3   Eosinophil %      0.0 - 8.0 %   3.0   Basophil %      0.0 - 1.9 %   0.8   Platelet Estimate         Appears normal   [place]         Slight   Poikilocytosis         Slight   Hypo         Occasional   Ovalocytes         Occasional   Target Cells         Occasional   Teardrop Cells         Occasional   Differential Method         Automated   Iron      45 - 160 ug/dL 22 (L) 21 (L)    Transferrin      200 - 375 mg/dL 317 320    TIBC      250 - 450 ug/dL 469 (H) 474 (H)    Saturated Iron      20 - 50 % 5 (L) 4 (L)    Ferritin      20.0 - 300.0 ng/mL  26    LD      110 - 260 U/L  167    Haptoglobin      30 - 250 mg/dL  105        1/5/23 SPEP: Normal total protein. Normal gamma globulins are decreased. There is a paraprotein band in gamma = 1.50 g/dL, previously 1.46 g/dL.        Component      Latest Ref Rng & Units 1/14/2023 1/14/2023 1/12/2023 1/5/2023           7:25 AM  7:25 AM     WBC      3.90 - 12.70 K/uL   5.07    RBC      4.60 - 6.20 M/uL   2.58 (L)    Hemoglobin      14.0 - 18.0 g/dL   5.8 (LL)    Hematocrit      40.0 - 54.0 %   21.8 (L)    MCV      82 - 98 fL   85    MCH      27.0 - 31.0 pg   22.5 (L)    MCHC      32.0 - 36.0 g/dL   26.6 (L)    RDW      11.5 - 14.5 %   20.2 (H)    Platelets      150 - 450 K/uL   261    MPV      9.2 - 12.9 fL   10.0    Immature Granulocytes      0.0 - 0.5 %   0.4    Gran # (ANC)      1.8 - 7.7 K/uL   2.8    Immature Grans (Abs)      0.00 - 0.04 K/uL   0.02    Lymph #      1.0 - 4.8 K/uL   1.6    Mono #      0.3 - 1.0 K/uL   0.5    Eos #      0.0 - 0.5 K/uL   0.2    Baso #      0.00 - 0.20 K/uL   0.04    nRBC      0 /100 WBC   0    Gran %      38.0 - 73.0 %   55.5    Lymph %      18.0 - 48.0 %   31.0    Mono %      4.0 - 15.0 %   9.3    Eosinophil %      0.0 - 8.0 %   3.0    Basophil %      0.0 - 1.9 %   0.8    Platelet Estimate         Appears normal     [place]         Slight    Poikilocytosis         Slight    Poly             Hypo         Occasional    Teardrop Cells         Occasional    Stomatocytes             Differential Method         Automated    Ovalocytes         Occasional    Target Cells         Occasional    Sodium      136 - 145 mmol/L   138    Potassium      3.5 - 5.1 mmol/L   4.3    Chloride      95 - 110 mmol/L   107    CO2      23 - 29 mmol/L   22 (L)    Glucose      70 - 110 mg/dL   87    BUN      6 - 20 mg/dL   21 (H)    Creatinine      0.5 - 1.4 mg/dL   0.9    Calcium      8.7 - 10.5 mg/dL   8.7    PROTEIN TOTAL      6.0 - 8.4 g/dL   7.6    Albumin      3.5 - 5.2 g/dL   3.8    BILIRUBIN TOTAL      0.1 - 1.0 mg/dL   0.6    Alkaline Phosphatase      55 - 135 U/L   39 (L)    AST      10 - 40 U/L   14    ALT      10 - 44 U/L   13    Anion Gap      8 - 16 mmol/L   9    eGFR      >60 mL/min/1.73 m:2   >60    Iron      45 - 160 ug/dL 22 (L) 21 (L)     Transferrin      200 - 375 mg/dL 317 320     TIBC      250 - 450 ug/dL 469 (H) 474 (H)     Saturated Iron      20 - 50 % 5 (L) 4 (L)     [place] Free Light Chains      0.33 - 1.94 mg/dL       Lambda Free Light Chains      0.57 - 2.63 mg/dL       Kappa/Lambda FLC Ratio      0.26 - 1.65       Beta-2 Microglobulin      0.0 - 2.5 ug/mL    0.6   Ferritin      20.0 - 300.0 ng/mL  26     LD      110 - 260 U/L  167     Haptoglobin      30 - 250 mg/dL  105       Component      Latest Ref Rng & Units 1/4/2023             WBC      3.90 - 12.70 K/uL 4.23   RBC      4.60 - 6.20 M/uL 2.18 (L)   Hemoglobin      14.0 - 18.0 g/dL 4.8 (LL)   Hematocrit      40.0 - 54.0 % 18.3 (LL)   MCV      82 - 98 fL 84   MCH      27.0 - 31.0 pg 22.0 (L)   MCHC      32.0 - 36.0 g/dL 26.2 (L)   RDW      11.5 - 14.5 % 19.9 (H)   Platelets      150 - 450 K/uL 253   MPV      9.2 - 12.9 fL 10.2   Immature Granulocytes      0.0 - 0.5 % 0.2   Gran # (ANC)      1.8 - 7.7 K/uL 2.4   Immature Grans (Abs)      0.00 - 0.04 K/uL 0.01   Lymph #       1.0 - 4.8 K/uL 1.1   Mono #      0.3 - 1.0 K/uL 0.5   Eos #      0.0 - 0.5 K/uL 0.2   Baso #      0.00 - 0.20 K/uL 0.04   nRBC      0 /100 WBC 1 (A)   Gran %      38.0 - 73.0 % 57.5   Lymph %      18.0 - 48.0 % 25.8   Mono %      4.0 - 15.0 % 11.6   Eosinophil %      0.0 - 8.0 % 4.0   Basophil %      0.0 - 1.9 % 0.9   Platelet Estimate       Appears normal   [place]       Slight   Poikilocytosis       Slight   Poly       Occasional   Hypo       Occasional   Teardrop Cells       Occasional   Stomatocytes       Present   Differential Method       Automated   Ovalocytes          Target Cells          Sodium      136 - 145 mmol/L 138   Potassium      3.5 - 5.1 mmol/L 4.5   Chloride      95 - 110 mmol/L 109   CO2      23 - 29 mmol/L 22 (L)   Glucose      70 - 110 mg/dL 117 (H)   BUN      6 - 20 mg/dL 14   Creatinine      0.5 - 1.4 mg/dL 0.9   Calcium      8.7 - 10.5 mg/dL 8.5 (L)   PROTEIN TOTAL      6.0 - 8.4 g/dL 7.3   Albumin      3.5 - 5.2 g/dL 3.5   BILIRUBIN TOTAL      0.1 - 1.0 mg/dL 0.5   Alkaline Phosphatase      55 - 135 U/L 39 (L)   AST      10 - 40 U/L 14   ALT      10 - 44 U/L 15   Anion Gap      8 - 16 mmol/L 7 (L)   eGFR      >60 mL/min/1.73 m:2 >60   Iron      45 - 160 ug/dL    Transferrin      200 - 375 mg/dL    TIBC      250 - 450 ug/dL    Saturated Iron      20 - 50 %    Kappa Free Light Chains      0.33 - 1.94 mg/dL 4.09 (H)   Lambda Free Light Chains      0.57 - 2.63 mg/dL 1.36   Kappa/Lambda FLC Ratio      0.26 - 1.65 3.01 (H)   Beta-2 Microglobulin      0.0 - 2.5 ug/mL    Ferritin      20.0 - 300.0 ng/mL    LD      110 - 260 U/L    Haptoglobin      30 - 250 mg/dL      12/7/22 MRI cervical spine    Impression:     No significant infiltrating marrow disease.  Cervical degenerative disc disease most pronounced at C5-6 disc level as above.     1/18/23 MRI thoracic  spine    Impression:     Normal MRI of the thoracic spine. No evidence of thoracic disc herniation.    1/9/23 MRI left hip    Impression:      Diffuse red marrow reconversion.  Small fluid-filled joint recess versus ganglion cyst inferior margin acetabulum, 1.2 cm.  Otherwise, unremarkable MR right hip.    1/9/23 MRI right hip    Impression:     Diffuse red marrow reconversion.  Small fluid-filled joint recess versus ganglion cyst inferior margin acetabulum, 1.2 cm.  Otherwise, unremarkable MR right hip.    1/18/23 MRI lumbar spine     Impression:     Normal MRI of the lumbar spine. No evidence of lumbar disc herniation  Plan:     1. Plasma cell neoplasm  2. Microcytic anemia  3. Fatigue  4. Iron deficiency      Plan:    1,2,3: -SPEP/MALI showed a IgG kappa monoclonal protein on 9/25/22, measuring 1.46g/dl. sFLC ratio was mildly elevated at 1.93.  -He has 25-30% clonal plasma cells in bone marrow biopsy done on 9/26/22. Normal renal function and normal serum creatinine. No hypermetabolic /lytic lesions on PET CT done on 10/19/22. 24hr UPEP/UIFE negative for monoclonal protein.   -He has one CRAB criteria , that is anemia. CBC on 11/28/22 showed a microcytic picture, suggesting iron deficiency.  -He has received 2 units PRBCs in 2022 ( Jan, Nov) and 2 units so far in Jan 2023. No overt bleeding.    -M Sebastian on SPEP on 1/5/23 was 1.5g/dl, previously 1.46g/dl, stable from Sept 2022.   -No renal impairment/ hypercalcemia on 1/12/23.   -No evidence of hemolysis ( bilirubin, LDH, haptoglobin all normal)  -RBC indices still show hypochromia as of 1/12/23  -TIBC high,. Serum iron saturation low, serum iron low ion 1/14/23, AFTER 2 units of PRBC transfusion on 1/5/23  -No enhancing/ lytic lesions noted on MRI hips done on 1/9/23, or MRI cervical/ thoracic /lumbar spines done ion 1/18/23  -He will have repeat BM biopsy.  -He will have weekly CBC, type and screen and iron studies, with iv iron and /or PRBC  -If BM biopsy shows further increase in plasma cells compared to previous biopsy , induction chemotherapy is indicated.       All his questions were answered. 30  minutes were spent on this encounter, including time spent on reviewing imaging, laboratory studies, bone marrow biopsy.             BMT Chart Routing      Follow up with physician    Follow up with [facility]    Provider visit type    Infusion scheduling note    Injection scheduling note    Labs CBC, iron and TIBC, ferritin, reticulocytes and type and screen   Lab interval:  cbc, iron, tibc, ferritin, retic count, type and screen weekly on [facility] at [facility]   Imaging    Pharmacy appointment    Other referrals

## 2023-01-19 ENCOUNTER — TELEPHONE (OUTPATIENT)
Dept: RADIOLOGY | Facility: HOSPITAL | Age: 58
End: 2023-01-19
Payer: MEDICAID

## 2023-01-25 ENCOUNTER — INFUSION (OUTPATIENT)
Dept: INFUSION THERAPY | Facility: HOSPITAL | Age: 58
End: 2023-01-25
Attending: INTERNAL MEDICINE
Payer: MEDICAID

## 2023-01-25 ENCOUNTER — OFFICE VISIT (OUTPATIENT)
Dept: HEMATOLOGY/ONCOLOGY | Facility: CLINIC | Age: 58
End: 2023-01-25
Payer: MEDICAID

## 2023-01-25 VITALS
HEART RATE: 69 BPM | SYSTOLIC BLOOD PRESSURE: 115 MMHG | BODY MASS INDEX: 30.04 KG/M2 | TEMPERATURE: 98 F | HEIGHT: 67 IN | WEIGHT: 191.38 LBS | DIASTOLIC BLOOD PRESSURE: 71 MMHG | OXYGEN SATURATION: 99 %

## 2023-01-25 VITALS
HEART RATE: 67 BPM | TEMPERATURE: 98 F | DIASTOLIC BLOOD PRESSURE: 86 MMHG | SYSTOLIC BLOOD PRESSURE: 143 MMHG | OXYGEN SATURATION: 100 % | RESPIRATION RATE: 18 BRPM

## 2023-01-25 DIAGNOSIS — R53.82 CHRONIC FATIGUE: ICD-10-CM

## 2023-01-25 DIAGNOSIS — D49.89 PLASMA CELL NEOPLASM: ICD-10-CM

## 2023-01-25 DIAGNOSIS — D50.0 IRON DEFICIENCY ANEMIA DUE TO CHRONIC BLOOD LOSS: Primary | ICD-10-CM

## 2023-01-25 DIAGNOSIS — D64.9 SYMPTOMATIC ANEMIA: ICD-10-CM

## 2023-01-25 PROCEDURE — 1160F RVW MEDS BY RX/DR IN RCRD: CPT | Mod: CPTII,,, | Performed by: INTERNAL MEDICINE

## 2023-01-25 PROCEDURE — 3078F DIAST BP <80 MM HG: CPT | Mod: CPTII,,, | Performed by: INTERNAL MEDICINE

## 2023-01-25 PROCEDURE — 3074F SYST BP LT 130 MM HG: CPT | Mod: CPTII,,, | Performed by: INTERNAL MEDICINE

## 2023-01-25 PROCEDURE — 1160F PR REVIEW ALL MEDS BY PRESCRIBER/CLIN PHARMACIST DOCUMENTED: ICD-10-PCS | Mod: CPTII,,, | Performed by: INTERNAL MEDICINE

## 2023-01-25 PROCEDURE — 99213 OFFICE O/P EST LOW 20 MIN: CPT | Mod: PBBFAC,25 | Performed by: INTERNAL MEDICINE

## 2023-01-25 PROCEDURE — 25000003 PHARM REV CODE 250: Performed by: INTERNAL MEDICINE

## 2023-01-25 PROCEDURE — 63600175 PHARM REV CODE 636 W HCPCS: Performed by: INTERNAL MEDICINE

## 2023-01-25 PROCEDURE — 3078F PR MOST RECENT DIASTOLIC BLOOD PRESSURE < 80 MM HG: ICD-10-PCS | Mod: CPTII,,, | Performed by: INTERNAL MEDICINE

## 2023-01-25 PROCEDURE — 99214 PR OFFICE/OUTPT VISIT, EST, LEVL IV, 30-39 MIN: ICD-10-PCS | Mod: 25,S$PBB,, | Performed by: INTERNAL MEDICINE

## 2023-01-25 PROCEDURE — 3008F PR BODY MASS INDEX (BMI) DOCUMENTED: ICD-10-PCS | Mod: CPTII,,, | Performed by: INTERNAL MEDICINE

## 2023-01-25 PROCEDURE — 1159F PR MEDICATION LIST DOCUMENTED IN MEDICAL RECORD: ICD-10-PCS | Mod: CPTII,,, | Performed by: INTERNAL MEDICINE

## 2023-01-25 PROCEDURE — 1159F MED LIST DOCD IN RCRD: CPT | Mod: CPTII,,, | Performed by: INTERNAL MEDICINE

## 2023-01-25 PROCEDURE — 99999 PR PBB SHADOW E&M-EST. PATIENT-LVL III: ICD-10-PCS | Mod: PBBFAC,,, | Performed by: INTERNAL MEDICINE

## 2023-01-25 PROCEDURE — 3074F PR MOST RECENT SYSTOLIC BLOOD PRESSURE < 130 MM HG: ICD-10-PCS | Mod: CPTII,,, | Performed by: INTERNAL MEDICINE

## 2023-01-25 PROCEDURE — 3008F BODY MASS INDEX DOCD: CPT | Mod: CPTII,,, | Performed by: INTERNAL MEDICINE

## 2023-01-25 PROCEDURE — 96365 THER/PROPH/DIAG IV INF INIT: CPT

## 2023-01-25 PROCEDURE — 99214 OFFICE O/P EST MOD 30 MIN: CPT | Mod: 25,S$PBB,, | Performed by: INTERNAL MEDICINE

## 2023-01-25 PROCEDURE — 99999 PR PBB SHADOW E&M-EST. PATIENT-LVL III: CPT | Mod: PBBFAC,,, | Performed by: INTERNAL MEDICINE

## 2023-01-25 RX ORDER — HEPARIN 100 UNIT/ML
500 SYRINGE INTRAVENOUS
Status: CANCELLED | OUTPATIENT
Start: 2023-02-01

## 2023-01-25 RX ORDER — SODIUM CHLORIDE 0.9 % (FLUSH) 0.9 %
10 SYRINGE (ML) INJECTION
Status: CANCELLED | OUTPATIENT
Start: 2023-02-01

## 2023-01-25 RX ADMIN — IRON SUCROSE 200 MG: 20 INJECTION, SOLUTION INTRAVENOUS at 09:01

## 2023-01-25 NOTE — PROGRESS NOTES
Subjective:       Patient ID: Shawn Carreno is a 57 y.o. male.    Chief Complaint: Anemia and Results    HPI:  57-year-old male with recurrent iron deficiency anemia.  Patient is receiving intravenous iron basis with symptomatic transfusion of packed red cells.  Scheduled for bone marrow on 02/06/2023.  For smoldering myeloma to see whether not progression has for ECOG status 1 patient returns much better they    Past Medical History:   Diagnosis Date    Multiple myeloma not having achieved remission 10/12/2022     History reviewed. No pertinent family history.  Social History     Socioeconomic History    Marital status: Single   Tobacco Use    Smoking status: Never    Smokeless tobacco: Never   Substance and Sexual Activity    Alcohol use: Never    Drug use: Never    Sexual activity: Not Currently     Past Surgical History:   Procedure Laterality Date    CHOLECYSTECTOMY 2020    COLONOSCOPY N/A 1/5/2022    Procedure: COLONOSCOPY;  Surgeon: Hannah Alvarado MD;  Location: [facility];  Service: Endoscopy;  Laterality: N/A;    ESOPHAGOGASTRODUODENOSCOPY N/A 1/5/2022    Procedure: EGD (ESOPHAGOGASTRODUODENOSCOPY);  Surgeon: Hannah Alvarado MD;  Location: [facility];  Service: Endoscopy;  Laterality: N/A;    ESOPHAGOGASTRODUODENOSCOPY N/A 4/6/2022    Procedure: EGD (ESOPHAGOGASTRODUODENOSCOPY) with push;  Surgeon: Vira Castaneda MD;  Location: [facility];  Service: Endoscopy;  Laterality: N/A;    INTRALUMINAL GASTROINTESTINAL TRACT IMAGING VIA CAPSULE N/A 1/26/2022    Procedure: IMAGING PROCEDURE, GI TRACT, INTRALUMINAL, VIA CAPSULE;  Surgeon: Baltazar Chaves RN;  Location: [facility];  Service: Endoscopy;  Laterality: N/A;    INTRALUMINAL GASTROINTESTINAL TRACT IMAGING VIA CAPSULE N/A 2/22/2022    Procedure: IMAGING PROCEDURE, GI TRACT, INTRALUMINAL, VIA CAPSULE;  Surgeon: Baltazar Chaves RN;  Location: [facility];  Service: Endoscopy;  Laterality: N/A;       Labs:  Lab Results   Component  Value Date    WBC 4.80 01/25/2023    HGB 7.1 (L) 01/25/2023    HCT 26.4 (L) 01/25/2023    MCV 81 (L) 01/25/2023     01/25/2023     BMP  Lab Results   Component Value Date     01/12/2023    K 4.3 01/12/2023     01/12/2023    CO2 22 (L) 01/12/2023    BUN 21 (H) 01/12/2023    CREATININE 0.9 01/12/2023    CALCIUM 8.7 01/12/2023    ANIONGAP 9 01/12/2023    ESTGFRAFRICA >60 01/06/2022    EGFRNONAA >60 01/06/2022     Lab Results   Component Value Date    ALT 13 01/12/2023    AST 14 01/12/2023    ALKPHOS 39 (L) 01/12/2023    BILITOT 0.6 01/12/2023       Lab Results   Component Value Date    IRON 21 (L) 01/14/2023    IRON 22 (L) 01/14/2023    TIBC 474 (H) 01/14/2023    TIBC 469 (H) 01/14/2023    FERRITIN 26 01/14/2023     Lab Results   Component Value Date    [id number] 283 09/15/2022     Lab Results   Component Value Date    FOLATE 12.2 09/15/2022     Lab Results   Component Value Date    TSH 2.047 09/15/2022         Review of Systems   Constitutional:  Positive for activity change and fatigue. Negative for appetite change, chills, diaphoresis, fever and unexpected weight change.   HENT:  Negative for congestion, dental problem, drooling, ear discharge, ear pain, facial swelling, hearing loss, mouth sores, nosebleeds, postnasal drip, rhinorrhea, sinus pressure, sneezing, sore throat, tinnitus, trouble swallowing and voice change.    Eyes:  Negative for photophobia, pain, discharge, redness, itching and visual disturbance.   Respiratory:  Negative for apnea, cough, choking, chest tightness, shortness of breath, wheezing and stridor.    Cardiovascular:  Negative for chest pain, palpitations and leg swelling.   Gastrointestinal:  Negative for abdominal distention, abdominal pain, anal bleeding, blood in stool, constipation, diarrhea, nausea, rectal pain and vomiting.   Endocrine: Negative for cold intolerance, heat intolerance, polydipsia, polyphagia and polyuria.   Genitourinary:  Negative for decreased  urine volume, difficulty urinating, dysuria, enuresis, flank pain, frequency, genital sores, hematuria, penile discharge, penile pain, penile swelling, scrotal swelling, testicular pain and urgency.   Musculoskeletal:  Negative for arthralgias, back pain, gait problem, joint swelling, myalgias, neck pain and neck stiffness.   Skin:  Negative for color change, pallor, rash and wound.   Allergic/Immunologic: Negative for environmental allergies, food allergies and immunocompromised state.   Neurological:  Positive for weakness. Negative for dizziness, tremors, seizures, syncope, facial asymmetry, speech difficulty, light-headedness, numbness and headaches.   Hematological:  Negative for adenopathy. Does not bruise/bleed easily.   Psychiatric/Behavioral:  Positive for dysphoric mood. Negative for agitation, behavioral problems, confusion, decreased concentration, hallucinations, self-injury, sleep disturbance and suicidal ideas. The patient is nervous/anxious. The patient is not hyperactive.      Objective:      Physical Exam  Vitals reviewed.   Constitutional:       General: He is not in acute distress.     Appearance: He is well-developed. He is ill-appearing. He is not diaphoretic.   HENT:      Head: Normocephalic.      Right Ear: External ear normal.      Left Ear: External ear normal.      Nose: Nose normal.      Right Sinus: No maxillary sinus tenderness or frontal sinus tenderness.      Left Sinus: No maxillary sinus tenderness or frontal sinus tenderness.      Mouth/Throat:      Pharynx: No oropharyngeal exudate.   Eyes:      General: Lids are normal. No scleral icterus.        Right eye: No discharge.         Left eye: No discharge.      Extraocular Movements:      Right eye: Normal extraocular motion.      Left eye: Normal extraocular motion.      Conjunctiva/sclera:      Right eye: Right conjunctiva is not injected. No hemorrhage.     Left eye: Left conjunctiva is not injected. No hemorrhage.     Pupils:  Pupils are equal, round, and reactive to light.   Neck:      Thyroid: No thyromegaly.      Vascular: No JVD.      Trachea: No tracheal deviation.   Cardiovascular:      Rate and Rhythm: Normal rate.   Pulmonary:      Effort: Pulmonary effort is normal. No respiratory distress.      Breath sounds: No stridor.   Abdominal:      General: Bowel sounds are normal.      Palpations: Abdomen is soft. There is no hepatomegaly, splenomegaly or mass.      Tenderness: There is no abdominal tenderness.   Musculoskeletal:         General: No tenderness. Normal range of motion.      Cervical back: Normal range of motion and neck supple.   Lymphadenopathy:      Head:      Right side of head: No posterior auricular or occipital adenopathy.      Left side of head: No posterior auricular or occipital adenopathy.      Cervical: No cervical adenopathy.      Right cervical: No superficial, deep or posterior cervical adenopathy.     Left cervical: No superficial, deep or posterior cervical adenopathy.      Upper Body:      Right upper body: No supraclavicular adenopathy.      Left upper body: No supraclavicular adenopathy.   Skin:     General: Skin is dry.      Findings: No erythema or rash.      Nails: There is no clubbing.   Neurological:      Mental Status: He is alert and oriented to person, place, and time.      Cranial Nerves: No cranial nerve deficit.      Coordination: Coordination normal.   Psychiatric:         Behavior: Behavior normal.         Thought Content: Thought content normal.         Judgment: Judgment normal.           Assessment:      1. Iron deficiency anemia due to chronic blood loss    2. Symptomatic anemia    3. Plasma cell neoplasm    4. Chronic fatigue           Plan:     Continue with Venofer 200 mg weekly.  With CBC.  Patient is to return after bone marrow is completed approximately 1 week later to see me should be seen on a weekly basis with Venofer dosing with APAP to see whether not transfusion is warranted  at that point awaiting results of bone marrow in early February to determine whether not concurrent development of multiple myeloma is contributing to his profound anemia      Med Onc Chart Routing      Follow up with physician 3 weeks. See me with a CBC 1 week after bone marrow completed in 02/06/2023   Follow up with [facility] 1 week. See APAP with weekly CBC prior to Venofer dosing   Infusion scheduling note    Injection scheduling note Venofer weekly x5 doses   Labs    Imaging    Pharmacy appointment    Other referrals         Kris Cesar Jr, MD FACP

## 2023-01-25 NOTE — PLAN OF CARE
Problem: Adult Inpatient Plan of Care  Goal: Plan of Care Review  Outcome: Ongoing, Progressing  Flowsheets (Taken 1/25/2023 0907)  Plan of Care Reviewed With: patient  Goal: Optimal Comfort and Wellbeing  Outcome: Ongoing, Progressing  Intervention: Provide Person-Centered Care  Flowsheets (Taken 1/25/2023 0907)  Trust Relationship/Rapport:   care explained   choices provided   emotional support provided   empathic listening provided   questions answered   questions encouraged   reassurance provided   thoughts/feelings acknowledged

## 2023-01-30 DIAGNOSIS — D50.0 IRON DEFICIENCY ANEMIA DUE TO CHRONIC BLOOD LOSS: Primary | ICD-10-CM

## 2023-02-01 ENCOUNTER — OFFICE VISIT (OUTPATIENT)
Dept: HEMATOLOGY/ONCOLOGY | Facility: CLINIC | Age: 58
End: 2023-02-01
Payer: MEDICAID

## 2023-02-01 ENCOUNTER — LAB VISIT (OUTPATIENT)
Dept: LAB | Facility: HOSPITAL | Age: 58
End: 2023-02-01
Attending: INTERNAL MEDICINE
Payer: MEDICAID

## 2023-02-01 ENCOUNTER — INFUSION (OUTPATIENT)
Dept: INFUSION THERAPY | Facility: HOSPITAL | Age: 58
End: 2023-02-01
Attending: INTERNAL MEDICINE
Payer: MEDICAID

## 2023-02-01 VITALS
HEIGHT: 67 IN | OXYGEN SATURATION: 99 % | TEMPERATURE: 98 F | WEIGHT: 191.81 LBS | BODY MASS INDEX: 30.1 KG/M2 | SYSTOLIC BLOOD PRESSURE: 117 MMHG | DIASTOLIC BLOOD PRESSURE: 71 MMHG | HEART RATE: 74 BPM

## 2023-02-01 VITALS
DIASTOLIC BLOOD PRESSURE: 70 MMHG | OXYGEN SATURATION: 100 % | HEART RATE: 69 BPM | RESPIRATION RATE: 18 BRPM | SYSTOLIC BLOOD PRESSURE: 123 MMHG | TEMPERATURE: 99 F

## 2023-02-01 DIAGNOSIS — C90.00 MULTIPLE MYELOMA NOT HAVING ACHIEVED REMISSION: ICD-10-CM

## 2023-02-01 DIAGNOSIS — D50.0 IRON DEFICIENCY ANEMIA DUE TO CHRONIC BLOOD LOSS: ICD-10-CM

## 2023-02-01 DIAGNOSIS — D64.9 SYMPTOMATIC ANEMIA: ICD-10-CM

## 2023-02-01 DIAGNOSIS — D50.0 IRON DEFICIENCY ANEMIA DUE TO CHRONIC BLOOD LOSS: Primary | ICD-10-CM

## 2023-02-01 DIAGNOSIS — D49.89 PLASMA CELL NEOPLASM: ICD-10-CM

## 2023-02-01 LAB
BASOPHILS # BLD AUTO: 0.07 K/UL (ref 0–0.2)
BASOPHILS NFR BLD: 1.2 % (ref 0–1.9)
DIFFERENTIAL METHOD: ABNORMAL
EOSINOPHIL # BLD AUTO: 0.3 K/UL (ref 0–0.5)
EOSINOPHIL NFR BLD: 4.5 % (ref 0–8)
ERYTHROCYTE [DISTWIDTH] IN BLOOD BY AUTOMATED COUNT: 20.2 % (ref 11.5–14.5)
HCT VFR BLD AUTO: 27.3 % (ref 40–54)
HGB BLD-MCNC: 7.4 G/DL (ref 14–18)
IMM GRANULOCYTES # BLD AUTO: 0.01 K/UL (ref 0–0.04)
IMM GRANULOCYTES NFR BLD AUTO: 0.2 % (ref 0–0.5)
LYMPHOCYTES # BLD AUTO: 1.7 K/UL (ref 1–4.8)
LYMPHOCYTES NFR BLD: 27.3 % (ref 18–48)
MCH RBC QN AUTO: 22.3 PG (ref 27–31)
MCHC RBC AUTO-ENTMCNC: 27.1 G/DL (ref 32–36)
MCV RBC AUTO: 82 FL (ref 82–98)
MONOCYTES # BLD AUTO: 0.7 K/UL (ref 0.3–1)
MONOCYTES NFR BLD: 11.3 % (ref 4–15)
NEUTROPHILS # BLD AUTO: 3.4 K/UL (ref 1.8–7.7)
NEUTROPHILS NFR BLD: 55.5 % (ref 38–73)
NRBC BLD-RTO: 0 /100 WBC
PLATELET # BLD AUTO: 305 K/UL (ref 150–450)
PMV BLD AUTO: 9.5 FL (ref 9.2–12.9)
RBC # BLD AUTO: 3.32 M/UL (ref 4.6–6.2)
WBC # BLD AUTO: 6.04 K/UL (ref 3.9–12.7)

## 2023-02-01 PROCEDURE — 96365 THER/PROPH/DIAG IV INF INIT: CPT

## 2023-02-01 PROCEDURE — 25000003 PHARM REV CODE 250: Performed by: INTERNAL MEDICINE

## 2023-02-01 PROCEDURE — 63600175 PHARM REV CODE 636 W HCPCS: Performed by: INTERNAL MEDICINE

## 2023-02-01 PROCEDURE — 3008F PR BODY MASS INDEX (BMI) DOCUMENTED: ICD-10-PCS | Mod: CPTII,,,

## 2023-02-01 PROCEDURE — 99215 OFFICE O/P EST HI 40 MIN: CPT | Mod: S$PBB,,,

## 2023-02-01 PROCEDURE — 3074F SYST BP LT 130 MM HG: CPT | Mod: CPTII,,,

## 2023-02-01 PROCEDURE — 99213 OFFICE O/P EST LOW 20 MIN: CPT | Mod: PBBFAC,25

## 2023-02-01 PROCEDURE — 99999 PR PBB SHADOW E&M-EST. PATIENT-LVL III: ICD-10-PCS | Mod: PBBFAC,,,

## 2023-02-01 PROCEDURE — 3078F DIAST BP <80 MM HG: CPT | Mod: CPTII,,,

## 2023-02-01 PROCEDURE — 3074F PR MOST RECENT SYSTOLIC BLOOD PRESSURE < 130 MM HG: ICD-10-PCS | Mod: CPTII,,,

## 2023-02-01 PROCEDURE — 36415 COLL VENOUS BLD VENIPUNCTURE: CPT | Performed by: INTERNAL MEDICINE

## 2023-02-01 PROCEDURE — 85025 COMPLETE CBC W/AUTO DIFF WBC: CPT | Performed by: INTERNAL MEDICINE

## 2023-02-01 PROCEDURE — 3008F BODY MASS INDEX DOCD: CPT | Mod: CPTII,,,

## 2023-02-01 PROCEDURE — 99215 PR OFFICE/OUTPT VISIT, EST, LEVL V, 40-54 MIN: ICD-10-PCS | Mod: S$PBB,,,

## 2023-02-01 PROCEDURE — 3078F PR MOST RECENT DIASTOLIC BLOOD PRESSURE < 80 MM HG: ICD-10-PCS | Mod: CPTII,,,

## 2023-02-01 PROCEDURE — 99999 PR PBB SHADOW E&M-EST. PATIENT-LVL III: CPT | Mod: PBBFAC,,,

## 2023-02-01 RX ORDER — HEPARIN 100 UNIT/ML
500 SYRINGE INTRAVENOUS
Status: CANCELLED | OUTPATIENT
Start: 2023-02-08

## 2023-02-01 RX ORDER — SODIUM CHLORIDE 0.9 % (FLUSH) 0.9 %
10 SYRINGE (ML) INJECTION
Status: CANCELLED | OUTPATIENT
Start: 2023-02-08

## 2023-02-01 RX ORDER — SODIUM CHLORIDE 0.9 % (FLUSH) 0.9 %
10 SYRINGE (ML) INJECTION
Status: DISCONTINUED | OUTPATIENT
Start: 2023-02-01 | End: 2023-02-01 | Stop reason: HOSPADM

## 2023-02-01 RX ADMIN — IRON SUCROSE 200 MG: 20 INJECTION, SOLUTION INTRAVENOUS at 03:02

## 2023-02-01 NOTE — H&P (VIEW-ONLY)
Subjective:      Patient ID: Shawn Carreno is a 57 y.o. male.    Chief Complaint: Iron deficiency anemia      HPI:  Mr. Carreno is a 57-year-old male with recurrent iron deficiency anemia who presents today for weekly lab review to assess need for possible blood transfusion. He received 2 units PRBC on 1/5/23 for a hemoglobin of 4.8, and another unit on 1/14/23 for Hgb of 5.8. Pt currently receiving IV iron therapy with Venofer x 5 doses--scheduled for dose #2/5 today. He is also scheduled for bone marrow biopsy on 02/06/2023 to assess for progression of smoldering myeloma. Today, he c/o  fatigue. Denies abnormal bleeding, lightheadedness, dizziness, sob, cp.        Social History     Socioeconomic History    Marital status: Single   Tobacco Use    Smoking status: Never    Smokeless tobacco: Never   Substance and Sexual Activity    Alcohol use: Never    Drug use: Never    Sexual activity: Not Currently       History reviewed. No pertinent family history.    Past Surgical History:   Procedure Laterality Date    CHOLECYSTECTOMY  2020    COLONOSCOPY N/A 1/5/2022    Procedure: COLONOSCOPY;  Surgeon: Hannah Alvarado MD;  Location: [facility];  Service: Endoscopy;  Laterality: N/A;    ESOPHAGOGASTRODUODENOSCOPY N/A 1/5/2022    Procedure: EGD (ESOPHAGOGASTRODUODENOSCOPY);  Surgeon: Hannah Alvarado MD;  Location: [facility];  Service: Endoscopy;  Laterality: N/A;    ESOPHAGOGASTRODUODENOSCOPY N/A 4/6/2022    Procedure: EGD (ESOPHAGOGASTRODUODENOSCOPY) with push;  Surgeon: Vira Castaneda MD;  Location: [facility];  Service: Endoscopy;  Laterality: N/A;    INTRALUMINAL GASTROINTESTINAL TRACT IMAGING VIA CAPSULE N/A 1/26/2022    Procedure: IMAGING PROCEDURE, GI TRACT, INTRALUMINAL, VIA CAPSULE;  Surgeon: Baltazar Chaves RN;  Location: [facility];  Service: Endoscopy;  Laterality: N/A;    INTRALUMINAL GASTROINTESTINAL TRACT IMAGING VIA CAPSULE N/A 2/22/2022    Procedure: IMAGING PROCEDURE, GI TRACT,  INTRALUMINAL, VIA CAPSULE;  Surgeon: Baltazar Chaves RN;  Location: [facility];  Service: Endoscopy;  Laterality: N/A;       Past Medical History:   Diagnosis Date    Multiple myeloma not having achieved remission 10/12/2022       Review of Systems   Constitutional:  Positive for activity change and fatigue. Negative for appetite change, chills, diaphoresis, fever and unexpected weight change.   HENT:  Negative for congestion, dental problem, drooling, ear discharge, ear pain, facial swelling, hearing loss, mouth sores, nosebleeds, postnasal drip, rhinorrhea, sinus pressure, sneezing, sore throat, tinnitus, trouble swallowing and voice change.    Eyes:  Negative for photophobia, pain, discharge, redness, itching and visual disturbance.   Respiratory:  Negative for apnea, cough, choking, chest tightness, shortness of breath, wheezing and stridor.    Cardiovascular:  Negative for chest pain, palpitations and leg swelling.   Gastrointestinal:  Negative for abdominal distention, abdominal pain, anal bleeding, blood in stool, constipation, diarrhea, nausea, rectal pain and vomiting.   Endocrine: Negative for cold intolerance, heat intolerance, polydipsia, polyphagia and polyuria.   Genitourinary:  Negative for decreased urine volume, difficulty urinating, dysuria, enuresis, flank pain, frequency, genital sores, hematuria, penile discharge, penile pain, penile swelling, scrotal swelling, testicular pain and urgency.   Musculoskeletal:  Negative for arthralgias, back pain, gait problem, joint swelling, myalgias, neck pain and neck stiffness.   Skin:  Negative for color change, pallor, rash and wound.   Allergic/Immunologic: Negative for environmental allergies, food allergies and immunocompromised state.   Neurological:  Positive for weakness. Negative for dizziness, tremors, seizures, syncope, facial asymmetry, speech difficulty, light-headedness, numbness and headaches.   Hematological:  Negative for adenopathy. Does not  bruise/bleed easily.   Psychiatric/Behavioral:  Positive for dysphoric mood. Negative for agitation, behavioral problems, confusion, decreased concentration, hallucinations, self-injury, sleep disturbance and suicidal ideas. The patient is nervous/anxious. The patient is not hyperactive.      Medication List with Changes/Refills   Current Medications    FERROUS SULFATE 324 MG (65 MG IRON) TBEC    Take 1 tablet (324 mg total) by mouth once daily.        Objective:     Vitals:    02/01/23 1420   BP: 117/71   Pulse: 74   Temp: 97.9 °F (36.6 °C)       Physical Exam  Vitals reviewed.   Constitutional:       General: He is not in acute distress.     Appearance: He is well-developed. He is not diaphoretic.   HENT:      Head: Normocephalic.      Right Ear: External ear normal.      Left Ear: External ear normal.      Nose: Nose normal.      Right Sinus: No maxillary sinus tenderness or frontal sinus tenderness.      Left Sinus: No maxillary sinus tenderness or frontal sinus tenderness.      Mouth/Throat:      Pharynx: No oropharyngeal exudate.   Eyes:      General: Lids are normal. No scleral icterus.        Right eye: No discharge.         Left eye: No discharge.      Extraocular Movements:      Right eye: Normal extraocular motion.      Left eye: Normal extraocular motion.      Conjunctiva/sclera:      Right eye: Right conjunctiva is not injected. No hemorrhage.     Left eye: Left conjunctiva is not injected. No hemorrhage.     Pupils: Pupils are equal, round, and reactive to light.   Neck:      Thyroid: No thyromegaly.      Vascular: No JVD.      Trachea: No tracheal deviation.   Cardiovascular:      Rate and Rhythm: Normal rate.   Pulmonary:      Effort: Pulmonary effort is normal. No respiratory distress.      Breath sounds: No stridor.   Abdominal:      General: Bowel sounds are normal.      Palpations: Abdomen is soft. There is no hepatomegaly, splenomegaly or mass.      Tenderness: There is no abdominal tenderness.    Musculoskeletal:         General: No tenderness. Normal range of motion.      Cervical back: Normal range of motion and neck supple.   Skin:     General: Skin is dry.      Findings: No erythema or rash.      Nails: There is no clubbing.   Neurological:      Mental Status: He is alert and oriented to person, place, and time.      Cranial Nerves: No cranial nerve deficit.      Coordination: Coordination normal.   Psychiatric:         Behavior: Behavior normal.         Thought Content: Thought content normal.         Judgment: Judgment normal.       Lab Results   Component Value Date    WBC 6.04 02/01/2023    HGB 7.4 (L) 02/01/2023    HCT 27.3 (L) 02/01/2023    MCV 82 02/01/2023     02/01/2023       Lab Results   Component Value Date     01/12/2023    K 4.3 01/12/2023     01/12/2023    CO2 22 (L) 01/12/2023    BUN 21 (H) 01/12/2023    CREATININE 0.9 01/12/2023    CALCIUM 8.7 01/12/2023    ANIONGAP 9 01/12/2023    ESTGFRAFRICA >60 01/06/2022    EGFRNONAA >60 01/06/2022     Lab Results   Component Value Date    ALT 13 01/12/2023    AST 14 01/12/2023    ALKPHOS 39 (L) 01/12/2023    BILITOT 0.6 01/12/2023       Assessment/Plan:     Problem List Items Addressed This Visit          Oncology    Symptomatic anemia     Lab Results   Component Value Date    HGB 7.4 (L) 02/01/2023   Improved from prior  Pt asymptomatic    Plan for continued close f/u weekly cbc and visit to assess need for transfusion         Iron deficiency anemia due to chronic blood loss     Continue with IV iron therapy as scheduled         Plasma cell neoplasm     Per review of the medical record:    -SPEP/MALI showed a IgG kappa monoclonal protein on 9/25/22, measuring 1.46g/dl. sFLC ratio was mildly elevated at 1.93.  -He has 25-30% clonal plasma cells in bone marrow biopsy done on 9/26/22. Normal renal function and normal serum creatinine. No hypermetabolic /lytic lesions on PET CT done on 10/19/22. 24hr UPEP/UIFE negative for  monoclonal protein.   -He has one CRAB criteria , that is anemia. CBC on 11/28/22 showed a microcytic picture, suggesting iron deficiency.  -He has received 2 units PRBCs in 2022 ( Jan, Nov) and 2 units so far in Jan 2023. No overt bleeding.    -M Sebastian on SPEP on 1/5/23 was 1.5g/dl, previously 1.46g/dl, stable from Sept 2022.   -No renal impairment/ hypercalcemia on 1/12/23.   -No evidence of hemolysis ( bilirubin, LDH, haptoglobin all normal)  -RBC indices still show hypochromia as of 1/12/23  -TIBC high,. Serum iron saturation low, serum iron low ion 1/14/23, AFTER 2 units of PRBC transfusion on 1/5/23  -No enhancing/ lytic lesions noted on MRI hips done on 1/9/23, or MRI cervical/ thoracic /lumbar spines done ion 1/18/23  -He will have repeat BM biopsy.  -He will have weekly CBC, type and screen and iron studies, with iv iron and /or PRBC  -If BM biopsy shows further increase in plasma cells compared to previous biopsy , induction chemotherapy is indicated.                      Med Onc Chart Routing      Follow up with physician    Follow up with [facility] 1 week. with labs prior to Venofer #3/5   Infusion scheduling note    Injection scheduling note    Labs CBC   Lab interval:     Imaging    Pharmacy appointment    Other referrals         JAYDA HillP-C  Hematology/Oncology

## 2023-02-01 NOTE — PLAN OF CARE
Discussed plan of care with pt. Addressed any and ongoing concerns. Pt denies   Problem: Adult Inpatient Plan of Care  Goal: Plan of Care Review  Outcome: Ongoing, Progressing  Goal: Patient-Specific Goal (Individualized)  Outcome: Ongoing, Progressing  Goal: Absence of Hospital-Acquired Illness or Injury  Outcome: Ongoing, Progressing  Intervention: Identify and Manage Fall Risk  Flowsheets (Taken 2/1/2023 1642)  Safety Promotion/Fall Prevention: in recliner, wheels locked  Intervention: Prevent Infection  Flowsheets (Taken 2/1/2023 1642)  Infection Prevention:   equipment surfaces disinfected   hand hygiene promoted   personal protective equipment utilized  Goal: Optimal Comfort and Wellbeing  Outcome: Ongoing, Progressing  Intervention: Provide Person-Centered Care  Flowsheets (Taken 2/1/2023 1642)  Trust Relationship/Rapport:   choices provided   emotional support provided   empathic listening provided   questions answered   questions encouraged   reassurance provided   thoughts/feelings acknowledged   care explained

## 2023-02-01 NOTE — PROGRESS NOTES
Subjective:      Patient ID: Shawn Carreno is a 57 y.o. male.    Chief Complaint: Iron deficiency anemia      HPI:  Mr. Carreno is a 57-year-old male with recurrent iron deficiency anemia who presents today for weekly lab review to assess need for possible blood transfusion. He received 2 units PRBC on 1/5/23 for a hemoglobin of 4.8, and another unit on 1/14/23 for Hgb of 5.8. Pt currently receiving IV iron therapy with Venofer x 5 doses--scheduled for dose #2/5 today. He is also scheduled for bone marrow biopsy on 02/06/2023 to assess for progression of smoldering myeloma. Today, he c/o  fatigue. Denies abnormal bleeding, lightheadedness, dizziness, sob, cp.        Social History     Socioeconomic History    Marital status: Single   Tobacco Use    Smoking status: Never    Smokeless tobacco: Never   Substance and Sexual Activity    Alcohol use: Never    Drug use: Never    Sexual activity: Not Currently       History reviewed. No pertinent family history.    Past Surgical History:   Procedure Laterality Date    CHOLECYSTECTOMY  2020    COLONOSCOPY N/A 1/5/2022    Procedure: COLONOSCOPY;  Surgeon: Hannah Alvarado MD;  Location: [facility];  Service: Endoscopy;  Laterality: N/A;    ESOPHAGOGASTRODUODENOSCOPY N/A 1/5/2022    Procedure: EGD (ESOPHAGOGASTRODUODENOSCOPY);  Surgeon: Hannah Alvarado MD;  Location: [facility];  Service: Endoscopy;  Laterality: N/A;    ESOPHAGOGASTRODUODENOSCOPY N/A 4/6/2022    Procedure: EGD (ESOPHAGOGASTRODUODENOSCOPY) with push;  Surgeon: Vira Castaneda MD;  Location: [facility];  Service: Endoscopy;  Laterality: N/A;    INTRALUMINAL GASTROINTESTINAL TRACT IMAGING VIA CAPSULE N/A 1/26/2022    Procedure: IMAGING PROCEDURE, GI TRACT, INTRALUMINAL, VIA CAPSULE;  Surgeon: Baltazar Chaves RN;  Location: [facility];  Service: Endoscopy;  Laterality: N/A;    INTRALUMINAL GASTROINTESTINAL TRACT IMAGING VIA CAPSULE N/A 2/22/2022    Procedure: IMAGING PROCEDURE, GI TRACT,  INTRALUMINAL, VIA CAPSULE;  Surgeon: Baltazar Chaves RN;  Location: [facility];  Service: Endoscopy;  Laterality: N/A;       Past Medical History:   Diagnosis Date    Multiple myeloma not having achieved remission 10/12/2022       Review of Systems   Constitutional:  Positive for activity change and fatigue. Negative for appetite change, chills, diaphoresis, fever and unexpected weight change.   HENT:  Negative for congestion, dental problem, drooling, ear discharge, ear pain, facial swelling, hearing loss, mouth sores, nosebleeds, postnasal drip, rhinorrhea, sinus pressure, sneezing, sore throat, tinnitus, trouble swallowing and voice change.    Eyes:  Negative for photophobia, pain, discharge, redness, itching and visual disturbance.   Respiratory:  Negative for apnea, cough, choking, chest tightness, shortness of breath, wheezing and stridor.    Cardiovascular:  Negative for chest pain, palpitations and leg swelling.   Gastrointestinal:  Negative for abdominal distention, abdominal pain, anal bleeding, blood in stool, constipation, diarrhea, nausea, rectal pain and vomiting.   Endocrine: Negative for cold intolerance, heat intolerance, polydipsia, polyphagia and polyuria.   Genitourinary:  Negative for decreased urine volume, difficulty urinating, dysuria, enuresis, flank pain, frequency, genital sores, hematuria, penile discharge, penile pain, penile swelling, scrotal swelling, testicular pain and urgency.   Musculoskeletal:  Negative for arthralgias, back pain, gait problem, joint swelling, myalgias, neck pain and neck stiffness.   Skin:  Negative for color change, pallor, rash and wound.   Allergic/Immunologic: Negative for environmental allergies, food allergies and immunocompromised state.   Neurological:  Positive for weakness. Negative for dizziness, tremors, seizures, syncope, facial asymmetry, speech difficulty, light-headedness, numbness and headaches.   Hematological:  Negative for adenopathy. Does not  bruise/bleed easily.   Psychiatric/Behavioral:  Positive for dysphoric mood. Negative for agitation, behavioral problems, confusion, decreased concentration, hallucinations, self-injury, sleep disturbance and suicidal ideas. The patient is nervous/anxious. The patient is not hyperactive.      Medication List with Changes/Refills   Current Medications    FERROUS SULFATE 324 MG (65 MG IRON) TBEC    Take 1 tablet (324 mg total) by mouth once daily.        Objective:     Vitals:    02/01/23 1420   BP: 117/71   Pulse: 74   Temp: 97.9 °F (36.6 °C)       Physical Exam  Vitals reviewed.   Constitutional:       General: He is not in acute distress.     Appearance: He is well-developed. He is not diaphoretic.   HENT:      Head: Normocephalic.      Right Ear: External ear normal.      Left Ear: External ear normal.      Nose: Nose normal.      Right Sinus: No maxillary sinus tenderness or frontal sinus tenderness.      Left Sinus: No maxillary sinus tenderness or frontal sinus tenderness.      Mouth/Throat:      Pharynx: No oropharyngeal exudate.   Eyes:      General: Lids are normal. No scleral icterus.        Right eye: No discharge.         Left eye: No discharge.      Extraocular Movements:      Right eye: Normal extraocular motion.      Left eye: Normal extraocular motion.      Conjunctiva/sclera:      Right eye: Right conjunctiva is not injected. No hemorrhage.     Left eye: Left conjunctiva is not injected. No hemorrhage.     Pupils: Pupils are equal, round, and reactive to light.   Neck:      Thyroid: No thyromegaly.      Vascular: No JVD.      Trachea: No tracheal deviation.   Cardiovascular:      Rate and Rhythm: Normal rate.   Pulmonary:      Effort: Pulmonary effort is normal. No respiratory distress.      Breath sounds: No stridor.   Abdominal:      General: Bowel sounds are normal.      Palpations: Abdomen is soft. There is no hepatomegaly, splenomegaly or mass.      Tenderness: There is no abdominal tenderness.    Musculoskeletal:         General: No tenderness. Normal range of motion.      Cervical back: Normal range of motion and neck supple.   Skin:     General: Skin is dry.      Findings: No erythema or rash.      Nails: There is no clubbing.   Neurological:      Mental Status: He is alert and oriented to person, place, and time.      Cranial Nerves: No cranial nerve deficit.      Coordination: Coordination normal.   Psychiatric:         Behavior: Behavior normal.         Thought Content: Thought content normal.         Judgment: Judgment normal.       Lab Results   Component Value Date    WBC 6.04 02/01/2023    HGB 7.4 (L) 02/01/2023    HCT 27.3 (L) 02/01/2023    MCV 82 02/01/2023     02/01/2023       Lab Results   Component Value Date     01/12/2023    K 4.3 01/12/2023     01/12/2023    CO2 22 (L) 01/12/2023    BUN 21 (H) 01/12/2023    CREATININE 0.9 01/12/2023    CALCIUM 8.7 01/12/2023    ANIONGAP 9 01/12/2023    ESTGFRAFRICA >60 01/06/2022    EGFRNONAA >60 01/06/2022     Lab Results   Component Value Date    ALT 13 01/12/2023    AST 14 01/12/2023    ALKPHOS 39 (L) 01/12/2023    BILITOT 0.6 01/12/2023       Assessment/Plan:     Problem List Items Addressed This Visit          Oncology    Symptomatic anemia     Lab Results   Component Value Date    HGB 7.4 (L) 02/01/2023   Improved from prior  Pt asymptomatic    Plan for continued close f/u weekly cbc and visit to assess need for transfusion         Iron deficiency anemia due to chronic blood loss     Continue with IV iron therapy as scheduled         Plasma cell neoplasm     Per review of the medical record:    -SPEP/MALI showed a IgG kappa monoclonal protein on 9/25/22, measuring 1.46g/dl. sFLC ratio was mildly elevated at 1.93.  -He has 25-30% clonal plasma cells in bone marrow biopsy done on 9/26/22. Normal renal function and normal serum creatinine. No hypermetabolic /lytic lesions on PET CT done on 10/19/22. 24hr UPEP/UIFE negative for  monoclonal protein.   -He has one CRAB criteria , that is anemia. CBC on 11/28/22 showed a microcytic picture, suggesting iron deficiency.  -He has received 2 units PRBCs in 2022 ( Jan, Nov) and 2 units so far in Jan 2023. No overt bleeding.    -M Sebastian on SPEP on 1/5/23 was 1.5g/dl, previously 1.46g/dl, stable from Sept 2022.   -No renal impairment/ hypercalcemia on 1/12/23.   -No evidence of hemolysis ( bilirubin, LDH, haptoglobin all normal)  -RBC indices still show hypochromia as of 1/12/23  -TIBC high,. Serum iron saturation low, serum iron low ion 1/14/23, AFTER 2 units of PRBC transfusion on 1/5/23  -No enhancing/ lytic lesions noted on MRI hips done on 1/9/23, or MRI cervical/ thoracic /lumbar spines done ion 1/18/23  -He will have repeat BM biopsy.  -He will have weekly CBC, type and screen and iron studies, with iv iron and /or PRBC  -If BM biopsy shows further increase in plasma cells compared to previous biopsy , induction chemotherapy is indicated.                      Med Onc Chart Routing      Follow up with physician    Follow up with EL[facility] 1 week. with labs prior to Venofer #3/5   Infusion scheduling note    Injection scheduling note    Labs CBC   Lab interval:     Imaging    Pharmacy appointment    Other referrals         JAYDA HillP-C  Hematology/Oncology

## 2023-02-01 NOTE — DISCHARGE INSTRUCTIONS
[facility]  [address] [facility]  [address]  [phone] phone     [phone] fax  Hours of Operation: Monday- Friday 8:00am- 5:00pm  After hours phone  [phone]  Hematology / Oncology Physicians on call      RICKY Dias Dr., Dr., NP Sydney Prescott, KRISTIE Heredia FNP    Please call with any concerns regarding your appointment today.

## 2023-02-03 ENCOUNTER — TELEPHONE (OUTPATIENT)
Dept: RADIOLOGY | Facility: HOSPITAL | Age: 58
End: 2023-02-03

## 2023-02-03 DIAGNOSIS — D49.89 PLASMA CELL NEOPLASM: ICD-10-CM

## 2023-02-03 DIAGNOSIS — D68.9 COAGULATION DEFECT, UNSPECIFIED: Primary | ICD-10-CM

## 2023-02-03 DIAGNOSIS — D64.9 SYMPTOMATIC ANEMIA: ICD-10-CM

## 2023-02-03 NOTE — TELEPHONE ENCOUNTER
INTERVENTIONAL RADIOLOGY    CONFIRMED 1030 APPOINTMENT ON 2/6/23 WITH Jaimie LORE SLOAN. INSTRUCTED PT ARRIVE @ 0930 TO FRONT ENTRANCE OF HOSPITAL ([address] OFF [address]) WITH .  PT DENIES BT, ASA AND FISH OIL.  INSTRUCTED NOT TO EAT OR DRINK ANYTHING AFTER MIDNIGHT  THE NIGHT PRIOR TO PROCEDURE AND HAVE A  ON MORNING OF PROCEDURE TO DRIVE HOME.  QUESTIONS ANSWERED.  INSTRUCTED PT MAY TAKE NECESSARY MEDICATIONS ON MORNING OF PROCEDURE WITH A SMALL SIP OF WATER.

## 2023-02-04 NOTE — ASSESSMENT & PLAN NOTE
Per review of the medical record:    -SPEP/MALI showed a IgG kappa monoclonal protein on 9/25/22, measuring 1.46g/dl. sFLC ratio was mildly elevated at 1.93.  -He has 25-30% clonal plasma cells in bone marrow biopsy done on 9/26/22. Normal renal function and normal serum creatinine. No hypermetabolic /lytic lesions on PET CT done on 10/19/22. 24hr UPEP/UIFE negative for monoclonal protein.   -He has one CRAB criteria , that is anemia. CBC on 11/28/22 showed a microcytic picture, suggesting iron deficiency.  -He has received 2 units PRBCs in 2022 ( Jan, Nov) and 2 units so far in Jan 2023. No overt bleeding.    -M Sebastian on SPEP on 1/5/23 was 1.5g/dl, previously 1.46g/dl, stable from Sept 2022.   -No renal impairment/ hypercalcemia on 1/12/23.   -No evidence of hemolysis ( bilirubin, LDH, haptoglobin all normal)  -RBC indices still show hypochromia as of 1/12/23  -TIBC high,. Serum iron saturation low, serum iron low ion 1/14/23, AFTER 2 units of PRBC transfusion on 1/5/23  -No enhancing/ lytic lesions noted on MRI hips done on 1/9/23, or MRI cervical/ thoracic /lumbar spines done ion 1/18/23  -He will have repeat BM biopsy.  -He will have weekly CBC, type and screen and iron studies, with iv iron and /or PRBC  -If BM biopsy shows further increase in plasma cells compared to previous biopsy , induction chemotherapy is indicated.

## 2023-02-04 NOTE — ASSESSMENT & PLAN NOTE
Lab Results   Component Value Date    HGB 7.4 (L) 02/01/2023   Improved from prior  Pt asymptomatic    Plan for continued close f/u weekly cbc and visit to assess need for transfusion

## 2023-02-06 ENCOUNTER — HOSPITAL ENCOUNTER (OUTPATIENT)
Dept: RADIOLOGY | Facility: HOSPITAL | Age: 58
Discharge: HOME | End: 2023-02-06
Attending: INTERNAL MEDICINE
Payer: MEDICAID

## 2023-02-06 VITALS
HEIGHT: 67 IN | WEIGHT: 191 LBS | SYSTOLIC BLOOD PRESSURE: 114 MMHG | OXYGEN SATURATION: 100 % | BODY MASS INDEX: 29.98 KG/M2 | DIASTOLIC BLOOD PRESSURE: 66 MMHG | HEART RATE: 65 BPM | RESPIRATION RATE: 12 BRPM

## 2023-02-06 DIAGNOSIS — C90.00 MULTIPLE MYELOMA NOT HAVING ACHIEVED REMISSION: ICD-10-CM

## 2023-02-06 PROCEDURE — 88189 FLOWCYTOMETRY/READ 16 & >: CPT | Mod: ,,, | Performed by: PATHOLOGY

## 2023-02-06 PROCEDURE — 88341 PR IHC OR ICC EACH ADD'L SINGLE ANTIBODY  STAINPR: ICD-10-PCS | Mod: 26,,, | Performed by: PATHOLOGY

## 2023-02-06 PROCEDURE — 77012 CT SCAN FOR NEEDLE BIOPSY: CPT | Mod: 26,RT,, | Performed by: RADIOLOGY

## 2023-02-06 PROCEDURE — 85097 BONE MARROW INTERPRETATION: CPT | Mod: ,,, | Performed by: PATHOLOGY

## 2023-02-06 PROCEDURE — 88189 PR  FLOWCYTOMETRY/READ, 16 & > MARKERS: ICD-10-PCS | Mod: ,,, | Performed by: PATHOLOGY

## 2023-02-06 PROCEDURE — 88313 PR  SPECIAL STAINS,GROUP II: ICD-10-PCS | Mod: 26,,, | Performed by: PATHOLOGY

## 2023-02-06 PROCEDURE — 88305 TISSUE EXAM BY PATHOLOGIST: CPT | Performed by: PATHOLOGY

## 2023-02-06 PROCEDURE — 88311 DECALCIFY TISSUE: CPT | Performed by: PATHOLOGY

## 2023-02-06 PROCEDURE — 85097 PR  BONE MARROW,SMEAR INTERPRETATION: ICD-10-PCS | Mod: ,,, | Performed by: PATHOLOGY

## 2023-02-06 PROCEDURE — 88271 CYTOGENETICS DNA PROBE: CPT | Mod: 59 | Performed by: INTERNAL MEDICINE

## 2023-02-06 PROCEDURE — 63600175 PHARM REV CODE 636 W HCPCS: Performed by: RADIOLOGY

## 2023-02-06 PROCEDURE — 88365 INSITU HYBRIDIZATION (FISH): CPT | Performed by: PATHOLOGY

## 2023-02-06 PROCEDURE — 38221 CT BIOPSY BONE MARROW (XPD): ICD-10-PCS | Mod: RT,,, | Performed by: RADIOLOGY

## 2023-02-06 PROCEDURE — 88305 TISSUE EXAM BY PATHOLOGIST: ICD-10-PCS | Mod: 26,,, | Performed by: PATHOLOGY

## 2023-02-06 PROCEDURE — 77012 CT SCAN FOR NEEDLE BIOPSY: CPT | Mod: TC

## 2023-02-06 PROCEDURE — 88365 INSITU HYBRIDIZATION (FISH): CPT | Mod: 26,,, | Performed by: PATHOLOGY

## 2023-02-06 PROCEDURE — 88341 IMHCHEM/IMCYTCHM EA ADD ANTB: CPT | Mod: 26,,, | Performed by: PATHOLOGY

## 2023-02-06 PROCEDURE — 77012 CT BIOPSY BONE MARROW (XPD): ICD-10-PCS | Mod: 26,RT,, | Performed by: RADIOLOGY

## 2023-02-06 PROCEDURE — 88305 TISSUE EXAM BY PATHOLOGIST: CPT | Mod: 26,,, | Performed by: PATHOLOGY

## 2023-02-06 PROCEDURE — 88299 UNLISTED CYTOGENETIC STUDY: CPT | Performed by: INTERNAL MEDICINE

## 2023-02-06 PROCEDURE — 88364 CHG INSITU HYBRIDIZATION (FISH: ICD-10-PCS | Mod: 26,,, | Performed by: PATHOLOGY

## 2023-02-06 PROCEDURE — 88313 SPECIAL STAINS GROUP 2: CPT | Performed by: PATHOLOGY

## 2023-02-06 PROCEDURE — 88185 FLOWCYTOMETRY/TC ADD-ON: CPT | Mod: 59 | Performed by: PATHOLOGY

## 2023-02-06 PROCEDURE — 88274 CYTOGENETICS 25-99: CPT | Mod: 59 | Performed by: INTERNAL MEDICINE

## 2023-02-06 PROCEDURE — 88237 TISSUE CULTURE BONE MARROW: CPT | Performed by: INTERNAL MEDICINE

## 2023-02-06 PROCEDURE — 88364 INSITU HYBRIDIZATION (FISH): CPT | Mod: 26,,, | Performed by: PATHOLOGY

## 2023-02-06 PROCEDURE — 88184 FLOWCYTOMETRY/ TC 1 MARKER: CPT | Mod: 59 | Performed by: PATHOLOGY

## 2023-02-06 PROCEDURE — 88365 PR  TISSUE HYBRIDIZATION: ICD-10-PCS | Mod: 26,,, | Performed by: PATHOLOGY

## 2023-02-06 PROCEDURE — 88342 IMHCHEM/IMCYTCHM 1ST ANTB: CPT | Performed by: PATHOLOGY

## 2023-02-06 PROCEDURE — 88341 IMHCHEM/IMCYTCHM EA ADD ANTB: CPT | Performed by: PATHOLOGY

## 2023-02-06 PROCEDURE — 88364 INSITU HYBRIDIZATION (FISH): CPT | Performed by: PATHOLOGY

## 2023-02-06 PROCEDURE — 88342 IMHCHEM/IMCYTCHM 1ST ANTB: CPT | Mod: 26,59,, | Performed by: PATHOLOGY

## 2023-02-06 PROCEDURE — 88342 CHG IMMUNOCYTOCHEMISTRY: ICD-10-PCS | Mod: 26,59,, | Performed by: PATHOLOGY

## 2023-02-06 PROCEDURE — 88311 PR  DECALCIFY TISSUE: ICD-10-PCS | Mod: 26,,, | Performed by: PATHOLOGY

## 2023-02-06 PROCEDURE — 88184 FLOWCYTOMETRY/ TC 1 MARKER: CPT | Performed by: INTERNAL MEDICINE

## 2023-02-06 PROCEDURE — 88311 DECALCIFY TISSUE: CPT | Mod: 26,,, | Performed by: PATHOLOGY

## 2023-02-06 PROCEDURE — 88313 SPECIAL STAINS GROUP 2: CPT | Mod: 26,,, | Performed by: PATHOLOGY

## 2023-02-06 PROCEDURE — 38221 DX BONE MARROW BIOPSIES: CPT | Mod: RT,,, | Performed by: RADIOLOGY

## 2023-02-06 PROCEDURE — 88264 CHROMOSOME ANALYSIS 20-25: CPT | Performed by: INTERNAL MEDICINE

## 2023-02-06 RX ORDER — FENTANYL CITRATE 50 UG/ML
INJECTION, SOLUTION INTRAMUSCULAR; INTRAVENOUS CODE/TRAUMA/SEDATION MEDICATION
Status: COMPLETED | OUTPATIENT
Start: 2023-02-06 | End: 2023-02-06

## 2023-02-06 RX ORDER — MIDAZOLAM HYDROCHLORIDE 1 MG/ML
INJECTION INTRAMUSCULAR; INTRAVENOUS CODE/TRAUMA/SEDATION MEDICATION
Status: COMPLETED | OUTPATIENT
Start: 2023-02-06 | End: 2023-02-06

## 2023-02-06 RX ADMIN — FENTANYL CITRATE 50 MCG: 50 INJECTION, SOLUTION INTRAMUSCULAR; INTRAVENOUS at 11:02

## 2023-02-06 RX ADMIN — MIDAZOLAM HYDROCHLORIDE 1 MG: 1 INJECTION INTRAMUSCULAR; INTRAVENOUS at 11:02

## 2023-02-06 NOTE — SEDATION DOCUMENTATION
Pt positioned prone on CT table and CM applied. VS taken and stable. NADN. Pt verbalized understanding of procedure.

## 2023-02-06 NOTE — DISCHARGE SUMMARY
[facility])  Discharge Note  Short Stay    CT Biopsy Bone Marrow (xpd)      OUTCOME: Patient tolerated treatment/procedure well without complication and is now ready for discharge.    DISPOSITION: Home or Self Care    FINAL DIAGNOSIS:  <principal problem not specified>    FOLLOWUP: In clinic    DISCHARGE INSTRUCTIONS:  No discharge procedures on file.      Clinical Reference Documents Added to Patient Instructions         Document    BONE MARROW ASPIRATION OR BIOPSY (ENGLISH)    PROCEDURAL SEDATION, ADULT ED (ENGLISH)            TIME SPENT ON DISCHARGE: 15 minutes    Pre Op Diagnosis: myeloma     Post Op Diagnosis: same     Procedure:  Bone marrow biopsy     Procedure performed by: Mello LOZANO, Armando GARCÍA     Written Informed Consent Obtained: Yes     Specimen Removed:  yes     Estimated Blood Loss:  minimal     Findings: Local anesthesia and moderate sedation were used.     The patient tolerated the procedure well and there were no complications.      Disposition:  F/U in clinic    Discharge instructions:  Light activity for 24 hours.  Remove band aid in 24 hours.  No baths (showers are appropriate).    F/U with ordering physician    Sterile technique was performed in the right iliac, lidocaine was used as a local anesthetic.  Multiple samples taken percutaneously from the right iliac bone.  Pt tolerated the procedure well without immediate complications.  Please see radiologist report for details. F/u with PCP and/or ordering physician.

## 2023-02-06 NOTE — PLAN OF CARE
PT AMBULATED TO ROOM WITHOUT ASSISTANCE.  LETA.  HISTORY PER PT.  DAUGHTER WAITING IN CAR.  PT PLACED IN HOSPITAL GOWN IN SUPINE POSITION ON STRETCHER.

## 2023-02-06 NOTE — PLAN OF CARE
Band aid to lower back C/D/I with no bleeding/redness/swelling noted. VSS, NADN, and pt meets criteria for discharge. Discharge instructions given to and reviewed with pt, and pt verbalized understanding of all. Pt discharged to home, taken out via wheelchair and driven home by Andrez (daughter).

## 2023-02-06 NOTE — SEDATION DOCUMENTATION
Transferred pt to stretcher and positioned supine. Pt denies pain and NADN. Transported pt to recovery and bedside report given to Betty KAUR. Bandaid to posterior hip site remains CDI. Pt was stable at time of transfer. Called pt's family member, Andrez, to update her on pt's status and give estimated DC time of 1215

## 2023-02-06 NOTE — SEDATION DOCUMENTATION
Procedure completed at this time. Pt tolerated well. Pressure held to posterior hip site for several minutes and bandaid applied. No further bleeding noted. VSS and NADN.

## 2023-02-06 NOTE — PLAN OF CARE
RECEIVED PT TO RECOVERY VIA STRETCHER IN SUPINE POSITION.  DRESSING TO LOWER BACK CDI. NADN.  VSS.  PT DENIES PAIN, NAUSEA AND HA.

## 2023-02-07 LAB
PCPDS FINAL DIAGNOSIS: NORMAL
PCPDS PRE-ANALYSIS PRE-SORT: NORMAL

## 2023-02-08 ENCOUNTER — HOSPITAL ENCOUNTER (OUTPATIENT)
Dept: RADIOLOGY | Facility: HOSPITAL | Age: 58
Discharge: HOME | End: 2023-02-08
Attending: NURSE PRACTITIONER
Payer: MEDICAID

## 2023-02-08 ENCOUNTER — INFUSION (OUTPATIENT)
Dept: INFUSION THERAPY | Facility: HOSPITAL | Age: 58
End: 2023-02-08
Attending: NURSE PRACTITIONER
Payer: MEDICAID

## 2023-02-08 ENCOUNTER — OFFICE VISIT (OUTPATIENT)
Dept: HEMATOLOGY/ONCOLOGY | Facility: CLINIC | Age: 58
End: 2023-02-08
Payer: MEDICAID

## 2023-02-08 VITALS
BODY MASS INDEX: 29.58 KG/M2 | WEIGHT: 188.5 LBS | HEIGHT: 67 IN | DIASTOLIC BLOOD PRESSURE: 64 MMHG | TEMPERATURE: 97 F | SYSTOLIC BLOOD PRESSURE: 108 MMHG | HEART RATE: 73 BPM | OXYGEN SATURATION: 99 %

## 2023-02-08 VITALS
SYSTOLIC BLOOD PRESSURE: 116 MMHG | HEART RATE: 65 BPM | TEMPERATURE: 98 F | DIASTOLIC BLOOD PRESSURE: 75 MMHG | OXYGEN SATURATION: 99 % | RESPIRATION RATE: 17 BRPM

## 2023-02-08 DIAGNOSIS — D49.89 PLASMA CELL NEOPLASM: ICD-10-CM

## 2023-02-08 DIAGNOSIS — D50.0 IRON DEFICIENCY ANEMIA DUE TO CHRONIC BLOOD LOSS: ICD-10-CM

## 2023-02-08 DIAGNOSIS — M54.2 NECK PAIN: Primary | ICD-10-CM

## 2023-02-08 DIAGNOSIS — D22.9 ATYPICAL NEVI: ICD-10-CM

## 2023-02-08 DIAGNOSIS — M54.2 NECK PAIN: ICD-10-CM

## 2023-02-08 DIAGNOSIS — D50.0 IRON DEFICIENCY ANEMIA DUE TO CHRONIC BLOOD LOSS: Primary | ICD-10-CM

## 2023-02-08 LAB
DNA/RNA EXTRACT AND HOLD RESULT: NORMAL
DNA/RNA EXTRACTION: NORMAL
EXHR SPECIMEN TYPE: NORMAL

## 2023-02-08 PROCEDURE — 1160F PR REVIEW ALL MEDS BY PRESCRIBER/CLIN PHARMACIST DOCUMENTED: ICD-10-PCS | Mod: CPTII,,, | Performed by: NURSE PRACTITIONER

## 2023-02-08 PROCEDURE — 3078F DIAST BP <80 MM HG: CPT | Mod: CPTII,,, | Performed by: NURSE PRACTITIONER

## 2023-02-08 PROCEDURE — 96365 THER/PROPH/DIAG IV INF INIT: CPT

## 2023-02-08 PROCEDURE — 1159F PR MEDICATION LIST DOCUMENTED IN MEDICAL RECORD: ICD-10-PCS | Mod: CPTII,,, | Performed by: NURSE PRACTITIONER

## 2023-02-08 PROCEDURE — 3074F PR MOST RECENT SYSTOLIC BLOOD PRESSURE < 130 MM HG: ICD-10-PCS | Mod: CPTII,,, | Performed by: NURSE PRACTITIONER

## 2023-02-08 PROCEDURE — 99214 OFFICE O/P EST MOD 30 MIN: CPT | Mod: 25,S$PBB,, | Performed by: NURSE PRACTITIONER

## 2023-02-08 PROCEDURE — 99999 PR PBB SHADOW E&M-EST. PATIENT-LVL III: CPT | Mod: PBBFAC,,, | Performed by: NURSE PRACTITIONER

## 2023-02-08 PROCEDURE — 76536 US EXAM OF HEAD AND NECK: CPT | Mod: TC

## 2023-02-08 PROCEDURE — 76536 US SOFT TISSUE HEAD NECK THYROID: ICD-10-PCS | Mod: 26,,, | Performed by: RADIOLOGY

## 2023-02-08 PROCEDURE — 63600175 PHARM REV CODE 636 W HCPCS: Performed by: NURSE PRACTITIONER

## 2023-02-08 PROCEDURE — 99214 PR OFFICE/OUTPT VISIT, EST, LEVL IV, 30-39 MIN: ICD-10-PCS | Mod: 25,S$PBB,, | Performed by: NURSE PRACTITIONER

## 2023-02-08 PROCEDURE — 3008F BODY MASS INDEX DOCD: CPT | Mod: CPTII,,, | Performed by: NURSE PRACTITIONER

## 2023-02-08 PROCEDURE — 3008F PR BODY MASS INDEX (BMI) DOCUMENTED: ICD-10-PCS | Mod: CPTII,,, | Performed by: NURSE PRACTITIONER

## 2023-02-08 PROCEDURE — 99999 PR PBB SHADOW E&M-EST. PATIENT-LVL III: ICD-10-PCS | Mod: PBBFAC,,, | Performed by: NURSE PRACTITIONER

## 2023-02-08 PROCEDURE — 76536 US EXAM OF HEAD AND NECK: CPT | Mod: 26,,, | Performed by: RADIOLOGY

## 2023-02-08 PROCEDURE — 3078F PR MOST RECENT DIASTOLIC BLOOD PRESSURE < 80 MM HG: ICD-10-PCS | Mod: CPTII,,, | Performed by: NURSE PRACTITIONER

## 2023-02-08 PROCEDURE — 25000003 PHARM REV CODE 250: Performed by: NURSE PRACTITIONER

## 2023-02-08 PROCEDURE — 1160F RVW MEDS BY RX/DR IN RCRD: CPT | Mod: CPTII,,, | Performed by: NURSE PRACTITIONER

## 2023-02-08 PROCEDURE — 1159F MED LIST DOCD IN RCRD: CPT | Mod: CPTII,,, | Performed by: NURSE PRACTITIONER

## 2023-02-08 PROCEDURE — 3074F SYST BP LT 130 MM HG: CPT | Mod: CPTII,,, | Performed by: NURSE PRACTITIONER

## 2023-02-08 PROCEDURE — 99213 OFFICE O/P EST LOW 20 MIN: CPT | Mod: PBBFAC,25 | Performed by: NURSE PRACTITIONER

## 2023-02-08 RX ORDER — SODIUM CHLORIDE 0.9 % (FLUSH) 0.9 %
10 SYRINGE (ML) INJECTION
Status: CANCELLED | OUTPATIENT
Start: 2023-02-15

## 2023-02-08 RX ORDER — HEPARIN 100 UNIT/ML
500 SYRINGE INTRAVENOUS
Status: CANCELLED | OUTPATIENT
Start: 2023-02-15

## 2023-02-08 RX ADMIN — SODIUM CHLORIDE 200 MG: 9 INJECTION, SOLUTION INTRAVENOUS at 09:02

## 2023-02-08 NOTE — ASSESSMENT & PLAN NOTE
S/p bmbx 2/6/23. Pending results. Arrange 1 week f/u with Dr. Cesar to review results. Plan for CBC in 1 week to determine need for blood transfusion. Arrange appointments at the [facility]. Discussed with patient if blood transfusion needed will need to have T&S arranged at [facility]

## 2023-02-08 NOTE — ASSESSMENT & PLAN NOTE
-most recent iron levels reflect improving iron stores. Hg stable w/o transfusion requirement  -proceed with venofer #3/5 today  -Arrange Venofer #4/5 to be completed next week at the [facility]

## 2023-02-08 NOTE — PLAN OF CARE
Problem: Adult Inpatient Plan of Care  Goal: Plan of Care Review  Outcome: Ongoing, Progressing  Goal: Patient-Specific Goal (Individualized)  Outcome: Ongoing, Progressing  Goal: Absence of Hospital-Acquired Illness or Injury  Outcome: Ongoing, Progressing  Goal: Optimal Comfort and Wellbeing  Outcome: Ongoing, Progressing  Intervention: Provide Person-Centered Care  Flowsheets (Taken 2/8/2023 0919)  Trust Relationship/Rapport:   reassurance provided   care explained   choices provided   thoughts/feelings acknowledged   emotional support provided   empathic listening provided   questions answered   questions encouraged

## 2023-02-08 NOTE — PROGRESS NOTES
Subjective:       Patient ID: Shawn Carreno is a 57 y.o. male.    Chief Complaint: review labs. Determine need for supportive care    HPI: 57 y.o male with plasma cell neoplasm, recurrent BRITTANI presenting today for lab review to determine need for blood transfusion. Currently receiving round of Venofer weekly for 5 planned doses. Dose #3 planned today. Of note patient had repeat bmbx 2/6/23 with pending pathology to determine if any progression in plasma cell neoplasm given worsening anemia    GI evaluation obtained for iron deficiency includes: EGD on 4/6/22 that was normal, VCE 2/22/22 showed angioectasia that was non-bleeding, and colonoscopy 1/5/22 with polyp and diverticulosis. It was recommended that he have 5 year follow up Colonoscopy.      Social History     Socioeconomic History    Marital status: Single   Tobacco Use    Smoking status: Never    Smokeless tobacco: Never   Substance and Sexual Activity    Alcohol use: Never    Drug use: Never    Sexual activity: Not Currently       Past Medical History:   Diagnosis Date    Multiple myeloma not having achieved remission 10/12/2022       No family history on file.    Past Surgical History:   Procedure Laterality Date    CHOLECYSTECTOMY 2020    COLONOSCOPY N/A 1/5/2022    Procedure: COLONOSCOPY;  Surgeon: Hannah Alvarado MD;  Location: [facility];  Service: Endoscopy;  Laterality: N/A;    ESOPHAGOGASTRODUODENOSCOPY N/A 1/5/2022    Procedure: EGD (ESOPHAGOGASTRODUODENOSCOPY);  Surgeon: Hannah Alvarado MD;  Location: [facility];  Service: Endoscopy;  Laterality: N/A;    ESOPHAGOGASTRODUODENOSCOPY N/A 4/6/2022    Procedure: EGD (ESOPHAGOGASTRODUODENOSCOPY) with push;  Surgeon: Vira Castaneda MD;  Location: [facility];  Service: Endoscopy;  Laterality: N/A;    INTRALUMINAL GASTROINTESTINAL TRACT IMAGING VIA CAPSULE N/A 1/26/2022    Procedure: IMAGING PROCEDURE, GI TRACT, INTRALUMINAL, VIA CAPSULE;  Surgeon: Baltazar Chaves RN;   Location: [facility];  Service: Endoscopy;  Laterality: N/A;    INTRALUMINAL GASTROINTESTINAL TRACT IMAGING VIA CAPSULE N/A 2/22/2022    Procedure: IMAGING PROCEDURE, GI TRACT, INTRALUMINAL, VIA CAPSULE;  Surgeon: Baltazar Chaves RN;  Location: [facility];  Service: Endoscopy;  Laterality: N/A;       Review of Systems   Constitutional:  Negative for activity change, appetite change, chills, fatigue, fever and unexpected weight change.   HENT:  Negative for congestion, mouth sores, nosebleeds, sore throat, trouble swallowing and voice change.    Eyes:  Negative for visual disturbance.   Respiratory:  Negative for cough, chest tightness, shortness of breath and wheezing.    Cardiovascular:  Negative for chest pain and leg swelling.   Gastrointestinal:  Negative for abdominal distention, abdominal pain, blood in stool, constipation, diarrhea, nausea and vomiting.   Genitourinary:  Negative for difficulty urinating, dysuria and hematuria.   Musculoskeletal:  Negative for arthralgias, back pain and myalgias.   Skin:  Negative for pallor, rash and wound.   Neurological:  Negative for dizziness, syncope, weakness and headaches.   Hematological:  Negative for adenopathy. Does not bruise/bleed easily.   Psychiatric/Behavioral:  The patient is nervous/anxious.        Medication List with Changes/Refills   Current Medications    FERROUS SULFATE 324 MG (65 MG IRON) TBEC    Take 1 tablet (324 mg total) by mouth once daily.     Objective:     Vitals:    02/08/23 0819   BP: 108/64   Pulse: 73   Temp: 97.4 °F (36.3 °C)     Lab Results   Component Value Date    WBC 4.66 02/06/2023    WBC 4.66 02/06/2023    HGB 7.9 (L) 02/06/2023    HGB 7.9 (L) 02/06/2023    HCT 28.6 (L) 02/06/2023    HCT 28.6 (L) 02/06/2023    MCV 83 02/06/2023    MCV 83 02/06/2023     02/06/2023     02/06/2023     BMP  Lab Results   Component Value Date     02/06/2023    K 4.1 02/06/2023     02/06/2023    CO2 26 02/06/2023    BUN 15 02/06/2023     CREATININE 0.8 02/06/2023    CALCIUM 9.0 02/06/2023    ANIONGAP 6 (L) 02/06/2023    EGFRNORACEVR >60 02/06/2023     Lab Results   Component Value Date    ALT 14 02/06/2023    AST 16 02/06/2023    ALKPHOS 44 (L) 02/06/2023    BILITOT 0.6 02/06/2023     Lab Results   Component Value Date    IRON 27 (L) 02/06/2023    TRANSFERRIN 300 02/06/2023    TIBC 444 02/06/2023    FESATURATED 6 (L) 02/06/2023          Physical Exam  Vitals reviewed.   Constitutional:       Appearance: He is well-developed.   HENT:      Head: Normocephalic.      Right Ear: Hearing and external ear normal. No drainage.      Left Ear: Hearing and external ear normal. No drainage.      Nose: Nose normal.   Eyes:      General: Lids are normal. No scleral icterus.        Right eye: No discharge.         Left eye: No discharge.      Conjunctiva/sclera: Conjunctivae normal.   Neck:      Thyroid: No thyroid mass.   Cardiovascular:      Rate and Rhythm: Normal rate and regular rhythm.      Heart sounds: Normal heart sounds.   Pulmonary:      Effort: Pulmonary effort is normal. No respiratory distress.      Breath sounds: Normal breath sounds. No wheezing or rales.   Abdominal:      General: Bowel sounds are normal. There is no distension.      Palpations: Abdomen is soft.      Tenderness: There is no abdominal tenderness.   Genitourinary:     Comments: deferred  Musculoskeletal:         General: Normal range of motion.      Cervical back: Normal range of motion.   Lymphadenopathy:      Head:      Right side of head: No submandibular, preauricular or posterior auricular adenopathy.      Left side of head: No submandibular, preauricular or posterior auricular adenopathy.      Cervical:      Right cervical: No superficial or deep cervical adenopathy.     Left cervical: No superficial cervical adenopathy.   Skin:     General: Skin is warm and dry.      Comments: Blister like lesion behind right ear  Nevi noted to right upper calf area   Neurological:       Mental Status: He is alert and oriented to person, place, and time.   Psychiatric:         Speech: Speech normal.         Behavior: Behavior normal. Behavior is cooperative.         Thought Content: Thought content normal.        Assessment:     Problem List Items Addressed This Visit          Oncology    Iron deficiency anemia due to chronic blood loss     -most recent iron levels reflect improving iron stores. Hg stable w/o transfusion requirement  -proceed with venofer #3/5 today  -Arrange Venofer #4/5 to be completed next week at the [facility]           Plasma cell neoplasm     S/p bmbx 2/6/23. Pending results. Arrange 1 week f/u with Dr. Cesar to review results. Plan for CBC in 1 week to determine need for blood transfusion. Arrange appointments at the [facility]. Discussed with patient if blood transfusion needed will need to have T&S arranged at main hospital           Other Visit Diagnoses       Neck pain    -  Primary    Relevant Orders    US Soft Tissue Head Neck Thyroid    Atypical nevi        Relevant Orders    Ambulatory referral/consult to Dermatology              Plan:     Neck pain  -     US Soft Tissue Head Neck Thyroid; Future; Expected date: 02/08/2023    Atypical nevi  -     Ambulatory referral/consult to Dermatology; Future; Expected date: 02/15/2023    Plasma cell neoplasm    Iron deficiency anemia due to chronic blood loss            RUTHY Meyer

## 2023-02-15 ENCOUNTER — INFUSION (OUTPATIENT)
Dept: INFUSION THERAPY | Facility: HOSPITAL | Age: 58
End: 2023-02-15
Attending: INTERNAL MEDICINE
Payer: MEDICAID

## 2023-02-15 ENCOUNTER — OFFICE VISIT (OUTPATIENT)
Dept: HEMATOLOGY/ONCOLOGY | Facility: CLINIC | Age: 58
End: 2023-02-15
Payer: MEDICAID

## 2023-02-15 ENCOUNTER — LAB VISIT (OUTPATIENT)
Dept: LAB | Facility: HOSPITAL | Age: 58
End: 2023-02-15
Attending: INTERNAL MEDICINE
Payer: MEDICAID

## 2023-02-15 VITALS
WEIGHT: 192 LBS | SYSTOLIC BLOOD PRESSURE: 116 MMHG | BODY MASS INDEX: 30.13 KG/M2 | TEMPERATURE: 98 F | OXYGEN SATURATION: 99 % | HEIGHT: 67 IN | HEART RATE: 69 BPM | DIASTOLIC BLOOD PRESSURE: 74 MMHG

## 2023-02-15 VITALS
DIASTOLIC BLOOD PRESSURE: 76 MMHG | TEMPERATURE: 98 F | HEART RATE: 63 BPM | HEIGHT: 67 IN | BODY MASS INDEX: 30.13 KG/M2 | WEIGHT: 192 LBS | SYSTOLIC BLOOD PRESSURE: 117 MMHG | OXYGEN SATURATION: 98 % | RESPIRATION RATE: 18 BRPM

## 2023-02-15 DIAGNOSIS — D49.89 PLASMA CELL NEOPLASM: ICD-10-CM

## 2023-02-15 DIAGNOSIS — D50.0 IRON DEFICIENCY ANEMIA DUE TO CHRONIC BLOOD LOSS: Primary | ICD-10-CM

## 2023-02-15 DIAGNOSIS — R53.82 CHRONIC FATIGUE: ICD-10-CM

## 2023-02-15 DIAGNOSIS — D64.9 SYMPTOMATIC ANEMIA: ICD-10-CM

## 2023-02-15 DIAGNOSIS — D50.0 IRON DEFICIENCY ANEMIA DUE TO CHRONIC BLOOD LOSS: ICD-10-CM

## 2023-02-15 LAB
ABO + RH BLD: NORMAL
ANISOCYTOSIS BLD QL SMEAR: SLIGHT
BASOPHILS # BLD AUTO: 0.06 K/UL (ref 0–0.2)
BASOPHILS NFR BLD: 1.3 % (ref 0–1.9)
BLD GP AB SCN CELLS X3 SERPL QL: NORMAL
COMMENT: NORMAL
DIFFERENTIAL METHOD: ABNORMAL
EOSINOPHIL # BLD AUTO: 0.2 K/UL (ref 0–0.5)
EOSINOPHIL NFR BLD: 5.1 % (ref 0–8)
ERYTHROCYTE [DISTWIDTH] IN BLOOD BY AUTOMATED COUNT: 23.4 % (ref 11.5–14.5)
FERRITIN SERPL-MCNC: 87 NG/ML (ref 20–300)
FINAL PATHOLOGIC DIAGNOSIS: NORMAL
GENETICIST REVIEW: NORMAL
GROSS: NORMAL
HCT VFR BLD AUTO: 31.1 % (ref 40–54)
HGB BLD-MCNC: 8.2 G/DL (ref 14–18)
HYPOCHROMIA BLD QL SMEAR: ABNORMAL
IMM GRANULOCYTES # BLD AUTO: 0.01 K/UL (ref 0–0.04)
IMM GRANULOCYTES NFR BLD AUTO: 0.2 % (ref 0–0.5)
IRON SERPL-MCNC: 28 UG/DL (ref 45–160)
LYMPHOCYTES # BLD AUTO: 1.2 K/UL (ref 1–4.8)
LYMPHOCYTES NFR BLD: 25.8 % (ref 18–48)
Lab: NORMAL
MCH RBC QN AUTO: 23.6 PG (ref 27–31)
MCHC RBC AUTO-ENTMCNC: 26.4 G/DL (ref 32–36)
MCV RBC AUTO: 89 FL (ref 82–98)
MICROSCOPIC EXAM: NORMAL
MONOCYTES # BLD AUTO: 0.5 K/UL (ref 0.3–1)
MONOCYTES NFR BLD: 11.7 % (ref 4–15)
NEUTROPHILS # BLD AUTO: 2.5 K/UL (ref 1.8–7.7)
NEUTROPHILS NFR BLD: 55.9 % (ref 38–73)
NRBC BLD-RTO: 0 /100 WBC
OVALOCYTES BLD QL SMEAR: ABNORMAL
PLASMA CELL PROLIF RELEASED BY: NORMAL
PLASMA CELL PROLIF RESULT SUMMARY: NORMAL
PLASMA CELL PROLIF RESULT TABLE: NORMAL
PLATELET # BLD AUTO: 335 K/UL (ref 150–450)
PLATELET BLD QL SMEAR: ABNORMAL
PMV BLD AUTO: 10.7 FL (ref 9.2–12.9)
POIKILOCYTOSIS BLD QL SMEAR: SLIGHT
RBC # BLD AUTO: 3.48 M/UL (ref 4.6–6.2)
REASON FOR REFERRAL, PLASMA CELL PROLIF (PCPD), FISH: NORMAL
REF LAB TEST METHOD: NORMAL
RESULTS, PLASMA CELL PROLIF (PCPD), FISH: NORMAL
RETICS/RBC NFR AUTO: 1.9 % (ref 0.4–2)
SATURATED IRON: 7 % (ref 20–50)
SERVICE CMNT-IMP: NORMAL
SERVICE CMNT-IMP: NORMAL
SPECIMEN SOURCE: NORMAL
SPECIMEN, PLASMA CELL PROLIF (PCPD), FISH: NORMAL
SPHEROCYTES BLD QL SMEAR: ABNORMAL
SUPPLEMENTAL DIAGNOSIS: NORMAL
TARGETS BLD QL SMEAR: ABNORMAL
TOTAL IRON BINDING CAPACITY: 413 UG/DL (ref 250–450)
TRANSFERRIN SERPL-MCNC: 279 MG/DL (ref 200–375)
WBC # BLD AUTO: 4.53 K/UL (ref 3.9–12.7)

## 2023-02-15 PROCEDURE — 3078F PR MOST RECENT DIASTOLIC BLOOD PRESSURE < 80 MM HG: ICD-10-PCS | Mod: CPTII,,, | Performed by: INTERNAL MEDICINE

## 2023-02-15 PROCEDURE — 3008F BODY MASS INDEX DOCD: CPT | Mod: CPTII,,, | Performed by: INTERNAL MEDICINE

## 2023-02-15 PROCEDURE — 1159F MED LIST DOCD IN RCRD: CPT | Mod: CPTII,,, | Performed by: INTERNAL MEDICINE

## 2023-02-15 PROCEDURE — 96365 THER/PROPH/DIAG IV INF INIT: CPT

## 2023-02-15 PROCEDURE — 3074F PR MOST RECENT SYSTOLIC BLOOD PRESSURE < 130 MM HG: ICD-10-PCS | Mod: CPTII,,, | Performed by: INTERNAL MEDICINE

## 2023-02-15 PROCEDURE — 25000003 PHARM REV CODE 250: Performed by: INTERNAL MEDICINE

## 2023-02-15 PROCEDURE — 99999 PR PBB SHADOW E&M-EST. PATIENT-LVL III: ICD-10-PCS | Mod: PBBFAC,,, | Performed by: INTERNAL MEDICINE

## 2023-02-15 PROCEDURE — 99999 PR PBB SHADOW E&M-EST. PATIENT-LVL III: CPT | Mod: PBBFAC,,, | Performed by: INTERNAL MEDICINE

## 2023-02-15 PROCEDURE — 85025 COMPLETE CBC W/AUTO DIFF WBC: CPT | Performed by: INTERNAL MEDICINE

## 2023-02-15 PROCEDURE — 1160F PR REVIEW ALL MEDS BY PRESCRIBER/CLIN PHARMACIST DOCUMENTED: ICD-10-PCS | Mod: CPTII,,, | Performed by: INTERNAL MEDICINE

## 2023-02-15 PROCEDURE — 86900 BLOOD TYPING SEROLOGIC ABO: CPT | Performed by: INTERNAL MEDICINE

## 2023-02-15 PROCEDURE — 84466 ASSAY OF TRANSFERRIN: CPT | Performed by: INTERNAL MEDICINE

## 2023-02-15 PROCEDURE — 85045 AUTOMATED RETICULOCYTE COUNT: CPT | Performed by: INTERNAL MEDICINE

## 2023-02-15 PROCEDURE — 1160F RVW MEDS BY RX/DR IN RCRD: CPT | Mod: CPTII,,, | Performed by: INTERNAL MEDICINE

## 2023-02-15 PROCEDURE — 99213 OFFICE O/P EST LOW 20 MIN: CPT | Mod: PBBFAC,25 | Performed by: INTERNAL MEDICINE

## 2023-02-15 PROCEDURE — 99215 OFFICE O/P EST HI 40 MIN: CPT | Mod: 25,S$PBB,, | Performed by: INTERNAL MEDICINE

## 2023-02-15 PROCEDURE — 99215 PR OFFICE/OUTPT VISIT, EST, LEVL V, 40-54 MIN: ICD-10-PCS | Mod: 25,S$PBB,, | Performed by: INTERNAL MEDICINE

## 2023-02-15 PROCEDURE — 3078F DIAST BP <80 MM HG: CPT | Mod: CPTII,,, | Performed by: INTERNAL MEDICINE

## 2023-02-15 PROCEDURE — 63600175 PHARM REV CODE 636 W HCPCS: Performed by: INTERNAL MEDICINE

## 2023-02-15 PROCEDURE — 3074F SYST BP LT 130 MM HG: CPT | Mod: CPTII,,, | Performed by: INTERNAL MEDICINE

## 2023-02-15 PROCEDURE — 3008F PR BODY MASS INDEX (BMI) DOCUMENTED: ICD-10-PCS | Mod: CPTII,,, | Performed by: INTERNAL MEDICINE

## 2023-02-15 PROCEDURE — 82728 ASSAY OF FERRITIN: CPT | Performed by: INTERNAL MEDICINE

## 2023-02-15 PROCEDURE — 1159F PR MEDICATION LIST DOCUMENTED IN MEDICAL RECORD: ICD-10-PCS | Mod: CPTII,,, | Performed by: INTERNAL MEDICINE

## 2023-02-15 RX ORDER — HEPARIN 100 UNIT/ML
500 SYRINGE INTRAVENOUS
Status: CANCELLED | OUTPATIENT
Start: 2023-02-22

## 2023-02-15 RX ORDER — SODIUM CHLORIDE 0.9 % (FLUSH) 0.9 %
10 SYRINGE (ML) INJECTION
Status: DISCONTINUED | OUTPATIENT
Start: 2023-02-15 | End: 2023-02-15 | Stop reason: HOSPADM

## 2023-02-15 RX ORDER — SODIUM CHLORIDE 0.9 % (FLUSH) 0.9 %
10 SYRINGE (ML) INJECTION
Status: CANCELLED | OUTPATIENT
Start: 2023-02-22

## 2023-02-15 RX ADMIN — IRON SUCROSE 200 MG: 20 INJECTION, SOLUTION INTRAVENOUS at 08:02

## 2023-02-15 NOTE — NURSING
Infusion# 4    Recent labs? Reviewed    Premeds? None    S/S of current or recent infections in the past 14 days? None/Denies    Recent Surgery/invasive procedures? None/Denies     Any recent vaccines ? None/Denies    Venofer 200 mg administered IV at a 60 minute rate per orders; see MAR and vitals for more  Details.

## 2023-02-15 NOTE — PROGRESS NOTES
Subjective:       Patient ID: Shawn Carreno is a 57 y.o. male.    Chief Complaint: Results and Anemia    HPI:  57-year-old male with recurrent anemia documented iron deficiency as well as monoclonal gammopathy of undetermined significance.  The patient returns appears to be symptomatically improved after vigorous intravenous iron and blood transfusions.  Recently underwent bone marrow biopsy and aspirate and returns for review    Past Medical History:   Diagnosis Date    Multiple myeloma not having achieved remission 10/12/2022     History reviewed. No pertinent family history.  Social History     Socioeconomic History    Marital status: Single   Tobacco Use    Smoking status: Never    Smokeless tobacco: Never   Substance and Sexual Activity    Alcohol use: Never    Drug use: Never    Sexual activity: Not Currently     Past Surgical History:   Procedure Laterality Date    CHOLECYSTECTOMY  2020    COLONOSCOPY N/A 1/5/2022    Procedure: COLONOSCOPY;  Surgeon: Hannah Alvarado MD;  Location: [facility];  Service: Endoscopy;  Laterality: N/A;    ESOPHAGOGASTRODUODENOSCOPY N/A 1/5/2022    Procedure: EGD (ESOPHAGOGASTRODUODENOSCOPY);  Surgeon: Hannah Alvarado MD;  Location: [facility];  Service: Endoscopy;  Laterality: N/A;    ESOPHAGOGASTRODUODENOSCOPY N/A 4/6/2022    Procedure: EGD (ESOPHAGOGASTRODUODENOSCOPY) with push;  Surgeon: Vira Castaneda MD;  Location: [facility];  Service: Endoscopy;  Laterality: N/A;    INTRALUMINAL GASTROINTESTINAL TRACT IMAGING VIA CAPSULE N/A 1/26/2022    Procedure: IMAGING PROCEDURE, GI TRACT, INTRALUMINAL, VIA CAPSULE;  Surgeon: Baltazar Chaves RN;  Location: [facility];  Service: Endoscopy;  Laterality: N/A;    INTRALUMINAL GASTROINTESTINAL TRACT IMAGING VIA CAPSULE N/A 2/22/2022    Procedure: IMAGING PROCEDURE, GI TRACT, INTRALUMINAL, VIA CAPSULE;  Surgeon: Baltazar Chaves RN;  Location: [facility];  Service: Endoscopy;  Laterality: N/A;       Labs:  Lab  Results   Component Value Date    WBC 4.66 02/06/2023    WBC 4.66 02/06/2023    HGB 7.9 (L) 02/06/2023    HGB 7.9 (L) 02/06/2023    HCT 28.6 (L) 02/06/2023    HCT 28.6 (L) 02/06/2023    MCV 83 02/06/2023    MCV 83 02/06/2023     02/06/2023     02/06/2023     BMP  Lab Results   Component Value Date     02/06/2023    K 4.1 02/06/2023     02/06/2023    CO2 26 02/06/2023    BUN 15 02/06/2023    CREATININE 0.8 02/06/2023    CALCIUM 9.0 02/06/2023    ANIONGAP 6 (L) 02/06/2023    ESTGFRAFRICA >60 01/06/2022    EGFRNONAA >60 01/06/2022     Lab Results   Component Value Date    ALT 14 02/06/2023    AST 16 02/06/2023    ALKPHOS 44 (L) 02/06/2023    BILITOT 0.6 02/06/2023       Lab Results   Component Value Date    IRON 27 (L) 02/06/2023    TIBC 444 02/06/2023    FERRITIN 57 02/06/2023     Lab Results   Component Value Date    VCKYCJGU73 283 09/15/2022     Lab Results   Component Value Date    FOLATE 12.2 09/15/2022     Lab Results   Component Value Date    TSH 2.047 09/15/2022         Review of Systems   Constitutional:  Positive for activity change, appetite change and fatigue. Negative for chills, diaphoresis, fever and unexpected weight change.   HENT:  Negative for congestion, dental problem, drooling, ear discharge, ear pain, facial swelling, hearing loss, mouth sores, nosebleeds, postnasal drip, rhinorrhea, sinus pressure, sneezing, sore throat, tinnitus, trouble swallowing and voice change.    Eyes:  Negative for photophobia, pain, discharge, redness, itching and visual disturbance.   Respiratory:  Negative for apnea, cough, choking, chest tightness, shortness of breath, wheezing and stridor.    Cardiovascular:  Negative for chest pain, palpitations and leg swelling.   Gastrointestinal:  Negative for abdominal distention, abdominal pain, anal bleeding, blood in stool, constipation, diarrhea, nausea, rectal pain and vomiting.   Endocrine: Negative for cold intolerance, heat intolerance,  polydipsia, polyphagia and polyuria.   Genitourinary:  Negative for decreased urine volume, difficulty urinating, dysuria, enuresis, flank pain, frequency, genital sores, hematuria, penile discharge, penile pain, penile swelling, scrotal swelling, testicular pain and urgency.   Musculoskeletal:  Negative for arthralgias, back pain, gait problem, joint swelling, myalgias, neck pain and neck stiffness.   Skin:  Negative for color change, pallor, rash and wound.   Allergic/Immunologic: Negative for environmental allergies, food allergies and immunocompromised state.   Neurological:  Positive for weakness. Negative for dizziness, tremors, seizures, syncope, facial asymmetry, speech difficulty, light-headedness, numbness and headaches.   Hematological:  Negative for adenopathy. Does not bruise/bleed easily.   Psychiatric/Behavioral:  Negative for agitation, behavioral problems, confusion, decreased concentration, dysphoric mood, hallucinations, self-injury, sleep disturbance and suicidal ideas. The patient is not nervous/anxious and is not hyperactive.      Objective:      Physical Exam  Vitals reviewed.   Constitutional:       General: He is not in acute distress.     Appearance: He is well-developed. He is obese. He is ill-appearing. He is not diaphoretic.   HENT:      Head: Normocephalic.      Right Ear: External ear normal.      Left Ear: External ear normal.      Nose: Nose normal.      Right Sinus: No maxillary sinus tenderness or frontal sinus tenderness.      Left Sinus: No maxillary sinus tenderness or frontal sinus tenderness.      Mouth/Throat:      Pharynx: No oropharyngeal exudate.   Eyes:      General: Lids are normal. No scleral icterus.        Right eye: No discharge.         Left eye: No discharge.      Extraocular Movements:      Right eye: Normal extraocular motion.      Left eye: Normal extraocular motion.      Conjunctiva/sclera:      Right eye: Right conjunctiva is not injected. No hemorrhage.      Left eye: Left conjunctiva is not injected. No hemorrhage.     Pupils: Pupils are equal, round, and reactive to light.   Neck:      Thyroid: No thyromegaly.      Vascular: No JVD.      Trachea: No tracheal deviation.   Cardiovascular:      Rate and Rhythm: Normal rate.   Pulmonary:      Effort: Pulmonary effort is normal. No respiratory distress.      Breath sounds: No stridor.   Abdominal:      General: Bowel sounds are normal.      Palpations: Abdomen is soft. There is no hepatomegaly, splenomegaly or mass.      Tenderness: There is no abdominal tenderness.   Musculoskeletal:         General: No tenderness. Normal range of motion.      Cervical back: Normal range of motion and neck supple.   Lymphadenopathy:      Head:      Right side of head: No posterior auricular or occipital adenopathy.      Left side of head: No posterior auricular or occipital adenopathy.      Cervical: No cervical adenopathy.      Right cervical: No superficial, deep or posterior cervical adenopathy.     Left cervical: No superficial, deep or posterior cervical adenopathy.      Upper Body:      Right upper body: No supraclavicular adenopathy.      Left upper body: No supraclavicular adenopathy.   Skin:     General: Skin is dry.      Findings: No erythema or rash.      Nails: There is no clubbing.   Neurological:      Mental Status: He is alert and oriented to person, place, and time.      Cranial Nerves: No cranial nerve deficit.      Coordination: Coordination normal.   Psychiatric:         Behavior: Behavior normal.         Thought Content: Thought content normal.         Judgment: Judgment normal.           Assessment:      1. Iron deficiency anemia due to chronic blood loss    2. Plasma cell neoplasm    3. Symptomatic anemia    4. Chronic fatigue           Plan:     Patient will continue to be treated with intravenous iron on a weekly basis with IV Venofer based off of insurance dictation.  Patient's bone marrow demonstrated a  hypercellular marrow with 75% cellularity.  In addition 5-10% plasma cells at this time will defer to leukemia section for review of bone marrow continue with weekly Venofer was weekly CBC.  Discussed implications answered questions with patient I will see patient back 1 week after he has been seen by leukemia/myeloma section.  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  Discussed implications answered questions orders have been written for weekly intravenous iron in see back 1 week after with weekly CBC see whether not punctuation of counts will require blood transfusions intermittently.  Total time 40 minutes      Med Onc Chart Routing      Follow up with physician 4 weeks. See 1 week after patient has been seen by Dr. Alcaraz   Follow up with [facility]    Infusion scheduling note    Injection scheduling note Weekly Venofer infusion   Labs CBC   Lab interval:  Weekly CBC   Imaging    Pharmacy appointment    Other referrals         Kris Cesar Jr, MD FACP

## 2023-02-22 ENCOUNTER — INFUSION (OUTPATIENT)
Dept: INFUSION THERAPY | Facility: HOSPITAL | Age: 58
End: 2023-02-22
Attending: INTERNAL MEDICINE
Payer: MEDICAID

## 2023-02-22 VITALS
DIASTOLIC BLOOD PRESSURE: 59 MMHG | RESPIRATION RATE: 16 BRPM | SYSTOLIC BLOOD PRESSURE: 108 MMHG | OXYGEN SATURATION: 100 % | TEMPERATURE: 97 F | HEART RATE: 62 BPM

## 2023-02-22 DIAGNOSIS — D50.0 IRON DEFICIENCY ANEMIA DUE TO CHRONIC BLOOD LOSS: Primary | ICD-10-CM

## 2023-02-22 PROCEDURE — 96365 THER/PROPH/DIAG IV INF INIT: CPT

## 2023-02-22 PROCEDURE — 25000003 PHARM REV CODE 250: Performed by: INTERNAL MEDICINE

## 2023-02-22 PROCEDURE — A4216 STERILE WATER/SALINE, 10 ML: HCPCS | Performed by: INTERNAL MEDICINE

## 2023-02-22 PROCEDURE — 63600175 PHARM REV CODE 636 W HCPCS: Performed by: INTERNAL MEDICINE

## 2023-02-22 RX ORDER — HEPARIN 100 UNIT/ML
500 SYRINGE INTRAVENOUS
Status: CANCELLED | OUTPATIENT
Start: 2023-03-01

## 2023-02-22 RX ORDER — SODIUM CHLORIDE 0.9 % (FLUSH) 0.9 %
10 SYRINGE (ML) INJECTION
Status: CANCELLED | OUTPATIENT
Start: 2023-03-01

## 2023-02-22 RX ORDER — SODIUM CHLORIDE 0.9 % (FLUSH) 0.9 %
10 SYRINGE (ML) INJECTION
Status: DISCONTINUED | OUTPATIENT
Start: 2023-02-22 | End: 2023-02-22 | Stop reason: HOSPADM

## 2023-02-22 RX ADMIN — IRON SUCROSE 200 MG: 20 INJECTION, SOLUTION INTRAVENOUS at 07:02

## 2023-02-22 RX ADMIN — Medication 10 ML: at 07:02

## 2023-02-22 NOTE — NURSING
Infusion # 5 of 5 - Venofer 200 mg   Last dose- 2/15/23      Recent labs? 2/22/23  Last Hem/Onc provider visit- Seen by Dr. Cesar on 2/15/23     Premeds-none     Venofer 200 mg administered IV at a 60 minute rate per orders; see MAR and vitals for more details. Tolerated well without adverse events, discharged and ambulatory out of clinic.

## 2023-02-22 NOTE — PLAN OF CARE
Plan of care reviewed with patient. Discussed if there are any new or ongoing concerns. Denies.   Problem: Adult Inpatient Plan of Care  Goal: Plan of Care Review  Outcome: Ongoing, Progressing  Flowsheets (Taken 2/22/2023 0916)  Plan of Care Reviewed With: patient  Goal: Patient-Specific Goal (Individualized)  Outcome: Ongoing, Progressing  Goal: Absence of Hospital-Acquired Illness or Injury  Outcome: Ongoing, Progressing  Intervention: Identify and Manage Fall Risk  Flowsheets (Taken 2/22/2023 0916)  Safety Promotion/Fall Prevention: in recliner, wheels locked  Goal: Optimal Comfort and Wellbeing  Outcome: Ongoing, Progressing  Intervention: Provide Person-Centered Care  Flowsheets (Taken 2/22/2023 0916)  Trust Relationship/Rapport:   care explained   reassurance provided   choices provided   thoughts/feelings acknowledged   emotional support provided   empathic listening provided   questions answered   questions encouraged

## 2023-03-01 ENCOUNTER — LAB VISIT (OUTPATIENT)
Dept: LAB | Facility: HOSPITAL | Age: 58
End: 2023-03-01
Attending: INTERNAL MEDICINE
Payer: MEDICAID

## 2023-03-01 DIAGNOSIS — D50.0 IRON DEFICIENCY ANEMIA DUE TO CHRONIC BLOOD LOSS: ICD-10-CM

## 2023-03-01 DIAGNOSIS — D49.89 PLASMA CELL NEOPLASM: ICD-10-CM

## 2023-03-01 DIAGNOSIS — R53.82 CHRONIC FATIGUE: ICD-10-CM

## 2023-03-01 PROCEDURE — 86900 BLOOD TYPING SEROLOGIC ABO: CPT | Performed by: INTERNAL MEDICINE

## 2023-03-01 PROCEDURE — 85045 AUTOMATED RETICULOCYTE COUNT: CPT | Performed by: INTERNAL MEDICINE

## 2023-03-01 PROCEDURE — 36415 COLL VENOUS BLD VENIPUNCTURE: CPT | Performed by: INTERNAL MEDICINE

## 2023-03-01 PROCEDURE — 84466 ASSAY OF TRANSFERRIN: CPT | Performed by: INTERNAL MEDICINE

## 2023-03-01 PROCEDURE — 82728 ASSAY OF FERRITIN: CPT | Performed by: INTERNAL MEDICINE

## 2023-03-01 PROCEDURE — 85025 COMPLETE CBC W/AUTO DIFF WBC: CPT | Performed by: INTERNAL MEDICINE

## 2023-03-02 LAB
ABO + RH BLD: NORMAL
BASOPHILS # BLD AUTO: 0.05 K/UL (ref 0–0.2)
BASOPHILS NFR BLD: 1.1 % (ref 0–1.9)
BLD GP AB SCN CELLS X3 SERPL QL: NORMAL
DIFFERENTIAL METHOD: ABNORMAL
EOSINOPHIL # BLD AUTO: 0.2 K/UL (ref 0–0.5)
EOSINOPHIL NFR BLD: 4.5 % (ref 0–8)
ERYTHROCYTE [DISTWIDTH] IN BLOOD BY AUTOMATED COUNT: 23.6 % (ref 11.5–14.5)
FERRITIN SERPL-MCNC: 53 NG/ML (ref 20–300)
HCT VFR BLD AUTO: 32.2 % (ref 40–54)
HGB BLD-MCNC: 8.7 G/DL (ref 14–18)
IMM GRANULOCYTES # BLD AUTO: 0.01 K/UL (ref 0–0.04)
IMM GRANULOCYTES NFR BLD AUTO: 0.2 % (ref 0–0.5)
IRON SERPL-MCNC: 48 UG/DL (ref 45–160)
LYMPHOCYTES # BLD AUTO: 1.6 K/UL (ref 1–4.8)
LYMPHOCYTES NFR BLD: 34.2 % (ref 18–48)
MCH RBC QN AUTO: 24.2 PG (ref 27–31)
MCHC RBC AUTO-ENTMCNC: 27 G/DL (ref 32–36)
MCV RBC AUTO: 90 FL (ref 82–98)
MONOCYTES # BLD AUTO: 0.5 K/UL (ref 0.3–1)
MONOCYTES NFR BLD: 11.4 % (ref 4–15)
NEUTROPHILS # BLD AUTO: 2.3 K/UL (ref 1.8–7.7)
NEUTROPHILS NFR BLD: 48.6 % (ref 38–73)
NRBC BLD-RTO: 0 /100 WBC
PLATELET # BLD AUTO: 302 K/UL (ref 150–450)
PMV BLD AUTO: 10.1 FL (ref 9.2–12.9)
RBC # BLD AUTO: 3.59 M/UL (ref 4.6–6.2)
RETICS/RBC NFR AUTO: 2.3 % (ref 0.4–2)
SATURATED IRON: 11 % (ref 20–50)
TOTAL IRON BINDING CAPACITY: 444 UG/DL (ref 250–450)
TRANSFERRIN SERPL-MCNC: 300 MG/DL (ref 200–375)
WBC # BLD AUTO: 4.65 K/UL (ref 3.9–12.7)

## 2023-03-08 ENCOUNTER — LAB VISIT (OUTPATIENT)
Dept: LAB | Facility: HOSPITAL | Age: 58
End: 2023-03-08
Attending: INTERNAL MEDICINE
Payer: MEDICAID

## 2023-03-08 DIAGNOSIS — D49.89 PLASMA CELL NEOPLASM: ICD-10-CM

## 2023-03-08 DIAGNOSIS — D50.0 IRON DEFICIENCY ANEMIA DUE TO CHRONIC BLOOD LOSS: ICD-10-CM

## 2023-03-08 DIAGNOSIS — R53.82 CHRONIC FATIGUE: ICD-10-CM

## 2023-03-08 PROCEDURE — 84466 ASSAY OF TRANSFERRIN: CPT | Performed by: INTERNAL MEDICINE

## 2023-03-08 PROCEDURE — 86900 BLOOD TYPING SEROLOGIC ABO: CPT | Performed by: INTERNAL MEDICINE

## 2023-03-08 PROCEDURE — 85045 AUTOMATED RETICULOCYTE COUNT: CPT | Performed by: INTERNAL MEDICINE

## 2023-03-08 PROCEDURE — 36415 COLL VENOUS BLD VENIPUNCTURE: CPT | Performed by: INTERNAL MEDICINE

## 2023-03-08 PROCEDURE — 82728 ASSAY OF FERRITIN: CPT | Performed by: INTERNAL MEDICINE

## 2023-03-08 PROCEDURE — 85025 COMPLETE CBC W/AUTO DIFF WBC: CPT | Performed by: INTERNAL MEDICINE

## 2023-03-09 LAB
ABO + RH BLD: NORMAL
ANISOCYTOSIS BLD QL SMEAR: SLIGHT
BASO STIPL BLD QL SMEAR: ABNORMAL
BASOPHILS # BLD AUTO: ABNORMAL K/UL (ref 0–0.2)
BASOPHILS NFR BLD: 0 % (ref 0–1.9)
BLD GP AB SCN CELLS X3 SERPL QL: NORMAL
DACRYOCYTES BLD QL SMEAR: ABNORMAL
DIFFERENTIAL METHOD: ABNORMAL
EOSINOPHIL # BLD AUTO: ABNORMAL K/UL (ref 0–0.5)
EOSINOPHIL NFR BLD: 3 % (ref 0–8)
ERYTHROCYTE [DISTWIDTH] IN BLOOD BY AUTOMATED COUNT: 22.3 % (ref 11.5–14.5)
FERRITIN SERPL-MCNC: 23 NG/ML (ref 20–300)
GIANT PLATELETS BLD QL SMEAR: PRESENT
HCT VFR BLD AUTO: 26.6 % (ref 40–54)
HGB BLD-MCNC: 7.1 G/DL (ref 14–18)
HYPOCHROMIA BLD QL SMEAR: ABNORMAL
IMM GRANULOCYTES # BLD AUTO: ABNORMAL K/UL (ref 0–0.04)
IMM GRANULOCYTES NFR BLD AUTO: ABNORMAL % (ref 0–0.5)
IRON SERPL-MCNC: 23 UG/DL (ref 45–160)
LYMPHOCYTES # BLD AUTO: ABNORMAL K/UL (ref 1–4.8)
LYMPHOCYTES NFR BLD: 24 % (ref 18–48)
MCH RBC QN AUTO: 23.6 PG (ref 27–31)
MCHC RBC AUTO-ENTMCNC: 26.7 G/DL (ref 32–36)
MCV RBC AUTO: 88 FL (ref 82–98)
MONOCYTES # BLD AUTO: ABNORMAL K/UL (ref 0.3–1)
MONOCYTES NFR BLD: 7 % (ref 4–15)
NEUTROPHILS NFR BLD: 66 % (ref 38–73)
NRBC BLD-RTO: 0 /100 WBC
OVALOCYTES BLD QL SMEAR: ABNORMAL
PLATELET # BLD AUTO: 303 K/UL (ref 150–450)
PLATELET BLD QL SMEAR: ABNORMAL
PMV BLD AUTO: 10.6 FL (ref 9.2–12.9)
POIKILOCYTOSIS BLD QL SMEAR: SLIGHT
POLYCHROMASIA BLD QL SMEAR: ABNORMAL
RBC # BLD AUTO: 3.01 M/UL (ref 4.6–6.2)
RETICS/RBC NFR AUTO: 2.3 % (ref 0.4–2)
SATURATED IRON: 5 % (ref 20–50)
SCHISTOCYTES BLD QL SMEAR: ABNORMAL
SCHISTOCYTES BLD QL SMEAR: PRESENT
SPHEROCYTES BLD QL SMEAR: ABNORMAL
TOTAL IRON BINDING CAPACITY: 440 UG/DL (ref 250–450)
TOXIC GRANULES BLD QL SMEAR: PRESENT
TRANSFERRIN SERPL-MCNC: 297 MG/DL (ref 200–375)
WBC # BLD AUTO: 5.3 K/UL (ref 3.9–12.7)
WBC TOXIC VACUOLES BLD QL SMEAR: PRESENT

## 2023-03-15 ENCOUNTER — LAB VISIT (OUTPATIENT)
Dept: LAB | Facility: HOSPITAL | Age: 58
End: 2023-03-15
Attending: INTERNAL MEDICINE
Payer: MEDICAID

## 2023-03-15 DIAGNOSIS — D50.0 IRON DEFICIENCY ANEMIA DUE TO CHRONIC BLOOD LOSS: ICD-10-CM

## 2023-03-15 DIAGNOSIS — R53.82 CHRONIC FATIGUE: ICD-10-CM

## 2023-03-15 DIAGNOSIS — D49.89 PLASMA CELL NEOPLASM: ICD-10-CM

## 2023-03-15 PROCEDURE — 85045 AUTOMATED RETICULOCYTE COUNT: CPT | Performed by: INTERNAL MEDICINE

## 2023-03-15 PROCEDURE — 36415 COLL VENOUS BLD VENIPUNCTURE: CPT | Performed by: INTERNAL MEDICINE

## 2023-03-15 PROCEDURE — 82728 ASSAY OF FERRITIN: CPT | Performed by: INTERNAL MEDICINE

## 2023-03-15 PROCEDURE — 84466 ASSAY OF TRANSFERRIN: CPT | Performed by: INTERNAL MEDICINE

## 2023-03-15 PROCEDURE — 85025 COMPLETE CBC W/AUTO DIFF WBC: CPT | Performed by: INTERNAL MEDICINE

## 2023-03-15 PROCEDURE — 86900 BLOOD TYPING SEROLOGIC ABO: CPT | Performed by: INTERNAL MEDICINE

## 2023-03-16 LAB
ABO + RH BLD: NORMAL
ANISOCYTOSIS BLD QL SMEAR: SLIGHT
BASO STIPL BLD QL SMEAR: ABNORMAL
BASOPHILS # BLD AUTO: 0.04 K/UL (ref 0–0.2)
BASOPHILS NFR BLD: 0.8 % (ref 0–1.9)
BLD GP AB SCN CELLS X3 SERPL QL: NORMAL
DIFFERENTIAL METHOD: ABNORMAL
EOSINOPHIL # BLD AUTO: 0.4 K/UL (ref 0–0.5)
EOSINOPHIL NFR BLD: 8.1 % (ref 0–8)
ERYTHROCYTE [DISTWIDTH] IN BLOOD BY AUTOMATED COUNT: 21.3 % (ref 11.5–14.5)
FERRITIN SERPL-MCNC: 15 NG/ML (ref 20–300)
HCT VFR BLD AUTO: 25.9 % (ref 40–54)
HGB BLD-MCNC: 6.9 G/DL (ref 14–18)
HYPOCHROMIA BLD QL SMEAR: ABNORMAL
IMM GRANULOCYTES # BLD AUTO: 0.01 K/UL (ref 0–0.04)
IMM GRANULOCYTES NFR BLD AUTO: 0.2 % (ref 0–0.5)
IRON SERPL-MCNC: 17 UG/DL (ref 45–160)
LYMPHOCYTES # BLD AUTO: 1.1 K/UL (ref 1–4.8)
LYMPHOCYTES NFR BLD: 20.3 % (ref 18–48)
MCH RBC QN AUTO: 23.2 PG (ref 27–31)
MCHC RBC AUTO-ENTMCNC: 26.6 G/DL (ref 32–36)
MCV RBC AUTO: 87 FL (ref 82–98)
MONOCYTES # BLD AUTO: 0.5 K/UL (ref 0.3–1)
MONOCYTES NFR BLD: 9.8 % (ref 4–15)
NEUTROPHILS # BLD AUTO: 3.2 K/UL (ref 1.8–7.7)
NEUTROPHILS NFR BLD: 60.8 % (ref 38–73)
NRBC BLD-RTO: 0 /100 WBC
PLATELET # BLD AUTO: 293 K/UL (ref 150–450)
PLATELET BLD QL SMEAR: ABNORMAL
PMV BLD AUTO: 10.6 FL (ref 9.2–12.9)
POLYCHROMASIA BLD QL SMEAR: ABNORMAL
RBC # BLD AUTO: 2.97 M/UL (ref 4.6–6.2)
RETICS/RBC NFR AUTO: 2 % (ref 0.4–2)
SATURATED IRON: 4 % (ref 20–50)
TOTAL IRON BINDING CAPACITY: 466 UG/DL (ref 250–450)
TRANSFERRIN SERPL-MCNC: 315 MG/DL (ref 200–375)
WBC # BLD AUTO: 5.21 K/UL (ref 3.9–12.7)

## 2023-03-22 ENCOUNTER — LAB VISIT (OUTPATIENT)
Dept: LAB | Facility: HOSPITAL | Age: 58
End: 2023-03-22
Attending: INTERNAL MEDICINE
Payer: MEDICAID

## 2023-03-22 DIAGNOSIS — D50.0 IRON DEFICIENCY ANEMIA DUE TO CHRONIC BLOOD LOSS: ICD-10-CM

## 2023-03-22 PROCEDURE — 36415 COLL VENOUS BLD VENIPUNCTURE: CPT | Performed by: INTERNAL MEDICINE

## 2023-03-22 PROCEDURE — 84466 ASSAY OF TRANSFERRIN: CPT | Performed by: INTERNAL MEDICINE

## 2023-03-22 PROCEDURE — 85025 COMPLETE CBC W/AUTO DIFF WBC: CPT | Performed by: INTERNAL MEDICINE

## 2023-03-22 PROCEDURE — 82728 ASSAY OF FERRITIN: CPT | Performed by: INTERNAL MEDICINE

## 2023-03-22 PROCEDURE — 86900 BLOOD TYPING SEROLOGIC ABO: CPT | Performed by: INTERNAL MEDICINE

## 2023-03-22 PROCEDURE — 85045 AUTOMATED RETICULOCYTE COUNT: CPT | Performed by: INTERNAL MEDICINE

## 2023-03-23 LAB
ABO + RH BLD: NORMAL
BASOPHILS # BLD AUTO: 0.06 K/UL (ref 0–0.2)
BASOPHILS NFR BLD: 1.1 % (ref 0–1.9)
BLD GP AB SCN CELLS X3 SERPL QL: NORMAL
DIFFERENTIAL METHOD: ABNORMAL
EOSINOPHIL # BLD AUTO: 0.1 K/UL (ref 0–0.5)
EOSINOPHIL NFR BLD: 2.7 % (ref 0–8)
ERYTHROCYTE [DISTWIDTH] IN BLOOD BY AUTOMATED COUNT: 20.7 % (ref 11.5–14.5)
FERRITIN SERPL-MCNC: 6 NG/ML (ref 20–300)
HCT VFR BLD AUTO: 24.4 % (ref 40–54)
HGB BLD-MCNC: 6.6 G/DL (ref 14–18)
IMM GRANULOCYTES # BLD AUTO: 0.01 K/UL (ref 0–0.04)
IMM GRANULOCYTES NFR BLD AUTO: 0.2 % (ref 0–0.5)
IRON SERPL-MCNC: 15 UG/DL (ref 45–160)
LYMPHOCYTES # BLD AUTO: 1.6 K/UL (ref 1–4.8)
LYMPHOCYTES NFR BLD: 30.5 % (ref 18–48)
MCH RBC QN AUTO: 22.2 PG (ref 27–31)
MCHC RBC AUTO-ENTMCNC: 27 G/DL (ref 32–36)
MCV RBC AUTO: 82 FL (ref 82–98)
MONOCYTES # BLD AUTO: 0.5 K/UL (ref 0.3–1)
MONOCYTES NFR BLD: 9.1 % (ref 4–15)
NEUTROPHILS # BLD AUTO: 3 K/UL (ref 1.8–7.7)
NEUTROPHILS NFR BLD: 56.4 % (ref 38–73)
NRBC BLD-RTO: 0 /100 WBC
PLATELET # BLD AUTO: 315 K/UL (ref 150–450)
PMV BLD AUTO: 10.7 FL (ref 9.2–12.9)
RBC # BLD AUTO: 2.97 M/UL (ref 4.6–6.2)
RETICS/RBC NFR AUTO: 1.7 % (ref 0.4–2)
SATURATED IRON: 3 % (ref 20–50)
SPECIMEN OUTDATE: NORMAL
TOTAL IRON BINDING CAPACITY: 477 UG/DL (ref 250–450)
TRANSFERRIN SERPL-MCNC: 322 MG/DL (ref 200–375)
WBC # BLD AUTO: 5.28 K/UL (ref 3.9–12.7)

## 2023-03-28 ENCOUNTER — TELEPHONE (OUTPATIENT)
Dept: HEMATOLOGY/ONCOLOGY | Facility: CLINIC | Age: 58
End: 2023-03-28
Payer: MEDICAID

## 2023-03-29 ENCOUNTER — LAB VISIT (OUTPATIENT)
Dept: LAB | Facility: HOSPITAL | Age: 58
End: 2023-03-29
Attending: INTERNAL MEDICINE
Payer: MEDICAID

## 2023-03-29 ENCOUNTER — PATIENT MESSAGE (OUTPATIENT)
Dept: HEMATOLOGY/ONCOLOGY | Facility: CLINIC | Age: 58
End: 2023-03-29
Payer: MEDICAID

## 2023-03-29 ENCOUNTER — TELEPHONE (OUTPATIENT)
Dept: HEMATOLOGY/ONCOLOGY | Facility: CLINIC | Age: 58
End: 2023-03-29
Payer: MEDICAID

## 2023-03-29 DIAGNOSIS — D50.0 IRON DEFICIENCY ANEMIA DUE TO CHRONIC BLOOD LOSS: ICD-10-CM

## 2023-03-29 DIAGNOSIS — C90.00 MULTIPLE MYELOMA NOT HAVING ACHIEVED REMISSION: ICD-10-CM

## 2023-03-29 LAB
ALBUMIN SERPL BCP-MCNC: 3.9 G/DL (ref 3.5–5.2)
ALP SERPL-CCNC: 35 U/L (ref 55–135)
ALT SERPL W/O P-5'-P-CCNC: 13 U/L (ref 10–44)
ANION GAP SERPL CALC-SCNC: 7 MMOL/L (ref 8–16)
ANISOCYTOSIS BLD QL SMEAR: SLIGHT
AST SERPL-CCNC: 12 U/L (ref 10–40)
BASOPHILS # BLD AUTO: 0.07 K/UL (ref 0–0.2)
BASOPHILS NFR BLD: 1.3 % (ref 0–1.9)
BILIRUB SERPL-MCNC: 0.4 MG/DL (ref 0.1–1)
BUN SERPL-MCNC: 15 MG/DL (ref 6–20)
CALCIUM SERPL-MCNC: 8.6 MG/DL (ref 8.7–10.5)
CHLORIDE SERPL-SCNC: 107 MMOL/L (ref 95–110)
CO2 SERPL-SCNC: 24 MMOL/L (ref 23–29)
CREAT SERPL-MCNC: 0.9 MG/DL (ref 0.5–1.4)
DACRYOCYTES BLD QL SMEAR: ABNORMAL
DIFFERENTIAL METHOD: ABNORMAL
EOSINOPHIL # BLD AUTO: 0.1 K/UL (ref 0–0.5)
EOSINOPHIL NFR BLD: 2.5 % (ref 0–8)
ERYTHROCYTE [DISTWIDTH] IN BLOOD BY AUTOMATED COUNT: 20.8 % (ref 11.5–14.5)
EST. GFR  (NO RACE VARIABLE): >60 ML/MIN/1.73 M^2
GIANT PLATELETS BLD QL SMEAR: PRESENT
GLUCOSE SERPL-MCNC: 101 MG/DL (ref 70–110)
HCT VFR BLD AUTO: 20.8 % (ref 40–54)
HGB BLD-MCNC: 5.7 G/DL (ref 14–18)
HYPOCHROMIA BLD QL SMEAR: ABNORMAL
IMM GRANULOCYTES # BLD AUTO: 0.01 K/UL (ref 0–0.04)
IMM GRANULOCYTES NFR BLD AUTO: 0.2 % (ref 0–0.5)
IRON SERPL-MCNC: 14 UG/DL (ref 45–160)
LDH SERPL L TO P-CCNC: 115 U/L (ref 110–260)
LYMPHOCYTES # BLD AUTO: 1.3 K/UL (ref 1–4.8)
LYMPHOCYTES NFR BLD: 23.6 % (ref 18–48)
MCH RBC QN AUTO: 21.5 PG (ref 27–31)
MCHC RBC AUTO-ENTMCNC: 27.4 G/DL (ref 32–36)
MCV RBC AUTO: 79 FL (ref 82–98)
MONOCYTES # BLD AUTO: 0.6 K/UL (ref 0.3–1)
MONOCYTES NFR BLD: 9.9 % (ref 4–15)
NEUTROPHILS # BLD AUTO: 3.5 K/UL (ref 1.8–7.7)
NEUTROPHILS NFR BLD: 62.5 % (ref 38–73)
NRBC BLD-RTO: 0 /100 WBC
OVALOCYTES BLD QL SMEAR: ABNORMAL
PLATELET # BLD AUTO: 288 K/UL (ref 150–450)
PLATELET BLD QL SMEAR: ABNORMAL
PMV BLD AUTO: 9.2 FL (ref 9.2–12.9)
POIKILOCYTOSIS BLD QL SMEAR: SLIGHT
POLYCHROMASIA BLD QL SMEAR: ABNORMAL
POTASSIUM SERPL-SCNC: 4 MMOL/L (ref 3.5–5.1)
PROT SERPL-MCNC: 8.1 G/DL (ref 6–8.4)
RBC # BLD AUTO: 2.65 M/UL (ref 4.6–6.2)
SATURATED IRON: 3 % (ref 20–50)
SODIUM SERPL-SCNC: 138 MMOL/L (ref 136–145)
STOMATOCYTES BLD QL SMEAR: PRESENT
TOTAL IRON BINDING CAPACITY: 519 UG/DL (ref 250–450)
TRANSFERRIN SERPL-MCNC: 351 MG/DL (ref 200–375)
WBC # BLD AUTO: 5.55 K/UL (ref 3.9–12.7)

## 2023-03-29 PROCEDURE — 84466 ASSAY OF TRANSFERRIN: CPT | Performed by: INTERNAL MEDICINE

## 2023-03-29 PROCEDURE — 82728 ASSAY OF FERRITIN: CPT | Performed by: INTERNAL MEDICINE

## 2023-03-29 PROCEDURE — 83615 LACTATE (LD) (LDH) ENZYME: CPT | Performed by: INTERNAL MEDICINE

## 2023-03-29 PROCEDURE — 80053 COMPREHEN METABOLIC PANEL: CPT | Performed by: INTERNAL MEDICINE

## 2023-03-29 PROCEDURE — 36415 COLL VENOUS BLD VENIPUNCTURE: CPT | Performed by: INTERNAL MEDICINE

## 2023-03-29 PROCEDURE — 83010 ASSAY OF HAPTOGLOBIN QUANT: CPT | Performed by: INTERNAL MEDICINE

## 2023-03-29 PROCEDURE — 85025 COMPLETE CBC W/AUTO DIFF WBC: CPT | Performed by: INTERNAL MEDICINE

## 2023-03-30 LAB
FERRITIN SERPL-MCNC: 3 NG/ML (ref 20–300)
HAPTOGLOB SERPL-MCNC: 114 MG/DL (ref 30–250)

## 2023-03-31 ENCOUNTER — TELEPHONE (OUTPATIENT)
Dept: HEMATOLOGY/ONCOLOGY | Facility: CLINIC | Age: 58
End: 2023-03-31
Payer: MEDICAID

## 2023-03-31 NOTE — TELEPHONE ENCOUNTER
----- Message from John Miranda sent at 3/31/2023  4:27 PM CDT -----  Type:  Sooner Apoointment Request    Caller is requesting a sooner appointment.  Caller declined first available appointment listed below.  Caller will not accept being placed on the waitlist and is requesting a message be sent to doctor.  Name of Caller:pt   When is the first available appointment?books are closed   Symptoms:follow up blood count low  Would the patient rather a call back or a response via [facility]? call  Best Call Back Number:[phone]  Additional Information: pt stated he can do a virtual appt Monday

## 2023-04-03 ENCOUNTER — OFFICE VISIT (OUTPATIENT)
Dept: HEMATOLOGY/ONCOLOGY | Facility: CLINIC | Age: 58
End: 2023-04-03
Payer: MEDICAID

## 2023-04-03 DIAGNOSIS — D50.0 IRON DEFICIENCY ANEMIA DUE TO CHRONIC BLOOD LOSS: ICD-10-CM

## 2023-04-03 DIAGNOSIS — D50.0 IRON DEFICIENCY ANEMIA DUE TO CHRONIC BLOOD LOSS: Primary | ICD-10-CM

## 2023-04-03 DIAGNOSIS — D49.89 PLASMA CELL NEOPLASM: Primary | ICD-10-CM

## 2023-04-03 PROCEDURE — 99214 PR OFFICE/OUTPT VISIT, EST, LEVL IV, 30-39 MIN: ICD-10-PCS | Mod: 95,,, | Performed by: NURSE PRACTITIONER

## 2023-04-03 PROCEDURE — 99214 OFFICE O/P EST MOD 30 MIN: CPT | Mod: 95,,, | Performed by: NURSE PRACTITIONER

## 2023-04-03 RX ORDER — ACETAMINOPHEN 325 MG/1
650 TABLET ORAL
Status: CANCELLED | OUTPATIENT
Start: 2023-04-03

## 2023-04-03 RX ORDER — HYDROCODONE BITARTRATE AND ACETAMINOPHEN 500; 5 MG/1; MG/1
TABLET ORAL ONCE
Status: CANCELLED | OUTPATIENT
Start: 2023-04-03 | End: 2023-04-03

## 2023-04-03 RX ORDER — DIPHENHYDRAMINE HCL 25 MG
25 CAPSULE ORAL
Status: CANCELLED | OUTPATIENT
Start: 2023-04-03

## 2023-04-03 NOTE — PROGRESS NOTES
The patient location is: home  The chief complaint leading to consultation is: plasma cell neoplasm, follow up    Visit type: audiovisual    Face to Face time with patient: 30 minutes  45 minutes of total time spent on the encounter, which includes face to face time and non-face to face time preparing to see the patient (eg, review of tests), Obtaining and/or reviewing separately obtained history, Documenting clinical information in the electronic or other health record, Independently interpreting results (not separately reported) and communicating results to the patient/family/caregiver, or Care coordination (not separately reported).         Each patient to whom he or she provides medical services by telemedicine is:  (1) informed of the relationship between the physician and patient and the respective role of any other health care provider with respect to management of the patient; and (2) notified that he or she may decline to receive medical services by telemedicine and may withdraw from such care at any time.    Notes:      Patient ID: Shawn Carreno is a 57 y.o. male with plasma cell neoplasm here for hematology follow up  Chief Complaint: Multiple Myeloma (Follow Up)      HPI:   is a 57-year-old male is here for virtual visit. He is a patient of Dr.Burke Cesar.  He has no significant medical problems except for anemia. He presented with severe anemia in January 2022. Her received multiple PRBCs at the time. No overt bleeding. Stool OB was negative in Jan 2022.  He was severely iron deficient, with a ferritin of 2ng/ml.  He was previously treated with 2 doses of injectafer, last dose on 6/20/22. He had EGD on 4/6/22 that was normal, VCE 2/22/22 showed angioectasia that was non-bleeding, and colonoscopy 1/5/22 with polyp and diverticulosis. It was , recommended that he have 5 year follow up. He has been taking oral iron .   He again required PRBCs on 11/28/22 for a Hgb of 7.1g/dl.    He  denies bone pain/ back pain. He has some recent onset weight loss and diminished appetite.   He had a bone marrow biopsy, 24hr UPEP/UIFE, PET CT.     Interval History:  presents virtually for follow up visit. He has fatigue. He denies overt bleeding. He is eating fairly well. Required transfusion support in between. Recent hgb -5.7, refused transfusion until he see . Recent Marrow with stable findings. Patient feels fine except fatigue. Advised patient to ED visit for worsening symptoms, chest pain or any acute issues.  Past Medical History:   Diagnosis Date    Multiple myeloma not having achieved remission 10/12/2022       Past Surgical History:   Procedure Laterality Date    CHOLECYSTECTOMY  2020    COLONOSCOPY N/A 1/5/2022    Procedure: COLONOSCOPY;  Surgeon: Hannah Alvarado MD;  Location: [facility];  Service: Endoscopy;  Laterality: N/A;    ESOPHAGOGASTRODUODENOSCOPY N/A 1/5/2022    Procedure: EGD (ESOPHAGOGASTRODUODENOSCOPY);  Surgeon: Hannah Alvarado MD;  Location: [facility];  Service: Endoscopy;  Laterality: N/A;    ESOPHAGOGASTRODUODENOSCOPY N/A 4/6/2022    Procedure: EGD (ESOPHAGOGASTRODUODENOSCOPY) with push;  Surgeon: Vira Castaneda MD;  Location: [facility];  Service: Endoscopy;  Laterality: N/A;    INTRALUMINAL GASTROINTESTINAL TRACT IMAGING VIA CAPSULE N/A 1/26/2022    Procedure: IMAGING PROCEDURE, GI TRACT, INTRALUMINAL, VIA CAPSULE;  Surgeon: Baltazar Chaves RN;  Location: [facility];  Service: Endoscopy;  Laterality: N/A;    INTRALUMINAL GASTROINTESTINAL TRACT IMAGING VIA CAPSULE N/A 2/22/2022    Procedure: IMAGING PROCEDURE, GI TRACT, INTRALUMINAL, VIA CAPSULE;  Surgeon: Baltazar Chaves RN;  Location: [facility];  Service: Endoscopy;  Laterality: N/A;       Social History     Socioeconomic History    Marital status: Single   Tobacco Use    Smoking status: Never    Smokeless tobacco: Never   Substance and Sexual Activity    Alcohol use: Never    Drug use: Never     Sexual activity: Not Currently       Review of patient's allergies indicates:  No Known Allergies    Current Outpatient Medications   Medication Sig    ferrous sulfate 324 mg (65 mg iron) TbEC Take 1 tablet (324 mg total) by mouth once daily. (Patient not taking: Reported on 2/15/2023)     No current facility-administered medications for this visit.     Facility-Administered Medications Ordered in Other Visits   Medication    lactated ringers infusion        Review of Systems   Constitutional:  Positive for activity change, appetite change and fatigue. Negative for chills, diaphoresis, fever and unexpected weight change.   HENT:  Negative for congestion, dental problem, drooling, ear discharge, ear pain, facial swelling, hearing loss, mouth sores, nosebleeds, postnasal drip, rhinorrhea, sinus pressure, sneezing, sore throat, tinnitus, trouble swallowing and voice change.    Eyes:  Negative for photophobia, pain, discharge, redness, itching and visual disturbance.   Respiratory:  Negative for apnea, cough, choking, chest tightness, shortness of breath, wheezing and stridor.    Cardiovascular:  Negative for chest pain, palpitations and leg swelling.   Gastrointestinal:  Negative for abdominal distention, abdominal pain, anal bleeding, blood in stool, constipation, diarrhea, nausea, rectal pain and vomiting.   Endocrine: Negative for cold intolerance, heat intolerance, polydipsia, polyphagia and polyuria.   Genitourinary:  Negative for decreased urine volume, difficulty urinating, dysuria, enuresis, flank pain, frequency, genital sores, hematuria, penile discharge, penile pain, penile swelling, scrotal swelling, testicular pain and urgency.   Musculoskeletal:  Negative for arthralgias, back pain, gait problem, joint swelling, myalgias, neck pain and neck stiffness.   Skin:  Negative for color change, pallor, rash and wound.   Allergic/Immunologic: Negative for environmental allergies, food allergies and  immunocompromised state.   Neurological:  Positive for weakness. Negative for dizziness, tremors, seizures, syncope, facial asymmetry, speech difficulty, light-headedness, numbness and headaches.   Hematological:  Negative for adenopathy. Does not bruise/bleed easily.   Psychiatric/Behavioral:  Negative for agitation, behavioral problems, confusion, decreased concentration, dysphoric mood, hallucinations, self-injury, sleep disturbance and suicidal ideas. The patient is nervous/anxious. The patient is not hyperactive.          Labs:  Component      Latest Ref Rng & Units 9/15/2022   [place] Free Light Chains      0.33 - 1.94 mg/dL 2.93 (H)   Lambda Free Light Chains      0.57 - 2.63 mg/dL 1.52   Kappa/Lambda FLC Ratio      0.26 - 1.65 1.93 (H)   IgG      650 - 1600 mg/dL 2175 (H)   IgA      40 - 350 mg/dL 127   IgM      50 - 300 mg/dL 63   Folate      4.0 - 24.0 ng/mL 12.2   Haptoglobin      30 - 250 mg/dL 109   HIV 1/2 Ag/Ab      Non-reactive Non-reactive   Vitamin B-12      210 - 950 pg/mL 283   TSH      0.400 - 4.000 uIU/mL 2.047   Retic      0.4 - 2.0 % 3.6 (H)     9/15/22 Normal total protein. Normal gamma globulins are decreased. There is a paraprotein band in gamma = 1.46 g/dL.   9/15/22 sIFE: IgG kappa specific monoclonal band present    9/26/22 RIGHT ILIAC CREST BONE MARROW ASPIRATE, BONE MARROW CLOT, AND BONE MARROW CORE BIOPSY WITH:     CELLULARITY=70-80%, TRILINEAGE HEMATOPOIETIC ACTIVITY (M/E=1.2:1).   CONSISTENT WITH PLASMA CELL NEOPLASM (25-30%).  SEE COMMENT.   GRADE 1 RETICULAR FIBROSIS.   CONGO RED NEGATIVE.   STORAGE IRON NOT IDENTIFIED.   ADEQUATE NUMBER OF MEGAKARYOCYTES.    Flow cytometric analysis of bone marrow detects a kappa restricted plasma cell population that expresses , CD20 (partial and dim), and CD38.  CD56   is positive.  CD19 is negative.   Flow differential:  Lymphocytes 15.1%, Monocytes 4.2%, Granulocytes  72.3%, Blast  2.2%, Debris/nRBC 4.3%,  Viability 98.4%.    Immunohistochemical studies were performed on the core biopsy for greater sensitivity and further architecture evaluation with adequate positive and   negative controls.  Scattered  T cells  are CD3 positive.  About 25-30% plasma cells ( positive, CD20 focal positive, cyclin D1 negative) are noted.   Special stain for Congo red is negative with adequate positive and negative controls.   Findings are consistent with plasma cell neoplasm.  Correlate clinically and with a cytogenetics report.     Myeloma Fixed Cell, High Risk, FISH     Result Summary        :       Normal                           Interpretation    :   The result is within normal limits for 1q duplication, TP53 deletion and IGH rearrangement.       10/13/22 UIFE: No definitive monoclonal peaks identified    10/19/22 WB PET CT    COMPARISON:  None.     FINDINGS:  Head/neck: There is normal physiologic uptake noted within the visualized brain parenchyma. No FDG avid adenopathy within the neck.     Chest: No FDG avid pulmonary nodules/masses. No FDG avid mediastinal, hilar or axillary lymph nodes.     Abdomen/Pelvis: Normal physiologic uptake noted within the liver, spleen, urinary tract, and bowel.The adrenals are unremarkable in appearance.  The patient is s/p cholecystectomy.  No FDG avid lymph nodes identified.  There is mild diverticulosis of the sigmoid colon.     Skeletal: No FDG avid osseus lesions identified.     Impression:     1. No suspicious FDG uptake identified.     1/5/23 SPEP: Normal total protein. Normal gamma globulins are decreased. There is a paraprotein band in gamma = 1.50 g/dL, previously 1.46 g/dL.        Lab Results   Component Value Date    WBC 5.55 03/29/2023    HGB 5.7 (LL) 03/29/2023    HCT 20.8 (L) 03/29/2023    MCV 79 (L) 03/29/2023     03/29/2023       CMP  Sodium   Date Value Ref Range Status   03/29/2023 138 136 - 145 mmol/L Final     Potassium   Date Value Ref Range Status   03/29/2023 4.0 3.5 - 5.1 mmol/L  Final     Chloride   Date Value Ref Range Status   03/29/2023 107 95 - 110 mmol/L Final     CO2   Date Value Ref Range Status   03/29/2023 24 23 - 29 mmol/L Final     Glucose   Date Value Ref Range Status   03/29/2023 101 70 - 110 mg/dL Final     BUN   Date Value Ref Range Status   03/29/2023 15 6 - 20 mg/dL Final     Creatinine   Date Value Ref Range Status   03/29/2023 0.9 0.5 - 1.4 mg/dL Final     Calcium   Date Value Ref Range Status   03/29/2023 8.6 (L) 8.7 - 10.5 mg/dL Final     Total Protein   Date Value Ref Range Status   03/29/2023 8.1 6.0 - 8.4 g/dL Final     Albumin   Date Value Ref Range Status   03/29/2023 3.9 3.5 - 5.2 g/dL Final     Total Bilirubin   Date Value Ref Range Status   03/29/2023 0.4 0.1 - 1.0 mg/dL Final     Comment:     For infants and newborns, interpretation of results should be based  on gestational age, weight and in agreement with clinical  observations.    Premature Infant recommended reference ranges:  Up to 24 hours.............<8.0 mg/dL  Up to 48 hours............<12.0 mg/dL  3-5 days..................<15.0 mg/dL  6-29 days.................<15.0 mg/dL       Alkaline Phosphatase   Date Value Ref Range Status   03/29/2023 35 (L) 55 - 135 U/L Final     AST   Date Value Ref Range Status   03/29/2023 12 10 - 40 U/L Final     ALT   Date Value Ref Range Status   03/29/2023 13 10 - 44 U/L Final     Anion Gap   Date Value Ref Range Status   03/29/2023 7 (L) 8 - 16 mmol/L Final     eGFR   Date Value Ref Range Status   03/29/2023 >60 >60 mL/min/1.73 m^2 Final         12/7/22 MRI cervical spine    Impression:     No significant infiltrating marrow disease.  Cervical degenerative disc disease most pronounced at C5-6 disc level as above.     1/18/23 MRI thoracic  spine    Impression:     Normal MRI of the thoracic spine. No evidence of thoracic disc herniation.    1/9/23 MRI left hip    Impression:     Diffuse red marrow reconversion.  Small fluid-filled joint recess versus ganglion cyst  inferior margin acetabulum, 1.2 cm.  Otherwise, unremarkable MR right hip.    1/9/23 MRI right hip    Impression:     Diffuse red marrow reconversion.  Small fluid-filled joint recess versus ganglion cyst inferior margin acetabulum, 1.2 cm.  Otherwise, unremarkable MR right hip.    1/18/23 MRI lumbar spine     Impression:     Normal MRI of the lumbar spine. No evidence of lumbar disc herniation  Plan:     1. Plasma cell neoplasm  2. Microcytic anemia  3. Fatigue  4. Iron deficiency      Plan:    1,2,3: -SPEP/MALI showed a IgG kappa monoclonal protein on 9/25/22, measuring 1.46g/dl. sFLC ratio was mildly elevated at 1.93.  -He has 25-30% clonal plasma cells in bone marrow biopsy done on 9/26/22. Normal renal function and normal serum creatinine. No hypermetabolic /lytic lesions on PET CT done on 10/19/22. 24hr UPEP/UIFE negative for monoclonal protein. Repeated marrow on 02/2023 with 5-10%   involvement by plasma cell neoplasm   -He has one CRAB criteria , that is anemia. CBC on 11/28/22 showed a microcytic picture, suggesting iron deficiency.   Receiving PRBC transfusions in between. Recent hgb- 5.7, refused transfusion at this time. No overt bleeding.    -M Sebastian on SPEP on 1/5/23 was 1.5g/dl, previously 1.46g/dl, stable from Sept 2022.   -No renal impairment/ hypercalcemia on 03/29/23.   -No evidence of hemolysis ( bilirubin, LDH, haptoglobin all normal)  -RBC indices still show hypochromia as of 03/29/23  -TIBC high,. Serum iron saturation low, serum iron low ion 1/14/23, AFTER 2 units of PRBC transfusion on 1/5/23. Repeated Ferritin -3. Patient to receive iron infusion locally.  -No enhancing/ lytic lesions noted on MRI hips done on 1/9/23, or MRI cervical/ thoracic /lumbar spines done ion 1/18/23  - Repeated Marrow on 02/2023 with 5-10% plasma cells, previously 25-30%. Not recommended induction chemo therapy at this time. Patient to receive iron replacement therapy. He need to set up GI locally  Continue weekly  cbc, type&screen weekly - Following  locally    BMT Chart Routing      Follow up with physician .  in 3 month   Follow up with [facility]    Provider visit type    Infusion scheduling note    Injection scheduling note    Labs   Scheduling:  Preferred lab:  Lab interval:  Cbc, cmp, ferritin, type and screen, light chains, SPEP, MALI in 3 month. Cbc, type and screen weekly   Imaging    Pharmacy appointment    Other referrals           Case discussed with  who agreed with plan  Cherelle Gómez NP  Hematology/Oncology/BMT

## 2023-04-10 ENCOUNTER — LAB VISIT (OUTPATIENT)
Dept: LAB | Facility: HOSPITAL | Age: 58
End: 2023-04-10
Attending: INTERNAL MEDICINE
Payer: MEDICAID

## 2023-04-10 ENCOUNTER — OFFICE VISIT (OUTPATIENT)
Dept: HEMATOLOGY/ONCOLOGY | Facility: CLINIC | Age: 58
End: 2023-04-10
Payer: MEDICAID

## 2023-04-10 VITALS
OXYGEN SATURATION: 100 % | WEIGHT: 193.81 LBS | DIASTOLIC BLOOD PRESSURE: 64 MMHG | TEMPERATURE: 98 F | HEART RATE: 86 BPM | RESPIRATION RATE: 18 BRPM | HEIGHT: 67 IN | SYSTOLIC BLOOD PRESSURE: 112 MMHG | BODY MASS INDEX: 30.42 KG/M2

## 2023-04-10 DIAGNOSIS — D49.89 PLASMA CELL NEOPLASM: ICD-10-CM

## 2023-04-10 DIAGNOSIS — D64.9 SYMPTOMATIC ANEMIA: ICD-10-CM

## 2023-04-10 DIAGNOSIS — D50.0 IRON DEFICIENCY ANEMIA DUE TO CHRONIC BLOOD LOSS: ICD-10-CM

## 2023-04-10 DIAGNOSIS — L03.221 CELLULITIS OF NECK: ICD-10-CM

## 2023-04-10 DIAGNOSIS — R53.82 CHRONIC FATIGUE: ICD-10-CM

## 2023-04-10 DIAGNOSIS — D50.0 IRON DEFICIENCY ANEMIA DUE TO CHRONIC BLOOD LOSS: Primary | ICD-10-CM

## 2023-04-10 LAB
ABO + RH BLD: NORMAL
BLD GP AB SCN CELLS X3 SERPL QL: NORMAL
SPECIMEN OUTDATE: NORMAL

## 2023-04-10 PROCEDURE — 99999 PR PBB SHADOW E&M-EST. PATIENT-LVL III: CPT | Mod: PBBFAC,,, | Performed by: INTERNAL MEDICINE

## 2023-04-10 PROCEDURE — 99213 OFFICE O/P EST LOW 20 MIN: CPT | Mod: PBBFAC | Performed by: INTERNAL MEDICINE

## 2023-04-10 PROCEDURE — 3074F SYST BP LT 130 MM HG: CPT | Mod: CPTII,,, | Performed by: INTERNAL MEDICINE

## 2023-04-10 PROCEDURE — 3074F PR MOST RECENT SYSTOLIC BLOOD PRESSURE < 130 MM HG: ICD-10-PCS | Mod: CPTII,,, | Performed by: INTERNAL MEDICINE

## 2023-04-10 PROCEDURE — 99215 PR OFFICE/OUTPT VISIT, EST, LEVL V, 40-54 MIN: ICD-10-PCS | Mod: S$PBB,,, | Performed by: INTERNAL MEDICINE

## 2023-04-10 PROCEDURE — 1159F PR MEDICATION LIST DOCUMENTED IN MEDICAL RECORD: ICD-10-PCS | Mod: CPTII,,, | Performed by: INTERNAL MEDICINE

## 2023-04-10 PROCEDURE — 99999 PR PBB SHADOW E&M-EST. PATIENT-LVL III: ICD-10-PCS | Mod: PBBFAC,,, | Performed by: INTERNAL MEDICINE

## 2023-04-10 PROCEDURE — 86920 COMPATIBILITY TEST SPIN: CPT | Performed by: INTERNAL MEDICINE

## 2023-04-10 PROCEDURE — 3078F PR MOST RECENT DIASTOLIC BLOOD PRESSURE < 80 MM HG: ICD-10-PCS | Mod: CPTII,,, | Performed by: INTERNAL MEDICINE

## 2023-04-10 PROCEDURE — 1160F PR REVIEW ALL MEDS BY PRESCRIBER/CLIN PHARMACIST DOCUMENTED: ICD-10-PCS | Mod: CPTII,,, | Performed by: INTERNAL MEDICINE

## 2023-04-10 PROCEDURE — 3008F PR BODY MASS INDEX (BMI) DOCUMENTED: ICD-10-PCS | Mod: CPTII,,, | Performed by: INTERNAL MEDICINE

## 2023-04-10 PROCEDURE — 1159F MED LIST DOCD IN RCRD: CPT | Mod: CPTII,,, | Performed by: INTERNAL MEDICINE

## 2023-04-10 PROCEDURE — 86900 BLOOD TYPING SEROLOGIC ABO: CPT | Performed by: INTERNAL MEDICINE

## 2023-04-10 PROCEDURE — 1160F RVW MEDS BY RX/DR IN RCRD: CPT | Mod: CPTII,,, | Performed by: INTERNAL MEDICINE

## 2023-04-10 PROCEDURE — 99215 OFFICE O/P EST HI 40 MIN: CPT | Mod: S$PBB,,, | Performed by: INTERNAL MEDICINE

## 2023-04-10 PROCEDURE — 36415 COLL VENOUS BLD VENIPUNCTURE: CPT | Performed by: INTERNAL MEDICINE

## 2023-04-10 PROCEDURE — 3078F DIAST BP <80 MM HG: CPT | Mod: CPTII,,, | Performed by: INTERNAL MEDICINE

## 2023-04-10 PROCEDURE — 3008F BODY MASS INDEX DOCD: CPT | Mod: CPTII,,, | Performed by: INTERNAL MEDICINE

## 2023-04-10 RX ORDER — DIPHENHYDRAMINE HCL 25 MG
25 CAPSULE ORAL
Status: CANCELLED | OUTPATIENT
Start: 2023-04-10

## 2023-04-10 RX ORDER — SULFAMETHOXAZOLE AND TRIMETHOPRIM 400; 80 MG/1; MG/1
1 TABLET ORAL 2 TIMES DAILY
Qty: 20 TABLET | Refills: 0 | Status: SHIPPED | OUTPATIENT
Start: 2023-04-10 | End: 2023-04-20

## 2023-04-10 RX ORDER — HYDROCODONE BITARTRATE AND ACETAMINOPHEN 500; 5 MG/1; MG/1
TABLET ORAL ONCE
Status: CANCELLED | OUTPATIENT
Start: 2023-04-10 | End: 2023-04-10

## 2023-04-10 RX ORDER — ACETAMINOPHEN 325 MG/1
650 TABLET ORAL
Status: CANCELLED | OUTPATIENT
Start: 2023-04-10

## 2023-04-10 NOTE — PROGRESS NOTES
Subjective:       Patient ID: Shawn Carreno is a 57 y.o. male.    Chief Complaint: Results and Anemia    HPI:  57-year-old male history of recurrent GI blood loss.  Receiving intravenous iron.  In addition patient has monoclonal gammopathy of undetermined significance.  Patient is complaining about tenderness over the right posterior year near hairline ECOG status 1    Past Medical History:   Diagnosis Date    Multiple myeloma not having achieved remission 10/12/2022     History reviewed. No pertinent family history.  Social History     Socioeconomic History    Marital status: Single   Tobacco Use    Smoking status: Never    Smokeless tobacco: Never   Substance and Sexual Activity    Alcohol use: Never    Drug use: Never    Sexual activity: Not Currently     Past Surgical History:   Procedure Laterality Date    CHOLECYSTECTOMY  2020    COLONOSCOPY N/A 1/5/2022    Procedure: COLONOSCOPY;  Surgeon: Hannah Alvarado MD;  Location: [facility];  Service: Endoscopy;  Laterality: N/A;    ESOPHAGOGASTRODUODENOSCOPY N/A 1/5/2022    Procedure: EGD (ESOPHAGOGASTRODUODENOSCOPY);  Surgeon: Hannah Alvarado MD;  Location: [facility];  Service: Endoscopy;  Laterality: N/A;    ESOPHAGOGASTRODUODENOSCOPY N/A 4/6/2022    Procedure: EGD (ESOPHAGOGASTRODUODENOSCOPY) with push;  Surgeon: Vira Castaneda MD;  Location: [facility];  Service: Endoscopy;  Laterality: N/A;    INTRALUMINAL GASTROINTESTINAL TRACT IMAGING VIA CAPSULE N/A 1/26/2022    Procedure: IMAGING PROCEDURE, GI TRACT, INTRALUMINAL, VIA CAPSULE;  Surgeon: Baltazar Chaves RN;  Location: [facility];  Service: Endoscopy;  Laterality: N/A;    INTRALUMINAL GASTROINTESTINAL TRACT IMAGING VIA CAPSULE N/A 2/22/2022    Procedure: IMAGING PROCEDURE, GI TRACT, INTRALUMINAL, VIA CAPSULE;  Surgeon: Baltazar Chaves RN;  Location: [facility];  Service: Endoscopy;  Laterality: N/A;       Labs:  Lab Results   Component Value Date    WBC 5.55 03/29/2023    HGB 5.7 (LL) 03/29/2023     HCT 20.8 (L) 03/29/2023    MCV 79 (L) 03/29/2023     03/29/2023     BMP  Lab Results   Component Value Date     03/29/2023    K 4.0 03/29/2023     03/29/2023    CO2 24 03/29/2023    BUN 15 03/29/2023    CREATININE 0.9 03/29/2023    CALCIUM 8.6 (L) 03/29/2023    ANIONGAP 7 (L) 03/29/2023    ESTGFRAFRICA >60 01/06/2022    EGFRNONAA >60 01/06/2022     Lab Results   Component Value Date    ALT 13 03/29/2023    AST 12 03/29/2023    ALKPHOS 35 (L) 03/29/2023    BILITOT 0.4 03/29/2023       Lab Results   Component Value Date    IRON 14 (L) 03/29/2023    TIBC 519 (H) 03/29/2023    FERRITIN 3 (L) 03/29/2023     Lab Results   Component Value Date    HUAKSCJA18 283 09/15/2022     Lab Results   Component Value Date    FOLATE 12.2 09/15/2022     Lab Results   Component Value Date    TSH 2.047 09/15/2022         Review of Systems   Constitutional:  Positive for activity change, appetite change and fatigue. Negative for chills, diaphoresis, fever and unexpected weight change.   HENT:  Negative for congestion, dental problem, drooling, ear discharge, ear pain, facial swelling, hearing loss, mouth sores, nosebleeds, postnasal drip, rhinorrhea, sinus pressure, sneezing, sore throat, tinnitus, trouble swallowing and voice change.    Eyes:  Negative for photophobia, pain, discharge, redness, itching and visual disturbance.   Respiratory:  Negative for apnea, cough, choking, chest tightness, shortness of breath, wheezing and stridor.    Cardiovascular:  Negative for chest pain, palpitations and leg swelling.   Gastrointestinal:  Negative for abdominal distention, abdominal pain, anal bleeding, blood in stool, constipation, diarrhea, nausea, rectal pain and vomiting.   Endocrine: Negative for cold intolerance, heat intolerance, polydipsia, polyphagia and polyuria.   Genitourinary:  Negative for decreased urine volume, difficulty urinating, dysuria, enuresis, flank pain, frequency, genital sores, hematuria, penile  discharge, penile pain, penile swelling, scrotal swelling, testicular pain and urgency.   Musculoskeletal:  Negative for arthralgias, back pain, gait problem, joint swelling, myalgias, neck pain and neck stiffness.   Skin:  Positive for wound. Negative for color change, pallor and rash.   Allergic/Immunologic: Negative for environmental allergies, food allergies and immunocompromised state.   Neurological:  Positive for weakness. Negative for dizziness, tremors, seizures, syncope, facial asymmetry, speech difficulty, light-headedness, numbness and headaches.   Hematological:  Negative for adenopathy. Does not bruise/bleed easily.   Psychiatric/Behavioral:  Positive for dysphoric mood. Negative for agitation, behavioral problems, confusion, decreased concentration, hallucinations, self-injury, sleep disturbance and suicidal ideas. The patient is nervous/anxious. The patient is not hyperactive.      Objective:      Physical Exam  Vitals reviewed.   Constitutional:       General: He is not in acute distress.     Appearance: He is well-developed. He is ill-appearing. He is not diaphoretic.   HENT:      Head: Normocephalic.      Right Ear: External ear normal.      Left Ear: External ear normal.      Nose: Nose normal.      Right Sinus: No maxillary sinus tenderness or frontal sinus tenderness.      Left Sinus: No maxillary sinus tenderness or frontal sinus tenderness.      Mouth/Throat:      Pharynx: No oropharyngeal exudate.   Eyes:      General: Lids are normal. No scleral icterus.        Right eye: No discharge.         Left eye: No discharge.      Extraocular Movements:      Right eye: Normal extraocular motion.      Left eye: Normal extraocular motion.      Conjunctiva/sclera:      Right eye: Right conjunctiva is not injected. No hemorrhage.     Left eye: Left conjunctiva is not injected. No hemorrhage.     Pupils: Pupils are equal, round, and reactive to light.   Neck:      Thyroid: No thyromegaly.      Vascular:  No JVD.      Trachea: No tracheal deviation.   Cardiovascular:      Rate and Rhythm: Normal rate.   Pulmonary:      Effort: Pulmonary effort is normal. No respiratory distress.      Breath sounds: No stridor.   Abdominal:      General: Bowel sounds are normal.      Palpations: Abdomen is soft. There is no hepatomegaly, splenomegaly or mass.      Tenderness: There is no abdominal tenderness.   Musculoskeletal:         General: No tenderness. Normal range of motion.      Cervical back: Normal range of motion and neck supple.   Lymphadenopathy:      Head:      Right side of head: No posterior auricular or occipital adenopathy.      Left side of head: No posterior auricular or occipital adenopathy.      Cervical: No cervical adenopathy.      Right cervical: No superficial, deep or posterior cervical adenopathy.     Left cervical: No superficial, deep or posterior cervical adenopathy.      Upper Body:      Right upper body: No supraclavicular adenopathy.      Left upper body: No supraclavicular adenopathy.   Skin:     General: Skin is dry.      Findings: No erythema or rash.      Nails: There is no clubbing.   Neurological:      Mental Status: He is alert and oriented to person, place, and time.      Cranial Nerves: No cranial nerve deficit.      Coordination: Coordination normal.   Psychiatric:         Behavior: Behavior normal.         Thought Content: Thought content normal.         Judgment: Judgment normal.           Assessment:      1. Iron deficiency anemia due to chronic blood loss    2. Symptomatic anemia    3. Plasma cell neoplasm    4. Chronic fatigue    5. Cellulitis of neck           Med Onc Chart Routing      Follow up with physician 3 weeks. Return to clinic to see me with labs done 2 days prior   Follow up with [facility]    Infusion scheduling note    Injection scheduling note Weekly Venofer x3   Labs CBC, CMP, ferritin, iron and TIBC, SPEP and free light chains   Schedulin days prior to infusion  Preferred  lab:  Lab interval:  Labs to be drawn in 3 weeks   Imaging    Pharmacy appointment    Other referrals            Plan:     Will proceed with transfusion 2 units of packed red cells.  Patient will treat with intravenous iron orders have been written.  Very extensive GI evaluation with lack of source.  May need to consider Sandostatin dosing.  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  Will observe if nonresponsive may need to consider biopsy        Kris Cesar Jr, MD FACP

## 2023-04-12 ENCOUNTER — LAB VISIT (OUTPATIENT)
Dept: LAB | Facility: HOSPITAL | Age: 58
End: 2023-04-12
Attending: INTERNAL MEDICINE
Payer: MEDICAID

## 2023-04-12 ENCOUNTER — INFUSION (OUTPATIENT)
Dept: INFUSION THERAPY | Facility: HOSPITAL | Age: 58
End: 2023-04-12
Attending: INTERNAL MEDICINE
Payer: MEDICAID

## 2023-04-12 VITALS
OXYGEN SATURATION: 100 % | DIASTOLIC BLOOD PRESSURE: 57 MMHG | RESPIRATION RATE: 18 BRPM | TEMPERATURE: 98 F | HEART RATE: 70 BPM | SYSTOLIC BLOOD PRESSURE: 124 MMHG

## 2023-04-12 DIAGNOSIS — D50.0 IRON DEFICIENCY ANEMIA DUE TO CHRONIC BLOOD LOSS: ICD-10-CM

## 2023-04-12 DIAGNOSIS — D50.0 IRON DEFICIENCY ANEMIA DUE TO CHRONIC BLOOD LOSS: Primary | ICD-10-CM

## 2023-04-12 LAB
BLD PROD TYP BPU: NORMAL
BLOOD UNIT EXPIRATION DATE: NORMAL
BLOOD UNIT TYPE CODE: 5100
BLOOD UNIT TYPE: NORMAL
CODING SYSTEM: NORMAL
CROSSMATCH INTERPRETATION: NORMAL
DISPENSE STATUS: NORMAL
NUM UNITS TRANS PACKED RBC: NORMAL

## 2023-04-12 PROCEDURE — P9040 RBC LEUKOREDUCED IRRADIATED: HCPCS | Performed by: INTERNAL MEDICINE

## 2023-04-12 PROCEDURE — 25000003 PHARM REV CODE 250: Performed by: INTERNAL MEDICINE

## 2023-04-12 PROCEDURE — 36430 TRANSFUSION BLD/BLD COMPNT: CPT

## 2023-04-12 RX ORDER — HYDROCODONE BITARTRATE AND ACETAMINOPHEN 500; 5 MG/1; MG/1
TABLET ORAL ONCE
Status: COMPLETED | OUTPATIENT
Start: 2023-04-12 | End: 2023-04-12

## 2023-04-12 RX ORDER — ACETAMINOPHEN 325 MG/1
650 TABLET ORAL
Status: DISCONTINUED | OUTPATIENT
Start: 2023-04-12 | End: 2023-04-12 | Stop reason: HOSPADM

## 2023-04-12 RX ORDER — DIPHENHYDRAMINE HCL 25 MG
25 CAPSULE ORAL
Status: DISCONTINUED | OUTPATIENT
Start: 2023-04-12 | End: 2023-04-12 | Stop reason: HOSPADM

## 2023-04-12 RX ADMIN — SODIUM CHLORIDE: 9 INJECTION, SOLUTION INTRAVENOUS at 09:04

## 2023-04-12 NOTE — PLAN OF CARE
Problem: Adult Inpatient Plan of Care  Goal: Plan of Care Review  Outcome: Ongoing, Progressing  Flowsheets (Taken 4/12/2023 0930)  Plan of Care Reviewed With: patient  Goal: Patient-Specific Goal (Individualized)  Outcome: Ongoing, Progressing  Flowsheets (Taken 4/12/2023 0930)  Anxieties, Fears or Concerns:   pt reports feeling short of breath, very tired and weak   states blood count is probably lower than it was when last checked  Individualized Care Needs: pillow to iv extremity, feet elevated     Problem: Anemia  Goal: Anemia Symptom Improvement  Outcome: Ongoing, Progressing  Intervention: Monitor and Manage Anemia  Flowsheets (Taken 4/12/2023 0930)  Oral Nutrition Promotion: rest periods promoted  Safety Promotion/Fall Prevention:   instructed to call staff for mobility   room near unit station  Fatigue Management: frequent rest breaks encouraged

## 2023-04-17 ENCOUNTER — INFUSION (OUTPATIENT)
Dept: INFUSION THERAPY | Facility: HOSPITAL | Age: 58
End: 2023-04-17
Attending: INTERNAL MEDICINE
Payer: MEDICAID

## 2023-04-17 VITALS
DIASTOLIC BLOOD PRESSURE: 65 MMHG | OXYGEN SATURATION: 98 % | HEART RATE: 71 BPM | RESPIRATION RATE: 18 BRPM | SYSTOLIC BLOOD PRESSURE: 107 MMHG | TEMPERATURE: 98 F

## 2023-04-17 DIAGNOSIS — D50.0 IRON DEFICIENCY ANEMIA DUE TO CHRONIC BLOOD LOSS: Primary | ICD-10-CM

## 2023-04-17 PROCEDURE — 63600175 PHARM REV CODE 636 W HCPCS: Performed by: INTERNAL MEDICINE

## 2023-04-17 PROCEDURE — 96365 THER/PROPH/DIAG IV INF INIT: CPT

## 2023-04-17 PROCEDURE — 25000003 PHARM REV CODE 250: Performed by: INTERNAL MEDICINE

## 2023-04-17 RX ORDER — SODIUM CHLORIDE 0.9 % (FLUSH) 0.9 %
10 SYRINGE (ML) INJECTION
Status: CANCELLED | OUTPATIENT
Start: 2023-04-24

## 2023-04-17 RX ORDER — HEPARIN 100 UNIT/ML
500 SYRINGE INTRAVENOUS
Status: CANCELLED | OUTPATIENT
Start: 2023-04-24

## 2023-04-17 RX ADMIN — IRON SUCROSE 200 MG: 20 INJECTION, SOLUTION INTRAVENOUS at 08:04

## 2023-04-17 NOTE — PLAN OF CARE
Discussed plan of care with pt. Addressed any and ongoing concerns. Pt denies    Problem: Adult Inpatient Plan of Care  Goal: Plan of Care Review  Outcome: Ongoing, Progressing  Flowsheets (Taken 4/17/2023 0757)  Plan of Care Reviewed With: patient  Goal: Patient-Specific Goal (Individualized)  Outcome: Ongoing, Progressing  Goal: Absence of Hospital-Acquired Illness or Injury  Outcome: Ongoing, Progressing  Intervention: Identify and Manage Fall Risk  Flowsheets (Taken 4/17/2023 0757)  Safety Promotion/Fall Prevention:   in recliner, wheels locked   room near unit station   nonskid shoes/socks when out of bed  Intervention: Prevent Infection  Flowsheets (Taken 4/17/2023 0757)  Infection Prevention:   hand hygiene promoted   equipment surfaces disinfected  Goal: Optimal Comfort and Wellbeing  Outcome: Ongoing, Progressing  Intervention: Monitor Pain and Promote Comfort  Flowsheets (Taken 4/17/2023 0757)  Pain Management Interventions: quiet environment facilitated  Intervention: Provide Person-Centered Care  Flowsheets (Taken 4/17/2023 0757)  Trust Relationship/Rapport:   care explained   reassurance provided   thoughts/feelings acknowledged   choices provided   emotional support provided   empathic listening provided   questions answered   questions encouraged

## 2023-04-24 ENCOUNTER — INFUSION (OUTPATIENT)
Dept: INFUSION THERAPY | Facility: HOSPITAL | Age: 58
End: 2023-04-24
Attending: INTERNAL MEDICINE
Payer: MEDICAID

## 2023-04-24 VITALS
DIASTOLIC BLOOD PRESSURE: 70 MMHG | HEART RATE: 73 BPM | SYSTOLIC BLOOD PRESSURE: 117 MMHG | RESPIRATION RATE: 18 BRPM | OXYGEN SATURATION: 100 % | TEMPERATURE: 98 F

## 2023-04-24 DIAGNOSIS — D50.0 IRON DEFICIENCY ANEMIA DUE TO CHRONIC BLOOD LOSS: Primary | ICD-10-CM

## 2023-04-24 PROCEDURE — 96365 THER/PROPH/DIAG IV INF INIT: CPT

## 2023-04-24 PROCEDURE — 63600175 PHARM REV CODE 636 W HCPCS: Performed by: INTERNAL MEDICINE

## 2023-04-24 PROCEDURE — 25000003 PHARM REV CODE 250: Performed by: INTERNAL MEDICINE

## 2023-04-24 RX ORDER — HEPARIN 100 UNIT/ML
500 SYRINGE INTRAVENOUS
Status: CANCELLED | OUTPATIENT
Start: 2023-05-01

## 2023-04-24 RX ORDER — SODIUM CHLORIDE 0.9 % (FLUSH) 0.9 %
10 SYRINGE (ML) INJECTION
Status: CANCELLED | OUTPATIENT
Start: 2023-05-01

## 2023-04-24 RX ADMIN — IRON SUCROSE 200 MG: 20 INJECTION, SOLUTION INTRAVENOUS at 08:04

## 2023-04-24 NOTE — PLAN OF CARE
Discussed plan of care with pt. Addressed any and ongoing concerns. Pt denies    Problem: Adult Inpatient Plan of Care  Goal: Plan of Care Review  Outcome: Ongoing, Progressing  Flowsheets (Taken 4/24/2023 0857)  Plan of Care Reviewed With: patient  Goal: Patient-Specific Goal (Individualized)  Outcome: Ongoing, Progressing  Goal: Absence of Hospital-Acquired Illness or Injury  Outcome: Ongoing, Progressing  Intervention: Identify and Manage Fall Risk  Flowsheets (Taken 4/24/2023 0857)  Safety Promotion/Fall Prevention:   in recliner, wheels locked   room near unit station   nonskid shoes/socks when out of bed  Intervention: Prevent Infection  Flowsheets (Taken 4/24/2023 0857)  Infection Prevention:   hand hygiene promoted   equipment surfaces disinfected  Goal: Optimal Comfort and Wellbeing  Outcome: Ongoing, Progressing  Intervention: Monitor Pain and Promote Comfort  Flowsheets (Taken 4/24/2023 0857)  Pain Management Interventions:   quiet environment facilitated   warm blanket provided  Intervention: Provide Person-Centered Care  Flowsheets (Taken 4/24/2023 0857)  Trust Relationship/Rapport:   reassurance provided   care explained   choices provided   thoughts/feelings acknowledged   emotional support provided   empathic listening provided   questions answered   questions encouraged

## 2023-04-24 NOTE — NURSING
Infusion: venofer 2/3  Last dose: #1 4/17/23  Recent labs? 3/29/23  Last Hem/Onc provider visit? 4/10/23     Premeds given? none     Venofer administered IVPB  Tolerated well without adverse events. NAD noted upon discharge.    Next appointment scheduled? 5/22/23 Venofer 3/3

## 2023-05-01 ENCOUNTER — INFUSION (OUTPATIENT)
Dept: INFUSION THERAPY | Facility: HOSPITAL | Age: 58
End: 2023-05-01
Attending: INTERNAL MEDICINE
Payer: MEDICAID

## 2023-05-01 VITALS
DIASTOLIC BLOOD PRESSURE: 69 MMHG | OXYGEN SATURATION: 100 % | RESPIRATION RATE: 18 BRPM | HEART RATE: 74 BPM | SYSTOLIC BLOOD PRESSURE: 108 MMHG | TEMPERATURE: 98 F

## 2023-05-01 DIAGNOSIS — D50.0 IRON DEFICIENCY ANEMIA DUE TO CHRONIC BLOOD LOSS: Primary | ICD-10-CM

## 2023-05-01 PROCEDURE — 25000003 PHARM REV CODE 250: Performed by: INTERNAL MEDICINE

## 2023-05-01 PROCEDURE — 63600175 PHARM REV CODE 636 W HCPCS: Performed by: INTERNAL MEDICINE

## 2023-05-01 PROCEDURE — 96365 THER/PROPH/DIAG IV INF INIT: CPT

## 2023-05-01 RX ORDER — HEPARIN 100 UNIT/ML
500 SYRINGE INTRAVENOUS
Status: CANCELLED | OUTPATIENT
Start: 2023-05-08

## 2023-05-01 RX ORDER — SODIUM CHLORIDE 0.9 % (FLUSH) 0.9 %
10 SYRINGE (ML) INJECTION
Status: DISCONTINUED | OUTPATIENT
Start: 2023-05-01 | End: 2023-05-01 | Stop reason: HOSPADM

## 2023-05-01 RX ORDER — SODIUM CHLORIDE 0.9 % (FLUSH) 0.9 %
10 SYRINGE (ML) INJECTION
Status: CANCELLED | OUTPATIENT
Start: 2023-05-08

## 2023-05-01 RX ADMIN — SODIUM CHLORIDE: 9 INJECTION, SOLUTION INTRAVENOUS at 08:05

## 2023-05-01 RX ADMIN — IRON SUCROSE 200 MG: 20 INJECTION, SOLUTION INTRAVENOUS at 08:05

## 2023-05-03 ENCOUNTER — OFFICE VISIT (OUTPATIENT)
Dept: HEMATOLOGY/ONCOLOGY | Facility: CLINIC | Age: 58
End: 2023-05-03
Payer: MEDICAID

## 2023-05-03 VITALS
SYSTOLIC BLOOD PRESSURE: 114 MMHG | DIASTOLIC BLOOD PRESSURE: 64 MMHG | WEIGHT: 192.88 LBS | HEART RATE: 75 BPM | HEIGHT: 67 IN | TEMPERATURE: 98 F | OXYGEN SATURATION: 98 % | BODY MASS INDEX: 30.27 KG/M2

## 2023-05-03 DIAGNOSIS — K55.20 ANGIODYSPLASIA OF GASTROINTESTINAL TRACT: ICD-10-CM

## 2023-05-03 DIAGNOSIS — M54.2 NECK PAIN: Primary | ICD-10-CM

## 2023-05-03 DIAGNOSIS — D49.89 PLASMA CELL NEOPLASM: Primary | ICD-10-CM

## 2023-05-03 DIAGNOSIS — R53.82 CHRONIC FATIGUE: ICD-10-CM

## 2023-05-03 DIAGNOSIS — D49.89 PLASMA CELL NEOPLASM: ICD-10-CM

## 2023-05-03 DIAGNOSIS — D64.9 SYMPTOMATIC ANEMIA: ICD-10-CM

## 2023-05-03 DIAGNOSIS — D50.0 IRON DEFICIENCY ANEMIA DUE TO CHRONIC BLOOD LOSS: ICD-10-CM

## 2023-05-03 PROCEDURE — 3078F PR MOST RECENT DIASTOLIC BLOOD PRESSURE < 80 MM HG: ICD-10-PCS | Mod: CPTII,,, | Performed by: INTERNAL MEDICINE

## 2023-05-03 PROCEDURE — 1159F MED LIST DOCD IN RCRD: CPT | Mod: CPTII,,, | Performed by: INTERNAL MEDICINE

## 2023-05-03 PROCEDURE — 3078F DIAST BP <80 MM HG: CPT | Mod: CPTII,,, | Performed by: INTERNAL MEDICINE

## 2023-05-03 PROCEDURE — 99214 PR OFFICE/OUTPT VISIT, EST, LEVL IV, 30-39 MIN: ICD-10-PCS | Mod: S$PBB,,, | Performed by: INTERNAL MEDICINE

## 2023-05-03 PROCEDURE — 1160F RVW MEDS BY RX/DR IN RCRD: CPT | Mod: CPTII,,, | Performed by: INTERNAL MEDICINE

## 2023-05-03 PROCEDURE — 3074F SYST BP LT 130 MM HG: CPT | Mod: CPTII,,, | Performed by: INTERNAL MEDICINE

## 2023-05-03 PROCEDURE — 3074F PR MOST RECENT SYSTOLIC BLOOD PRESSURE < 130 MM HG: ICD-10-PCS | Mod: CPTII,,, | Performed by: INTERNAL MEDICINE

## 2023-05-03 PROCEDURE — 99999 PR PBB SHADOW E&M-EST. PATIENT-LVL III: CPT | Mod: PBBFAC,,, | Performed by: INTERNAL MEDICINE

## 2023-05-03 PROCEDURE — 1160F PR REVIEW ALL MEDS BY PRESCRIBER/CLIN PHARMACIST DOCUMENTED: ICD-10-PCS | Mod: CPTII,,, | Performed by: INTERNAL MEDICINE

## 2023-05-03 PROCEDURE — 99213 OFFICE O/P EST LOW 20 MIN: CPT | Mod: PBBFAC | Performed by: INTERNAL MEDICINE

## 2023-05-03 PROCEDURE — 3008F BODY MASS INDEX DOCD: CPT | Mod: CPTII,,, | Performed by: INTERNAL MEDICINE

## 2023-05-03 PROCEDURE — 3008F PR BODY MASS INDEX (BMI) DOCUMENTED: ICD-10-PCS | Mod: CPTII,,, | Performed by: INTERNAL MEDICINE

## 2023-05-03 PROCEDURE — 99999 PR PBB SHADOW E&M-EST. PATIENT-LVL III: ICD-10-PCS | Mod: PBBFAC,,, | Performed by: INTERNAL MEDICINE

## 2023-05-03 PROCEDURE — 99214 OFFICE O/P EST MOD 30 MIN: CPT | Mod: S$PBB,,, | Performed by: INTERNAL MEDICINE

## 2023-05-03 PROCEDURE — 1159F PR MEDICATION LIST DOCUMENTED IN MEDICAL RECORD: ICD-10-PCS | Mod: CPTII,,, | Performed by: INTERNAL MEDICINE

## 2023-05-03 NOTE — PROGRESS NOTES
Subjective:       Patient ID: Shawn Carreno is a 57 y.o. male.    Chief Complaint: Results, Anemia, Pain, and Multiple Myeloma    HPI:  57-year-old male with recurrent GI blood loss.  Patient has had upper lower endoscopies including push endoscopies.  In video capsule has dysplasia noted.  Previously.  At this time patient presents with recent IV iron patient also reports neck pain increasing    Past Medical History:   Diagnosis Date    Multiple myeloma not having achieved remission 10/12/2022     History reviewed. No pertinent family history.  Social History     Socioeconomic History    Marital status: Single   Tobacco Use    Smoking status: Never    Smokeless tobacco: Never   Substance and Sexual Activity    Alcohol use: Never    Drug use: Never    Sexual activity: Not Currently     Past Surgical History:   Procedure Laterality Date    CHOLECYSTECTOMY 2020    COLONOSCOPY N/A 1/5/2022    Procedure: COLONOSCOPY;  Surgeon: Hannah Alvarado MD;  Location: [facility];  Service: Endoscopy;  Laterality: N/A;    ESOPHAGOGASTRODUODENOSCOPY N/A 1/5/2022    Procedure: EGD (ESOPHAGOGASTRODUODENOSCOPY);  Surgeon: Hannah Alvarado MD;  Location: [facility];  Service: Endoscopy;  Laterality: N/A;    ESOPHAGOGASTRODUODENOSCOPY N/A 4/6/2022    Procedure: EGD (ESOPHAGOGASTRODUODENOSCOPY) with push;  Surgeon: Vira Castaneda MD;  Location: [facility];  Service: Endoscopy;  Laterality: N/A;    INTRALUMINAL GASTROINTESTINAL TRACT IMAGING VIA CAPSULE N/A 1/26/2022    Procedure: IMAGING PROCEDURE, GI TRACT, INTRALUMINAL, VIA CAPSULE;  Surgeon: Baltazar Chaves RN;  Location: [facility];  Service: Endoscopy;  Laterality: N/A;    INTRALUMINAL GASTROINTESTINAL TRACT IMAGING VIA CAPSULE N/A 2/22/2022    Procedure: IMAGING PROCEDURE, GI TRACT, INTRALUMINAL, VIA CAPSULE;  Surgeon: Baltazar Chaves RN;  Location: [facility];  Service: Endoscopy;  Laterality: N/A;       Labs:  Lab Results   Component Value Date    WBC 5.18  05/01/2023    HGB 7.3 (L) 05/01/2023    HCT 26.5 (L) 05/01/2023    MCV 84 05/01/2023     05/01/2023     BMP  Lab Results   Component Value Date     05/01/2023    K 4.4 05/01/2023     05/01/2023    CO2 25 05/01/2023    BUN 14 05/01/2023    CREATININE 0.9 05/01/2023    CALCIUM 9.1 05/01/2023    ANIONGAP 7 (L) 05/01/2023    ESTGFRAFRICA >60 01/06/2022    EGFRNONAA >60 01/06/2022     Lab Results   Component Value Date    ALT 16 05/01/2023    AST 14 05/01/2023    ALKPHOS 43 (L) 05/01/2023    BILITOT 0.4 05/01/2023       Lab Results   Component Value Date    IRON 523 (H) 05/01/2023    TIBC 413 05/01/2023    FERRITIN 45 05/01/2023     Lab Results   Component Value Date    [id number] 283 09/15/2022     Lab Results   Component Value Date    FOLATE 12.2 09/15/2022     Lab Results   Component Value Date    TSH 2.047 09/15/2022         Review of Systems   Constitutional:  Negative for activity change, appetite change, chills, diaphoresis, fatigue, fever and unexpected weight change.   HENT:  Negative for congestion, dental problem, drooling, ear discharge, ear pain, facial swelling, hearing loss, mouth sores, nosebleeds, postnasal drip, rhinorrhea, sinus pressure, sneezing, sore throat, tinnitus, trouble swallowing and voice change.    Eyes:  Negative for photophobia, pain, discharge, redness, itching and visual disturbance.   Respiratory:  Negative for apnea, cough, choking, chest tightness, shortness of breath, wheezing and stridor.    Cardiovascular:  Negative for chest pain, palpitations and leg swelling.   Gastrointestinal:  Negative for abdominal distention, abdominal pain, anal bleeding, blood in stool, constipation, diarrhea, nausea, rectal pain and vomiting.   Endocrine: Negative for cold intolerance, heat intolerance, polydipsia, polyphagia and polyuria.   Genitourinary:  Negative for decreased urine volume, difficulty urinating, dysuria, enuresis, flank pain, frequency, genital sores, hematuria,  penile discharge, penile pain, penile swelling, scrotal swelling, testicular pain and urgency.   Musculoskeletal:  Positive for neck pain. Negative for arthralgias, back pain, gait problem, joint swelling, myalgias and neck stiffness.   Skin:  Negative for color change, pallor, rash and wound.   Allergic/Immunologic: Negative for environmental allergies, food allergies and immunocompromised state.   Neurological:  Negative for dizziness, tremors, seizures, syncope, facial asymmetry, speech difficulty, weakness, light-headedness, numbness and headaches.   Hematological:  Negative for adenopathy. Does not bruise/bleed easily.   Psychiatric/Behavioral:  Positive for dysphoric mood. Negative for agitation, behavioral problems, confusion, decreased concentration, hallucinations, self-injury, sleep disturbance and suicidal ideas. The patient is nervous/anxious. The patient is not hyperactive.      Objective:      Physical Exam  Vitals reviewed.   Constitutional:       General: He is not in acute distress.     Appearance: He is well-developed. He is not diaphoretic.   HENT:      Head: Normocephalic.      Right Ear: External ear normal.      Left Ear: External ear normal.      Nose: Nose normal.      Right Sinus: No maxillary sinus tenderness or frontal sinus tenderness.      Left Sinus: No maxillary sinus tenderness or frontal sinus tenderness.      Mouth/Throat:      Pharynx: No oropharyngeal exudate.   Eyes:      General: Lids are normal. No scleral icterus.        Right eye: No discharge.         Left eye: No discharge.      Extraocular Movements:      Right eye: Normal extraocular motion.      Left eye: Normal extraocular motion.      Conjunctiva/sclera:      Right eye: Right conjunctiva is not injected. No hemorrhage.     Left eye: Left conjunctiva is not injected. No hemorrhage.     Pupils: Pupils are equal, round, and reactive to light.   Neck:      Thyroid: No thyromegaly.      Vascular: No JVD.      Trachea: No  tracheal deviation.   Cardiovascular:      Rate and Rhythm: Normal rate.   Pulmonary:      Effort: Pulmonary effort is normal. No respiratory distress.      Breath sounds: No stridor.   Abdominal:      General: Bowel sounds are normal.      Palpations: Abdomen is soft. There is no hepatomegaly, splenomegaly or mass.      Tenderness: There is no abdominal tenderness.   Musculoskeletal:         General: No tenderness. Normal range of motion.      Cervical back: Normal range of motion and neck supple.   Lymphadenopathy:      Head:      Right side of head: No posterior auricular or occipital adenopathy.      Left side of head: No posterior auricular or occipital adenopathy.      Cervical: No cervical adenopathy.      Right cervical: No superficial, deep or posterior cervical adenopathy.     Left cervical: No superficial, deep or posterior cervical adenopathy.      Upper Body:      Right upper body: No supraclavicular adenopathy.      Left upper body: No supraclavicular adenopathy.   Skin:     General: Skin is dry.      Findings: No erythema or rash.      Nails: There is no clubbing.   Neurological:      Mental Status: He is alert and oriented to person, place, and time.      Cranial Nerves: No cranial nerve deficit.      Motor: Weakness present.      Coordination: Coordination normal.   Psychiatric:         Behavior: Behavior normal.         Thought Content: Thought content normal.         Judgment: Judgment normal.           Assessment:      1. Neck pain    2. Plasma cell neoplasm    3. Angiodysplasia of gastrointestinal tract    4. Symptomatic anemia    5. Iron deficiency anemia due to chronic blood loss    6. Chronic fatigue           Med Onc Chart Routing      Follow up with physician 3 weeks. Return to clinic to see me between 2 and 3 weeks with CBC and iron status prior   Follow up with [facility]    Infusion scheduling note    Injection scheduling note Begin patient on Sandostatin 20 mg Q month   Labs CBC, iron and TIBC  and ferritin   Schedulin day prior to infusion  Preferred lab:  Lab interval:  Labs to be done 1 day prior to see me   Imaging MRI   Patient needs MRI cervical spine as soon as possible   Pharmacy appointment    Other referrals            Plan:     Extensive conversation with patient this is the 1st time his iron levels have been normal he is receive intravenous iron for 3 weeks in a row will hold at present time and have patient return in 2-3 weeks with CBC iron status prior reports increasing neck pain would recommend cervical spine MRI.  Because he is had recurrent GI blood loss and previous history of angiodysplasia recommend beginning on Sandostatin 20 mg Q month.  Discussed implications with him will see back in 2-3 weeks CBC iron status hopefully MRI will be done at that time make sure no evidence of progression in terms of his plasma cell neoplasia        Kris Cesar Jr, MD FACP

## 2023-05-08 ENCOUNTER — HOSPITAL ENCOUNTER (OUTPATIENT)
Dept: RADIOLOGY | Facility: HOSPITAL | Age: 58
Discharge: HOME | End: 2023-05-08
Attending: INTERNAL MEDICINE
Payer: MEDICAID

## 2023-05-08 DIAGNOSIS — D49.89 PLASMA CELL NEOPLASM: ICD-10-CM

## 2023-05-08 PROCEDURE — 72141 MRI NECK SPINE W/O DYE: CPT | Mod: 26,,, | Performed by: RADIOLOGY

## 2023-05-08 PROCEDURE — 72141 MRI NECK SPINE W/O DYE: CPT | Mod: TC

## 2023-05-08 PROCEDURE — 72141 MRI CERVICAL SPINE WITHOUT CONTRAST: ICD-10-PCS | Mod: 26,,, | Performed by: RADIOLOGY

## 2023-05-10 ENCOUNTER — LAB VISIT (OUTPATIENT)
Dept: LAB | Facility: HOSPITAL | Age: 58
End: 2023-05-10
Payer: MEDICAID

## 2023-05-10 ENCOUNTER — TELEPHONE (OUTPATIENT)
Dept: HEMATOLOGY/ONCOLOGY | Facility: CLINIC | Age: 58
End: 2023-05-10
Payer: MEDICAID

## 2023-05-10 DIAGNOSIS — K55.20 ANGIODYSPLASIA OF GASTROINTESTINAL TRACT: Primary | ICD-10-CM

## 2023-05-10 DIAGNOSIS — D50.0 IRON DEFICIENCY ANEMIA DUE TO CHRONIC BLOOD LOSS: Primary | ICD-10-CM

## 2023-05-10 DIAGNOSIS — D50.0 IRON DEFICIENCY ANEMIA DUE TO CHRONIC BLOOD LOSS: ICD-10-CM

## 2023-05-10 DIAGNOSIS — D49.89 PLASMA CELL NEOPLASM: ICD-10-CM

## 2023-05-10 LAB
ABO + RH BLD: NORMAL
ALBUMIN SERPL BCP-MCNC: 3.6 G/DL (ref 3.5–5.2)
ALP SERPL-CCNC: 33 U/L (ref 55–135)
ALT SERPL W/O P-5'-P-CCNC: 13 U/L (ref 10–44)
ANION GAP SERPL CALC-SCNC: 4 MMOL/L (ref 8–16)
AST SERPL-CCNC: 14 U/L (ref 10–40)
BASOPHILS # BLD AUTO: 0.05 K/UL (ref 0–0.2)
BASOPHILS NFR BLD: 1.1 % (ref 0–1.9)
BILIRUB SERPL-MCNC: 0.4 MG/DL (ref 0.1–1)
BLD GP AB SCN CELLS X3 SERPL QL: NORMAL
BUN SERPL-MCNC: 16 MG/DL (ref 6–20)
CALCIUM SERPL-MCNC: 8.7 MG/DL (ref 8.7–10.5)
CHLORIDE SERPL-SCNC: 109 MMOL/L (ref 95–110)
CO2 SERPL-SCNC: 25 MMOL/L (ref 23–29)
CREAT SERPL-MCNC: 0.8 MG/DL (ref 0.5–1.4)
DIFFERENTIAL METHOD: ABNORMAL
EOSINOPHIL # BLD AUTO: 0.1 K/UL (ref 0–0.5)
EOSINOPHIL NFR BLD: 3.2 % (ref 0–8)
ERYTHROCYTE [DISTWIDTH] IN BLOOD BY AUTOMATED COUNT: 24.8 % (ref 11.5–14.5)
EST. GFR  (NO RACE VARIABLE): >60 ML/MIN/1.73 M^2
GLUCOSE SERPL-MCNC: 130 MG/DL (ref 70–110)
HCT VFR BLD AUTO: 25.1 % (ref 40–54)
HGB BLD-MCNC: 6.7 G/DL (ref 14–18)
IMM GRANULOCYTES # BLD AUTO: 0.01 K/UL (ref 0–0.04)
IMM GRANULOCYTES NFR BLD AUTO: 0.2 % (ref 0–0.5)
LYMPHOCYTES # BLD AUTO: 1.2 K/UL (ref 1–4.8)
LYMPHOCYTES NFR BLD: 27 % (ref 18–48)
MCH RBC QN AUTO: 22.4 PG (ref 27–31)
MCHC RBC AUTO-ENTMCNC: 26.7 G/DL (ref 32–36)
MCV RBC AUTO: 84 FL (ref 82–98)
MONOCYTES # BLD AUTO: 0.4 K/UL (ref 0.3–1)
MONOCYTES NFR BLD: 9 % (ref 4–15)
NEUTROPHILS # BLD AUTO: 2.6 K/UL (ref 1.8–7.7)
NEUTROPHILS NFR BLD: 59.5 % (ref 38–73)
NRBC BLD-RTO: 0 /100 WBC
PLATELET # BLD AUTO: 288 K/UL (ref 150–450)
PMV BLD AUTO: 9.4 FL (ref 9.2–12.9)
POTASSIUM SERPL-SCNC: 4.4 MMOL/L (ref 3.5–5.1)
PROT SERPL-MCNC: 7.7 G/DL (ref 6–8.4)
RBC # BLD AUTO: 2.99 M/UL (ref 4.6–6.2)
SODIUM SERPL-SCNC: 138 MMOL/L (ref 136–145)
SPECIMEN OUTDATE: NORMAL
WBC # BLD AUTO: 4.44 K/UL (ref 3.9–12.7)

## 2023-05-10 PROCEDURE — 36415 COLL VENOUS BLD VENIPUNCTURE: CPT | Performed by: NURSE PRACTITIONER

## 2023-05-10 PROCEDURE — 80053 COMPREHEN METABOLIC PANEL: CPT | Performed by: NURSE PRACTITIONER

## 2023-05-10 PROCEDURE — 86900 BLOOD TYPING SEROLOGIC ABO: CPT | Performed by: INTERNAL MEDICINE

## 2023-05-10 PROCEDURE — 85025 COMPLETE CBC W/AUTO DIFF WBC: CPT | Performed by: NURSE PRACTITIONER

## 2023-05-10 NOTE — TELEPHONE ENCOUNTER
----- Message from Yary Ruiz LPN sent at 5/10/2023  9:33 AM CDT -----  Contact: [phone]  Patient called about labs scheduled for friday. He cannot come on fridays. I scheduled appointment to today. Please link labs needed. Thanks  ----- Message -----  From: María Elena Gutierrez  Sent: 5/10/2023   8:28 AM CDT  To: Ar[facility]OT[facility] Staff    Patient called, requested a courtesy call from Dr Emery's nurse in regard patient upcoming appointment on Friday 05/12. Please advise. Thank you

## 2023-05-17 ENCOUNTER — TELEPHONE (OUTPATIENT)
Dept: HEMATOLOGY/ONCOLOGY | Facility: CLINIC | Age: 58
End: 2023-05-17
Payer: MEDICAID

## 2023-05-17 ENCOUNTER — LAB VISIT (OUTPATIENT)
Dept: LAB | Facility: HOSPITAL | Age: 58
End: 2023-05-17
Attending: INTERNAL MEDICINE
Payer: MEDICAID

## 2023-05-17 DIAGNOSIS — D50.0 IRON DEFICIENCY ANEMIA DUE TO CHRONIC BLOOD LOSS: ICD-10-CM

## 2023-05-17 LAB
ABO + RH BLD: NORMAL
ANISOCYTOSIS BLD QL SMEAR: ABNORMAL
BASOPHILS # BLD AUTO: 0.04 K/UL (ref 0–0.2)
BASOPHILS NFR BLD: 1.1 % (ref 0–1.9)
BLD GP AB SCN CELLS X3 SERPL QL: NORMAL
DIFFERENTIAL METHOD: ABNORMAL
EOSINOPHIL # BLD AUTO: 0.2 K/UL (ref 0–0.5)
EOSINOPHIL NFR BLD: 4.4 % (ref 0–8)
ERYTHROCYTE [DISTWIDTH] IN BLOOD BY AUTOMATED COUNT: 23.4 % (ref 11.5–14.5)
HCT VFR BLD AUTO: 19.9 % (ref 40–54)
HGB BLD-MCNC: 5.3 G/DL (ref 14–18)
IMM GRANULOCYTES # BLD AUTO: 0.01 K/UL (ref 0–0.04)
IMM GRANULOCYTES NFR BLD AUTO: 0.3 % (ref 0–0.5)
LYMPHOCYTES # BLD AUTO: 1.2 K/UL (ref 1–4.8)
LYMPHOCYTES NFR BLD: 32.2 % (ref 18–48)
MCH RBC QN AUTO: 21.8 PG (ref 27–31)
MCHC RBC AUTO-ENTMCNC: 26.6 G/DL (ref 32–36)
MCV RBC AUTO: 82 FL (ref 82–98)
MONOCYTES # BLD AUTO: 0.4 K/UL (ref 0.3–1)
MONOCYTES NFR BLD: 9.8 % (ref 4–15)
NEUTROPHILS # BLD AUTO: 1.9 K/UL (ref 1.8–7.7)
NEUTROPHILS NFR BLD: 52.2 % (ref 38–73)
NRBC BLD-RTO: 0 /100 WBC
OVALOCYTES BLD QL SMEAR: ABNORMAL
PLATELET # BLD AUTO: 255 K/UL (ref 150–450)
PLATELET BLD QL SMEAR: ABNORMAL
PMV BLD AUTO: 9.1 FL (ref 9.2–12.9)
POIKILOCYTOSIS BLD QL SMEAR: SLIGHT
RBC # BLD AUTO: 2.43 M/UL (ref 4.6–6.2)
SPECIMEN OUTDATE: NORMAL
STOMATOCYTES BLD QL SMEAR: PRESENT
WBC # BLD AUTO: 3.67 K/UL (ref 3.9–12.7)

## 2023-05-17 PROCEDURE — 86900 BLOOD TYPING SEROLOGIC ABO: CPT | Performed by: NURSE PRACTITIONER

## 2023-05-17 PROCEDURE — 85025 COMPLETE CBC W/AUTO DIFF WBC: CPT | Performed by: NURSE PRACTITIONER

## 2023-05-17 PROCEDURE — 36415 COLL VENOUS BLD VENIPUNCTURE: CPT | Performed by: NURSE PRACTITIONER

## 2023-05-17 PROCEDURE — 86920 COMPATIBILITY TEST SPIN: CPT | Performed by: INTERNAL MEDICINE

## 2023-05-17 RX ORDER — HYDROCODONE BITARTRATE AND ACETAMINOPHEN 500; 5 MG/1; MG/1
TABLET ORAL ONCE
Status: CANCELLED | OUTPATIENT
Start: 2023-05-17 | End: 2023-05-17

## 2023-05-17 RX ORDER — ACETAMINOPHEN 325 MG/1
650 TABLET ORAL
Status: CANCELLED | OUTPATIENT
Start: 2023-05-17

## 2023-05-17 RX ORDER — DIPHENHYDRAMINE HCL 25 MG
25 CAPSULE ORAL
Status: CANCELLED | OUTPATIENT
Start: 2023-05-17

## 2023-05-18 ENCOUNTER — TELEPHONE (OUTPATIENT)
Dept: INFUSION THERAPY | Facility: HOSPITAL | Age: 58
End: 2023-05-18
Payer: MEDICAID

## 2023-05-18 ENCOUNTER — DOCUMENTATION ONLY (OUTPATIENT)
Dept: INFUSION THERAPY | Facility: HOSPITAL | Age: 58
End: 2023-05-18
Payer: MEDICAID

## 2023-05-18 NOTE — PROGRESS NOTES
"Contacted patient to confirm his 0800 blood transfusion tomorrow. Patient stated "I have to be at work at 5am so I will do my best to be there at that time." Patient was informed that we have limited chair space for transfusions/infusions and it  was highly recommended to try his best to make it here. Explained the critically low blood counts and what to watch for symptomatically. Patient encouraged to report to the emergency room if he becomes symptomatic. Patient verbalized understanding.  "

## 2023-05-19 ENCOUNTER — OFFICE VISIT (OUTPATIENT)
Dept: HEMATOLOGY/ONCOLOGY | Facility: CLINIC | Age: 58
End: 2023-05-19
Payer: MEDICAID

## 2023-05-19 ENCOUNTER — INFUSION (OUTPATIENT)
Dept: INFUSION THERAPY | Facility: HOSPITAL | Age: 58
End: 2023-05-19
Attending: INTERNAL MEDICINE
Payer: MEDICAID

## 2023-05-19 VITALS
SYSTOLIC BLOOD PRESSURE: 111 MMHG | RESPIRATION RATE: 16 BRPM | OXYGEN SATURATION: 98 % | DIASTOLIC BLOOD PRESSURE: 69 MMHG | HEART RATE: 71 BPM | TEMPERATURE: 98 F

## 2023-05-19 DIAGNOSIS — K55.20 ANGIODYSPLASIA OF GASTROINTESTINAL TRACT: Primary | ICD-10-CM

## 2023-05-19 DIAGNOSIS — D50.0 IRON DEFICIENCY ANEMIA DUE TO CHRONIC BLOOD LOSS: ICD-10-CM

## 2023-05-19 DIAGNOSIS — D49.89 PLASMA CELL NEOPLASM: ICD-10-CM

## 2023-05-19 DIAGNOSIS — M54.2 NECK PAIN: ICD-10-CM

## 2023-05-19 DIAGNOSIS — D64.9 SYMPTOMATIC ANEMIA: ICD-10-CM

## 2023-05-19 PROCEDURE — 96372 THER/PROPH/DIAG INJ SC/IM: CPT

## 2023-05-19 PROCEDURE — 1160F PR REVIEW ALL MEDS BY PRESCRIBER/CLIN PHARMACIST DOCUMENTED: ICD-10-PCS | Mod: CPTII,,, | Performed by: INTERNAL MEDICINE

## 2023-05-19 PROCEDURE — 99214 PR OFFICE/OUTPT VISIT, EST, LEVL IV, 30-39 MIN: ICD-10-PCS | Mod: S$PBB,,, | Performed by: INTERNAL MEDICINE

## 2023-05-19 PROCEDURE — 25000003 PHARM REV CODE 250: Performed by: INTERNAL MEDICINE

## 2023-05-19 PROCEDURE — 99211 OFF/OP EST MAY X REQ PHY/QHP: CPT | Mod: PBBFAC | Performed by: INTERNAL MEDICINE

## 2023-05-19 PROCEDURE — 99214 OFFICE O/P EST MOD 30 MIN: CPT | Mod: S$PBB,,, | Performed by: INTERNAL MEDICINE

## 2023-05-19 PROCEDURE — 63600175 PHARM REV CODE 636 W HCPCS: Mod: JZ,JG | Performed by: INTERNAL MEDICINE

## 2023-05-19 PROCEDURE — 1159F PR MEDICATION LIST DOCUMENTED IN MEDICAL RECORD: ICD-10-PCS | Mod: CPTII,,, | Performed by: INTERNAL MEDICINE

## 2023-05-19 PROCEDURE — 1160F RVW MEDS BY RX/DR IN RCRD: CPT | Mod: CPTII,,, | Performed by: INTERNAL MEDICINE

## 2023-05-19 PROCEDURE — P9016 RBC LEUKOCYTES REDUCED: HCPCS | Performed by: INTERNAL MEDICINE

## 2023-05-19 PROCEDURE — 99999 PR PBB SHADOW E&M-EST. PATIENT-LVL I: CPT | Mod: PBBFAC,,, | Performed by: INTERNAL MEDICINE

## 2023-05-19 PROCEDURE — 1159F MED LIST DOCD IN RCRD: CPT | Mod: CPTII,,, | Performed by: INTERNAL MEDICINE

## 2023-05-19 PROCEDURE — 99999 PR PBB SHADOW E&M-EST. PATIENT-LVL I: ICD-10-PCS | Mod: PBBFAC,,, | Performed by: INTERNAL MEDICINE

## 2023-05-19 PROCEDURE — 36430 TRANSFUSION BLD/BLD COMPNT: CPT

## 2023-05-19 RX ORDER — HYDROCODONE BITARTRATE AND ACETAMINOPHEN 500; 5 MG/1; MG/1
TABLET ORAL ONCE
Status: DISCONTINUED | OUTPATIENT
Start: 2023-05-19 | End: 2023-05-19 | Stop reason: HOSPADM

## 2023-05-19 RX ORDER — ACETAMINOPHEN 325 MG/1
650 TABLET ORAL
Status: DISCONTINUED | OUTPATIENT
Start: 2023-05-19 | End: 2023-05-19 | Stop reason: HOSPADM

## 2023-05-19 RX ORDER — DIPHENHYDRAMINE HCL 25 MG
25 CAPSULE ORAL
Status: DISCONTINUED | OUTPATIENT
Start: 2023-05-19 | End: 2023-05-19 | Stop reason: HOSPADM

## 2023-05-19 RX ORDER — HYDROCODONE BITARTRATE AND ACETAMINOPHEN 500; 5 MG/1; MG/1
TABLET ORAL ONCE
Status: COMPLETED | OUTPATIENT
Start: 2023-05-19 | End: 2023-05-19

## 2023-05-19 RX ADMIN — SODIUM CHLORIDE: 9 INJECTION, SOLUTION INTRAVENOUS at 08:05

## 2023-05-19 RX ADMIN — OCTREOTIDE ACETATE 20 MG: KIT at 10:05

## 2023-05-19 NOTE — PLAN OF CARE
Problem: Adult Inpatient Plan of Care  Goal: Plan of Care Review  Outcome: Ongoing, Progressing  Flowsheets (Taken 5/19/2023 1109)  Plan of Care Reviewed With: patient  Goal: Optimal Comfort and Wellbeing  Outcome: Ongoing, Progressing  Intervention: Provide Person-Centered Care  Flowsheets (Taken 5/19/2023 1109)  Trust Relationship/Rapport:   care explained   choices provided   emotional support provided   empathic listening provided   questions answered   questions encouraged   reassurance provided   thoughts/feelings acknowledged

## 2023-05-19 NOTE — PROGRESS NOTES
Subjective:       Patient ID: Shawn Carreno is a 57 y.o. male.    Chief Complaint: Results and Anemia    HPI: 57-year-old male with a history of recurrent iron deficiency anemia felt secondary.  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  This time returns increased fatigue transfusion asked to be seen arrangements made for transfusion    Past Medical History:   Diagnosis Date    Multiple myeloma not having achieved remission 10/12/2022     History reviewed. No pertinent family history.  Social History     Socioeconomic History    Marital status: Single   Tobacco Use    Smoking status: Never    Smokeless tobacco: Never   Substance and Sexual Activity    Alcohol use: Never    Drug use: Never    Sexual activity: Not Currently     Past Surgical History:   Procedure Laterality Date    CHOLECYSTECTOMY 2020    COLONOSCOPY N/A 1/5/2022    Procedure: COLONOSCOPY;  Surgeon: Hannah Alvarado MD;  Location: [facility];  Service: Endoscopy;  Laterality: N/A;    ESOPHAGOGASTRODUODENOSCOPY N/A 1/5/2022    Procedure: EGD (ESOPHAGOGASTRODUODENOSCOPY);  Surgeon: Hannah Alvarado MD;  Location: [facility];  Service: Endoscopy;  Laterality: N/A;    ESOPHAGOGASTRODUODENOSCOPY N/A 4/6/2022    Procedure: EGD (ESOPHAGOGASTRODUODENOSCOPY) with push;  Surgeon: Vira Castaneda MD;  Location: [facility];  Service: Endoscopy;  Laterality: N/A;    INTRALUMINAL GASTROINTESTINAL TRACT IMAGING VIA CAPSULE N/A 1/26/2022    Procedure: IMAGING PROCEDURE, GI TRACT, INTRALUMINAL, VIA CAPSULE;  Surgeon: Baltazar Chaves RN;  Location: [facility];  Service: Endoscopy;  Laterality: N/A;    INTRALUMINAL GASTROINTESTINAL TRACT IMAGING VIA CAPSULE N/A 2/22/2022    Procedure: IMAGING PROCEDURE, GI TRACT, INTRALUMINAL, VIA CAPSULE;  Surgeon: Baltazar Chaves RN;  Location: [facility];  Service: Endoscopy;  Laterality: N/A;       Labs:  Lab Results   Component Value Date    WBC 3.67 (L) 05/17/2023    HGB 5.3 (LL)  05/17/2023    HCT 19.9 (LL) 05/17/2023    MCV 82 05/17/2023     05/17/2023     BMP  Lab Results   Component Value Date     05/10/2023    K 4.4 05/10/2023     05/10/2023    CO2 25 05/10/2023    BUN 16 05/10/2023    CREATININE 0.8 05/10/2023    CALCIUM 8.7 05/10/2023    ANIONGAP 4 (L) 05/10/2023    ESTGFRAFRICA >60 01/06/2022    EGFRNONAA >60 01/06/2022     Lab Results   Component Value Date    ALT 13 05/10/2023    AST 14 05/10/2023    ALKPHOS 33 (L) 05/10/2023    BILITOT 0.4 05/10/2023       Lab Results   Component Value Date    IRON 523 (H) 05/01/2023    TIBC 413 05/01/2023    FERRITIN 45 05/01/2023     Lab Results   Component Value Date    [id number] 283 09/15/2022     Lab Results   Component Value Date    FOLATE 12.2 09/15/2022     Lab Results   Component Value Date    TSH 2.047 09/15/2022         Review of Systems   Constitutional:  Positive for activity change, appetite change and fatigue. Negative for chills, diaphoresis, fever and unexpected weight change.   HENT:  Negative for congestion, dental problem, drooling, ear discharge, ear pain, facial swelling, hearing loss, mouth sores, nosebleeds, postnasal drip, rhinorrhea, sinus pressure, sneezing, sore throat, tinnitus, trouble swallowing and voice change.    Eyes:  Negative for photophobia, pain, discharge, redness, itching and visual disturbance.   Respiratory:  Negative for apnea, cough, choking, chest tightness, shortness of breath, wheezing and stridor.    Cardiovascular:  Negative for chest pain, palpitations and leg swelling.   Gastrointestinal:  Negative for abdominal distention, abdominal pain, anal bleeding, blood in stool, constipation, diarrhea, nausea, rectal pain and vomiting.   Endocrine: Negative for cold intolerance, heat intolerance, polydipsia, polyphagia and polyuria.   Genitourinary:  Negative for decreased urine volume, difficulty urinating, dysuria, enuresis, flank pain, frequency, genital sores, hematuria, penile  discharge, penile pain, penile swelling, scrotal swelling, testicular pain and urgency.   Musculoskeletal:  Negative for arthralgias, back pain, gait problem, joint swelling, myalgias, neck pain and neck stiffness.   Skin:  Negative for color change, pallor, rash and wound.   Allergic/Immunologic: Negative for environmental allergies, food allergies and immunocompromised state.   Neurological:  Positive for weakness. Negative for dizziness, tremors, seizures, syncope, facial asymmetry, speech difficulty, light-headedness, numbness and headaches.   Hematological:  Negative for adenopathy. Does not bruise/bleed easily.   Psychiatric/Behavioral:  Positive for dysphoric mood. Negative for agitation, behavioral problems, confusion, decreased concentration, hallucinations, self-injury, sleep disturbance and suicidal ideas. The patient is nervous/anxious. The patient is not hyperactive.      Objective:      Physical Exam  Vitals reviewed.   Constitutional:       General: He is not in acute distress.     Appearance: He is well-developed. He is obese. He is ill-appearing. He is not diaphoretic.   HENT:      Head: Normocephalic.      Right Ear: External ear normal.      Left Ear: External ear normal.      Nose: Nose normal.      Right Sinus: No maxillary sinus tenderness or frontal sinus tenderness.      Left Sinus: No maxillary sinus tenderness or frontal sinus tenderness.      Mouth/Throat:      Pharynx: No oropharyngeal exudate.   Eyes:      General: Lids are normal. No scleral icterus.        Right eye: No discharge.         Left eye: No discharge.      Extraocular Movements:      Right eye: Normal extraocular motion.      Left eye: Normal extraocular motion.      Conjunctiva/sclera:      Right eye: Right conjunctiva is not injected. No hemorrhage.     Left eye: Left conjunctiva is not injected. No hemorrhage.     Pupils: Pupils are equal, round, and reactive to light.   Neck:      Thyroid: No thyromegaly.      Vascular:  No JVD.      Trachea: No tracheal deviation.   Cardiovascular:      Rate and Rhythm: Normal rate.   Pulmonary:      Effort: Pulmonary effort is normal. No respiratory distress.      Breath sounds: No stridor.   Abdominal:      General: Bowel sounds are normal.      Palpations: Abdomen is soft. There is no hepatomegaly, splenomegaly or mass.      Tenderness: There is no abdominal tenderness.   Musculoskeletal:         General: No tenderness. Normal range of motion.      Cervical back: Normal range of motion and neck supple.   Lymphadenopathy:      Head:      Right side of head: No posterior auricular or occipital adenopathy.      Left side of head: No posterior auricular or occipital adenopathy.      Cervical: No cervical adenopathy.      Right cervical: No superficial, deep or posterior cervical adenopathy.     Left cervical: No superficial, deep or posterior cervical adenopathy.      Upper Body:      Right upper body: No supraclavicular adenopathy.      Left upper body: No supraclavicular adenopathy.   Skin:     General: Skin is dry.      Findings: No erythema or rash.      Nails: There is no clubbing.   Neurological:      Mental Status: He is alert and oriented to person, place, and time.      Cranial Nerves: No cranial nerve deficit.      Coordination: Coordination normal.   Psychiatric:         Behavior: Behavior normal.         Thought Content: Thought content normal.         Judgment: Judgment normal.           Assessment:      1. Angiodysplasia of gastrointestinal tract    2. Iron deficiency anemia due to chronic blood loss    3. Symptomatic anemia    4. Plasma cell neoplasm           Med Onc Chart Routing      Follow up with physician 2 weeks. CBC 2 weeks with me   Follow up with [facility]    Infusion scheduling note    Injection scheduling note Sandostatin Q 4 weeks; intravenous Venofer if documented iron deficiency   Labs Ferritin and iron and TIBC   Scheduling:  Preferred lab:  Lab interval:     Imaging     Pharmacy appointment    Other referrals            Plan:     Reviewed information with patient today will documented if iron deficient.  Has previously been given intravenous Venofer if not continue with IV supplementation.  Transfuse urgently for symptomatic anemia.  Recommend start on Sandostatin as soon as possible like to see back 2 weeks whether not this represents recurrent iron deficiency or progressive disease in bone marrow to be ascertained with follow-up.  This would be seen by leukemia lymphoma section felt to be most likely related to iron deficiency in not clear indications at this point to start treatment for multiple myeloma; reviewed results of MRI of cervical spine.  Demonstrates no clear evidence of focal involvement.  Ask pain management to see and evaluate the patient.        Kris Cesar Jr, MD FACP

## 2023-05-24 ENCOUNTER — LAB VISIT (OUTPATIENT)
Dept: LAB | Facility: HOSPITAL | Age: 58
End: 2023-05-24
Attending: INTERNAL MEDICINE
Payer: MEDICAID

## 2023-05-24 DIAGNOSIS — L03.221 CELLULITIS OF NECK: ICD-10-CM

## 2023-05-24 DIAGNOSIS — K55.20 ANGIODYSPLASIA OF GASTROINTESTINAL TRACT: ICD-10-CM

## 2023-05-24 DIAGNOSIS — D50.0 IRON DEFICIENCY ANEMIA DUE TO CHRONIC BLOOD LOSS: ICD-10-CM

## 2023-05-24 DIAGNOSIS — D64.9 SYMPTOMATIC ANEMIA: ICD-10-CM

## 2023-05-24 DIAGNOSIS — R53.82 CHRONIC FATIGUE: ICD-10-CM

## 2023-05-24 DIAGNOSIS — D49.89 PLASMA CELL NEOPLASM: ICD-10-CM

## 2023-05-24 LAB
ABO + RH BLD: NORMAL
ALBUMIN SERPL BCP-MCNC: 3.6 G/DL (ref 3.5–5.2)
ALP SERPL-CCNC: 38 U/L (ref 55–135)
ALT SERPL W/O P-5'-P-CCNC: 14 U/L (ref 10–44)
ANION GAP SERPL CALC-SCNC: 5 MMOL/L (ref 8–16)
AST SERPL-CCNC: 12 U/L (ref 10–40)
BASOPHILS # BLD AUTO: 0.05 K/UL (ref 0–0.2)
BASOPHILS NFR BLD: 1.3 % (ref 0–1.9)
BILIRUB SERPL-MCNC: 0.5 MG/DL (ref 0.1–1)
BLD GP AB SCN CELLS X3 SERPL QL: NORMAL
BUN SERPL-MCNC: 18 MG/DL (ref 6–20)
CALCIUM SERPL-MCNC: 8.5 MG/DL (ref 8.7–10.5)
CHLORIDE SERPL-SCNC: 109 MMOL/L (ref 95–110)
CO2 SERPL-SCNC: 26 MMOL/L (ref 23–29)
CREAT SERPL-MCNC: 0.8 MG/DL (ref 0.5–1.4)
DIFFERENTIAL METHOD: ABNORMAL
EOSINOPHIL # BLD AUTO: 0.2 K/UL (ref 0–0.5)
EOSINOPHIL NFR BLD: 5.3 % (ref 0–8)
ERYTHROCYTE [DISTWIDTH] IN BLOOD BY AUTOMATED COUNT: 22 % (ref 11.5–14.5)
EST. GFR  (NO RACE VARIABLE): >60 ML/MIN/1.73 M^2
FERRITIN SERPL-MCNC: 7 NG/ML (ref 20–300)
GLUCOSE SERPL-MCNC: 101 MG/DL (ref 70–110)
HCT VFR BLD AUTO: 21.5 % (ref 40–54)
HGB BLD-MCNC: 6 G/DL (ref 14–18)
IMM GRANULOCYTES # BLD AUTO: 0 K/UL (ref 0–0.04)
IMM GRANULOCYTES NFR BLD AUTO: 0 % (ref 0–0.5)
LYMPHOCYTES # BLD AUTO: 1.3 K/UL (ref 1–4.8)
LYMPHOCYTES NFR BLD: 33 % (ref 18–48)
MCH RBC QN AUTO: 22.3 PG (ref 27–31)
MCHC RBC AUTO-ENTMCNC: 27.9 G/DL (ref 32–36)
MCV RBC AUTO: 80 FL (ref 82–98)
MONOCYTES # BLD AUTO: 0.4 K/UL (ref 0.3–1)
MONOCYTES NFR BLD: 9.1 % (ref 4–15)
NEUTROPHILS # BLD AUTO: 2 K/UL (ref 1.8–7.7)
NEUTROPHILS NFR BLD: 51.3 % (ref 38–73)
NRBC BLD-RTO: 0 /100 WBC
PLATELET # BLD AUTO: 210 K/UL (ref 150–450)
PMV BLD AUTO: 9.2 FL (ref 9.2–12.9)
POTASSIUM SERPL-SCNC: 4.1 MMOL/L (ref 3.5–5.1)
PROT SERPL-MCNC: 7.3 G/DL (ref 6–8.4)
RBC # BLD AUTO: 2.69 M/UL (ref 4.6–6.2)
SODIUM SERPL-SCNC: 140 MMOL/L (ref 136–145)
SPECIMEN OUTDATE: NORMAL
WBC # BLD AUTO: 3.94 K/UL (ref 3.9–12.7)

## 2023-05-24 PROCEDURE — 36415 COLL VENOUS BLD VENIPUNCTURE: CPT | Performed by: INTERNAL MEDICINE

## 2023-05-24 PROCEDURE — 82728 ASSAY OF FERRITIN: CPT | Performed by: INTERNAL MEDICINE

## 2023-05-24 PROCEDURE — 84466 ASSAY OF TRANSFERRIN: CPT | Performed by: INTERNAL MEDICINE

## 2023-05-24 PROCEDURE — 80053 COMPREHEN METABOLIC PANEL: CPT | Performed by: INTERNAL MEDICINE

## 2023-05-24 PROCEDURE — 85025 COMPLETE CBC W/AUTO DIFF WBC: CPT | Performed by: INTERNAL MEDICINE

## 2023-05-24 PROCEDURE — 86900 BLOOD TYPING SEROLOGIC ABO: CPT | Performed by: INTERNAL MEDICINE

## 2023-05-25 ENCOUNTER — TELEPHONE (OUTPATIENT)
Dept: INFUSION THERAPY | Facility: HOSPITAL | Age: 58
End: 2023-05-25
Payer: MEDICAID

## 2023-05-25 LAB
IRON SERPL-MCNC: 16 UG/DL (ref 45–160)
SATURATED IRON: 3 % (ref 20–50)
TOTAL IRON BINDING CAPACITY: 459 UG/DL (ref 250–450)
TRANSFERRIN SERPL-MCNC: 310 MG/DL (ref 200–375)

## 2023-05-25 NOTE — TELEPHONE ENCOUNTER
Spoke with patient and per his preferences scheduled his 1st iv iron. Explained that I wanted to clarify with Dr. Cesar how many he wants him to have and I will schedule the rest. He acknowledged. Call ended well.

## 2023-05-26 RX ORDER — SODIUM CHLORIDE 0.9 % (FLUSH) 0.9 %
10 SYRINGE (ML) INJECTION
Status: CANCELLED | OUTPATIENT
Start: 2023-05-26

## 2023-05-26 RX ORDER — HEPARIN 100 UNIT/ML
500 SYRINGE INTRAVENOUS
Status: CANCELLED | OUTPATIENT
Start: 2023-05-26

## 2023-05-29 ENCOUNTER — INFUSION (OUTPATIENT)
Dept: INFUSION THERAPY | Facility: HOSPITAL | Age: 58
End: 2023-05-29
Attending: INTERNAL MEDICINE
Payer: MEDICAID

## 2023-05-29 VITALS
TEMPERATURE: 98 F | SYSTOLIC BLOOD PRESSURE: 110 MMHG | DIASTOLIC BLOOD PRESSURE: 65 MMHG | OXYGEN SATURATION: 100 % | HEART RATE: 67 BPM | RESPIRATION RATE: 18 BRPM

## 2023-05-29 DIAGNOSIS — D50.0 IRON DEFICIENCY ANEMIA DUE TO CHRONIC BLOOD LOSS: Primary | ICD-10-CM

## 2023-05-29 PROCEDURE — 96365 THER/PROPH/DIAG IV INF INIT: CPT

## 2023-05-29 PROCEDURE — 63600175 PHARM REV CODE 636 W HCPCS: Performed by: INTERNAL MEDICINE

## 2023-05-29 PROCEDURE — 25000003 PHARM REV CODE 250: Performed by: INTERNAL MEDICINE

## 2023-05-29 RX ORDER — HEPARIN 100 UNIT/ML
500 SYRINGE INTRAVENOUS
Status: CANCELLED | OUTPATIENT
Start: 2023-06-05

## 2023-05-29 RX ORDER — SODIUM CHLORIDE 0.9 % (FLUSH) 0.9 %
10 SYRINGE (ML) INJECTION
Status: CANCELLED | OUTPATIENT
Start: 2023-06-05

## 2023-05-29 RX ADMIN — IRON SUCROSE 200 MG: 20 INJECTION, SOLUTION INTRAVENOUS at 11:05

## 2023-06-01 ENCOUNTER — TELEPHONE (OUTPATIENT)
Dept: HEMATOLOGY/ONCOLOGY | Facility: CLINIC | Age: 58
End: 2023-06-01
Payer: MEDICAID

## 2023-06-01 ENCOUNTER — OFFICE VISIT (OUTPATIENT)
Dept: HEMATOLOGY/ONCOLOGY | Facility: CLINIC | Age: 58
End: 2023-06-01
Payer: MEDICAID

## 2023-06-01 ENCOUNTER — LAB VISIT (OUTPATIENT)
Dept: LAB | Facility: HOSPITAL | Age: 58
End: 2023-06-01
Attending: INTERNAL MEDICINE
Payer: MEDICAID

## 2023-06-01 VITALS
OXYGEN SATURATION: 99 % | HEIGHT: 67 IN | HEART RATE: 68 BPM | DIASTOLIC BLOOD PRESSURE: 59 MMHG | BODY MASS INDEX: 29.83 KG/M2 | TEMPERATURE: 99 F | WEIGHT: 190.06 LBS | SYSTOLIC BLOOD PRESSURE: 110 MMHG

## 2023-06-01 DIAGNOSIS — K55.20 ANGIODYSPLASIA OF GASTROINTESTINAL TRACT: ICD-10-CM

## 2023-06-01 DIAGNOSIS — R53.82 CHRONIC FATIGUE: ICD-10-CM

## 2023-06-01 DIAGNOSIS — K55.20 ANGIODYSPLASIA OF GASTROINTESTINAL TRACT: Primary | ICD-10-CM

## 2023-06-01 DIAGNOSIS — D50.0 IRON DEFICIENCY ANEMIA DUE TO CHRONIC BLOOD LOSS: ICD-10-CM

## 2023-06-01 DIAGNOSIS — D49.89 PLASMA CELL NEOPLASM: ICD-10-CM

## 2023-06-01 DIAGNOSIS — D64.9 SYMPTOMATIC ANEMIA: ICD-10-CM

## 2023-06-01 DIAGNOSIS — D64.9 SYMPTOMATIC ANEMIA: Primary | ICD-10-CM

## 2023-06-01 DIAGNOSIS — L03.221 CELLULITIS OF NECK: ICD-10-CM

## 2023-06-01 LAB
ANISOCYTOSIS BLD QL SMEAR: SLIGHT
BASOPHILS # BLD AUTO: 0.07 K/UL (ref 0–0.2)
BASOPHILS NFR BLD: 1.3 % (ref 0–1.9)
DIFFERENTIAL METHOD: ABNORMAL
EOSINOPHIL # BLD AUTO: 0.2 K/UL (ref 0–0.5)
EOSINOPHIL NFR BLD: 3.4 % (ref 0–8)
ERYTHROCYTE [DISTWIDTH] IN BLOOD BY AUTOMATED COUNT: 23.9 % (ref 11.5–14.5)
HCT VFR BLD AUTO: 20.4 % (ref 40–54)
HGB BLD-MCNC: 5.4 G/DL (ref 14–18)
HYPOCHROMIA BLD QL SMEAR: ABNORMAL
IMM GRANULOCYTES # BLD AUTO: 0.03 K/UL (ref 0–0.04)
IMM GRANULOCYTES NFR BLD AUTO: 0.6 % (ref 0–0.5)
LYMPHOCYTES # BLD AUTO: 1.6 K/UL (ref 1–4.8)
LYMPHOCYTES NFR BLD: 30.4 % (ref 18–48)
MCH RBC QN AUTO: 21.3 PG (ref 27–31)
MCHC RBC AUTO-ENTMCNC: 26.5 G/DL (ref 32–36)
MCV RBC AUTO: 80 FL (ref 82–98)
MONOCYTES # BLD AUTO: 0.5 K/UL (ref 0.3–1)
MONOCYTES NFR BLD: 9.8 % (ref 4–15)
NEUTROPHILS # BLD AUTO: 2.9 K/UL (ref 1.8–7.7)
NEUTROPHILS NFR BLD: 54.5 % (ref 38–73)
NRBC BLD-RTO: 1 /100 WBC
OVALOCYTES BLD QL SMEAR: ABNORMAL
PLATELET # BLD AUTO: 294 K/UL (ref 150–450)
PLATELET BLD QL SMEAR: ABNORMAL
PMV BLD AUTO: 9.3 FL (ref 9.2–12.9)
POIKILOCYTOSIS BLD QL SMEAR: SLIGHT
POLYCHROMASIA BLD QL SMEAR: ABNORMAL
RBC # BLD AUTO: 2.54 M/UL (ref 4.6–6.2)
WBC # BLD AUTO: 5.23 K/UL (ref 3.9–12.7)

## 2023-06-01 PROCEDURE — 3078F PR MOST RECENT DIASTOLIC BLOOD PRESSURE < 80 MM HG: ICD-10-PCS | Mod: CPTII,,, | Performed by: INTERNAL MEDICINE

## 2023-06-01 PROCEDURE — 3078F DIAST BP <80 MM HG: CPT | Mod: CPTII,,, | Performed by: INTERNAL MEDICINE

## 2023-06-01 PROCEDURE — 1159F PR MEDICATION LIST DOCUMENTED IN MEDICAL RECORD: ICD-10-PCS | Mod: CPTII,,, | Performed by: INTERNAL MEDICINE

## 2023-06-01 PROCEDURE — 3074F PR MOST RECENT SYSTOLIC BLOOD PRESSURE < 130 MM HG: ICD-10-PCS | Mod: CPTII,,, | Performed by: INTERNAL MEDICINE

## 2023-06-01 PROCEDURE — 85025 COMPLETE CBC W/AUTO DIFF WBC: CPT | Performed by: INTERNAL MEDICINE

## 2023-06-01 PROCEDURE — 99213 OFFICE O/P EST LOW 20 MIN: CPT | Mod: PBBFAC | Performed by: INTERNAL MEDICINE

## 2023-06-01 PROCEDURE — 3074F SYST BP LT 130 MM HG: CPT | Mod: CPTII,,, | Performed by: INTERNAL MEDICINE

## 2023-06-01 PROCEDURE — 99999 PR PBB SHADOW E&M-EST. PATIENT-LVL III: ICD-10-PCS | Mod: PBBFAC,,, | Performed by: INTERNAL MEDICINE

## 2023-06-01 PROCEDURE — 1160F PR REVIEW ALL MEDS BY PRESCRIBER/CLIN PHARMACIST DOCUMENTED: ICD-10-PCS | Mod: CPTII,,, | Performed by: INTERNAL MEDICINE

## 2023-06-01 PROCEDURE — 1160F RVW MEDS BY RX/DR IN RCRD: CPT | Mod: CPTII,,, | Performed by: INTERNAL MEDICINE

## 2023-06-01 PROCEDURE — 1159F MED LIST DOCD IN RCRD: CPT | Mod: CPTII,,, | Performed by: INTERNAL MEDICINE

## 2023-06-01 PROCEDURE — 3008F PR BODY MASS INDEX (BMI) DOCUMENTED: ICD-10-PCS | Mod: CPTII,,, | Performed by: INTERNAL MEDICINE

## 2023-06-01 PROCEDURE — 99214 PR OFFICE/OUTPT VISIT, EST, LEVL IV, 30-39 MIN: ICD-10-PCS | Mod: S$PBB,,, | Performed by: INTERNAL MEDICINE

## 2023-06-01 PROCEDURE — 3008F BODY MASS INDEX DOCD: CPT | Mod: CPTII,,, | Performed by: INTERNAL MEDICINE

## 2023-06-01 PROCEDURE — 99214 OFFICE O/P EST MOD 30 MIN: CPT | Mod: S$PBB,,, | Performed by: INTERNAL MEDICINE

## 2023-06-01 PROCEDURE — 36415 COLL VENOUS BLD VENIPUNCTURE: CPT | Performed by: INTERNAL MEDICINE

## 2023-06-01 PROCEDURE — 99999 PR PBB SHADOW E&M-EST. PATIENT-LVL III: CPT | Mod: PBBFAC,,, | Performed by: INTERNAL MEDICINE

## 2023-06-01 RX ORDER — DIPHENHYDRAMINE HCL 25 MG
25 CAPSULE ORAL
Status: CANCELLED | OUTPATIENT
Start: 2023-06-01

## 2023-06-01 RX ORDER — HYDROCODONE BITARTRATE AND ACETAMINOPHEN 500; 5 MG/1; MG/1
TABLET ORAL ONCE
Status: CANCELLED | OUTPATIENT
Start: 2023-06-01 | End: 2023-06-01

## 2023-06-01 RX ORDER — ACETAMINOPHEN 325 MG/1
650 TABLET ORAL
Status: CANCELLED | OUTPATIENT
Start: 2023-06-01

## 2023-06-01 NOTE — PROGRESS NOTES
Subjective:       Patient ID: Shawn Carreno is a 57 y.o. male.    Chief Complaint: Results and Anemia    HPI:  57-year-old male with recurrent angiodysplasia of stomach.  In addition receiving intravenous iron as well as intermittent blood transfusions also patient has smoldering myeloma at this time in returns for clinical follow-up with repeat laboratory studies    Past Medical History:   Diagnosis Date    Multiple myeloma not having achieved remission 10/12/2022     History reviewed. No pertinent family history.  Social History     Socioeconomic History    Marital status: Single   Tobacco Use    Smoking status: Never    Smokeless tobacco: Never   Substance and Sexual Activity    Alcohol use: Never    Drug use: Never    Sexual activity: Not Currently     Past Surgical History:   Procedure Laterality Date    CHOLECYSTECTOMY 2020    COLONOSCOPY N/A 1/5/2022    Procedure: COLONOSCOPY;  Surgeon: Hannah Alvarado MD;  Location: [facility];  Service: Endoscopy;  Laterality: N/A;    ESOPHAGOGASTRODUODENOSCOPY N/A 1/5/2022    Procedure: EGD (ESOPHAGOGASTRODUODENOSCOPY);  Surgeon: Hannah Alvarado MD;  Location: [facility];  Service: Endoscopy;  Laterality: N/A;    ESOPHAGOGASTRODUODENOSCOPY N/A 4/6/2022    Procedure: EGD (ESOPHAGOGASTRODUODENOSCOPY) with push;  Surgeon: Vira Castaneda MD;  Location: [facility];  Service: Endoscopy;  Laterality: N/A;    INTRALUMINAL GASTROINTESTINAL TRACT IMAGING VIA CAPSULE N/A 1/26/2022    Procedure: IMAGING PROCEDURE, GI TRACT, INTRALUMINAL, VIA CAPSULE;  Surgeon: Baltazar Chaves RN;  Location: [facility];  Service: Endoscopy;  Laterality: N/A;    INTRALUMINAL GASTROINTESTINAL TRACT IMAGING VIA CAPSULE N/A 2/22/2022    Procedure: IMAGING PROCEDURE, GI TRACT, INTRALUMINAL, VIA CAPSULE;  Surgeon: Baltazar Chaves RN;  Location: [facility];  Service: Endoscopy;  Laterality: N/A;       Labs:  Lab Results   Component Value Date    WBC 5.23 06/01/2023    HGB 5.4 (LL) 06/01/2023     HCT 20.4 (L) 06/01/2023    MCV 80 (L) 06/01/2023     06/01/2023     BMP  Lab Results   Component Value Date     05/24/2023    K 4.1 05/24/2023     05/24/2023    CO2 26 05/24/2023    BUN 18 05/24/2023    CREATININE 0.8 05/24/2023    CALCIUM 8.5 (L) 05/24/2023    ANIONGAP 5 (L) 05/24/2023    ESTGFRAFRICA >60 01/06/2022    EGFRNONAA >60 01/06/2022     Lab Results   Component Value Date    ALT 14 05/24/2023    AST 12 05/24/2023    ALKPHOS 38 (L) 05/24/2023    BILITOT 0.5 05/24/2023       Lab Results   Component Value Date    IRON 16 (L) 05/24/2023    TIBC 459 (H) 05/24/2023    FERRITIN 7 (L) 05/24/2023     Lab Results   Component Value Date    IUAVGVUB84 283 09/15/2022     Lab Results   Component Value Date    FOLATE 12.2 09/15/2022     Lab Results   Component Value Date    TSH 2.047 09/15/2022         Review of Systems   Constitutional:  Positive for fatigue. Negative for activity change, appetite change, chills, diaphoresis, fever and unexpected weight change.   HENT:  Negative for congestion, dental problem, drooling, ear discharge, ear pain, facial swelling, hearing loss, mouth sores, nosebleeds, postnasal drip, rhinorrhea, sinus pressure, sneezing, sore throat, tinnitus, trouble swallowing and voice change.    Eyes:  Negative for photophobia, pain, discharge, redness, itching and visual disturbance.   Respiratory:  Negative for apnea, cough, choking, chest tightness, shortness of breath, wheezing and stridor.    Cardiovascular:  Negative for chest pain, palpitations and leg swelling.   Gastrointestinal:  Negative for abdominal distention, abdominal pain, anal bleeding, blood in stool, constipation, diarrhea, nausea, rectal pain and vomiting.   Endocrine: Negative for cold intolerance, heat intolerance, polydipsia, polyphagia and polyuria.   Genitourinary:  Negative for decreased urine volume, difficulty urinating, dysuria, enuresis, flank pain, frequency, genital sores, hematuria, penile  discharge, penile pain, penile swelling, scrotal swelling, testicular pain and urgency.   Musculoskeletal:  Negative for arthralgias, back pain, gait problem, joint swelling, myalgias, neck pain and neck stiffness.   Skin:  Negative for color change, pallor, rash and wound.   Allergic/Immunologic: Negative for environmental allergies, food allergies and immunocompromised state.   Neurological:  Positive for weakness. Negative for dizziness, tremors, seizures, syncope, facial asymmetry, speech difficulty, light-headedness, numbness and headaches.   Hematological:  Negative for adenopathy. Does not bruise/bleed easily.   Psychiatric/Behavioral:  Negative for agitation, behavioral problems, confusion, decreased concentration, dysphoric mood, hallucinations, self-injury, sleep disturbance and suicidal ideas. The patient is not nervous/anxious and is not hyperactive.      Objective:      Physical Exam  Vitals reviewed.   Constitutional:       General: He is not in acute distress.     Appearance: He is well-developed. He is not diaphoretic.   HENT:      Head: Normocephalic.      Right Ear: External ear normal.      Left Ear: External ear normal.      Nose: Nose normal.      Right Sinus: No maxillary sinus tenderness or frontal sinus tenderness.      Left Sinus: No maxillary sinus tenderness or frontal sinus tenderness.      Mouth/Throat:      Pharynx: No oropharyngeal exudate.   Eyes:      General: Lids are normal. No scleral icterus.        Right eye: No discharge.         Left eye: No discharge.      Extraocular Movements:      Right eye: Normal extraocular motion.      Left eye: Normal extraocular motion.      Conjunctiva/sclera:      Right eye: Right conjunctiva is not injected. No hemorrhage.     Left eye: Left conjunctiva is not injected. No hemorrhage.     Pupils: Pupils are equal, round, and reactive to light.   Neck:      Thyroid: No thyromegaly.      Vascular: No JVD.      Trachea: No tracheal deviation.    Cardiovascular:      Rate and Rhythm: Normal rate.   Pulmonary:      Effort: Pulmonary effort is normal. No respiratory distress.      Breath sounds: No stridor.   Abdominal:      General: Bowel sounds are normal.      Palpations: Abdomen is soft. There is no hepatomegaly, splenomegaly or mass.      Tenderness: There is no abdominal tenderness.   Musculoskeletal:         General: No tenderness. Normal range of motion.      Cervical back: Normal range of motion and neck supple.   Lymphadenopathy:      Head:      Right side of head: No posterior auricular or occipital adenopathy.      Left side of head: No posterior auricular or occipital adenopathy.      Cervical: No cervical adenopathy.      Right cervical: No superficial, deep or posterior cervical adenopathy.     Left cervical: No superficial, deep or posterior cervical adenopathy.      Upper Body:      Right upper body: No supraclavicular adenopathy.      Left upper body: No supraclavicular adenopathy.   Skin:     General: Skin is dry.      Findings: No erythema or rash.      Nails: There is no clubbing.   Neurological:      Mental Status: He is alert and oriented to person, place, and time.      Cranial Nerves: No cranial nerve deficit.      Motor: Weakness present.      Coordination: Coordination normal.   Psychiatric:         Behavior: Behavior normal.         Thought Content: Thought content normal.         Judgment: Judgment normal.           Assessment:      1. Symptomatic anemia    2. Angiodysplasia of gastrointestinal tract    3. Iron deficiency anemia due to chronic blood loss    4. Plasma cell neoplasm           Med Onc Chart Routing      Follow up with physician . Return to clinic in 2 weeks with CBC CMP SPEP free light chain   Follow up with [facility]    Infusion scheduling note    Injection scheduling note Intravenous iron weekly return in 2 weeks with Sandostatin   Labs    Imaging    Pharmacy appointment    Other referrals             Plan:      Patient's hemoglobin is 5.4 would recommend transfusion of 1 unit of packed red cells symptomatic.  Orders have been placed consent signed proceed with weekly Venofer return 2 weeks CBC CMP SPEP free light chain.  Discussed implications answered questions Alicia Cesar Jr, MD FACP

## 2023-06-02 ENCOUNTER — INFUSION (OUTPATIENT)
Dept: INFUSION THERAPY | Facility: HOSPITAL | Age: 58
End: 2023-06-02
Attending: INTERNAL MEDICINE
Payer: MEDICAID

## 2023-06-02 VITALS
DIASTOLIC BLOOD PRESSURE: 65 MMHG | HEART RATE: 57 BPM | SYSTOLIC BLOOD PRESSURE: 127 MMHG | RESPIRATION RATE: 18 BRPM | TEMPERATURE: 98 F | OXYGEN SATURATION: 100 %

## 2023-06-02 DIAGNOSIS — D50.0 IRON DEFICIENCY ANEMIA DUE TO CHRONIC BLOOD LOSS: ICD-10-CM

## 2023-06-02 DIAGNOSIS — D64.9 SYMPTOMATIC ANEMIA: ICD-10-CM

## 2023-06-02 DIAGNOSIS — K55.20 ANGIODYSPLASIA OF GASTROINTESTINAL TRACT: ICD-10-CM

## 2023-06-02 PROCEDURE — P9016 RBC LEUKOCYTES REDUCED: HCPCS | Performed by: INTERNAL MEDICINE

## 2023-06-02 PROCEDURE — 36430 TRANSFUSION BLD/BLD COMPNT: CPT

## 2023-06-02 RX ORDER — DIPHENHYDRAMINE HCL 25 MG
25 CAPSULE ORAL
Status: DISCONTINUED | OUTPATIENT
Start: 2023-06-02 | End: 2023-06-02 | Stop reason: HOSPADM

## 2023-06-02 RX ORDER — ACETAMINOPHEN 325 MG/1
650 TABLET ORAL
Status: DISCONTINUED | OUTPATIENT
Start: 2023-06-02 | End: 2023-06-02 | Stop reason: HOSPADM

## 2023-06-02 RX ORDER — HYDROCODONE BITARTRATE AND ACETAMINOPHEN 500; 5 MG/1; MG/1
TABLET ORAL ONCE
Status: DISCONTINUED | OUTPATIENT
Start: 2023-06-02 | End: 2023-06-02 | Stop reason: HOSPADM

## 2023-06-02 NOTE — PLAN OF CARE
Problem: Adult Inpatient Plan of Care  Goal: Plan of Care Review  Outcome: Ongoing, Progressing  Flowsheets (Taken 6/2/2023 1100)  Plan of Care Reviewed With: patient  Goal: Patient-Specific Goal (Individualized)  Outcome: Ongoing, Progressing  Flowsheets (Taken 6/2/2023 1100)  Anxieties, Fears or Concerns: denies any complaints  Individualized Care Needs: pillow     Problem: Anemia  Goal: Anemia Symptom Improvement  Outcome: Ongoing, Progressing  Intervention: Monitor and Manage Anemia  Flowsheets (Taken 6/2/2023 1100)  Oral Nutrition Promotion: rest periods promoted  Safety Promotion/Fall Prevention:   instructed to call staff for mobility   room near unit station  Fatigue Management: frequent rest breaks encouraged

## 2023-06-07 ENCOUNTER — INFUSION (OUTPATIENT)
Dept: INFUSION THERAPY | Facility: HOSPITAL | Age: 58
End: 2023-06-07
Attending: INTERNAL MEDICINE
Payer: MEDICAID

## 2023-06-07 VITALS
TEMPERATURE: 98 F | SYSTOLIC BLOOD PRESSURE: 115 MMHG | HEART RATE: 68 BPM | OXYGEN SATURATION: 98 % | DIASTOLIC BLOOD PRESSURE: 67 MMHG | RESPIRATION RATE: 16 BRPM

## 2023-06-07 DIAGNOSIS — D50.0 IRON DEFICIENCY ANEMIA DUE TO CHRONIC BLOOD LOSS: Primary | ICD-10-CM

## 2023-06-07 PROCEDURE — 25000003 PHARM REV CODE 250: Performed by: INTERNAL MEDICINE

## 2023-06-07 PROCEDURE — 63600175 PHARM REV CODE 636 W HCPCS: Performed by: INTERNAL MEDICINE

## 2023-06-07 PROCEDURE — 96365 THER/PROPH/DIAG IV INF INIT: CPT

## 2023-06-07 RX ORDER — HEPARIN 100 UNIT/ML
500 SYRINGE INTRAVENOUS
Status: CANCELLED | OUTPATIENT
Start: 2023-06-12

## 2023-06-07 RX ORDER — SODIUM CHLORIDE 0.9 % (FLUSH) 0.9 %
10 SYRINGE (ML) INJECTION
Status: CANCELLED | OUTPATIENT
Start: 2023-06-12

## 2023-06-07 RX ADMIN — SODIUM CHLORIDE: 9 INJECTION, SOLUTION INTRAVENOUS at 03:06

## 2023-06-07 RX ADMIN — IRON SUCROSE 200 MG: 20 INJECTION, SOLUTION INTRAVENOUS at 03:06

## 2023-06-07 NOTE — PLAN OF CARE
Plan of care reviewed with pt. All needs and concerns addressed.   Problem: Adult Inpatient Plan of Care  Goal: Plan of Care Review  Outcome: Ongoing, Progressing  Flowsheets (Taken 6/7/2023 1602)  Plan of Care Reviewed With: patient  Goal: Patient-Specific Goal (Individualized)  Outcome: Ongoing, Progressing  Flowsheets (Taken 6/7/2023 1602)  Anxieties, Fears or Concerns: None expressed today  Individualized Care Needs: Pt in recliner with feet elevated. Request iv i left ac with pillow under arm.     Problem: Anemia  Goal: Anemia Symptom Improvement  Outcome: Ongoing, Progressing  Intervention: Monitor and Manage Anemia  Flowsheets (Taken 6/7/2023 1602)  Safety Promotion/Fall Prevention:   in recliner, wheels locked   room near unit station   instructed to call staff for mobility

## 2023-06-14 ENCOUNTER — INFUSION (OUTPATIENT)
Dept: INFUSION THERAPY | Facility: HOSPITAL | Age: 58
End: 2023-06-14
Attending: INTERNAL MEDICINE
Payer: MEDICAID

## 2023-06-14 ENCOUNTER — OFFICE VISIT (OUTPATIENT)
Dept: HEMATOLOGY/ONCOLOGY | Facility: CLINIC | Age: 58
End: 2023-06-14
Payer: MEDICAID

## 2023-06-14 VITALS
WEIGHT: 186.31 LBS | SYSTOLIC BLOOD PRESSURE: 107 MMHG | HEIGHT: 67 IN | HEART RATE: 68 BPM | TEMPERATURE: 97 F | DIASTOLIC BLOOD PRESSURE: 65 MMHG | BODY MASS INDEX: 29.24 KG/M2 | OXYGEN SATURATION: 98 %

## 2023-06-14 VITALS
RESPIRATION RATE: 17 BRPM | TEMPERATURE: 98 F | DIASTOLIC BLOOD PRESSURE: 67 MMHG | HEART RATE: 54 BPM | OXYGEN SATURATION: 99 % | SYSTOLIC BLOOD PRESSURE: 111 MMHG

## 2023-06-14 DIAGNOSIS — D50.0 IRON DEFICIENCY ANEMIA DUE TO CHRONIC BLOOD LOSS: Primary | ICD-10-CM

## 2023-06-14 DIAGNOSIS — D64.9 SYMPTOMATIC ANEMIA: ICD-10-CM

## 2023-06-14 DIAGNOSIS — K55.20 ANGIODYSPLASIA OF GASTROINTESTINAL TRACT: ICD-10-CM

## 2023-06-14 DIAGNOSIS — D49.89 PLASMA CELL NEOPLASM: ICD-10-CM

## 2023-06-14 DIAGNOSIS — R53.82 CHRONIC FATIGUE: ICD-10-CM

## 2023-06-14 PROCEDURE — 1159F PR MEDICATION LIST DOCUMENTED IN MEDICAL RECORD: ICD-10-PCS | Mod: CPTII,,, | Performed by: INTERNAL MEDICINE

## 2023-06-14 PROCEDURE — 99999 PR PBB SHADOW E&M-EST. PATIENT-LVL III: ICD-10-PCS | Mod: PBBFAC,,, | Performed by: INTERNAL MEDICINE

## 2023-06-14 PROCEDURE — 3074F SYST BP LT 130 MM HG: CPT | Mod: CPTII,,, | Performed by: INTERNAL MEDICINE

## 2023-06-14 PROCEDURE — 1160F RVW MEDS BY RX/DR IN RCRD: CPT | Mod: CPTII,,, | Performed by: INTERNAL MEDICINE

## 2023-06-14 PROCEDURE — 96365 THER/PROPH/DIAG IV INF INIT: CPT

## 2023-06-14 PROCEDURE — 3008F BODY MASS INDEX DOCD: CPT | Mod: CPTII,,, | Performed by: INTERNAL MEDICINE

## 2023-06-14 PROCEDURE — 99213 OFFICE O/P EST LOW 20 MIN: CPT | Mod: PBBFAC,25 | Performed by: INTERNAL MEDICINE

## 2023-06-14 PROCEDURE — 99214 PR OFFICE/OUTPT VISIT, EST, LEVL IV, 30-39 MIN: ICD-10-PCS | Mod: 25,S$PBB,, | Performed by: INTERNAL MEDICINE

## 2023-06-14 PROCEDURE — 99214 OFFICE O/P EST MOD 30 MIN: CPT | Mod: 25,S$PBB,, | Performed by: INTERNAL MEDICINE

## 2023-06-14 PROCEDURE — 96372 THER/PROPH/DIAG INJ SC/IM: CPT

## 2023-06-14 PROCEDURE — 3078F PR MOST RECENT DIASTOLIC BLOOD PRESSURE < 80 MM HG: ICD-10-PCS | Mod: CPTII,,, | Performed by: INTERNAL MEDICINE

## 2023-06-14 PROCEDURE — 3078F DIAST BP <80 MM HG: CPT | Mod: CPTII,,, | Performed by: INTERNAL MEDICINE

## 2023-06-14 PROCEDURE — 63600175 PHARM REV CODE 636 W HCPCS: Mod: JZ,JG | Performed by: INTERNAL MEDICINE

## 2023-06-14 PROCEDURE — 3074F PR MOST RECENT SYSTOLIC BLOOD PRESSURE < 130 MM HG: ICD-10-PCS | Mod: CPTII,,, | Performed by: INTERNAL MEDICINE

## 2023-06-14 PROCEDURE — 1160F PR REVIEW ALL MEDS BY PRESCRIBER/CLIN PHARMACIST DOCUMENTED: ICD-10-PCS | Mod: CPTII,,, | Performed by: INTERNAL MEDICINE

## 2023-06-14 PROCEDURE — 1159F MED LIST DOCD IN RCRD: CPT | Mod: CPTII,,, | Performed by: INTERNAL MEDICINE

## 2023-06-14 PROCEDURE — 25000003 PHARM REV CODE 250: Performed by: INTERNAL MEDICINE

## 2023-06-14 PROCEDURE — 99999 PR PBB SHADOW E&M-EST. PATIENT-LVL III: CPT | Mod: PBBFAC,,, | Performed by: INTERNAL MEDICINE

## 2023-06-14 PROCEDURE — 3008F PR BODY MASS INDEX (BMI) DOCUMENTED: ICD-10-PCS | Mod: CPTII,,, | Performed by: INTERNAL MEDICINE

## 2023-06-14 RX ORDER — HEPARIN 100 UNIT/ML
500 SYRINGE INTRAVENOUS
Status: CANCELLED | OUTPATIENT
Start: 2023-06-21

## 2023-06-14 RX ORDER — SODIUM CHLORIDE 0.9 % (FLUSH) 0.9 %
10 SYRINGE (ML) INJECTION
Status: DISCONTINUED | OUTPATIENT
Start: 2023-06-14 | End: 2023-06-14 | Stop reason: HOSPADM

## 2023-06-14 RX ORDER — SODIUM CHLORIDE 0.9 % (FLUSH) 0.9 %
10 SYRINGE (ML) INJECTION
Status: CANCELLED | OUTPATIENT
Start: 2023-06-21

## 2023-06-14 RX ADMIN — OCTREOTIDE ACETATE 20 MG: KIT at 10:06

## 2023-06-14 RX ADMIN — IRON SUCROSE 200 MG: 20 INJECTION, SOLUTION INTRAVENOUS at 09:06

## 2023-06-14 NOTE — PROGRESS NOTES
Subjective:       Patient ID: Shawn Carreno is a 57 y.o. male.    Chief Complaint: Results and Anemia    HPI:  57-year-old male with recurrent iron deficiency anemia angiodysplasias patient is receiving Sandostatin 20 mg addition patient receiving IV Venofer 2 every 2 weeks.  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  In addition to that is being followed expectantly ECOG status 1    Past Medical History:   Diagnosis Date    Multiple myeloma not having achieved remission 10/12/2022     History reviewed. No pertinent family history.  Social History     Socioeconomic History    Marital status: Single   Tobacco Use    Smoking status: Never    Smokeless tobacco: Never   Substance and Sexual Activity    Alcohol use: Never    Drug use: Never    Sexual activity: Not Currently     Past Surgical History:   Procedure Laterality Date    CHOLECYSTECTOMY  2020    COLONOSCOPY N/A 1/5/2022    Procedure: COLONOSCOPY;  Surgeon: Hannah Alvarado MD;  Location: [facility];  Service: Endoscopy;  Laterality: N/A;    ESOPHAGOGASTRODUODENOSCOPY N/A 1/5/2022    Procedure: EGD (ESOPHAGOGASTRODUODENOSCOPY);  Surgeon: Hannah Alvarado MD;  Location: [facility];  Service: Endoscopy;  Laterality: N/A;    ESOPHAGOGASTRODUODENOSCOPY N/A 4/6/2022    Procedure: EGD (ESOPHAGOGASTRODUODENOSCOPY) with push;  Surgeon: Vira Castaneda MD;  Location: [facility];  Service: Endoscopy;  Laterality: N/A;    INTRALUMINAL GASTROINTESTINAL TRACT IMAGING VIA CAPSULE N/A 1/26/2022    Procedure: IMAGING PROCEDURE, GI TRACT, INTRALUMINAL, VIA CAPSULE;  Surgeon: Baltazar Chaves RN;  Location: [facility];  Service: Endoscopy;  Laterality: N/A;    INTRALUMINAL GASTROINTESTINAL TRACT IMAGING VIA CAPSULE N/A 2/22/2022    Procedure: IMAGING PROCEDURE, GI TRACT, INTRALUMINAL, VIA CAPSULE;  Surgeon: Baltazar Chaves RN;  Location: [facility];  Service: Endoscopy;  Laterality: N/A;       Labs:  Lab Results   Component  Value Date    WBC 4.77 06/14/2023    HGB 7.8 (L) 06/14/2023    HCT 27.9 (L) 06/14/2023    MCV 83 06/14/2023     06/14/2023     BMP  Lab Results   Component Value Date     05/24/2023    K 4.1 05/24/2023     05/24/2023    CO2 26 05/24/2023    BUN 18 05/24/2023    CREATININE 0.8 05/24/2023    CALCIUM 8.5 (L) 05/24/2023    ANIONGAP 5 (L) 05/24/2023    ESTGFRAFRICA >60 01/06/2022    EGFRNONAA >60 01/06/2022     Lab Results   Component Value Date    ALT 14 05/24/2023    AST 12 05/24/2023    ALKPHOS 38 (L) 05/24/2023    BILITOT 0.5 05/24/2023       Lab Results   Component Value Date    IRON 16 (L) 05/24/2023    TIBC 459 (H) 05/24/2023    FERRITIN 7 (L) 05/24/2023     Lab Results   Component Value Date    [id number] 283 09/15/2022     Lab Results   Component Value Date    FOLATE 12.2 09/15/2022     Lab Results   Component Value Date    TSH 2.047 09/15/2022         Review of Systems   Constitutional:  Positive for fatigue. Negative for activity change, appetite change, chills, diaphoresis, fever and unexpected weight change.   HENT:  Negative for congestion, dental problem, drooling, ear discharge, ear pain, facial swelling, hearing loss, mouth sores, nosebleeds, postnasal drip, rhinorrhea, sinus pressure, sneezing, sore throat, tinnitus, trouble swallowing and voice change.    Eyes:  Negative for photophobia, pain, discharge, redness, itching and visual disturbance.   Respiratory:  Negative for apnea, cough, choking, chest tightness, shortness of breath, wheezing and stridor.    Cardiovascular:  Negative for chest pain, palpitations and leg swelling.   Gastrointestinal:  Negative for abdominal distention, abdominal pain, anal bleeding, blood in stool, constipation, diarrhea, nausea, rectal pain and vomiting.   Endocrine: Negative for cold intolerance, heat intolerance, polydipsia, polyphagia and polyuria.   Genitourinary:  Negative for decreased urine volume, difficulty urinating, dysuria, enuresis, flank  pain, frequency, genital sores, hematuria, penile discharge, penile pain, penile swelling, scrotal swelling, testicular pain and urgency.   Musculoskeletal:  Negative for arthralgias, back pain, gait problem, joint swelling, myalgias, neck pain and neck stiffness.   Skin:  Negative for color change, pallor, rash and wound.   Allergic/Immunologic: Negative for environmental allergies, food allergies and immunocompromised state.   Neurological:  Positive for weakness. Negative for dizziness, tremors, seizures, syncope, facial asymmetry, speech difficulty, light-headedness, numbness and headaches.   Hematological:  Negative for adenopathy. Does not bruise/bleed easily.   Psychiatric/Behavioral:  Positive for dysphoric mood. Negative for agitation, behavioral problems, confusion, decreased concentration, hallucinations, self-injury, sleep disturbance and suicidal ideas. The patient is nervous/anxious. The patient is not hyperactive.      Objective:      Physical Exam  Vitals reviewed.   Constitutional:       General: He is not in acute distress.     Appearance: He is well-developed. He is not diaphoretic.   HENT:      Head: Normocephalic.      Right Ear: External ear normal.      Left Ear: External ear normal.      Nose: Nose normal.      Right Sinus: No maxillary sinus tenderness or frontal sinus tenderness.      Left Sinus: No maxillary sinus tenderness or frontal sinus tenderness.      Mouth/Throat:      Pharynx: No oropharyngeal exudate.   Eyes:      General: Lids are normal. No scleral icterus.        Right eye: No discharge.         Left eye: No discharge.      Extraocular Movements:      Right eye: Normal extraocular motion.      Left eye: Normal extraocular motion.      Conjunctiva/sclera:      Right eye: Right conjunctiva is not injected. No hemorrhage.     Left eye: Left conjunctiva is not injected. No hemorrhage.     Pupils: Pupils are equal, round, and reactive to light.   Neck:      Thyroid: No thyromegaly.       Vascular: No JVD.      Trachea: No tracheal deviation.   Cardiovascular:      Rate and Rhythm: Normal rate.   Pulmonary:      Effort: Pulmonary effort is normal. No respiratory distress.      Breath sounds: No stridor.   Abdominal:      General: Bowel sounds are normal.      Palpations: Abdomen is soft. There is no hepatomegaly, splenomegaly or mass.      Tenderness: There is no abdominal tenderness.   Musculoskeletal:         General: No tenderness. Normal range of motion.      Cervical back: Normal range of motion and neck supple.   Lymphadenopathy:      Head:      Right side of head: No posterior auricular or occipital adenopathy.      Left side of head: No posterior auricular or occipital adenopathy.      Cervical: No cervical adenopathy.      Right cervical: No superficial, deep or posterior cervical adenopathy.     Left cervical: No superficial, deep or posterior cervical adenopathy.      Upper Body:      Right upper body: No supraclavicular adenopathy.      Left upper body: No supraclavicular adenopathy.   Skin:     General: Skin is dry.      Findings: No erythema or rash.      Nails: There is no clubbing.   Neurological:      Mental Status: He is alert and oriented to person, place, and time.      Cranial Nerves: No cranial nerve deficit.      Motor: Weakness present.      Coordination: Coordination normal.   Psychiatric:         Behavior: Behavior normal.         Thought Content: Thought content normal.         Judgment: Judgment normal.           Assessment:      1. Iron deficiency anemia due to chronic blood loss    2. Symptomatic anemia    3. Angiodysplasia of gastrointestinal tract    4. Plasma cell neoplasm    5. Chronic fatigue           Med Onc Chart Routing      Follow up with physician 4 weeks. 4 weeks MD IV Venofer and Sandostatin; CBC iron status   Follow up with [facility] 2 weeks. Two weeks with APAP and CBC IV Venofer   Infusion scheduling note    Injection scheduling note Venofer q.2 weeks  Sandostatin q.month   Labs    Imaging    Pharmacy appointment    Other referrals             Plan:     Continue with Sandostatin on a monthly basis Venofer q.2 weeks.  Will recheck CBC in 2 weeks CBC and iron status in 1 month.  Continue follow-up in that manner afterwards.  Will need to check MGUS in free light chain in probably 2-3 months.  Follow-up unless clinically warranted prior        Kris Cesar Jr, MD FACP

## 2023-06-21 ENCOUNTER — INFUSION (OUTPATIENT)
Dept: INFUSION THERAPY | Facility: HOSPITAL | Age: 58
End: 2023-06-21
Attending: INTERNAL MEDICINE
Payer: MEDICAID

## 2023-06-21 VITALS
OXYGEN SATURATION: 99 % | HEART RATE: 79 BPM | TEMPERATURE: 98 F | RESPIRATION RATE: 18 BRPM | SYSTOLIC BLOOD PRESSURE: 154 MMHG | DIASTOLIC BLOOD PRESSURE: 73 MMHG

## 2023-06-21 DIAGNOSIS — D50.0 IRON DEFICIENCY ANEMIA DUE TO CHRONIC BLOOD LOSS: Primary | ICD-10-CM

## 2023-06-21 DIAGNOSIS — D64.9 SYMPTOMATIC ANEMIA: Primary | ICD-10-CM

## 2023-06-21 PROCEDURE — 25000003 PHARM REV CODE 250: Performed by: INTERNAL MEDICINE

## 2023-06-21 PROCEDURE — 96374 THER/PROPH/DIAG INJ IV PUSH: CPT

## 2023-06-21 PROCEDURE — 63600175 PHARM REV CODE 636 W HCPCS: Performed by: INTERNAL MEDICINE

## 2023-06-21 PROCEDURE — A4216 STERILE WATER/SALINE, 10 ML: HCPCS | Performed by: INTERNAL MEDICINE

## 2023-06-21 RX ORDER — SODIUM CHLORIDE 0.9 % (FLUSH) 0.9 %
10 SYRINGE (ML) INJECTION
Status: DISCONTINUED | OUTPATIENT
Start: 2023-06-21 | End: 2023-06-21 | Stop reason: HOSPADM

## 2023-06-21 RX ORDER — HYDROCODONE BITARTRATE AND ACETAMINOPHEN 500; 5 MG/1; MG/1
TABLET ORAL ONCE
Status: ACTIVE | OUTPATIENT
Start: 2023-06-21

## 2023-06-21 RX ORDER — HEPARIN 100 UNIT/ML
500 SYRINGE INTRAVENOUS
Status: CANCELLED | OUTPATIENT
Start: 2023-06-28

## 2023-06-21 RX ORDER — SODIUM CHLORIDE 0.9 % (FLUSH) 0.9 %
10 SYRINGE (ML) INJECTION
Status: CANCELLED | OUTPATIENT
Start: 2023-06-28

## 2023-06-21 RX ADMIN — IRON SUCROSE 200 MG: 20 INJECTION, SOLUTION INTRAVENOUS at 02:06

## 2023-06-21 RX ADMIN — Medication 10 ML: at 02:06

## 2023-06-21 NOTE — PLAN OF CARE
Patient reports no complaints at this time. Tolerated iron well with no adverse reactions. Patient will return in one week for labs/provider appt/ and next dose of iron.

## 2023-06-21 NOTE — DISCHARGE INSTRUCTIONS
.[facility]  [address] [address]  [address]  [phone] phone     [phone] fax  Hours of Operation: Monday- Friday 8:00am- 5:00pm  After hours phone  [phone]  Hematology / Oncology Physicians on call    Dr. Arsenoi Turner      Nurse Practitioners:    Marimar Lombardi, KRISTIE Garcia, KRISTIE Griffith, KRISTIE Moreno, KRISTIE Stevenson, ANA Orozco      Please don't hesitate to call if you have any concerns.

## 2023-06-27 DIAGNOSIS — D64.9 SYMPTOMATIC ANEMIA: Primary | ICD-10-CM

## 2023-06-27 RX ORDER — HYDROCODONE BITARTRATE AND ACETAMINOPHEN 500; 5 MG/1; MG/1
TABLET ORAL ONCE
Status: ACTIVE | OUTPATIENT
Start: 2023-06-27

## 2023-06-28 ENCOUNTER — OFFICE VISIT (OUTPATIENT)
Dept: HEMATOLOGY/ONCOLOGY | Facility: CLINIC | Age: 58
End: 2023-06-28
Payer: MEDICAID

## 2023-06-28 ENCOUNTER — INFUSION (OUTPATIENT)
Dept: INFUSION THERAPY | Facility: HOSPITAL | Age: 58
End: 2023-06-28
Attending: INTERNAL MEDICINE
Payer: MEDICAID

## 2023-06-28 VITALS
OXYGEN SATURATION: 98 % | RESPIRATION RATE: 18 BRPM | SYSTOLIC BLOOD PRESSURE: 113 MMHG | HEIGHT: 67 IN | SYSTOLIC BLOOD PRESSURE: 118 MMHG | RESPIRATION RATE: 18 BRPM | TEMPERATURE: 98 F | TEMPERATURE: 98 F | OXYGEN SATURATION: 99 % | HEART RATE: 66 BPM | BODY MASS INDEX: 29.69 KG/M2 | DIASTOLIC BLOOD PRESSURE: 71 MMHG | HEART RATE: 73 BPM | WEIGHT: 189.13 LBS | DIASTOLIC BLOOD PRESSURE: 69 MMHG

## 2023-06-28 DIAGNOSIS — D50.0 IRON DEFICIENCY ANEMIA DUE TO CHRONIC BLOOD LOSS: Primary | ICD-10-CM

## 2023-06-28 DIAGNOSIS — D49.89 PLASMA CELL NEOPLASM: ICD-10-CM

## 2023-06-28 DIAGNOSIS — D50.0 IRON DEFICIENCY ANEMIA DUE TO CHRONIC BLOOD LOSS: ICD-10-CM

## 2023-06-28 DIAGNOSIS — K55.20 ANGIODYSPLASIA OF GASTROINTESTINAL TRACT: ICD-10-CM

## 2023-06-28 DIAGNOSIS — D64.9 SYMPTOMATIC ANEMIA: ICD-10-CM

## 2023-06-28 PROCEDURE — 99213 OFFICE O/P EST LOW 20 MIN: CPT | Mod: PBBFAC,25

## 2023-06-28 PROCEDURE — 3074F SYST BP LT 130 MM HG: CPT | Mod: CPTII,,,

## 2023-06-28 PROCEDURE — 1159F PR MEDICATION LIST DOCUMENTED IN MEDICAL RECORD: ICD-10-PCS | Mod: CPTII,,,

## 2023-06-28 PROCEDURE — 63600175 PHARM REV CODE 636 W HCPCS: Performed by: INTERNAL MEDICINE

## 2023-06-28 PROCEDURE — 3078F PR MOST RECENT DIASTOLIC BLOOD PRESSURE < 80 MM HG: ICD-10-PCS | Mod: CPTII,,,

## 2023-06-28 PROCEDURE — 99214 OFFICE O/P EST MOD 30 MIN: CPT | Mod: S$PBB,,,

## 2023-06-28 PROCEDURE — 1160F RVW MEDS BY RX/DR IN RCRD: CPT | Mod: CPTII,,,

## 2023-06-28 PROCEDURE — 3008F BODY MASS INDEX DOCD: CPT | Mod: CPTII,,,

## 2023-06-28 PROCEDURE — 3008F PR BODY MASS INDEX (BMI) DOCUMENTED: ICD-10-PCS | Mod: CPTII,,,

## 2023-06-28 PROCEDURE — 1160F PR REVIEW ALL MEDS BY PRESCRIBER/CLIN PHARMACIST DOCUMENTED: ICD-10-PCS | Mod: CPTII,,,

## 2023-06-28 PROCEDURE — 3074F PR MOST RECENT SYSTOLIC BLOOD PRESSURE < 130 MM HG: ICD-10-PCS | Mod: CPTII,,,

## 2023-06-28 PROCEDURE — 1159F MED LIST DOCD IN RCRD: CPT | Mod: CPTII,,,

## 2023-06-28 PROCEDURE — 96374 THER/PROPH/DIAG INJ IV PUSH: CPT

## 2023-06-28 PROCEDURE — 99214 PR OFFICE/OUTPT VISIT, EST, LEVL IV, 30-39 MIN: ICD-10-PCS | Mod: S$PBB,,,

## 2023-06-28 PROCEDURE — 99999 PR PBB SHADOW E&M-EST. PATIENT-LVL III: ICD-10-PCS | Mod: PBBFAC,,,

## 2023-06-28 PROCEDURE — 99999 PR PBB SHADOW E&M-EST. PATIENT-LVL III: CPT | Mod: PBBFAC,,,

## 2023-06-28 PROCEDURE — 3078F DIAST BP <80 MM HG: CPT | Mod: CPTII,,,

## 2023-06-28 RX ORDER — SODIUM CHLORIDE 0.9 % (FLUSH) 0.9 %
10 SYRINGE (ML) INJECTION
Status: CANCELLED | OUTPATIENT
Start: 2023-07-05

## 2023-06-28 RX ORDER — HEPARIN 100 UNIT/ML
500 SYRINGE INTRAVENOUS
Status: CANCELLED | OUTPATIENT
Start: 2023-07-05

## 2023-06-28 RX ADMIN — IRON SUCROSE 200 MG: 20 INJECTION, SOLUTION INTRAVENOUS at 02:06

## 2023-06-28 NOTE — ASSESSMENT & PLAN NOTE
Lab Results   Component Value Date    HGB 8.0 (L) 06/28/2023   Improved from prior  Pt asymptomatic    Plan for continued close f/u to assess need for transfusion

## 2023-06-28 NOTE — PROGRESS NOTES
Subjective:      Patient ID: Shawn Carreno is a 57 y.o. male.    Chief Complaint: No chief complaint on file.      HPI:  Mr. Carreno is a 57-year-old male with recurrent iron deficiency anemia who presents today for weekly lab review to assess need for possible blood transfusion. He received 1 unit PRBC on 06/02/23 for a hemoglobin of 5.4g/dL. Pt currently receiving IV iron therapy with Venofer x 5 doses--scheduled for dose #5/5 today.  Today, he voices no c/o. Denies abnormal bleeding, lightheadedness, dizziness, sob, cp.        Social History     Socioeconomic History    Marital status: Single   Tobacco Use    Smoking status: Never    Smokeless tobacco: Never   Substance and Sexual Activity    Alcohol use: Never    Drug use: Never    Sexual activity: Not Currently       No family history on file.    Past Surgical History:   Procedure Laterality Date    CHOLECYSTECTOMY  2020    COLONOSCOPY N/A 1/5/2022    Procedure: COLONOSCOPY;  Surgeon: Hannah Alvarado MD;  Location: [facility];  Service: Endoscopy;  Laterality: N/A;    ESOPHAGOGASTRODUODENOSCOPY N/A 1/5/2022    Procedure: EGD (ESOPHAGOGASTRODUODENOSCOPY);  Surgeon: Hannah Alvarado MD;  Location: [facility];  Service: Endoscopy;  Laterality: N/A;    ESOPHAGOGASTRODUODENOSCOPY N/A 4/6/2022    Procedure: EGD (ESOPHAGOGASTRODUODENOSCOPY) with push;  Surgeon: Vira Castaneda MD;  Location: [facility];  Service: Endoscopy;  Laterality: N/A;    INTRALUMINAL GASTROINTESTINAL TRACT IMAGING VIA CAPSULE N/A 1/26/2022    Procedure: IMAGING PROCEDURE, GI TRACT, INTRALUMINAL, VIA CAPSULE;  Surgeon: Baltazar Chaves RN;  Location: [facility];  Service: Endoscopy;  Laterality: N/A;    INTRALUMINAL GASTROINTESTINAL TRACT IMAGING VIA CAPSULE N/A 2/22/2022    Procedure: IMAGING PROCEDURE, GI TRACT, INTRALUMINAL, VIA CAPSULE;  Surgeon: Baltazar Chaves RN;  Location: [facility];  Service: Endoscopy;  Laterality: N/A;       Past Medical History:   Diagnosis  Date    Multiple myeloma not having achieved remission 10/12/2022       Review of Systems   Constitutional:  Positive for activity change and fatigue. Negative for appetite change, chills, diaphoresis, fever and unexpected weight change.   HENT:  Negative for congestion, dental problem, drooling, ear discharge, ear pain, facial swelling, hearing loss, mouth sores, nosebleeds, postnasal drip, rhinorrhea, sinus pressure, sneezing, sore throat, tinnitus, trouble swallowing and voice change.    Eyes:  Negative for photophobia, pain, discharge, redness, itching and visual disturbance.   Respiratory:  Negative for apnea, cough, choking, chest tightness, shortness of breath, wheezing and stridor.    Cardiovascular:  Negative for chest pain, palpitations and leg swelling.   Gastrointestinal:  Negative for abdominal distention, abdominal pain, anal bleeding, blood in stool, constipation, diarrhea, nausea, rectal pain and vomiting.   Endocrine: Negative for cold intolerance, heat intolerance, polydipsia, polyphagia and polyuria.   Genitourinary:  Negative for decreased urine volume, difficulty urinating, dysuria, enuresis, flank pain, frequency, genital sores, hematuria, penile discharge, penile pain, penile swelling, scrotal swelling, testicular pain and urgency.   Musculoskeletal:  Negative for arthralgias, back pain, gait problem, joint swelling, myalgias, neck pain and neck stiffness.   Skin:  Negative for color change, pallor, rash and wound.   Allergic/Immunologic: Negative for environmental allergies, food allergies and immunocompromised state.   Neurological:  Positive for weakness. Negative for dizziness, tremors, seizures, syncope, facial asymmetry, speech difficulty, light-headedness, numbness and headaches.   Hematological:  Negative for adenopathy. Does not bruise/bleed easily.   Psychiatric/Behavioral:  Negative for agitation, behavioral problems, confusion, decreased concentration, dysphoric mood,  hallucinations, self-injury, sleep disturbance and suicidal ideas. The patient is not nervous/anxious and is not hyperactive.             Objective:     Vitals:    06/28/23 1403   BP: 113/69   Pulse: 73   Resp: 18   Temp: 98.1 °F (36.7 °C)       Physical Exam  Constitutional:       Appearance: Normal appearance.   HENT:      Head: Normocephalic.   Cardiovascular:      Rate and Rhythm: Normal rate.   Pulmonary:      Effort: Pulmonary effort is normal.   Neurological:      Mental Status: He is alert.   Psychiatric:         Mood and Affect: Mood normal.         Behavior: Behavior normal.         Thought Content: Thought content normal.         Judgment: Judgment normal.       Lab Results   Component Value Date    WBC 5.93 06/28/2023    HGB 8.0 (L) 06/28/2023    HCT 28.4 (L) 06/28/2023    MCV 83 06/28/2023     06/28/2023       Lab Results   Component Value Date     06/14/2023    K 3.9 06/14/2023     06/14/2023    CO2 23 06/14/2023    BUN 20 06/14/2023    CREATININE 0.9 06/14/2023    CALCIUM 8.8 06/14/2023    ANIONGAP 8 06/14/2023    ESTGFRAFRICA >60 01/06/2022    EGFRNONAA >60 01/06/2022     Lab Results   Component Value Date    ALT 11 06/14/2023    AST 15 06/14/2023    ALKPHOS 37 (L) 06/14/2023    BILITOT 0.6 06/14/2023       Assessment/Plan:     Problem List Items Addressed This Visit          Oncology    Symptomatic anemia     Lab Results   Component Value Date    HGB 8.0 (L) 06/28/2023   Improved from prior  Pt asymptomatic    Plan for continued close f/u to assess need for transfusion         Iron deficiency anemia due to chronic blood loss     Continue with IV iron therapy as scheduled         Plasma cell neoplasm     Repeated Marrow on 02/2023 with 5-10% plasma cells, previously 25-30%. Not recommended induction chemo therapy at this time.             GI    Angiodysplasia of gastrointestinal tract     Continues on Sandostatin monthly last dose 06/14/2023                Med Onc Chart  Routing      Follow up with physician . Scheduled   Follow up with [facility]    Infusion scheduling note    Injection scheduling note    Labs    Imaging    Pharmacy appointment    Other referrals             JAYDA HillP-C  Hematology/Oncology

## 2023-06-28 NOTE — ASSESSMENT & PLAN NOTE
Repeated Marrow on 02/2023 with 5-10% plasma cells, previously 25-30%. Not recommended induction chemo therapy at this time.

## 2023-07-05 ENCOUNTER — INFUSION (OUTPATIENT)
Dept: INFUSION THERAPY | Facility: HOSPITAL | Age: 58
End: 2023-07-05
Attending: INTERNAL MEDICINE
Payer: MEDICAID

## 2023-07-05 VITALS
OXYGEN SATURATION: 99 % | DIASTOLIC BLOOD PRESSURE: 68 MMHG | SYSTOLIC BLOOD PRESSURE: 125 MMHG | HEART RATE: 64 BPM | RESPIRATION RATE: 18 BRPM | TEMPERATURE: 97 F

## 2023-07-05 DIAGNOSIS — D50.0 IRON DEFICIENCY ANEMIA DUE TO CHRONIC BLOOD LOSS: Primary | ICD-10-CM

## 2023-07-05 PROCEDURE — 96374 THER/PROPH/DIAG INJ IV PUSH: CPT

## 2023-07-05 PROCEDURE — 63600175 PHARM REV CODE 636 W HCPCS: Performed by: INTERNAL MEDICINE

## 2023-07-05 RX ORDER — SODIUM CHLORIDE 0.9 % (FLUSH) 0.9 %
10 SYRINGE (ML) INJECTION
Status: CANCELLED | OUTPATIENT
Start: 2023-07-12

## 2023-07-05 RX ORDER — HEPARIN 100 UNIT/ML
500 SYRINGE INTRAVENOUS
Status: DISCONTINUED | OUTPATIENT
Start: 2023-07-05 | End: 2023-07-05 | Stop reason: HOSPADM

## 2023-07-05 RX ORDER — HEPARIN 100 UNIT/ML
500 SYRINGE INTRAVENOUS
Status: CANCELLED | OUTPATIENT
Start: 2023-07-12

## 2023-07-05 RX ADMIN — IRON SUCROSE 200 MG: 20 INJECTION, SOLUTION INTRAVENOUS at 02:07

## 2023-07-11 ENCOUNTER — LAB VISIT (OUTPATIENT)
Dept: LAB | Facility: HOSPITAL | Age: 58
End: 2023-07-11
Attending: INTERNAL MEDICINE
Payer: MEDICAID

## 2023-07-11 DIAGNOSIS — K55.20 ANGIODYSPLASIA OF GASTROINTESTINAL TRACT: ICD-10-CM

## 2023-07-11 DIAGNOSIS — D49.89 PLASMA CELL NEOPLASM: ICD-10-CM

## 2023-07-11 DIAGNOSIS — D50.0 IRON DEFICIENCY ANEMIA DUE TO CHRONIC BLOOD LOSS: ICD-10-CM

## 2023-07-11 LAB
BASOPHILS # BLD AUTO: 0.06 K/UL (ref 0–0.2)
BASOPHILS NFR BLD: 1.2 % (ref 0–1.9)
DIFFERENTIAL METHOD: ABNORMAL
EOSINOPHIL # BLD AUTO: 0.2 K/UL (ref 0–0.5)
EOSINOPHIL NFR BLD: 3.9 % (ref 0–8)
ERYTHROCYTE [DISTWIDTH] IN BLOOD BY AUTOMATED COUNT: 24.5 % (ref 11.5–14.5)
FERRITIN SERPL-MCNC: 84 NG/ML (ref 20–300)
HCT VFR BLD AUTO: 31.5 % (ref 40–54)
HGB BLD-MCNC: 9 G/DL (ref 14–18)
IMM GRANULOCYTES # BLD AUTO: 0.01 K/UL (ref 0–0.04)
IMM GRANULOCYTES NFR BLD AUTO: 0.2 % (ref 0–0.5)
IRON SERPL-MCNC: 34 UG/DL (ref 45–160)
LYMPHOCYTES # BLD AUTO: 1.3 K/UL (ref 1–4.8)
LYMPHOCYTES NFR BLD: 25 % (ref 18–48)
MCH RBC QN AUTO: 24.5 PG (ref 27–31)
MCHC RBC AUTO-ENTMCNC: 28.6 G/DL (ref 32–36)
MCV RBC AUTO: 86 FL (ref 82–98)
MONOCYTES # BLD AUTO: 0.4 K/UL (ref 0.3–1)
MONOCYTES NFR BLD: 8.4 % (ref 4–15)
NEUTROPHILS # BLD AUTO: 3.1 K/UL (ref 1.8–7.7)
NEUTROPHILS NFR BLD: 61.3 % (ref 38–73)
NRBC BLD-RTO: 0 /100 WBC
PLATELET # BLD AUTO: 353 K/UL (ref 150–450)
PMV BLD AUTO: 9.1 FL (ref 9.2–12.9)
RBC # BLD AUTO: 3.67 M/UL (ref 4.6–6.2)
SATURATED IRON: 8 % (ref 20–50)
TOTAL IRON BINDING CAPACITY: 420 UG/DL (ref 250–450)
TRANSFERRIN SERPL-MCNC: 284 MG/DL (ref 200–375)
WBC # BLD AUTO: 5.09 K/UL (ref 3.9–12.7)

## 2023-07-11 PROCEDURE — 36415 COLL VENOUS BLD VENIPUNCTURE: CPT | Performed by: INTERNAL MEDICINE

## 2023-07-11 PROCEDURE — 84466 ASSAY OF TRANSFERRIN: CPT | Performed by: INTERNAL MEDICINE

## 2023-07-11 PROCEDURE — 82728 ASSAY OF FERRITIN: CPT | Performed by: INTERNAL MEDICINE

## 2023-07-11 PROCEDURE — 85025 COMPLETE CBC W/AUTO DIFF WBC: CPT | Performed by: INTERNAL MEDICINE

## 2023-07-12 ENCOUNTER — INFUSION (OUTPATIENT)
Dept: INFUSION THERAPY | Facility: HOSPITAL | Age: 58
End: 2023-07-12
Attending: INTERNAL MEDICINE
Payer: MEDICAID

## 2023-07-12 ENCOUNTER — OFFICE VISIT (OUTPATIENT)
Dept: HEMATOLOGY/ONCOLOGY | Facility: CLINIC | Age: 58
End: 2023-07-12
Payer: MEDICAID

## 2023-07-12 VITALS
DIASTOLIC BLOOD PRESSURE: 69 MMHG | BODY MASS INDEX: 29.58 KG/M2 | SYSTOLIC BLOOD PRESSURE: 111 MMHG | HEART RATE: 58 BPM | HEIGHT: 67 IN | WEIGHT: 188.5 LBS | OXYGEN SATURATION: 98 % | TEMPERATURE: 98 F

## 2023-07-12 VITALS
DIASTOLIC BLOOD PRESSURE: 71 MMHG | SYSTOLIC BLOOD PRESSURE: 122 MMHG | RESPIRATION RATE: 16 BRPM | HEART RATE: 64 BPM | TEMPERATURE: 98 F | OXYGEN SATURATION: 99 %

## 2023-07-12 DIAGNOSIS — D64.9 SYMPTOMATIC ANEMIA: ICD-10-CM

## 2023-07-12 DIAGNOSIS — D47.2 MGUS (MONOCLONAL GAMMOPATHY OF UNKNOWN SIGNIFICANCE): Primary | ICD-10-CM

## 2023-07-12 DIAGNOSIS — D49.89 PLASMA CELL NEOPLASM: ICD-10-CM

## 2023-07-12 DIAGNOSIS — K55.20 ANGIODYSPLASIA OF GASTROINTESTINAL TRACT: ICD-10-CM

## 2023-07-12 DIAGNOSIS — D50.0 IRON DEFICIENCY ANEMIA DUE TO CHRONIC BLOOD LOSS: Primary | ICD-10-CM

## 2023-07-12 DIAGNOSIS — D50.0 IRON DEFICIENCY ANEMIA DUE TO CHRONIC BLOOD LOSS: ICD-10-CM

## 2023-07-12 PROCEDURE — 3074F PR MOST RECENT SYSTOLIC BLOOD PRESSURE < 130 MM HG: ICD-10-PCS | Mod: CPTII,,, | Performed by: INTERNAL MEDICINE

## 2023-07-12 PROCEDURE — 99214 PR OFFICE/OUTPT VISIT, EST, LEVL IV, 30-39 MIN: ICD-10-PCS | Mod: 25,S$PBB,, | Performed by: INTERNAL MEDICINE

## 2023-07-12 PROCEDURE — 1159F MED LIST DOCD IN RCRD: CPT | Mod: CPTII,,, | Performed by: INTERNAL MEDICINE

## 2023-07-12 PROCEDURE — 3078F DIAST BP <80 MM HG: CPT | Mod: CPTII,,, | Performed by: INTERNAL MEDICINE

## 2023-07-12 PROCEDURE — 99214 OFFICE O/P EST MOD 30 MIN: CPT | Mod: 25,S$PBB,, | Performed by: INTERNAL MEDICINE

## 2023-07-12 PROCEDURE — 99999 PR PBB SHADOW E&M-EST. PATIENT-LVL III: ICD-10-PCS | Mod: PBBFAC,,, | Performed by: INTERNAL MEDICINE

## 2023-07-12 PROCEDURE — 1160F RVW MEDS BY RX/DR IN RCRD: CPT | Mod: CPTII,,, | Performed by: INTERNAL MEDICINE

## 2023-07-12 PROCEDURE — 99213 OFFICE O/P EST LOW 20 MIN: CPT | Mod: PBBFAC | Performed by: INTERNAL MEDICINE

## 2023-07-12 PROCEDURE — 96374 THER/PROPH/DIAG INJ IV PUSH: CPT

## 2023-07-12 PROCEDURE — 96372 THER/PROPH/DIAG INJ SC/IM: CPT | Mod: 59

## 2023-07-12 PROCEDURE — 3008F BODY MASS INDEX DOCD: CPT | Mod: CPTII,,, | Performed by: INTERNAL MEDICINE

## 2023-07-12 PROCEDURE — 63600175 PHARM REV CODE 636 W HCPCS: Mod: JZ,JG | Performed by: INTERNAL MEDICINE

## 2023-07-12 PROCEDURE — 1160F PR REVIEW ALL MEDS BY PRESCRIBER/CLIN PHARMACIST DOCUMENTED: ICD-10-PCS | Mod: CPTII,,, | Performed by: INTERNAL MEDICINE

## 2023-07-12 PROCEDURE — 3074F SYST BP LT 130 MM HG: CPT | Mod: CPTII,,, | Performed by: INTERNAL MEDICINE

## 2023-07-12 PROCEDURE — 3078F PR MOST RECENT DIASTOLIC BLOOD PRESSURE < 80 MM HG: ICD-10-PCS | Mod: CPTII,,, | Performed by: INTERNAL MEDICINE

## 2023-07-12 PROCEDURE — 3008F PR BODY MASS INDEX (BMI) DOCUMENTED: ICD-10-PCS | Mod: CPTII,,, | Performed by: INTERNAL MEDICINE

## 2023-07-12 PROCEDURE — 99999 PR PBB SHADOW E&M-EST. PATIENT-LVL III: CPT | Mod: PBBFAC,,, | Performed by: INTERNAL MEDICINE

## 2023-07-12 PROCEDURE — 1159F PR MEDICATION LIST DOCUMENTED IN MEDICAL RECORD: ICD-10-PCS | Mod: CPTII,,, | Performed by: INTERNAL MEDICINE

## 2023-07-12 RX ORDER — SODIUM CHLORIDE 0.9 % (FLUSH) 0.9 %
10 SYRINGE (ML) INJECTION
Status: CANCELLED | OUTPATIENT
Start: 2023-07-19

## 2023-07-12 RX ORDER — SODIUM CHLORIDE 0.9 % (FLUSH) 0.9 %
10 SYRINGE (ML) INJECTION
Status: DISCONTINUED | OUTPATIENT
Start: 2023-07-12 | End: 2023-07-12 | Stop reason: HOSPADM

## 2023-07-12 RX ORDER — HEPARIN 100 UNIT/ML
500 SYRINGE INTRAVENOUS
Status: CANCELLED | OUTPATIENT
Start: 2023-07-19

## 2023-07-12 RX ADMIN — IRON SUCROSE 200 MG: 20 INJECTION, SOLUTION INTRAVENOUS at 11:07

## 2023-07-12 RX ADMIN — OCTREOTIDE ACETATE 20 MG: KIT at 11:07

## 2023-07-12 NOTE — PROGRESS NOTES
Venofer q.2 weeks.  Subjective:       Patient ID: Shawn Carreno is a 57 y.o. male.    Chief Complaint: Results, Chemotherapy, and Anemia    HPI:  57-year-old male history of recurrent GI blood loss angiodysplasias.  Patient is now being maintained on Sandostatin on a monthly basis Venofer every 2 weeks patient appears to be markedly improved symptomatically ECOG status 1 in addition patient has monoclonal gammopathy of undetermined significance borderline on smoldering myeloma.    Past Medical History:   Diagnosis Date    Multiple myeloma not having achieved remission 10/12/2022     History reviewed. No pertinent family history.  Social History     Socioeconomic History    Marital status: Single   Tobacco Use    Smoking status: Never    Smokeless tobacco: Never   Substance and Sexual Activity    Alcohol use: Never    Drug use: Never    Sexual activity: Not Currently     Past Surgical History:   Procedure Laterality Date    CHOLECYSTECTOMY 2020    COLONOSCOPY N/A 1/5/2022    Procedure: COLONOSCOPY;  Surgeon: Hannah Alvarado MD;  Location: [facility];  Service: Endoscopy;  Laterality: N/A;    ESOPHAGOGASTRODUODENOSCOPY N/A 1/5/2022    Procedure: EGD (ESOPHAGOGASTRODUODENOSCOPY);  Surgeon: Hannah Alvarado MD;  Location: [facility];  Service: Endoscopy;  Laterality: N/A;    ESOPHAGOGASTRODUODENOSCOPY N/A 4/6/2022    Procedure: EGD (ESOPHAGOGASTRODUODENOSCOPY) with push;  Surgeon: Vira Castaneda MD;  Location: [facility];  Service: Endoscopy;  Laterality: N/A;    INTRALUMINAL GASTROINTESTINAL TRACT IMAGING VIA CAPSULE N/A 1/26/2022    Procedure: IMAGING PROCEDURE, GI TRACT, INTRALUMINAL, VIA CAPSULE;  Surgeon: Baltazar Chaves RN;  Location: [facility];  Service: Endoscopy;  Laterality: N/A;    INTRALUMINAL GASTROINTESTINAL TRACT IMAGING VIA CAPSULE N/A 2/22/2022    Procedure: IMAGING PROCEDURE, GI TRACT, INTRALUMINAL, VIA CAPSULE;  Surgeon: Baltazar Chaves RN;  Location: [facility];  Service: Endoscopy;   Laterality: N/A;       Labs:  Lab Results   Component Value Date    WBC 5.09 07/11/2023    HGB 9.0 (L) 07/11/2023    HCT 31.5 (L) 07/11/2023    MCV 86 07/11/2023     07/11/2023     BMP  Lab Results   Component Value Date     06/14/2023    K 3.9 06/14/2023     06/14/2023    CO2 23 06/14/2023    BUN 20 06/14/2023    CREATININE 0.9 06/14/2023    CALCIUM 8.8 06/14/2023    ANIONGAP 8 06/14/2023    ESTGFRAFRICA >60 01/06/2022    EGFRNONAA >60 01/06/2022     Lab Results   Component Value Date    ALT 11 06/14/2023    AST 15 06/14/2023    ALKPHOS 37 (L) 06/14/2023    BILITOT 0.6 06/14/2023       Lab Results   Component Value Date    IRON 34 (L) 07/11/2023    TIBC 420 07/11/2023    FERRITIN 84 07/11/2023     Lab Results   Component Value Date    WSBCITXM86 283 09/15/2022     Lab Results   Component Value Date    FOLATE 12.2 09/15/2022     Lab Results   Component Value Date    TSH 2.047 09/15/2022         Review of Systems   Constitutional:  Positive for fatigue. Negative for activity change, appetite change, chills, diaphoresis, fever and unexpected weight change.   HENT:  Negative for congestion, dental problem, drooling, ear discharge, ear pain, facial swelling, hearing loss, mouth sores, nosebleeds, postnasal drip, rhinorrhea, sinus pressure, sneezing, sore throat, tinnitus, trouble swallowing and voice change.    Eyes:  Negative for photophobia, pain, discharge, redness, itching and visual disturbance.   Respiratory:  Negative for apnea, cough, choking, chest tightness, shortness of breath, wheezing and stridor.    Cardiovascular:  Negative for chest pain, palpitations and leg swelling.   Gastrointestinal:  Negative for abdominal distention, abdominal pain, anal bleeding, blood in stool, constipation, diarrhea, nausea, rectal pain and vomiting.   Endocrine: Negative for cold intolerance, heat intolerance, polydipsia, polyphagia and polyuria.   Genitourinary:  Negative for decreased urine volume,  difficulty urinating, dysuria, enuresis, flank pain, frequency, genital sores, hematuria, penile discharge, penile pain, penile swelling, scrotal swelling, testicular pain and urgency.   Musculoskeletal:  Negative for arthralgias, back pain, gait problem, joint swelling, myalgias, neck pain and neck stiffness.   Skin:  Negative for color change, pallor, rash and wound.   Allergic/Immunologic: Negative for environmental allergies, food allergies and immunocompromised state.   Neurological:  Positive for weakness. Negative for dizziness, tremors, seizures, syncope, facial asymmetry, speech difficulty, light-headedness, numbness and headaches.   Hematological:  Negative for adenopathy. Does not bruise/bleed easily.   Psychiatric/Behavioral:  Negative for agitation, behavioral problems, confusion, decreased concentration, dysphoric mood, hallucinations, self-injury, sleep disturbance and suicidal ideas. The patient is not nervous/anxious and is not hyperactive.      Objective:      Physical Exam  Vitals reviewed.   Constitutional:       General: He is not in acute distress.     Appearance: He is well-developed. He is not diaphoretic.   HENT:      Head: Normocephalic.      Right Ear: External ear normal.      Left Ear: External ear normal.      Nose: Nose normal.      Right Sinus: No maxillary sinus tenderness or frontal sinus tenderness.      Left Sinus: No maxillary sinus tenderness or frontal sinus tenderness.      Mouth/Throat:      Pharynx: No oropharyngeal exudate.   Eyes:      General: Lids are normal. No scleral icterus.        Right eye: No discharge.         Left eye: No discharge.      Extraocular Movements:      Right eye: Normal extraocular motion.      Left eye: Normal extraocular motion.      Conjunctiva/sclera:      Right eye: Right conjunctiva is not injected. No hemorrhage.     Left eye: Left conjunctiva is not injected. No hemorrhage.     Pupils: Pupils are equal, round, and reactive to light.   Neck:       Thyroid: No thyromegaly.      Vascular: No JVD.      Trachea: No tracheal deviation.   Cardiovascular:      Rate and Rhythm: Normal rate.   Pulmonary:      Effort: Pulmonary effort is normal. No respiratory distress.      Breath sounds: No stridor.   Abdominal:      General: Bowel sounds are normal.      Palpations: Abdomen is soft. There is no hepatomegaly, splenomegaly or mass.      Tenderness: There is no abdominal tenderness.   Musculoskeletal:         General: No tenderness. Normal range of motion.      Cervical back: Normal range of motion and neck supple.   Lymphadenopathy:      Head:      Right side of head: No posterior auricular or occipital adenopathy.      Left side of head: No posterior auricular or occipital adenopathy.      Cervical: No cervical adenopathy.      Right cervical: No superficial, deep or posterior cervical adenopathy.     Left cervical: No superficial, deep or posterior cervical adenopathy.      Upper Body:      Right upper body: No supraclavicular adenopathy.      Left upper body: No supraclavicular adenopathy.   Skin:     General: Skin is dry.      Findings: No erythema or rash.      Nails: There is no clubbing.   Neurological:      Mental Status: He is alert and oriented to person, place, and time.      Cranial Nerves: No cranial nerve deficit.      Coordination: Coordination normal.   Psychiatric:         Behavior: Behavior normal.         Thought Content: Thought content normal.         Judgment: Judgment normal.           Assessment:      1. MGUS (monoclonal gammopathy of unknown significance)    2. Angiodysplasia of gastrointestinal tract    3. Iron deficiency anemia due to chronic blood loss    4. Symptomatic anemia    5. Plasma cell neoplasm           Med Onc Chart Routing      Follow up with physician 2 months. CBC iron status SPEP free light chains prior follow-up in 2 months   Follow up with [facility] 4 weeks. CBC CMP iron status in 1 month   Infusion scheduling note     Injection scheduling note Venofer q.2 weeks; Sandostatin monthly   Labs CBC, ferritin, iron and TIBC and CMP   Scheduling:  Preferred lab:  Lab interval:     Imaging    Pharmacy appointment    Other referrals             Plan:       Seen by APAP in 1 month withRT CBC CMP and ironC status in 2 months by myself with CBC iron status SPEP and free light chain prior return to clinic as above        Kris Cesar Jr, MD FACP

## 2023-07-12 NOTE — NURSING
Venofer 200 mg ivp over 2 min per protocol without difficulties.     Sandostatin Injection given without difficulties.Bandaid applied. Patient instructed to stay in the clinic for 15 minutes. Patient verbalized understanding and will notify nurse with any complaints.

## 2023-07-26 ENCOUNTER — INFUSION (OUTPATIENT)
Dept: INFUSION THERAPY | Facility: HOSPITAL | Age: 58
End: 2023-07-26
Attending: INTERNAL MEDICINE
Payer: MEDICAID

## 2023-07-26 VITALS
DIASTOLIC BLOOD PRESSURE: 77 MMHG | SYSTOLIC BLOOD PRESSURE: 126 MMHG | HEART RATE: 61 BPM | OXYGEN SATURATION: 99 % | RESPIRATION RATE: 18 BRPM | TEMPERATURE: 97 F

## 2023-07-26 DIAGNOSIS — D50.0 IRON DEFICIENCY ANEMIA DUE TO CHRONIC BLOOD LOSS: Primary | ICD-10-CM

## 2023-07-26 PROCEDURE — 63600175 PHARM REV CODE 636 W HCPCS: Performed by: INTERNAL MEDICINE

## 2023-07-26 PROCEDURE — 96374 THER/PROPH/DIAG INJ IV PUSH: CPT

## 2023-07-26 RX ORDER — HEPARIN 100 UNIT/ML
500 SYRINGE INTRAVENOUS
Status: CANCELLED | OUTPATIENT
Start: 2023-08-02

## 2023-07-26 RX ORDER — SODIUM CHLORIDE 0.9 % (FLUSH) 0.9 %
10 SYRINGE (ML) INJECTION
Status: DISCONTINUED | OUTPATIENT
Start: 2023-07-26 | End: 2023-07-26 | Stop reason: HOSPADM

## 2023-07-26 RX ORDER — HEPARIN 100 UNIT/ML
500 SYRINGE INTRAVENOUS
Status: DISCONTINUED | OUTPATIENT
Start: 2023-07-26 | End: 2023-07-26 | Stop reason: HOSPADM

## 2023-07-26 RX ORDER — SODIUM CHLORIDE 0.9 % (FLUSH) 0.9 %
10 SYRINGE (ML) INJECTION
Status: CANCELLED | OUTPATIENT
Start: 2023-08-02

## 2023-07-26 RX ADMIN — IRON SUCROSE 200 MG: 20 INJECTION, SOLUTION INTRAVENOUS at 08:07

## 2023-07-26 NOTE — PLAN OF CARE
Problem: Adult Inpatient Plan of Care  Goal: Plan of Care Review  Outcome: Ongoing, Progressing  Flowsheets (Taken 7/26/2023 0822)  Plan of Care Reviewed With: patient  Goal: Patient-Specific Goal (Individualized)  Outcome: Ongoing, Progressing  Flowsheets (Taken 7/26/2023 0822)  Anxieties, Fears or Concerns: none  Individualized Care Needs: none. just wants to get med and go to work  Goal: Optimal Comfort and Wellbeing  Outcome: Ongoing, Progressing  Intervention: Provide Person-Centered Care  Flowsheets (Taken 7/26/2023 0822)  Trust Relationship/Rapport:   care explained   questions encouraged   choices provided   thoughts/feelings acknowledged     Problem: Fall Injury Risk  Goal: Absence of Fall and Fall-Related Injury  Outcome: Ongoing, Progressing  Intervention: Identify and Manage Contributors  Flowsheets (Taken 7/26/2023 0822)  Self-Care Promotion: BADL personal routines maintained  Medication Review/Management: medications reviewed  Intervention: Promote Injury-Free Environment  Flowsheets (Taken 7/26/2023 0822)  Safety Promotion/Fall Prevention:   in recliner, wheels locked   nonskid shoes/socks when out of bed   medications reviewed   room near unit station

## 2023-07-26 NOTE — DISCHARGE INSTRUCTIONS
THANKS FOR ALLOWING ME TO CARE FOR YOU TODAY!!! ~Vivienne          THANKS FOR CHOOSING [facility]!!!      [facility]  [address] [facility]  [id number] [place] [address] Drive  [phone] phone     [phone] fax  Hours of Operation: Monday- Friday 8:00am- 5:00pm  After hours phone  [phone]  Hematology / Oncology Physicians on call      RICKY Dias Dr., Dr., NP Sydney Prescott, KRISTIE Moreno, DEVEN Murphy    Please call with any concerns regarding your appointment today.

## 2023-08-08 ENCOUNTER — LAB VISIT (OUTPATIENT)
Dept: LAB | Facility: HOSPITAL | Age: 58
End: 2023-08-08
Attending: INTERNAL MEDICINE
Payer: MEDICAID

## 2023-08-08 DIAGNOSIS — K55.20 ANGIODYSPLASIA OF GASTROINTESTINAL TRACT: ICD-10-CM

## 2023-08-08 DIAGNOSIS — D50.0 IRON DEFICIENCY ANEMIA DUE TO CHRONIC BLOOD LOSS: ICD-10-CM

## 2023-08-08 DIAGNOSIS — D47.2 MGUS (MONOCLONAL GAMMOPATHY OF UNKNOWN SIGNIFICANCE): ICD-10-CM

## 2023-08-08 DIAGNOSIS — D64.9 SYMPTOMATIC ANEMIA: ICD-10-CM

## 2023-08-08 LAB
ALBUMIN SERPL BCP-MCNC: 3.8 G/DL (ref 3.5–5.2)
ALP SERPL-CCNC: 38 U/L (ref 55–135)
ALT SERPL W/O P-5'-P-CCNC: 11 U/L (ref 10–44)
ANION GAP SERPL CALC-SCNC: 6 MMOL/L (ref 8–16)
AST SERPL-CCNC: 14 U/L (ref 10–40)
BASOPHILS # BLD AUTO: 0.05 K/UL (ref 0–0.2)
BASOPHILS NFR BLD: 0.9 % (ref 0–1.9)
BILIRUB SERPL-MCNC: 0.3 MG/DL (ref 0.1–1)
BUN SERPL-MCNC: 16 MG/DL (ref 6–20)
CALCIUM SERPL-MCNC: 8.5 MG/DL (ref 8.7–10.5)
CHLORIDE SERPL-SCNC: 107 MMOL/L (ref 95–110)
CO2 SERPL-SCNC: 26 MMOL/L (ref 23–29)
CREAT SERPL-MCNC: 0.8 MG/DL (ref 0.5–1.4)
DIFFERENTIAL METHOD: ABNORMAL
EOSINOPHIL # BLD AUTO: 0.3 K/UL (ref 0–0.5)
EOSINOPHIL NFR BLD: 4.8 % (ref 0–8)
ERYTHROCYTE [DISTWIDTH] IN BLOOD BY AUTOMATED COUNT: 20.8 % (ref 11.5–14.5)
EST. GFR  (NO RACE VARIABLE): >60 ML/MIN/1.73 M^2
FERRITIN SERPL-MCNC: 44 NG/ML (ref 20–300)
GLUCOSE SERPL-MCNC: 102 MG/DL (ref 70–110)
HCT VFR BLD AUTO: 32 % (ref 40–54)
HGB BLD-MCNC: 9.5 G/DL (ref 14–18)
IMM GRANULOCYTES # BLD AUTO: 0.01 K/UL (ref 0–0.04)
IMM GRANULOCYTES NFR BLD AUTO: 0.2 % (ref 0–0.5)
IRON SERPL-MCNC: 36 UG/DL (ref 45–160)
LYMPHOCYTES # BLD AUTO: 1.3 K/UL (ref 1–4.8)
LYMPHOCYTES NFR BLD: 23.1 % (ref 18–48)
MCH RBC QN AUTO: 26.5 PG (ref 27–31)
MCHC RBC AUTO-ENTMCNC: 29.7 G/DL (ref 32–36)
MCV RBC AUTO: 89 FL (ref 82–98)
MONOCYTES # BLD AUTO: 0.5 K/UL (ref 0.3–1)
MONOCYTES NFR BLD: 8.5 % (ref 4–15)
NEUTROPHILS # BLD AUTO: 3.5 K/UL (ref 1.8–7.7)
NEUTROPHILS NFR BLD: 62.5 % (ref 38–73)
NRBC BLD-RTO: 0 /100 WBC
PLATELET # BLD AUTO: 285 K/UL (ref 150–450)
PMV BLD AUTO: 9.1 FL (ref 9.2–12.9)
POTASSIUM SERPL-SCNC: 3.9 MMOL/L (ref 3.5–5.1)
PROT SERPL-MCNC: 8 G/DL (ref 6–8.4)
RBC # BLD AUTO: 3.59 M/UL (ref 4.6–6.2)
SATURATED IRON: 9 % (ref 20–50)
SODIUM SERPL-SCNC: 139 MMOL/L (ref 136–145)
TOTAL IRON BINDING CAPACITY: 419 UG/DL (ref 250–450)
TRANSFERRIN SERPL-MCNC: 283 MG/DL (ref 200–375)
WBC # BLD AUTO: 5.62 K/UL (ref 3.9–12.7)

## 2023-08-08 PROCEDURE — 36415 COLL VENOUS BLD VENIPUNCTURE: CPT | Performed by: INTERNAL MEDICINE

## 2023-08-08 PROCEDURE — 80053 COMPREHEN METABOLIC PANEL: CPT | Performed by: INTERNAL MEDICINE

## 2023-08-08 PROCEDURE — 82728 ASSAY OF FERRITIN: CPT | Performed by: INTERNAL MEDICINE

## 2023-08-08 PROCEDURE — 84466 ASSAY OF TRANSFERRIN: CPT | Performed by: INTERNAL MEDICINE

## 2023-08-08 PROCEDURE — 85025 COMPLETE CBC W/AUTO DIFF WBC: CPT | Performed by: INTERNAL MEDICINE

## 2023-08-10 ENCOUNTER — OFFICE VISIT (OUTPATIENT)
Dept: HEMATOLOGY/ONCOLOGY | Facility: CLINIC | Age: 58
End: 2023-08-10
Payer: MEDICAID

## 2023-08-10 ENCOUNTER — INFUSION (OUTPATIENT)
Dept: INFUSION THERAPY | Facility: HOSPITAL | Age: 58
End: 2023-08-10
Attending: INTERNAL MEDICINE
Payer: MEDICAID

## 2023-08-10 VITALS
HEIGHT: 67 IN | RESPIRATION RATE: 18 BRPM | HEART RATE: 63 BPM | BODY MASS INDEX: 29.62 KG/M2 | OXYGEN SATURATION: 99 % | TEMPERATURE: 98 F | DIASTOLIC BLOOD PRESSURE: 54 MMHG | WEIGHT: 188.69 LBS | SYSTOLIC BLOOD PRESSURE: 118 MMHG | SYSTOLIC BLOOD PRESSURE: 96 MMHG | TEMPERATURE: 98 F | OXYGEN SATURATION: 97 % | DIASTOLIC BLOOD PRESSURE: 67 MMHG | HEART RATE: 61 BPM

## 2023-08-10 DIAGNOSIS — K55.20 ANGIODYSPLASIA OF GASTROINTESTINAL TRACT: ICD-10-CM

## 2023-08-10 DIAGNOSIS — D50.0 IRON DEFICIENCY ANEMIA DUE TO CHRONIC BLOOD LOSS: ICD-10-CM

## 2023-08-10 DIAGNOSIS — D64.9 SYMPTOMATIC ANEMIA: ICD-10-CM

## 2023-08-10 DIAGNOSIS — D50.0 IRON DEFICIENCY ANEMIA DUE TO CHRONIC BLOOD LOSS: Primary | ICD-10-CM

## 2023-08-10 DIAGNOSIS — D47.2 MGUS (MONOCLONAL GAMMOPATHY OF UNKNOWN SIGNIFICANCE): Primary | ICD-10-CM

## 2023-08-10 PROCEDURE — 3074F SYST BP LT 130 MM HG: CPT | Mod: CPTII,,,

## 2023-08-10 PROCEDURE — 1159F PR MEDICATION LIST DOCUMENTED IN MEDICAL RECORD: ICD-10-PCS | Mod: CPTII,,,

## 2023-08-10 PROCEDURE — 99215 PR OFFICE/OUTPT VISIT, EST, LEVL V, 40-54 MIN: ICD-10-PCS | Mod: S$PBB,,,

## 2023-08-10 PROCEDURE — 3074F PR MOST RECENT SYSTOLIC BLOOD PRESSURE < 130 MM HG: ICD-10-PCS | Mod: CPTII,,,

## 2023-08-10 PROCEDURE — 3008F BODY MASS INDEX DOCD: CPT | Mod: CPTII,,,

## 2023-08-10 PROCEDURE — 3008F PR BODY MASS INDEX (BMI) DOCUMENTED: ICD-10-PCS | Mod: CPTII,,,

## 2023-08-10 PROCEDURE — 25000003 PHARM REV CODE 250: Performed by: INTERNAL MEDICINE

## 2023-08-10 PROCEDURE — 99213 OFFICE O/P EST LOW 20 MIN: CPT | Mod: PBBFAC,25

## 2023-08-10 PROCEDURE — 3078F PR MOST RECENT DIASTOLIC BLOOD PRESSURE < 80 MM HG: ICD-10-PCS | Mod: CPTII,,,

## 2023-08-10 PROCEDURE — 96372 THER/PROPH/DIAG INJ SC/IM: CPT

## 2023-08-10 PROCEDURE — A4216 STERILE WATER/SALINE, 10 ML: HCPCS | Performed by: INTERNAL MEDICINE

## 2023-08-10 PROCEDURE — 99999 PR PBB SHADOW E&M-EST. PATIENT-LVL III: CPT | Mod: PBBFAC,,,

## 2023-08-10 PROCEDURE — 3078F DIAST BP <80 MM HG: CPT | Mod: CPTII,,,

## 2023-08-10 PROCEDURE — 1160F PR REVIEW ALL MEDS BY PRESCRIBER/CLIN PHARMACIST DOCUMENTED: ICD-10-PCS | Mod: CPTII,,,

## 2023-08-10 PROCEDURE — 96374 THER/PROPH/DIAG INJ IV PUSH: CPT

## 2023-08-10 PROCEDURE — 1159F MED LIST DOCD IN RCRD: CPT | Mod: CPTII,,,

## 2023-08-10 PROCEDURE — 1160F RVW MEDS BY RX/DR IN RCRD: CPT | Mod: CPTII,,,

## 2023-08-10 PROCEDURE — 63600175 PHARM REV CODE 636 W HCPCS: Performed by: INTERNAL MEDICINE

## 2023-08-10 PROCEDURE — 99999 PR PBB SHADOW E&M-EST. PATIENT-LVL III: ICD-10-PCS | Mod: PBBFAC,,,

## 2023-08-10 PROCEDURE — 63600175 PHARM REV CODE 636 W HCPCS: Mod: JZ,JG

## 2023-08-10 PROCEDURE — 99215 OFFICE O/P EST HI 40 MIN: CPT | Mod: S$PBB,,,

## 2023-08-10 RX ORDER — SODIUM CHLORIDE 0.9 % (FLUSH) 0.9 %
10 SYRINGE (ML) INJECTION
Status: DISCONTINUED | OUTPATIENT
Start: 2023-08-10 | End: 2023-08-10 | Stop reason: HOSPADM

## 2023-08-10 RX ORDER — HEPARIN 100 UNIT/ML
500 SYRINGE INTRAVENOUS
Status: CANCELLED | OUTPATIENT
Start: 2023-08-16

## 2023-08-10 RX ORDER — SODIUM CHLORIDE 0.9 % (FLUSH) 0.9 %
10 SYRINGE (ML) INJECTION
Status: CANCELLED | OUTPATIENT
Start: 2023-08-16

## 2023-08-10 RX ADMIN — IRON SUCROSE 200 MG: 20 INJECTION, SOLUTION INTRAVENOUS at 02:08

## 2023-08-10 RX ADMIN — Medication 10 ML: at 02:08

## 2023-08-10 RX ADMIN — OCTREOTIDE ACETATE 20 MG: KIT at 02:08

## 2023-08-10 NOTE — PROGRESS NOTES
Subjective:      Patient ID: Shawn Carreno is a 57 y.o. male.    Chief Complaint: Follow-up      HPI:  Mr. Carreno is a 57-year-old male with recurrent iron deficiency anemia and MGUS who presents today for lab review to assess prior to biweekly venofer and monthly Sandostatin. Today, c/o neck pain for which he. Denies abnormal bleeding, lightheadedness, dizziness, sob, cp.        Social History     Socioeconomic History    Marital status: Single   Tobacco Use    Smoking status: Never    Smokeless tobacco: Never   Substance and Sexual Activity    Alcohol use: Never    Drug use: Never    Sexual activity: Not Currently       No family history on file.    Past Surgical History:   Procedure Laterality Date    CHOLECYSTECTOMY  2020    COLONOSCOPY N/A 1/5/2022    Procedure: COLONOSCOPY;  Surgeon: Hannah Alvarado MD;  Location: [facility];  Service: Endoscopy;  Laterality: N/A;    ESOPHAGOGASTRODUODENOSCOPY N/A 1/5/2022    Procedure: EGD (ESOPHAGOGASTRODUODENOSCOPY);  Surgeon: Hannah Alvarado MD;  Location: [facility];  Service: Endoscopy;  Laterality: N/A;    ESOPHAGOGASTRODUODENOSCOPY N/A 4/6/2022    Procedure: EGD (ESOPHAGOGASTRODUODENOSCOPY) with push;  Surgeon: Vira Castaneda MD;  Location: [facility];  Service: Endoscopy;  Laterality: N/A;    INTRALUMINAL GASTROINTESTINAL TRACT IMAGING VIA CAPSULE N/A 1/26/2022    Procedure: IMAGING PROCEDURE, GI TRACT, INTRALUMINAL, VIA CAPSULE;  Surgeon: Baltazar Chaves RN;  Location: [facility];  Service: Endoscopy;  Laterality: N/A;    INTRALUMINAL GASTROINTESTINAL TRACT IMAGING VIA CAPSULE N/A 2/22/2022    Procedure: IMAGING PROCEDURE, GI TRACT, INTRALUMINAL, VIA CAPSULE;  Surgeon: Baltazar Chaves RN;  Location: [facility];  Service: Endoscopy;  Laterality: N/A;       Past Medical History:   Diagnosis Date    Multiple myeloma not having achieved remission 10/12/2022       Review of Systems   Constitutional:  Positive for activity change and fatigue. Negative for  appetite change, chills, diaphoresis, fever and unexpected weight change.   HENT:  Negative for congestion, dental problem, drooling, ear discharge, ear pain, facial swelling, hearing loss, mouth sores, nosebleeds, postnasal drip, rhinorrhea, sinus pressure, sneezing, sore throat, tinnitus, trouble swallowing and voice change.    Eyes:  Negative for photophobia, pain, discharge, redness, itching and visual disturbance.   Respiratory:  Negative for apnea, cough, choking, chest tightness, shortness of breath, wheezing and stridor.    Cardiovascular:  Negative for chest pain, palpitations and leg swelling.   Gastrointestinal:  Negative for abdominal distention, abdominal pain, anal bleeding, blood in stool, constipation, diarrhea, nausea, rectal pain and vomiting.   Endocrine: Negative for cold intolerance, heat intolerance, polydipsia, polyphagia and polyuria.   Genitourinary:  Negative for decreased urine volume, difficulty urinating, dysuria, enuresis, flank pain, frequency, genital sores, hematuria, penile discharge, penile pain, penile swelling, scrotal swelling, testicular pain and urgency.   Musculoskeletal:  Negative for arthralgias, back pain, gait problem, joint swelling, myalgias, neck pain and neck stiffness.   Skin:  Negative for color change, pallor, rash and wound.   Allergic/Immunologic: Negative for environmental allergies, food allergies and immunocompromised state.   Neurological:  Positive for weakness. Negative for dizziness, tremors, seizures, syncope, facial asymmetry, speech difficulty, light-headedness, numbness and headaches.   Hematological:  Negative for adenopathy. Does not bruise/bleed easily.   Psychiatric/Behavioral:  Negative for agitation, behavioral problems, confusion, decreased concentration, dysphoric mood, hallucinations, self-injury, sleep disturbance and suicidal ideas. The patient is not nervous/anxious and is not hyperactive.               Objective:     Vitals:    08/10/23  1415   BP: (!) 96/54   Pulse: 63   Temp: 97.8 °F (36.6 °C)       Physical Exam  Constitutional:       Appearance: Normal appearance.   HENT:      Head: Normocephalic.   Cardiovascular:      Rate and Rhythm: Normal rate.   Pulmonary:      Effort: Pulmonary effort is normal.   Neurological:      Mental Status: He is alert.   Psychiatric:         Mood and Affect: Mood normal.         Behavior: Behavior normal.         Thought Content: Thought content normal.         Judgment: Judgment normal.         Lab Results   Component Value Date    WBC 5.62 08/08/2023    HGB 9.5 (L) 08/08/2023    HCT 32.0 (L) 08/08/2023    MCV 89 08/08/2023     08/08/2023       Lab Results   Component Value Date     08/08/2023    K 3.9 08/08/2023     08/08/2023    CO2 26 08/08/2023    BUN 16 08/08/2023    CREATININE 0.8 08/08/2023    CALCIUM 8.5 (L) 08/08/2023    ANIONGAP 6 (L) 08/08/2023    ESTGFRAFRICA >60 01/06/2022    EGFRNONAA >60 01/06/2022     Lab Results   Component Value Date    ALT 11 08/08/2023    AST 14 08/08/2023    ALKPHOS 38 (L) 08/08/2023    BILITOT 0.3 08/08/2023       Assessment/Plan:     Problem List Items Addressed This Visit          Oncology    Symptomatic anemia     Lab Results   Component Value Date    HGB 9.5 (L) 08/08/2023   Improved from prior  Pt asymptomatic    Plan for continued close f/u to assess need for transfusion         Iron deficiency anemia due to chronic blood loss     Continue with Venofer today and continue q two weeks          Relevant Orders    CBC Auto Differential    Comprehensive Metabolic Panel    Ferritin    Iron and TIBC    Protein Electrophoresis, Serum    Immunoglobulin Free LT Chains Blood    MGUS (monoclonal gammopathy of unknown significance) - Primary     Repeated Marrow on 02/2023 with 5-10% plasma cells, previously 25-30%. Not recommended induction chemo therapy at this time.     Plan for f/u in one month with Dr. Cesar and repeat MM labs         Relevant Orders    CBC  Auto Differential    Comprehensive Metabolic Panel    Ferritin    Iron and TIBC    Protein Electrophoresis, Serum    Immunoglobulin Free LT Chains Blood       GI    Angiodysplasia of gastrointestinal tract    Relevant Orders    CBC Auto Differential    Comprehensive Metabolic Panel    Ferritin    Iron and TIBC    Protein Electrophoresis, Serum    Immunoglobulin Free LT Chains Blood         Med Onc Chart Routing      Follow up with physician 1 month. with labs a few days prior for sandostatin/venofer with Dr. Cesar   Follow up with [facility]    Infusion scheduling note   continue Venofer q 2 weeks and sandostatin monthly   Injection scheduling note    Labs CBC, CMP, free light chains, SPEP, iron and TIBC and ferritin   Scheduling:  Preferred lab:  Lab interval:     Imaging    Pharmacy appointment    Other referrals               JAYDA HillP-C  Hematology/Oncology

## 2023-08-10 NOTE — DISCHARGE INSTRUCTIONS
.[facility]  [address] [facility]  [address] [address]  [phone] phone     [phone] fax  Hours of Operation: Monday- Friday 8:00am- 5:00pm  After hours phone  [phone]  Hematology / Oncology Physicians on call    Dr. Arsenio Robins      Nurse Practitioners:    Annie Garcia, KRISTIE Griffith, KRISTIE Moreno, KRISTIE Stevenson, KRISTIE Anaya, PA      Please don't hesitate to call if you have any concerns.     .WAYS TO HELP PREVENT INFECTION        WASH YOUR HANDS OFTEN DURING THE DAY, ESPECIALLY BEFORE YOU EAT, AFTER USING THE BATHROOM, AND AFTER TOUCHING ANIMALS    STAY AWAY FROM PEOPLE WHO HAVE ILLNESSES YOU CAN CATCH; SUCH AS COLDS, FLU, CHICKEN POX    TRY TO AVOID CROWDS    STAY AWAY FROM CHILDREN WHO RECENTLY HAVE RECEIVED LIVE VIRUS VACCINES    MAINTAIN GOOD MOUTH CARE    DO NOT SQUEEZE OR SCRATCH PIMPLES    CLEAN CUTS & SCRAPES RIGHT AWAY AND DAILY UNTIL HEALED WITH WARM WATER, SOAP & AN ANTISEPTIC    AVOID CONTACT WITH LITTER BOXES, BIRD CAGES, & FISH TANKS    AVOID STANDING WATER, IE., BIRD BATHS, FLOWER POTS/VASES, OR HUMIDIFIERS    WEAR GLOVES WHEN GARDENING OR CLEANING UP AFTER OTHERS, ESPECIALLY BABIES & SMALL CHILDREN    DO NOT EAT RAW FISH, SEAFOOD, MEAT, OR EGGS

## 2023-08-10 NOTE — ASSESSMENT & PLAN NOTE
Repeated Marrow on 02/2023 with 5-10% plasma cells, previously 25-30%. Not recommended induction chemo therapy at this time.     Plan for f/u in one month with Dr. Cesar and repeat MM labs

## 2023-08-10 NOTE — ASSESSMENT & PLAN NOTE
Lab Results   Component Value Date    HGB 9.5 (L) 08/08/2023   Improved from prior  Pt asymptomatic    Plan for continued close f/u to assess need for transfusion

## 2023-08-10 NOTE — PLAN OF CARE
Patient reports no complaints or concerns at this time. Tolerated injection and iron well with no adverse reactions. Patient to return in 2 weeks for venofer.

## 2023-08-24 ENCOUNTER — INFUSION (OUTPATIENT)
Dept: INFUSION THERAPY | Facility: HOSPITAL | Age: 58
End: 2023-08-24
Attending: INTERNAL MEDICINE
Payer: MEDICAID

## 2023-08-24 VITALS
TEMPERATURE: 98 F | HEART RATE: 66 BPM | SYSTOLIC BLOOD PRESSURE: 136 MMHG | OXYGEN SATURATION: 99 % | DIASTOLIC BLOOD PRESSURE: 73 MMHG | RESPIRATION RATE: 18 BRPM

## 2023-08-24 DIAGNOSIS — D50.0 IRON DEFICIENCY ANEMIA DUE TO CHRONIC BLOOD LOSS: Primary | ICD-10-CM

## 2023-08-24 PROCEDURE — 63600175 PHARM REV CODE 636 W HCPCS: Performed by: INTERNAL MEDICINE

## 2023-08-24 PROCEDURE — 96374 THER/PROPH/DIAG INJ IV PUSH: CPT

## 2023-08-24 RX ORDER — HEPARIN 100 UNIT/ML
500 SYRINGE INTRAVENOUS
Status: CANCELLED | OUTPATIENT
Start: 2023-08-31

## 2023-08-24 RX ORDER — SODIUM CHLORIDE 0.9 % (FLUSH) 0.9 %
10 SYRINGE (ML) INJECTION
Status: CANCELLED | OUTPATIENT
Start: 2023-08-31

## 2023-08-24 RX ADMIN — IRON SUCROSE 200 MG: 20 INJECTION, SOLUTION INTRAVENOUS at 07:08

## 2023-08-24 NOTE — PLAN OF CARE
Problem: Adult Inpatient Plan of Care  Goal: Plan of Care Review  Outcome: Ongoing, Progressing  Flowsheets (Taken 8/24/2023 0650)  Plan of Care Reviewed With: patient  Goal: Optimal Comfort and Wellbeing  Outcome: Ongoing, Progressing  Intervention: Provide Person-Centered Care  Flowsheets (Taken 8/24/2023 0650)  Trust Relationship/Rapport:   care explained   choices provided   emotional support provided   empathic listening provided   questions answered   questions encouraged   reassurance provided   thoughts/feelings acknowledged

## 2023-09-05 ENCOUNTER — LAB VISIT (OUTPATIENT)
Dept: LAB | Facility: HOSPITAL | Age: 58
End: 2023-09-05
Attending: INTERNAL MEDICINE
Payer: MEDICAID

## 2023-09-05 DIAGNOSIS — D50.0 IRON DEFICIENCY ANEMIA DUE TO CHRONIC BLOOD LOSS: ICD-10-CM

## 2023-09-05 DIAGNOSIS — D47.2 MGUS (MONOCLONAL GAMMOPATHY OF UNKNOWN SIGNIFICANCE): ICD-10-CM

## 2023-09-05 DIAGNOSIS — K55.20 ANGIODYSPLASIA OF GASTROINTESTINAL TRACT: ICD-10-CM

## 2023-09-05 LAB
ALBUMIN SERPL BCP-MCNC: 4.2 G/DL (ref 3.5–5.2)
ALP SERPL-CCNC: 43 U/L (ref 55–135)
ALT SERPL W/O P-5'-P-CCNC: 14 U/L (ref 10–44)
ANION GAP SERPL CALC-SCNC: 7 MMOL/L (ref 8–16)
AST SERPL-CCNC: 17 U/L (ref 10–40)
BASOPHILS NFR BLD: 1 % (ref 0–1.9)
BILIRUB SERPL-MCNC: 0.4 MG/DL (ref 0.1–1)
BUN SERPL-MCNC: 16 MG/DL (ref 6–20)
CALCIUM SERPL-MCNC: 9.3 MG/DL (ref 8.7–10.5)
CHLORIDE SERPL-SCNC: 108 MMOL/L (ref 95–110)
CO2 SERPL-SCNC: 25 MMOL/L (ref 23–29)
CREAT SERPL-MCNC: 0.9 MG/DL (ref 0.5–1.4)
DIFFERENTIAL METHOD: ABNORMAL
EOSINOPHIL NFR BLD: 3 % (ref 0–8)
ERYTHROCYTE [DISTWIDTH] IN BLOOD BY AUTOMATED COUNT: 18.9 % (ref 11.5–14.5)
EST. GFR  (NO RACE VARIABLE): >60 ML/MIN/1.73 M^2
FERRITIN SERPL-MCNC: 49 NG/ML (ref 20–300)
GLUCOSE SERPL-MCNC: 100 MG/DL (ref 70–110)
HCT VFR BLD AUTO: 35.3 % (ref 40–54)
HGB BLD-MCNC: 10.7 G/DL (ref 14–18)
IMM GRANULOCYTES # BLD AUTO: ABNORMAL K/UL (ref 0–0.04)
IMM GRANULOCYTES NFR BLD AUTO: ABNORMAL % (ref 0–0.5)
IRON SERPL-MCNC: 39 UG/DL (ref 45–160)
LYMPHOCYTES NFR BLD: 28 % (ref 18–48)
MCH RBC QN AUTO: 26.5 PG (ref 27–31)
MCHC RBC AUTO-ENTMCNC: 30.3 G/DL (ref 32–36)
MCV RBC AUTO: 87 FL (ref 82–98)
MONOCYTES NFR BLD: 7 % (ref 4–15)
NEUTROPHILS NFR BLD: 61 % (ref 38–73)
NRBC BLD-RTO: 0 /100 WBC
PLATELET # BLD AUTO: 279 K/UL (ref 150–450)
PMV BLD AUTO: 9.4 FL (ref 9.2–12.9)
POTASSIUM SERPL-SCNC: 4 MMOL/L (ref 3.5–5.1)
PROT SERPL-MCNC: 8.6 G/DL (ref 6–8.4)
RBC # BLD AUTO: 4.04 M/UL (ref 4.6–6.2)
SATURATED IRON: 8 % (ref 20–50)
SODIUM SERPL-SCNC: 140 MMOL/L (ref 136–145)
TOTAL IRON BINDING CAPACITY: 459 UG/DL (ref 250–450)
TRANSFERRIN SERPL-MCNC: 310 MG/DL (ref 200–375)
WBC # BLD AUTO: 5.22 K/UL (ref 3.9–12.7)

## 2023-09-05 PROCEDURE — 84466 ASSAY OF TRANSFERRIN: CPT

## 2023-09-05 PROCEDURE — 82728 ASSAY OF FERRITIN: CPT

## 2023-09-05 PROCEDURE — 83521 IG LIGHT CHAINS FREE EACH: CPT | Mod: 59

## 2023-09-05 PROCEDURE — 84165 PATHOLOGIST INTERPRETATION SPE: ICD-10-PCS | Mod: 26,,, | Performed by: PATHOLOGY

## 2023-09-05 PROCEDURE — 80053 COMPREHEN METABOLIC PANEL: CPT

## 2023-09-05 PROCEDURE — 85025 COMPLETE CBC W/AUTO DIFF WBC: CPT

## 2023-09-05 PROCEDURE — 83540 ASSAY OF IRON: CPT

## 2023-09-05 PROCEDURE — 84165 PROTEIN E-PHORESIS SERUM: CPT

## 2023-09-05 PROCEDURE — 84165 PROTEIN E-PHORESIS SERUM: CPT | Mod: 26,,, | Performed by: PATHOLOGY

## 2023-09-05 PROCEDURE — 36415 COLL VENOUS BLD VENIPUNCTURE: CPT

## 2023-09-06 LAB
ALBUMIN SERPL ELPH-MCNC: 4.38 G/DL (ref 3.35–5.55)
ALPHA1 GLOB SERPL ELPH-MCNC: 0.24 G/DL (ref 0.17–0.41)
ALPHA2 GLOB SERPL ELPH-MCNC: 0.49 G/DL (ref 0.43–0.99)
B-GLOBULIN SERPL ELPH-MCNC: 0.69 G/DL (ref 0.5–1.1)
GAMMA GLOB SERPL ELPH-MCNC: 2.5 G/DL (ref 0.67–1.58)
KAPPA LC SER QL IA: 4.3 MG/DL (ref 0.33–1.94)
KAPPA LC/LAMBDA SER IA: 4.22 (ref 0.26–1.65)
LAMBDA LC SER QL IA: 1.02 MG/DL (ref 0.57–2.63)
PROT SERPL-MCNC: 8.3 G/DL (ref 6–8.4)

## 2023-09-07 ENCOUNTER — OFFICE VISIT (OUTPATIENT)
Dept: HEMATOLOGY/ONCOLOGY | Facility: CLINIC | Age: 58
End: 2023-09-07
Payer: MEDICAID

## 2023-09-07 ENCOUNTER — INFUSION (OUTPATIENT)
Dept: INFUSION THERAPY | Facility: HOSPITAL | Age: 58
End: 2023-09-07
Attending: INTERNAL MEDICINE
Payer: MEDICAID

## 2023-09-07 VITALS
OXYGEN SATURATION: 97 % | BODY MASS INDEX: 29.36 KG/M2 | TEMPERATURE: 98 F | HEART RATE: 69 BPM | HEIGHT: 67 IN | SYSTOLIC BLOOD PRESSURE: 114 MMHG | WEIGHT: 187.06 LBS | DIASTOLIC BLOOD PRESSURE: 59 MMHG

## 2023-09-07 VITALS
OXYGEN SATURATION: 98 % | HEART RATE: 65 BPM | TEMPERATURE: 98 F | DIASTOLIC BLOOD PRESSURE: 60 MMHG | SYSTOLIC BLOOD PRESSURE: 117 MMHG | RESPIRATION RATE: 18 BRPM

## 2023-09-07 DIAGNOSIS — K55.20 ANGIODYSPLASIA OF GASTROINTESTINAL TRACT: Primary | ICD-10-CM

## 2023-09-07 DIAGNOSIS — D50.0 IRON DEFICIENCY ANEMIA DUE TO CHRONIC BLOOD LOSS: ICD-10-CM

## 2023-09-07 DIAGNOSIS — D47.2 MGUS (MONOCLONAL GAMMOPATHY OF UNKNOWN SIGNIFICANCE): ICD-10-CM

## 2023-09-07 DIAGNOSIS — D64.9 SYMPTOMATIC ANEMIA: ICD-10-CM

## 2023-09-07 LAB — PATHOLOGIST INTERPRETATION SPE: NORMAL

## 2023-09-07 PROCEDURE — 1159F MED LIST DOCD IN RCRD: CPT | Mod: CPTII,,, | Performed by: INTERNAL MEDICINE

## 2023-09-07 PROCEDURE — 1159F PR MEDICATION LIST DOCUMENTED IN MEDICAL RECORD: ICD-10-PCS | Mod: CPTII,,, | Performed by: INTERNAL MEDICINE

## 2023-09-07 PROCEDURE — 3078F PR MOST RECENT DIASTOLIC BLOOD PRESSURE < 80 MM HG: ICD-10-PCS | Mod: CPTII,,, | Performed by: INTERNAL MEDICINE

## 2023-09-07 PROCEDURE — 96374 THER/PROPH/DIAG INJ IV PUSH: CPT

## 2023-09-07 PROCEDURE — 3074F SYST BP LT 130 MM HG: CPT | Mod: CPTII,,, | Performed by: INTERNAL MEDICINE

## 2023-09-07 PROCEDURE — 3008F BODY MASS INDEX DOCD: CPT | Mod: CPTII,,, | Performed by: INTERNAL MEDICINE

## 2023-09-07 PROCEDURE — 99214 PR OFFICE/OUTPT VISIT, EST, LEVL IV, 30-39 MIN: ICD-10-PCS | Mod: 25,S$PBB,, | Performed by: INTERNAL MEDICINE

## 2023-09-07 PROCEDURE — 99214 OFFICE O/P EST MOD 30 MIN: CPT | Mod: 25,S$PBB,, | Performed by: INTERNAL MEDICINE

## 2023-09-07 PROCEDURE — 99999 PR PBB SHADOW E&M-EST. PATIENT-LVL III: ICD-10-PCS | Mod: PBBFAC,,, | Performed by: INTERNAL MEDICINE

## 2023-09-07 PROCEDURE — 99213 OFFICE O/P EST LOW 20 MIN: CPT | Mod: PBBFAC,25 | Performed by: INTERNAL MEDICINE

## 2023-09-07 PROCEDURE — 96372 THER/PROPH/DIAG INJ SC/IM: CPT

## 2023-09-07 PROCEDURE — 99999 PR PBB SHADOW E&M-EST. PATIENT-LVL III: CPT | Mod: PBBFAC,,, | Performed by: INTERNAL MEDICINE

## 2023-09-07 PROCEDURE — 3074F PR MOST RECENT SYSTOLIC BLOOD PRESSURE < 130 MM HG: ICD-10-PCS | Mod: CPTII,,, | Performed by: INTERNAL MEDICINE

## 2023-09-07 PROCEDURE — 3008F PR BODY MASS INDEX (BMI) DOCUMENTED: ICD-10-PCS | Mod: CPTII,,, | Performed by: INTERNAL MEDICINE

## 2023-09-07 PROCEDURE — 3078F DIAST BP <80 MM HG: CPT | Mod: CPTII,,, | Performed by: INTERNAL MEDICINE

## 2023-09-07 PROCEDURE — 63600175 PHARM REV CODE 636 W HCPCS: Mod: JZ,JG | Performed by: INTERNAL MEDICINE

## 2023-09-07 RX ORDER — SODIUM CHLORIDE 0.9 % (FLUSH) 0.9 %
10 SYRINGE (ML) INJECTION
Status: DISCONTINUED | OUTPATIENT
Start: 2023-09-07 | End: 2023-09-07 | Stop reason: HOSPADM

## 2023-09-07 RX ORDER — SODIUM CHLORIDE 0.9 % (FLUSH) 0.9 %
10 SYRINGE (ML) INJECTION
Status: CANCELLED | OUTPATIENT
Start: 2023-09-14

## 2023-09-07 RX ORDER — HEPARIN 100 UNIT/ML
500 SYRINGE INTRAVENOUS
Status: CANCELLED | OUTPATIENT
Start: 2023-09-14

## 2023-09-07 RX ADMIN — IRON SUCROSE 200 MG: 20 INJECTION, SOLUTION INTRAVENOUS at 11:09

## 2023-09-07 RX ADMIN — OCTREOTIDE ACETATE 20 MG: KIT at 11:09

## 2023-09-07 NOTE — PROGRESS NOTES
Subjective:       Patient ID: Shawn Carreno is a 57 y.o. male.    Chief Complaint: Results and Anemia    HPI:  57-year-old male with chronic GI blood loss.  Patient has angiodysplasias of cecum in his responding to Sandostatin on a monthly basis.  Patient's marked improvement hemoglobin marked improvement sense of    Past Medical History:   Diagnosis Date    Multiple myeloma not having achieved remission 10/12/2022     No family history on file.  Social History     Socioeconomic History    Marital status: Single   Tobacco Use    Smoking status: Never    Smokeless tobacco: Never   Substance and Sexual Activity    Alcohol use: Never    Drug use: Never    Sexual activity: Not Currently     Past Surgical History:   Procedure Laterality Date    CHOLECYSTECTOMY 2020    COLONOSCOPY N/A 1/5/2022    Procedure: COLONOSCOPY;  Surgeon: Hannah Alvarado MD;  Location: [facility];  Service: Endoscopy;  Laterality: N/A;    ESOPHAGOGASTRODUODENOSCOPY N/A 1/5/2022    Procedure: EGD (ESOPHAGOGASTRODUODENOSCOPY);  Surgeon: Hannah Alvarado MD;  Location: [facility];  Service: Endoscopy;  Laterality: N/A;    ESOPHAGOGASTRODUODENOSCOPY N/A 4/6/2022    Procedure: EGD (ESOPHAGOGASTRODUODENOSCOPY) with push;  Surgeon: Vira Castaneda MD;  Location: [facility];  Service: Endoscopy;  Laterality: N/A;    INTRALUMINAL GASTROINTESTINAL TRACT IMAGING VIA CAPSULE N/A 1/26/2022    Procedure: IMAGING PROCEDURE, GI TRACT, INTRALUMINAL, VIA CAPSULE;  Surgeon: Baltazar Chaves RN;  Location: [facility];  Service: Endoscopy;  Laterality: N/A;    INTRALUMINAL GASTROINTESTINAL TRACT IMAGING VIA CAPSULE N/A 2/22/2022    Procedure: IMAGING PROCEDURE, GI TRACT, INTRALUMINAL, VIA CAPSULE;  Surgeon: Baltazar Chaves RN;  Location: [facility];  Service: Endoscopy;  Laterality: N/A;       Labs:  Lab Results   Component Value Date    WBC 5.22 09/05/2023    HGB 10.7 (L) 09/05/2023    HCT 35.3 (L) 09/05/2023    MCV 87 09/05/2023     09/05/2023      BMP  Lab Results   Component Value Date     09/05/2023    K 4.0 09/05/2023     09/05/2023    CO2 25 09/05/2023    BUN 16 09/05/2023    CREATININE 0.9 09/05/2023    CALCIUM 9.3 09/05/2023    ANIONGAP 7 (L) 09/05/2023    ESTGFRAFRICA >60 01/06/2022    EGFRNONAA >60 01/06/2022     Lab Results   Component Value Date    ALT 14 09/05/2023    AST 17 09/05/2023    ALKPHOS 43 (L) 09/05/2023    BILITOT 0.4 09/05/2023       Lab Results   Component Value Date    IRON 39 (L) 09/05/2023    TIBC 459 (H) 09/05/2023    FERRITIN 49 09/05/2023     Lab Results   Component Value Date    JEXNIGEL42 283 09/15/2022     Lab Results   Component Value Date    FOLATE 12.2 09/15/2022     Lab Results   Component Value Date    TSH 2.047 09/15/2022         Review of Systems   Constitutional:  Negative for activity change, appetite change, chills, diaphoresis, fatigue, fever and unexpected weight change.   HENT:  Negative for congestion, dental problem, drooling, ear discharge, ear pain, facial swelling, hearing loss, mouth sores, nosebleeds, postnasal drip, rhinorrhea, sinus pressure, sneezing, sore throat, tinnitus, trouble swallowing and voice change.    Eyes:  Negative for photophobia, pain, discharge, redness, itching and visual disturbance.   Respiratory:  Negative for apnea, cough, choking, chest tightness, shortness of breath, wheezing and stridor.    Cardiovascular:  Negative for chest pain, palpitations and leg swelling.   Gastrointestinal:  Negative for abdominal distention, abdominal pain, anal bleeding, blood in stool, constipation, diarrhea, nausea, rectal pain and vomiting.   Endocrine: Negative for cold intolerance, heat intolerance, polydipsia, polyphagia and polyuria.   Genitourinary:  Negative for decreased urine volume, difficulty urinating, dysuria, enuresis, flank pain, frequency, genital sores, hematuria, penile discharge, penile pain, penile swelling, scrotal swelling, testicular pain and urgency.    Musculoskeletal:  Negative for arthralgias, back pain, gait problem, joint swelling, myalgias, neck pain and neck stiffness.   Skin:  Negative for color change, pallor, rash and wound.   Allergic/Immunologic: Negative for environmental allergies, food allergies and immunocompromised state.   Neurological:  Negative for dizziness, tremors, seizures, syncope, facial asymmetry, speech difficulty, weakness, light-headedness, numbness and headaches.   Hematological:  Negative for adenopathy. Does not bruise/bleed easily.   Psychiatric/Behavioral:  Negative for agitation, behavioral problems, confusion, decreased concentration, dysphoric mood, hallucinations, self-injury, sleep disturbance and suicidal ideas. The patient is not nervous/anxious and is not hyperactive.        Objective:      Physical Exam  Vitals reviewed.   Constitutional:       General: He is not in acute distress.     Appearance: He is well-developed. He is not diaphoretic.   HENT:      Head: Normocephalic.      Right Ear: External ear normal.      Left Ear: External ear normal.      Nose: Nose normal.      Right Sinus: No maxillary sinus tenderness or frontal sinus tenderness.      Left Sinus: No maxillary sinus tenderness or frontal sinus tenderness.      Mouth/Throat:      Pharynx: No oropharyngeal exudate.   Eyes:      General: Lids are normal. No scleral icterus.        Right eye: No discharge.         Left eye: No discharge.      Extraocular Movements:      Right eye: Normal extraocular motion.      Left eye: Normal extraocular motion.      Conjunctiva/sclera:      Right eye: Right conjunctiva is not injected. No hemorrhage.     Left eye: Left conjunctiva is not injected. No hemorrhage.     Pupils: Pupils are equal, round, and reactive to light.   Neck:      Thyroid: No thyromegaly.      Vascular: No JVD.      Trachea: No tracheal deviation.   Cardiovascular:      Rate and Rhythm: Normal rate.   Pulmonary:      Effort: Pulmonary effort is normal.  No respiratory distress.      Breath sounds: No stridor.   Abdominal:      General: Bowel sounds are normal.      Palpations: Abdomen is soft. There is no hepatomegaly, splenomegaly or mass.      Tenderness: There is no abdominal tenderness.   Musculoskeletal:         General: No tenderness. Normal range of motion.      Cervical back: Normal range of motion and neck supple.   Lymphadenopathy:      Head:      Right side of head: No posterior auricular or occipital adenopathy.      Left side of head: No posterior auricular or occipital adenopathy.      Cervical: No cervical adenopathy.      Right cervical: No superficial, deep or posterior cervical adenopathy.     Left cervical: No superficial, deep or posterior cervical adenopathy.      Upper Body:      Right upper body: No supraclavicular adenopathy.      Left upper body: No supraclavicular adenopathy.   Skin:     General: Skin is dry.      Findings: No erythema or rash.      Nails: There is no clubbing.   Neurological:      Mental Status: He is alert and oriented to person, place, and time.      Cranial Nerves: No cranial nerve deficit.      Coordination: Coordination normal.   Psychiatric:         Behavior: Behavior normal.         Thought Content: Thought content normal.         Judgment: Judgment normal.             Assessment:      1. Angiodysplasia of gastrointestinal tract    2. Iron deficiency anemia due to chronic blood loss    3. MGUS (monoclonal gammopathy of unknown significance)    4. Symptomatic anemia           Med Onc Chart Routing      Follow up with physician 2 months. Return to clinic in 2 months with CBC CMP SPEP free light chain in serum iron TIBC prior   Follow up with [facility]    Infusion scheduling note    Injection scheduling note Venofer every 2 weeks Sandostatin on a monthly basis   Labs CBC, ferritin, iron and TIBC, SPEP and free light chains   Scheduling:  Preferred lab:  Lab interval:     Imaging    Pharmacy appointment    Other referrals                Plan:      patient has had marked improvement with hemoglobin of 10.7.  Will continue with Venofer q.2 weeks Sandostatin monthly basis patient will return in 2 months with CBC CMP iron status SPEP and free light chain prior.  Continue plan course of treatment orders signed        Kris Cesar Jr, MD FACP

## 2023-09-21 ENCOUNTER — INFUSION (OUTPATIENT)
Dept: INFUSION THERAPY | Facility: HOSPITAL | Age: 58
End: 2023-09-21
Attending: INTERNAL MEDICINE
Payer: MEDICAID

## 2023-09-21 VITALS
DIASTOLIC BLOOD PRESSURE: 79 MMHG | HEART RATE: 68 BPM | TEMPERATURE: 98 F | SYSTOLIC BLOOD PRESSURE: 137 MMHG | RESPIRATION RATE: 17 BRPM | OXYGEN SATURATION: 98 %

## 2023-09-21 DIAGNOSIS — D50.0 IRON DEFICIENCY ANEMIA DUE TO CHRONIC BLOOD LOSS: Primary | ICD-10-CM

## 2023-09-21 PROCEDURE — 63600175 PHARM REV CODE 636 W HCPCS: Performed by: INTERNAL MEDICINE

## 2023-09-21 PROCEDURE — 96374 THER/PROPH/DIAG INJ IV PUSH: CPT

## 2023-09-21 RX ORDER — SODIUM CHLORIDE 0.9 % (FLUSH) 0.9 %
10 SYRINGE (ML) INJECTION
Status: CANCELLED | OUTPATIENT
Start: 2023-09-28

## 2023-09-21 RX ORDER — HEPARIN 100 UNIT/ML
500 SYRINGE INTRAVENOUS
Status: CANCELLED | OUTPATIENT
Start: 2023-09-28

## 2023-09-21 RX ADMIN — IRON SUCROSE 200 MG: 20 INJECTION, SOLUTION INTRAVENOUS at 07:09

## 2023-09-21 NOTE — PLAN OF CARE
Problem: Adult Inpatient Plan of Care  Goal: Plan of Care Review  Outcome: Ongoing, Progressing  Flowsheets (Taken 9/21/2023 0709)  Plan of Care Reviewed With: patient  Goal: Optimal Comfort and Wellbeing  Outcome: Ongoing, Progressing  Intervention: Provide Person-Centered Care  Flowsheets (Taken 9/21/2023 0709)  Trust Relationship/Rapport:   care explained   choices provided   emotional support provided   empathic listening provided   questions answered   questions encouraged   reassurance provided   thoughts/feelings acknowledged

## 2023-10-05 ENCOUNTER — INFUSION (OUTPATIENT)
Dept: INFUSION THERAPY | Facility: HOSPITAL | Age: 58
End: 2023-10-05
Attending: INTERNAL MEDICINE
Payer: MEDICAID

## 2023-10-05 VITALS
DIASTOLIC BLOOD PRESSURE: 79 MMHG | BODY MASS INDEX: 29.03 KG/M2 | HEART RATE: 65 BPM | RESPIRATION RATE: 16 BRPM | SYSTOLIC BLOOD PRESSURE: 128 MMHG | WEIGHT: 185 LBS | OXYGEN SATURATION: 99 % | TEMPERATURE: 98 F | HEIGHT: 67 IN

## 2023-10-05 DIAGNOSIS — D50.0 IRON DEFICIENCY ANEMIA DUE TO CHRONIC BLOOD LOSS: ICD-10-CM

## 2023-10-05 DIAGNOSIS — K55.20 ANGIODYSPLASIA OF GASTROINTESTINAL TRACT: Primary | ICD-10-CM

## 2023-10-05 PROCEDURE — 63600175 PHARM REV CODE 636 W HCPCS: Mod: JZ,JG | Performed by: INTERNAL MEDICINE

## 2023-10-05 PROCEDURE — 96372 THER/PROPH/DIAG INJ SC/IM: CPT | Mod: 59

## 2023-10-05 PROCEDURE — 96374 THER/PROPH/DIAG INJ IV PUSH: CPT

## 2023-10-05 RX ORDER — SODIUM CHLORIDE 0.9 % (FLUSH) 0.9 %
10 SYRINGE (ML) INJECTION
Status: CANCELLED | OUTPATIENT
Start: 2023-10-12

## 2023-10-05 RX ORDER — HEPARIN 100 UNIT/ML
500 SYRINGE INTRAVENOUS
Status: CANCELLED | OUTPATIENT
Start: 2023-10-12

## 2023-10-05 RX ADMIN — OCTREOTIDE ACETATE 20 MG: KIT at 08:10

## 2023-10-05 RX ADMIN — IRON SUCROSE 200 MG: 20 INJECTION, SOLUTION INTRAVENOUS at 07:10

## 2023-10-05 NOTE — PLAN OF CARE
Problem: Adult Inpatient Plan of Care  Goal: Plan of Care Review  Outcome: Ongoing, Progressing  Flowsheets (Taken 10/5/2023 0720)  Plan of Care Reviewed With: patient  Goal: Optimal Comfort and Wellbeing  Outcome: Ongoing, Progressing  Goal: Patient-Specific Goal (Individualized)  Outcome: Ongoing, Progressing  Flowsheets (Taken 10/5/2023 0720)  Anxieties, Fears or Concerns: Patient voices no concerns at this time  Individualized Care Needs: Pt has no needs today.     Problem: Anemia  Goal: Anemia Symptom Improvement  Outcome: Ongoing, Progressing     Problem: Infection  Goal: Absence of Infection Signs and Symptoms  Outcome: Ongoing, Progressing

## 2023-10-05 NOTE — DISCHARGE INSTRUCTIONS
..[facility]  [address] [facility]  [address] [address]  [phone] phone     [phone] fax  Hours of Operation: Monday- Friday 8:00am- 5:00pm  After hours phone  [phone]  Hematology / Oncology Physicians on call    Dr. Arsenio Turner      Nurse Practitioners:    Marimar Lombardi, KRISTIE Garcia, KRISTIE Griffith, KRISTIE Moreno, KRISTIE Stevenson, KRISTIE Anaya, PA      Please don't hesitate to call if you have any concerns.    .FALL PREVENTION   Falls often occur due to slipping, tripping or losing your balance. Here are ways to reduce your risk of falling again.   Was there anything that caused your fall that can be fixed, removed or replaced?   Make your home safe by keeping walkways clear of objects you may trip over.   Use non-slip pads under rugs.   Do not walk in poorly lit areas.   Do not stand on chairs or wobbly ladders.   Use caution when reaching overhead or looking upward. This position can cause a loss of balance.   Be sure your shoes fit properly, have non-slip bottoms and are in good condition.   Be cautious when going up and down stairs, curbs, and when walking on uneven sidewalks.   If your balance is poor, consider using a cane or walker.   If your fall was related to alcohol use, stop or limit alcohol intake.   If your fall was related to use of sleeping medicines, talk to your doctor about this. You may need to reduce your dosage at bedtime if you awaken during the night to go to the bathroom.   To reduce the need for nighttime bathroom trips:   Avoid drinking fluids for several hours before going to bed   Empty your bladder before going to bed   Men can keep a urinal at the bedside   © 9456-6530 [employer] Rice, [address] [place] [address], [place] [address]. All rights reserved. This information is not intended as a substitute for professional medical care. Always follow your healthcare  professional's instructions.  .WAYS TO HELP PREVENT INFECTION        WASH YOUR HANDS OFTEN DURING THE DAY, ESPECIALLY BEFORE YOU EAT, AFTER USING THE BATHROOM, AND AFTER TOUCHING ANIMALS    STAY AWAY FROM PEOPLE WHO HAVE ILLNESSES YOU CAN CATCH; SUCH AS COLDS, FLU, CHICKEN POX    TRY TO AVOID CROWDS    STAY AWAY FROM CHILDREN WHO RECENTLY HAVE RECEIVED LIVE VIRUS VACCINES    MAINTAIN GOOD MOUTH CARE    DO NOT SQUEEZE OR SCRATCH PIMPLES    CLEAN CUTS & SCRAPES RIGHT AWAY AND DAILY UNTIL HEALED WITH WARM WATER, SOAP & AN ANTISEPTIC    AVOID CONTACT WITH LITTER BOXES, BIRD CAGES, & FISH TANKS    AVOID STANDING WATER, IE., BIRD BATHS, FLOWER POTS/VASES, OR HUMIDIFIERS    WEAR GLOVES WHEN GARDENING OR CLEANING UP AFTER OTHERS, ESPECIALLY BABIES & SMALL CHILDREN    DO NOT EAT RAW FISH, SEAFOOD, MEAT, OR EGGS

## 2023-10-19 ENCOUNTER — INFUSION (OUTPATIENT)
Dept: INFUSION THERAPY | Facility: HOSPITAL | Age: 58
End: 2023-10-19
Attending: INTERNAL MEDICINE
Payer: MEDICAID

## 2023-10-19 VITALS
RESPIRATION RATE: 18 BRPM | HEART RATE: 62 BPM | OXYGEN SATURATION: 100 % | DIASTOLIC BLOOD PRESSURE: 77 MMHG | SYSTOLIC BLOOD PRESSURE: 137 MMHG | TEMPERATURE: 97 F

## 2023-10-19 DIAGNOSIS — D50.0 IRON DEFICIENCY ANEMIA DUE TO CHRONIC BLOOD LOSS: Primary | ICD-10-CM

## 2023-10-19 PROCEDURE — 96374 THER/PROPH/DIAG INJ IV PUSH: CPT

## 2023-10-19 PROCEDURE — 63600175 PHARM REV CODE 636 W HCPCS: Performed by: INTERNAL MEDICINE

## 2023-10-19 RX ORDER — HEPARIN 100 UNIT/ML
500 SYRINGE INTRAVENOUS
Status: CANCELLED | OUTPATIENT
Start: 2023-10-26

## 2023-10-19 RX ORDER — SODIUM CHLORIDE 0.9 % (FLUSH) 0.9 %
10 SYRINGE (ML) INJECTION
Status: CANCELLED | OUTPATIENT
Start: 2023-10-26

## 2023-10-19 RX ORDER — SODIUM CHLORIDE 0.9 % (FLUSH) 0.9 %
10 SYRINGE (ML) INJECTION
Status: DISCONTINUED | OUTPATIENT
Start: 2023-10-19 | End: 2023-10-19 | Stop reason: HOSPADM

## 2023-10-19 RX ADMIN — IRON SUCROSE 200 MG: 20 INJECTION, SOLUTION INTRAVENOUS at 07:10

## 2023-10-19 NOTE — PLAN OF CARE
Patient reports no complaints or concerns at this time. Tolerated iron well with no adverse reactions. Patient to return in 2 weeks for iron and Sandostatin injection.

## 2023-10-19 NOTE — DISCHARGE INSTRUCTIONS
.[facility]  [address] [address]  [address] [address]  [phone] phone     [phone] fax  Hours of Operation: Monday- Friday 8:00am- 5:00pm  After hours phone  [phone]  Hematology / Oncology Physicians on call    Dr. Arsenio Robins      Nurse Practitioners:    Annie Garcia, KRISTIE Griffith, KRISTIE Heredia, KRISTIE Anaya, PA      Please don't hesitate to call if you have any concerns.

## 2023-11-02 ENCOUNTER — INFUSION (OUTPATIENT)
Dept: INFUSION THERAPY | Facility: HOSPITAL | Age: 58
End: 2023-11-02
Attending: INTERNAL MEDICINE
Payer: MEDICAID

## 2023-11-02 ENCOUNTER — OFFICE VISIT (OUTPATIENT)
Dept: HEMATOLOGY/ONCOLOGY | Facility: CLINIC | Age: 58
End: 2023-11-02
Payer: MEDICAID

## 2023-11-02 VITALS
BODY MASS INDEX: 30.38 KG/M2 | OXYGEN SATURATION: 98 % | WEIGHT: 193.56 LBS | HEART RATE: 69 BPM | DIASTOLIC BLOOD PRESSURE: 72 MMHG | HEIGHT: 67 IN | BODY MASS INDEX: 30.38 KG/M2 | DIASTOLIC BLOOD PRESSURE: 77 MMHG | HEART RATE: 72 BPM | SYSTOLIC BLOOD PRESSURE: 135 MMHG | TEMPERATURE: 97 F | HEIGHT: 67 IN | TEMPERATURE: 98 F | WEIGHT: 193.56 LBS | RESPIRATION RATE: 16 BRPM | SYSTOLIC BLOOD PRESSURE: 123 MMHG | OXYGEN SATURATION: 99 %

## 2023-11-02 DIAGNOSIS — K55.20 ANGIODYSPLASIA OF GASTROINTESTINAL TRACT: ICD-10-CM

## 2023-11-02 DIAGNOSIS — D64.9 SYMPTOMATIC ANEMIA: Primary | ICD-10-CM

## 2023-11-02 DIAGNOSIS — D47.2 MGUS (MONOCLONAL GAMMOPATHY OF UNKNOWN SIGNIFICANCE): ICD-10-CM

## 2023-11-02 DIAGNOSIS — D50.0 IRON DEFICIENCY ANEMIA DUE TO CHRONIC BLOOD LOSS: ICD-10-CM

## 2023-11-02 DIAGNOSIS — D50.0 IRON DEFICIENCY ANEMIA DUE TO CHRONIC BLOOD LOSS: Primary | ICD-10-CM

## 2023-11-02 PROCEDURE — 99999 PR PBB SHADOW E&M-EST. PATIENT-LVL III: ICD-10-PCS | Mod: PBBFAC,,, | Performed by: INTERNAL MEDICINE

## 2023-11-02 PROCEDURE — 3008F PR BODY MASS INDEX (BMI) DOCUMENTED: ICD-10-PCS | Mod: CPTII,,, | Performed by: INTERNAL MEDICINE

## 2023-11-02 PROCEDURE — 99215 PR OFFICE/OUTPT VISIT, EST, LEVL V, 40-54 MIN: ICD-10-PCS | Mod: 25,S$PBB,, | Performed by: INTERNAL MEDICINE

## 2023-11-02 PROCEDURE — 63600175 PHARM REV CODE 636 W HCPCS: Performed by: INTERNAL MEDICINE

## 2023-11-02 PROCEDURE — 1159F MED LIST DOCD IN RCRD: CPT | Mod: CPTII,,, | Performed by: INTERNAL MEDICINE

## 2023-11-02 PROCEDURE — 3008F BODY MASS INDEX DOCD: CPT | Mod: CPTII,,, | Performed by: INTERNAL MEDICINE

## 2023-11-02 PROCEDURE — 99213 OFFICE O/P EST LOW 20 MIN: CPT | Mod: PBBFAC,25 | Performed by: INTERNAL MEDICINE

## 2023-11-02 PROCEDURE — 3075F SYST BP GE 130 - 139MM HG: CPT | Mod: CPTII,,, | Performed by: INTERNAL MEDICINE

## 2023-11-02 PROCEDURE — 1160F RVW MEDS BY RX/DR IN RCRD: CPT | Mod: CPTII,,, | Performed by: INTERNAL MEDICINE

## 2023-11-02 PROCEDURE — 1159F PR MEDICATION LIST DOCUMENTED IN MEDICAL RECORD: ICD-10-PCS | Mod: CPTII,,, | Performed by: INTERNAL MEDICINE

## 2023-11-02 PROCEDURE — 99999 PR PBB SHADOW E&M-EST. PATIENT-LVL III: CPT | Mod: PBBFAC,,, | Performed by: INTERNAL MEDICINE

## 2023-11-02 PROCEDURE — 99215 OFFICE O/P EST HI 40 MIN: CPT | Mod: 25,S$PBB,, | Performed by: INTERNAL MEDICINE

## 2023-11-02 PROCEDURE — 3078F PR MOST RECENT DIASTOLIC BLOOD PRESSURE < 80 MM HG: ICD-10-PCS | Mod: CPTII,,, | Performed by: INTERNAL MEDICINE

## 2023-11-02 PROCEDURE — 3075F PR MOST RECENT SYSTOLIC BLOOD PRESS GE 130-139MM HG: ICD-10-PCS | Mod: CPTII,,, | Performed by: INTERNAL MEDICINE

## 2023-11-02 PROCEDURE — 3078F DIAST BP <80 MM HG: CPT | Mod: CPTII,,, | Performed by: INTERNAL MEDICINE

## 2023-11-02 PROCEDURE — 96374 THER/PROPH/DIAG INJ IV PUSH: CPT

## 2023-11-02 PROCEDURE — 96401 CHEMO ANTI-NEOPL SQ/IM: CPT

## 2023-11-02 PROCEDURE — 1160F PR REVIEW ALL MEDS BY PRESCRIBER/CLIN PHARMACIST DOCUMENTED: ICD-10-PCS | Mod: CPTII,,, | Performed by: INTERNAL MEDICINE

## 2023-11-02 PROCEDURE — 96375 TX/PRO/DX INJ NEW DRUG ADDON: CPT

## 2023-11-02 PROCEDURE — 96372 THER/PROPH/DIAG INJ SC/IM: CPT

## 2023-11-02 RX ORDER — SODIUM CHLORIDE 0.9 % (FLUSH) 0.9 %
10 SYRINGE (ML) INJECTION
Status: CANCELLED | OUTPATIENT
Start: 2023-11-09

## 2023-11-02 RX ORDER — HEPARIN 100 UNIT/ML
500 SYRINGE INTRAVENOUS
Status: CANCELLED | OUTPATIENT
Start: 2023-11-09

## 2023-11-02 RX ADMIN — OCTREOTIDE ACETATE 20 MG: KIT at 08:11

## 2023-11-02 RX ADMIN — IRON SUCROSE 200 MG: 20 INJECTION, SOLUTION INTRAVENOUS at 07:11

## 2023-11-02 NOTE — PROGRESS NOTES
Subjective:       Patient ID: Shawn Carreno is a 57 y.o. male.    Chief Complaint: Anemia and Results    HPI:  [age]-year-old male history of recurrent iron deficiency anemia felt secondary to angiodysplasia continues on Sandostatin on a monthly basis.  Patient is tolerating therapy well with marked stabilization of his patient has a monoclonal gammopathy of undetermined significance followed as well    Past Medical History:   Diagnosis Date    Multiple myeloma not having achieved remission 10/12/2022     History reviewed. No pertinent family history.  Social History     Socioeconomic History    Marital status: Single   Tobacco Use    Smoking status: Never    Smokeless tobacco: Never   Substance and Sexual Activity    Alcohol use: Never    Drug use: Never    Sexual activity: Not Currently     Past Surgical History:   Procedure Laterality Date    CHOLECYSTECTOMY  2020    COLONOSCOPY N/A 1/5/2022    Procedure: COLONOSCOPY;  Surgeon: Hannah Alvarado MD;  Location: [facility];  Service: Endoscopy;  Laterality: N/A;    ESOPHAGOGASTRODUODENOSCOPY N/A 1/5/2022    Procedure: EGD (ESOPHAGOGASTRODUODENOSCOPY);  Surgeon: Hannah Alvarado MD;  Location: [facility];  Service: Endoscopy;  Laterality: N/A;    ESOPHAGOGASTRODUODENOSCOPY N/A 4/6/2022    Procedure: EGD (ESOPHAGOGASTRODUODENOSCOPY) with push;  Surgeon: Vira Castaneda MD;  Location: [facility];  Service: Endoscopy;  Laterality: N/A;    INTRALUMINAL GASTROINTESTINAL TRACT IMAGING VIA CAPSULE N/A 1/26/2022    Procedure: IMAGING PROCEDURE, GI TRACT, INTRALUMINAL, VIA CAPSULE;  Surgeon: Baltazar Chaves RN;  Location: [facility];  Service: Endoscopy;  Laterality: N/A;    INTRALUMINAL GASTROINTESTINAL TRACT IMAGING VIA CAPSULE N/A 2/22/2022    Procedure: IMAGING PROCEDURE, GI TRACT, INTRALUMINAL, VIA CAPSULE;  Surgeon: Baltazar Chaves RN;  Location: [facility];  Service: Endoscopy;  Laterality: N/A;       Labs:  Lab Results   Component Value Date    WBC 4.69  11/02/2023    HGB 10.9 (L) 11/02/2023    HCT 35.7 (L) 11/02/2023    MCV 91 11/02/2023     11/02/2023     BMP  Lab Results   Component Value Date     11/02/2023    K 4.1 11/02/2023     11/02/2023    CO2 24 11/02/2023    BUN 20 11/02/2023    CREATININE 0.9 11/02/2023    CALCIUM 8.9 11/02/2023    ANIONGAP 10 11/02/2023    ESTGFRAFRICA >60 01/06/2022    EGFRNONAA >60 01/06/2022     Lab Results   Component Value Date    ALT 12 11/02/2023    AST 16 11/02/2023    ALKPHOS 41 (L) 11/02/2023    BILITOT 0.3 11/02/2023       Lab Results   Component Value Date    IRON 39 (L) 09/05/2023    TIBC 459 (H) 09/05/2023    FERRITIN 49 09/05/2023     Lab Results   Component Value Date    [id number] 283 09/15/2022     Lab Results   Component Value Date    FOLATE 12.2 09/15/2022     Lab Results   Component Value Date    TSH 2.047 09/15/2022         Review of Systems   Constitutional:  Negative for activity change, appetite change, chills, diaphoresis, fatigue, fever and unexpected weight change.   HENT:  Negative for congestion, dental problem, drooling, ear discharge, ear pain, facial swelling, hearing loss, mouth sores, nosebleeds, postnasal drip, rhinorrhea, sinus pressure, sneezing, sore throat, tinnitus, trouble swallowing and voice change.    Eyes:  Negative for photophobia, pain, discharge, redness, itching and visual disturbance.   Respiratory:  Negative for apnea, cough, choking, chest tightness, shortness of breath, wheezing and stridor.    Cardiovascular:  Negative for chest pain, palpitations and leg swelling.   Gastrointestinal:  Negative for abdominal distention, abdominal pain, anal bleeding, blood in stool, constipation, diarrhea, nausea, rectal pain and vomiting.   Endocrine: Negative for cold intolerance, heat intolerance, polydipsia, polyphagia and polyuria.   Genitourinary:  Negative for decreased urine volume, difficulty urinating, dysuria, enuresis, flank pain, frequency, genital sores, hematuria,  penile discharge, penile pain, penile swelling, scrotal swelling, testicular pain and urgency.   Musculoskeletal:  Negative for arthralgias, back pain, gait problem, joint swelling, myalgias, neck pain and neck stiffness.   Skin:  Negative for color change, pallor, rash and wound.   Allergic/Immunologic: Negative for environmental allergies, food allergies and immunocompromised state.   Neurological:  Negative for dizziness, tremors, seizures, syncope, facial asymmetry, speech difficulty, weakness, light-headedness, numbness and headaches.   Hematological:  Negative for adenopathy. Does not bruise/bleed easily.   Psychiatric/Behavioral:  Negative for agitation, behavioral problems, confusion, decreased concentration, dysphoric mood, hallucinations, self-injury, sleep disturbance and suicidal ideas. The patient is not nervous/anxious and is not hyperactive.        Objective:      Physical Exam  Vitals reviewed.   Constitutional:       General: He is not in acute distress.     Appearance: He is well-developed. He is not diaphoretic.   HENT:      Head: Normocephalic.      Right Ear: External ear normal.      Left Ear: External ear normal.      Nose: Nose normal.      Right Sinus: No maxillary sinus tenderness or frontal sinus tenderness.      Left Sinus: No maxillary sinus tenderness or frontal sinus tenderness.      Mouth/Throat:      Pharynx: No oropharyngeal exudate.   Eyes:      General: Lids are normal. No scleral icterus.        Right eye: No discharge.         Left eye: No discharge.      Extraocular Movements:      Right eye: Normal extraocular motion.      Left eye: Normal extraocular motion.      Conjunctiva/sclera:      Right eye: Right conjunctiva is not injected. No hemorrhage.     Left eye: Left conjunctiva is not injected. No hemorrhage.     Pupils: Pupils are equal, round, and reactive to light.   Neck:      Thyroid: No thyromegaly.      Vascular: No JVD.      Trachea: No tracheal deviation.    Cardiovascular:      Rate and Rhythm: Normal rate.   Pulmonary:      Effort: Pulmonary effort is normal. No respiratory distress.   Abdominal:      General: Bowel sounds are normal.      Palpations: Abdomen is soft. There is no hepatomegaly, splenomegaly or mass.      Tenderness: There is no abdominal tenderness.   Musculoskeletal:         General: No tenderness. Normal range of motion.      Cervical back: Normal range of motion and neck supple.   Lymphadenopathy:      Head:      Right side of head: No posterior auricular or occipital adenopathy.      Left side of head: No posterior auricular or occipital adenopathy.      Cervical: No cervical adenopathy.      Right cervical: No superficial, deep or posterior cervical adenopathy.     Left cervical: No superficial, deep or posterior cervical adenopathy.      Upper Body:      Right upper body: No supraclavicular adenopathy.      Left upper body: No supraclavicular adenopathy.   Skin:     General: Skin is dry.      Findings: No erythema or rash.      Nails: There is no clubbing.   Neurological:      Mental Status: He is alert and oriented to person, place, and time.      Cranial Nerves: No cranial nerve deficit.      Coordination: Coordination normal.   Psychiatric:         Behavior: Behavior normal.         Thought Content: Thought content normal.         Judgment: Judgment normal.             Assessment:      1. Symptomatic anemia    2. Iron deficiency anemia due to chronic blood loss    3. Angiodysplasia of gastrointestinal tract    4. MGUS (monoclonal gammopathy of unknown significance)           Med Onc Chart Routing      Follow up with physician 4 weeks. Return to clinic in 4 weeks with repeat CBC iron TIBC   Follow up with [facility]    Infusion scheduling note    Injection scheduling note Sandostatin and Venofer today and again in 1 month   Labs    Imaging    Pharmacy appointment    Other referrals                   Plan:     Marked improvement in symptomatic  anemia.  At this time continue with Venofer on a monthly basis [place] for Sandostatin on a monthly basis return in 1 month results of SPEP and free light chain pending communicate results through portal.  Discussed implications of answered questions with patient        Kris Cesar Jr, MD FACP

## 2023-11-02 NOTE — DISCHARGE INSTRUCTIONS
..[facility]  [address] [facility]  [address] [facility]  [phone] phone     [phone] fax  Hours of Operation: Monday- Friday 8:00am- 5:00pm  After hours phone  [phone]  Hematology / Oncology Physicians on call    Dr. Arsenio Turner      Nurse Practitioners:    Marimar Lombardi, KRISTIE Garcia, KRISTIE Griffith, KRISTIE Moreno, KRISTIE Stevenson, KRISTIE Anaya, PA      Please don't hesitate to call if you have any concerns.    .FALL PREVENTION   Falls often occur due to slipping, tripping or losing your balance. Here are ways to reduce your risk of falling again.   Was there anything that caused your fall that can be fixed, removed or replaced?   Make your home safe by keeping walkways clear of objects you may trip over.   Use non-slip pads under rugs.   Do not walk in poorly lit areas.   Do not stand on chairs or wobbly ladders.   Use caution when reaching overhead or looking upward. This position can cause a loss of balance.   Be sure your shoes fit properly, have non-slip bottoms and are in good condition.   Be cautious when going up and down stairs, curbs, and when walking on uneven sidewalks.   If your balance is poor, consider using a cane or walker.   If your fall was related to alcohol use, stop or limit alcohol intake.   If your fall was related to use of sleeping medicines, talk to your doctor about this. You may need to reduce your dosage at bedtime if you awaken during the night to go to the bathroom.   To reduce the need for nighttime bathroom trips:   Avoid drinking fluids for several hours before going to bed   Empty your bladder before going to bed   Men can keep a urinal at the bedside   © 9408-1498 Nathen Rice, [address] [place] [address], [place] [address]. All rights reserved. This information is not intended as a substitute for professional medical care. Always follow your healthcare  professional's instructions.  .WAYS TO HELP PREVENT INFECTION        WASH YOUR HANDS OFTEN DURING THE DAY, ESPECIALLY BEFORE YOU EAT, AFTER USING THE BATHROOM, AND AFTER TOUCHING ANIMALS    STAY AWAY FROM PEOPLE WHO HAVE ILLNESSES YOU CAN CATCH; SUCH AS COLDS, FLU, CHICKEN POX    TRY TO AVOID CROWDS    STAY AWAY FROM CHILDREN WHO RECENTLY HAVE RECEIVED LIVE VIRUS VACCINES    MAINTAIN GOOD MOUTH CARE    DO NOT SQUEEZE OR SCRATCH PIMPLES    CLEAN CUTS & SCRAPES RIGHT AWAY AND DAILY UNTIL HEALED WITH WARM WATER, SOAP & AN ANTISEPTIC    AVOID CONTACT WITH LITTER BOXES, BIRD CAGES, & FISH TANKS    AVOID STANDING WATER, IE., BIRD BATHS, FLOWER POTS/VASES, OR HUMIDIFIERS    WEAR GLOVES WHEN GARDENING OR CLEANING UP AFTER OTHERS, ESPECIALLY BABIES & SMALL CHILDREN    DO NOT EAT RAW FISH, SEAFOOD, MEAT, OR EGGS

## 2023-11-02 NOTE — PLAN OF CARE
Problem: Anemia  Goal: Anemia Symptom Improvement  Outcome: Ongoing, Progressing     Problem: Adult Inpatient Plan of Care  Goal: Plan of Care Review  Description: Patient likes feet down and IV to left arm.   Outcome: Ongoing, Progressing  Flowsheets (Taken 11/2/2023 0829)  Plan of Care Reviewed With: patient  Goal: Optimal Comfort and Wellbeing  Description: Patient tolerated infusion well with no adverse reactions.   Outcome: Ongoing, Progressing  Goal: Patient-Specific Goal (Individualized)  Outcome: Ongoing, Progressing  Flowsheets (Taken 11/2/2023 0829)  Anxieties, Fears or Concerns: Pt Voices no concerns at this time  Individualized Care Needs: Pt in recliner, pillow under IV arm, snacks offered     Problem: Infection  Goal: Absence of Infection Signs and Symptoms  Outcome: Ongoing, Progressing

## 2023-11-30 ENCOUNTER — INFUSION (OUTPATIENT)
Dept: INFUSION THERAPY | Facility: HOSPITAL | Age: 58
End: 2023-11-30
Attending: INTERNAL MEDICINE
Payer: MEDICAID

## 2023-11-30 ENCOUNTER — OFFICE VISIT (OUTPATIENT)
Dept: HEMATOLOGY/ONCOLOGY | Facility: CLINIC | Age: 58
End: 2023-11-30
Payer: MEDICAID

## 2023-11-30 VITALS
RESPIRATION RATE: 18 BRPM | SYSTOLIC BLOOD PRESSURE: 118 MMHG | HEART RATE: 73 BPM | OXYGEN SATURATION: 98 % | TEMPERATURE: 97 F | DIASTOLIC BLOOD PRESSURE: 76 MMHG

## 2023-11-30 VITALS
RESPIRATION RATE: 18 BRPM | HEART RATE: 73 BPM | OXYGEN SATURATION: 98 % | DIASTOLIC BLOOD PRESSURE: 76 MMHG | BODY MASS INDEX: 30.67 KG/M2 | HEIGHT: 67 IN | WEIGHT: 195.44 LBS | SYSTOLIC BLOOD PRESSURE: 118 MMHG | TEMPERATURE: 97 F

## 2023-11-30 DIAGNOSIS — D50.0 IRON DEFICIENCY ANEMIA DUE TO CHRONIC BLOOD LOSS: Primary | ICD-10-CM

## 2023-11-30 DIAGNOSIS — K55.20 ANGIODYSPLASIA OF GASTROINTESTINAL TRACT: ICD-10-CM

## 2023-11-30 DIAGNOSIS — R53.82 CHRONIC FATIGUE: ICD-10-CM

## 2023-11-30 DIAGNOSIS — D64.9 SYMPTOMATIC ANEMIA: ICD-10-CM

## 2023-11-30 DIAGNOSIS — D47.2 MGUS (MONOCLONAL GAMMOPATHY OF UNKNOWN SIGNIFICANCE): ICD-10-CM

## 2023-11-30 PROCEDURE — 3074F SYST BP LT 130 MM HG: CPT | Mod: CPTII,,, | Performed by: INTERNAL MEDICINE

## 2023-11-30 PROCEDURE — 99215 PR OFFICE/OUTPT VISIT, EST, LEVL V, 40-54 MIN: ICD-10-PCS | Mod: 25,S$PBB,, | Performed by: INTERNAL MEDICINE

## 2023-11-30 PROCEDURE — 96374 THER/PROPH/DIAG INJ IV PUSH: CPT

## 2023-11-30 PROCEDURE — 99213 OFFICE O/P EST LOW 20 MIN: CPT | Mod: PBBFAC,25 | Performed by: INTERNAL MEDICINE

## 2023-11-30 PROCEDURE — 3078F DIAST BP <80 MM HG: CPT | Mod: CPTII,,, | Performed by: INTERNAL MEDICINE

## 2023-11-30 PROCEDURE — 99215 OFFICE O/P EST HI 40 MIN: CPT | Mod: 25,S$PBB,, | Performed by: INTERNAL MEDICINE

## 2023-11-30 PROCEDURE — 3074F PR MOST RECENT SYSTOLIC BLOOD PRESSURE < 130 MM HG: ICD-10-PCS | Mod: CPTII,,, | Performed by: INTERNAL MEDICINE

## 2023-11-30 PROCEDURE — 1159F PR MEDICATION LIST DOCUMENTED IN MEDICAL RECORD: ICD-10-PCS | Mod: CPTII,,, | Performed by: INTERNAL MEDICINE

## 2023-11-30 PROCEDURE — 3078F PR MOST RECENT DIASTOLIC BLOOD PRESSURE < 80 MM HG: ICD-10-PCS | Mod: CPTII,,, | Performed by: INTERNAL MEDICINE

## 2023-11-30 PROCEDURE — 1159F MED LIST DOCD IN RCRD: CPT | Mod: CPTII,,, | Performed by: INTERNAL MEDICINE

## 2023-11-30 PROCEDURE — 3008F PR BODY MASS INDEX (BMI) DOCUMENTED: ICD-10-PCS | Mod: CPTII,,, | Performed by: INTERNAL MEDICINE

## 2023-11-30 PROCEDURE — 96372 THER/PROPH/DIAG INJ SC/IM: CPT | Mod: 59

## 2023-11-30 PROCEDURE — 3008F BODY MASS INDEX DOCD: CPT | Mod: CPTII,,, | Performed by: INTERNAL MEDICINE

## 2023-11-30 PROCEDURE — 99999 PR PBB SHADOW E&M-EST. PATIENT-LVL III: ICD-10-PCS | Mod: PBBFAC,,, | Performed by: INTERNAL MEDICINE

## 2023-11-30 PROCEDURE — 99999 PR PBB SHADOW E&M-EST. PATIENT-LVL III: CPT | Mod: PBBFAC,,, | Performed by: INTERNAL MEDICINE

## 2023-11-30 PROCEDURE — 63600175 PHARM REV CODE 636 W HCPCS: Performed by: INTERNAL MEDICINE

## 2023-11-30 RX ORDER — SODIUM CHLORIDE 0.9 % (FLUSH) 0.9 %
10 SYRINGE (ML) INJECTION
Status: CANCELLED | OUTPATIENT
Start: 2023-12-07

## 2023-11-30 RX ORDER — HEPARIN 100 UNIT/ML
500 SYRINGE INTRAVENOUS
Status: CANCELLED | OUTPATIENT
Start: 2023-12-07

## 2023-11-30 RX ADMIN — OCTREOTIDE ACETATE 20 MG: KIT at 09:11

## 2023-11-30 RX ADMIN — IRON SUCROSE 200 MG: 20 INJECTION, SOLUTION INTRAVENOUS at 09:11

## 2023-11-30 NOTE — PLAN OF CARE
Discussed plan of care with pt. Addressed any and ongoing concerns. Pt denies   Problem: Adult Inpatient Plan of Care  Goal: Plan of Care Review  Outcome: Ongoing, Progressing  Goal: Patient-Specific Goal (Individualized)  Outcome: Ongoing, Progressing  Flowsheets (Taken 11/30/2023 0934)  Anxieties, Fears or Concerns: Denies  Individualized Care Needs: In recliner , no prn needs at this time  Goal: Absence of Hospital-Acquired Illness or Injury  Outcome: Ongoing, Progressing  Intervention: Identify and Manage Fall Risk  Flowsheets (Taken 11/30/2023 0934)  Safety Promotion/Fall Prevention: in recliner, wheels locked  Intervention: Prevent Infection  Flowsheets (Taken 11/30/2023 0934)  Infection Prevention:   equipment surfaces disinfected   hand hygiene promoted   personal protective equipment utilized  Goal: Optimal Comfort and Wellbeing  Outcome: Ongoing, Progressing  Intervention: Provide Person-Centered Care  Flowsheets (Taken 11/30/2023 0934)  Trust Relationship/Rapport:   care explained   choices provided   emotional support provided   empathic listening provided   questions answered   questions encouraged   reassurance provided   thoughts/feelings acknowledged

## 2023-11-30 NOTE — PROGRESS NOTES
Subjective:       Patient ID: Shawn Carreno is a 58 y.o. male.    Chief Complaint: Results, Anemia, and Chemotherapy    HPI:  58-year-old male history of recurrent iron deficiency anemia from angiodysplasias of cecum continuing with Sandostatin on a monthly basis intravenous Venofer every 2 weeks.  Continuing to monitor both recurrent iron deficiency as well as MGUS.  ECOG status 1    Past Medical History:   Diagnosis Date    Multiple myeloma not having achieved remission 10/12/2022     No family history on file.  Social History     Socioeconomic History    Marital status: Single   Tobacco Use    Smoking status: Never    Smokeless tobacco: Never   Substance and Sexual Activity    Alcohol use: Never    Drug use: Never    Sexual activity: Not Currently     Past Surgical History:   Procedure Laterality Date    CHOLECYSTECTOMY  2020    COLONOSCOPY N/A 1/5/2022    Procedure: COLONOSCOPY;  Surgeon: Hannah Alvarado MD;  Location: [facility];  Service: Endoscopy;  Laterality: N/A;    ESOPHAGOGASTRODUODENOSCOPY N/A 1/5/2022    Procedure: EGD (ESOPHAGOGASTRODUODENOSCOPY);  Surgeon: Hannah Alvarado MD;  Location: [facility];  Service: Endoscopy;  Laterality: N/A;    ESOPHAGOGASTRODUODENOSCOPY N/A 4/6/2022    Procedure: EGD (ESOPHAGOGASTRODUODENOSCOPY) with push;  Surgeon: Vira Castaneda MD;  Location: [facility];  Service: Endoscopy;  Laterality: N/A;    INTRALUMINAL GASTROINTESTINAL TRACT IMAGING VIA CAPSULE N/A 1/26/2022    Procedure: IMAGING PROCEDURE, GI TRACT, INTRALUMINAL, VIA CAPSULE;  Surgeon: Baltazar Chaves RN;  Location: [facility];  Service: Endoscopy;  Laterality: N/A;    INTRALUMINAL GASTROINTESTINAL TRACT IMAGING VIA CAPSULE N/A 2/22/2022    Procedure: IMAGING PROCEDURE, GI TRACT, INTRALUMINAL, VIA CAPSULE;  Surgeon: Baltazar Chaves RN;  Location: [facility];  Service: Endoscopy;  Laterality: N/A;       Labs:  Lab Results   Component Value Date    WBC 4.48 11/30/2023    HGB 9.5 (L) 11/30/2023     HCT 31.1 (L) 11/30/2023    MCV 89 11/30/2023     11/30/2023     BMP  Lab Results   Component Value Date     11/30/2023    K 4.0 11/30/2023     11/30/2023    CO2 25 11/30/2023    BUN 16 11/30/2023    CREATININE 0.9 11/30/2023    CALCIUM 8.4 (L) 11/30/2023    ANIONGAP 8 11/30/2023    ESTGFRAFRICA >60 01/06/2022    EGFRNONAA >60 01/06/2022     Lab Results   Component Value Date    ALT 13 11/30/2023    AST 14 11/30/2023    ALKPHOS 41 (L) 11/30/2023    BILITOT 0.3 11/30/2023       Lab Results   Component Value Date    IRON 50 11/02/2023    TIBC 422 11/02/2023    FERRITIN 47 11/02/2023     Lab Results   Component Value Date    DFTIBROF68 283 09/15/2022     Lab Results   Component Value Date    FOLATE 12.2 09/15/2022     Lab Results   Component Value Date    TSH 2.047 09/15/2022         Review of Systems   Constitutional:  Positive for fatigue. Negative for activity change, appetite change, chills, diaphoresis, fever and unexpected weight change.   HENT:  Negative for congestion, dental problem, drooling, ear discharge, ear pain, facial swelling, hearing loss, mouth sores, nosebleeds, postnasal drip, rhinorrhea, sinus pressure, sneezing, sore throat, tinnitus, trouble swallowing and voice change.    Eyes:  Negative for photophobia, pain, discharge, redness, itching and visual disturbance.   Respiratory:  Negative for apnea, cough, choking, chest tightness, shortness of breath, wheezing and stridor.    Cardiovascular:  Negative for chest pain, palpitations and leg swelling.   Gastrointestinal:  Negative for abdominal distention, abdominal pain, anal bleeding, blood in stool, constipation, diarrhea, nausea, rectal pain and vomiting.   Endocrine: Negative for cold intolerance, heat intolerance, polydipsia, polyphagia and polyuria.   Genitourinary:  Negative for decreased urine volume, difficulty urinating, dysuria, enuresis, flank pain, frequency, genital sores, hematuria, penile discharge, penile pain,  penile swelling, scrotal swelling, testicular pain and urgency.   Musculoskeletal:  Negative for arthralgias, back pain, gait problem, joint swelling, myalgias, neck pain and neck stiffness.   Skin:  Negative for color change, pallor, rash and wound.   Allergic/Immunologic: Negative for environmental allergies, food allergies and immunocompromised state.   Neurological:  Positive for weakness. Negative for dizziness, tremors, seizures, syncope, facial asymmetry, speech difficulty, light-headedness, numbness and headaches.   Hematological:  Negative for adenopathy. Does not bruise/bleed easily.   Psychiatric/Behavioral:  Negative for agitation, behavioral problems, confusion, decreased concentration, dysphoric mood, hallucinations, self-injury, sleep disturbance and suicidal ideas. The patient is not nervous/anxious and is not hyperactive.        Objective:      Physical Exam  Vitals reviewed.   Constitutional:       General: He is not in acute distress.     Appearance: Normal appearance. He is well-developed. He is not diaphoretic.   HENT:      Head: Normocephalic.      Right Ear: External ear normal.      Left Ear: External ear normal.      Nose: Nose normal.      Right Sinus: No maxillary sinus tenderness or frontal sinus tenderness.      Left Sinus: No maxillary sinus tenderness or frontal sinus tenderness.      Mouth/Throat:      Pharynx: No oropharyngeal exudate.   Eyes:      General: Lids are normal. No scleral icterus.        Right eye: No discharge.         Left eye: No discharge.      Extraocular Movements:      Right eye: Normal extraocular motion.      Left eye: Normal extraocular motion.      Conjunctiva/sclera:      Right eye: Right conjunctiva is not injected. No hemorrhage.     Left eye: Left conjunctiva is not injected. No hemorrhage.     Pupils: Pupils are equal, round, and reactive to light.   Neck:      Thyroid: No thyromegaly.      Vascular: No JVD.      Trachea: No tracheal deviation.    Cardiovascular:      Rate and Rhythm: Normal rate and regular rhythm.      Heart sounds: Normal heart sounds.   Pulmonary:      Effort: Pulmonary effort is normal. No respiratory distress.      Breath sounds: Normal breath sounds. No stridor.   Abdominal:      General: Bowel sounds are normal.      Palpations: Abdomen is soft. There is no hepatomegaly, splenomegaly or mass.      Tenderness: There is no abdominal tenderness.   Musculoskeletal:         General: No tenderness. Normal range of motion.      Cervical back: Normal range of motion and neck supple.   Lymphadenopathy:      Head:      Right side of head: No posterior auricular or occipital adenopathy.      Left side of head: No posterior auricular or occipital adenopathy.      Cervical: No cervical adenopathy.      Right cervical: No superficial, deep or posterior cervical adenopathy.     Left cervical: No superficial, deep or posterior cervical adenopathy.      Upper Body:      Right upper body: No supraclavicular adenopathy.      Left upper body: No supraclavicular adenopathy.   Skin:     General: Skin is dry.      Findings: No erythema or rash.      Nails: There is no clubbing.   Neurological:      Mental Status: He is alert and oriented to person, place, and time.      Cranial Nerves: No cranial nerve deficit.      Coordination: Coordination normal.   Psychiatric:         Behavior: Behavior normal.         Thought Content: Thought content normal.         Judgment: Judgment normal.             Assessment:      1. Iron deficiency anemia due to chronic blood loss    2. Angiodysplasia of gastrointestinal tract    3. Symptomatic anemia    4. MGUS (monoclonal gammopathy of unknown significance)    5. Chronic fatigue           Med Onc Chart Routing      Follow up with physician 4 weeks. Return to clinic to see me in 1 month with CBC iron status prior for Venofer in Sandostatin   Follow up with [facility]    Infusion scheduling note    Injection scheduling note Venofer  and Sandostatin dosing today and Venofer again in 2 weeks   Labs   Scheduling:  Preferred lab:  Lab interval:  CBC serum iron TIBC and ferritin along with serum protein electrophoresis and free light chain in 2 weeks   Imaging    Pharmacy appointment    Other referrals                   Plan:     Sandostatin dosing today and again in 1 month.  Venofer dosing today and again in 2 weeks.  Patient will have in 2 weeks CBC serum iron TIBC ferritin along with serum protein electrophoresis and serum free light chain no need for physician visit at that point APAP proceed with follow-up in 1 month with CBC serum iron TIBC and ferritin        Kris Cesar Jr, MD FACP

## 2023-12-14 ENCOUNTER — INFUSION (OUTPATIENT)
Dept: INFUSION THERAPY | Facility: HOSPITAL | Age: 58
End: 2023-12-14
Attending: INTERNAL MEDICINE
Payer: COMMERCIAL

## 2023-12-14 ENCOUNTER — LAB VISIT (OUTPATIENT)
Dept: LAB | Facility: HOSPITAL | Age: 58
End: 2023-12-14
Attending: INTERNAL MEDICINE
Payer: COMMERCIAL

## 2023-12-14 VITALS
OXYGEN SATURATION: 99 % | HEART RATE: 71 BPM | RESPIRATION RATE: 18 BRPM | TEMPERATURE: 98 F | SYSTOLIC BLOOD PRESSURE: 137 MMHG | DIASTOLIC BLOOD PRESSURE: 68 MMHG

## 2023-12-14 DIAGNOSIS — D50.0 IRON DEFICIENCY ANEMIA DUE TO CHRONIC BLOOD LOSS: ICD-10-CM

## 2023-12-14 DIAGNOSIS — D50.0 IRON DEFICIENCY ANEMIA DUE TO CHRONIC BLOOD LOSS: Primary | ICD-10-CM

## 2023-12-14 LAB
ALBUMIN SERPL BCP-MCNC: 3.8 G/DL (ref 3.5–5.2)
ALP SERPL-CCNC: 48 U/L (ref 55–135)
ALT SERPL W/O P-5'-P-CCNC: 20 U/L (ref 10–44)
ANION GAP SERPL CALC-SCNC: 8 MMOL/L (ref 8–16)
AST SERPL-CCNC: 24 U/L (ref 10–40)
BASOPHILS # BLD AUTO: 0.04 K/UL (ref 0–0.2)
BASOPHILS NFR BLD: 0.7 % (ref 0–1.9)
BILIRUB SERPL-MCNC: 0.4 MG/DL (ref 0.1–1)
BUN SERPL-MCNC: 16 MG/DL (ref 6–20)
CALCIUM SERPL-MCNC: 8.9 MG/DL (ref 8.7–10.5)
CHLORIDE SERPL-SCNC: 105 MMOL/L (ref 95–110)
CO2 SERPL-SCNC: 26 MMOL/L (ref 23–29)
CREAT SERPL-MCNC: 0.9 MG/DL (ref 0.5–1.4)
DIFFERENTIAL METHOD: ABNORMAL
EOSINOPHIL # BLD AUTO: 0.2 K/UL (ref 0–0.5)
EOSINOPHIL NFR BLD: 4.3 % (ref 0–8)
ERYTHROCYTE [DISTWIDTH] IN BLOOD BY AUTOMATED COUNT: 18.4 % (ref 11.5–14.5)
EST. GFR  (NO RACE VARIABLE): >60 ML/MIN/1.73 M^2
FERRITIN SERPL-MCNC: 32 NG/ML (ref 20–300)
GLUCOSE SERPL-MCNC: 148 MG/DL (ref 70–110)
HCT VFR BLD AUTO: 33.4 % (ref 40–54)
HGB BLD-MCNC: 10.1 G/DL (ref 14–18)
IMM GRANULOCYTES # BLD AUTO: 0.01 K/UL (ref 0–0.04)
IMM GRANULOCYTES NFR BLD AUTO: 0.2 % (ref 0–0.5)
IRON SERPL-MCNC: 26 UG/DL (ref 45–160)
LYMPHOCYTES # BLD AUTO: 1.3 K/UL (ref 1–4.8)
LYMPHOCYTES NFR BLD: 23.1 % (ref 18–48)
MCH RBC QN AUTO: 26.9 PG (ref 27–31)
MCHC RBC AUTO-ENTMCNC: 30.2 G/DL (ref 32–36)
MCV RBC AUTO: 89 FL (ref 82–98)
MONOCYTES # BLD AUTO: 0.4 K/UL (ref 0.3–1)
MONOCYTES NFR BLD: 7.9 % (ref 4–15)
NEUTROPHILS # BLD AUTO: 3.5 K/UL (ref 1.8–7.7)
NEUTROPHILS NFR BLD: 63.8 % (ref 38–73)
NRBC BLD-RTO: 0 /100 WBC
PLATELET # BLD AUTO: 281 K/UL (ref 150–450)
PMV BLD AUTO: 9.4 FL (ref 9.2–12.9)
POTASSIUM SERPL-SCNC: 4 MMOL/L (ref 3.5–5.1)
PROT SERPL-MCNC: 8.4 G/DL (ref 6–8.4)
RBC # BLD AUTO: 3.76 M/UL (ref 4.6–6.2)
SATURATED IRON: 6 % (ref 20–50)
SODIUM SERPL-SCNC: 139 MMOL/L (ref 136–145)
TOTAL IRON BINDING CAPACITY: 425 UG/DL (ref 250–450)
TRANSFERRIN SERPL-MCNC: 287 MG/DL (ref 200–375)
WBC # BLD AUTO: 5.55 K/UL (ref 3.9–12.7)

## 2023-12-14 PROCEDURE — 84165 PROTEIN E-PHORESIS SERUM: CPT | Performed by: INTERNAL MEDICINE

## 2023-12-14 PROCEDURE — 84165 PATHOLOGIST INTERPRETATION SPE: ICD-10-PCS | Mod: 26,,, | Performed by: PATHOLOGY

## 2023-12-14 PROCEDURE — 63600175 PHARM REV CODE 636 W HCPCS: Performed by: INTERNAL MEDICINE

## 2023-12-14 PROCEDURE — 84165 PROTEIN E-PHORESIS SERUM: CPT | Mod: 26,,, | Performed by: PATHOLOGY

## 2023-12-14 PROCEDURE — 96374 THER/PROPH/DIAG INJ IV PUSH: CPT

## 2023-12-14 PROCEDURE — 82728 ASSAY OF FERRITIN: CPT | Performed by: INTERNAL MEDICINE

## 2023-12-14 PROCEDURE — 25000003 PHARM REV CODE 250: Performed by: INTERNAL MEDICINE

## 2023-12-14 PROCEDURE — 85025 COMPLETE CBC W/AUTO DIFF WBC: CPT | Performed by: INTERNAL MEDICINE

## 2023-12-14 PROCEDURE — 83521 IG LIGHT CHAINS FREE EACH: CPT | Performed by: INTERNAL MEDICINE

## 2023-12-14 PROCEDURE — A4216 STERILE WATER/SALINE, 10 ML: HCPCS | Performed by: INTERNAL MEDICINE

## 2023-12-14 PROCEDURE — 36415 COLL VENOUS BLD VENIPUNCTURE: CPT | Performed by: INTERNAL MEDICINE

## 2023-12-14 PROCEDURE — 84466 ASSAY OF TRANSFERRIN: CPT | Performed by: INTERNAL MEDICINE

## 2023-12-14 PROCEDURE — 83540 ASSAY OF IRON: CPT | Performed by: INTERNAL MEDICINE

## 2023-12-14 PROCEDURE — 80053 COMPREHEN METABOLIC PANEL: CPT | Performed by: INTERNAL MEDICINE

## 2023-12-14 RX ORDER — HEPARIN 100 UNIT/ML
500 SYRINGE INTRAVENOUS
Status: CANCELLED | OUTPATIENT
Start: 2023-12-21

## 2023-12-14 RX ORDER — SODIUM CHLORIDE 0.9 % (FLUSH) 0.9 %
10 SYRINGE (ML) INJECTION
Status: CANCELLED | OUTPATIENT
Start: 2023-12-21

## 2023-12-14 RX ORDER — SODIUM CHLORIDE 0.9 % (FLUSH) 0.9 %
10 SYRINGE (ML) INJECTION
Status: DISCONTINUED | OUTPATIENT
Start: 2023-12-14 | End: 2023-12-14 | Stop reason: HOSPADM

## 2023-12-14 RX ADMIN — Medication 10 ML: at 08:12

## 2023-12-14 RX ADMIN — IRON SUCROSE 200 MG: 20 INJECTION, SOLUTION INTRAVENOUS at 08:12

## 2023-12-14 NOTE — PLAN OF CARE
Discussed plan of care with pt. Addressed any and ongoing concerns. Pt denies   Problem: Adult Inpatient Plan of Care  Goal: Plan of Care Review  Outcome: Ongoing, Progressing  Goal: Patient-Specific Goal (Individualized)  Outcome: Ongoing, Progressing  Flowsheets (Taken 12/14/2023 0824)  Anxieties, Fears or Concerns: Denies  Individualized Care Needs: None at this time  Goal: Absence of Hospital-Acquired Illness or Injury  Outcome: Ongoing, Progressing  Intervention: Identify and Manage Fall Risk  Flowsheets (Taken 12/14/2023 0824)  Safety Promotion/Fall Prevention: in recliner, wheels locked  Intervention: Prevent Infection  Flowsheets (Taken 12/14/2023 0824)  Infection Prevention:   equipment surfaces disinfected   hand hygiene promoted   personal protective equipment utilized  Goal: Optimal Comfort and Wellbeing  Outcome: Ongoing, Progressing  Intervention: Provide Person-Centered Care  Flowsheets (Taken 12/14/2023 0824)  Trust Relationship/Rapport:   care explained   choices provided   emotional support provided   empathic listening provided   questions answered   questions encouraged   reassurance provided   thoughts/feelings acknowledged

## 2023-12-15 LAB
ALBUMIN SERPL ELPH-MCNC: 4.1 G/DL (ref 3.35–5.55)
ALPHA1 GLOB SERPL ELPH-MCNC: 0.25 G/DL (ref 0.17–0.41)
ALPHA2 GLOB SERPL ELPH-MCNC: 0.54 G/DL (ref 0.43–0.99)
B-GLOBULIN SERPL ELPH-MCNC: 0.67 G/DL (ref 0.5–1.1)
GAMMA GLOB SERPL ELPH-MCNC: 2.34 G/DL (ref 0.67–1.58)
KAPPA LC SER QL IA: 5.09 MG/DL (ref 0.33–1.94)
KAPPA LC/LAMBDA SER IA: 4.94 (ref 0.26–1.65)
LAMBDA LC SER QL IA: 1.03 MG/DL (ref 0.57–2.63)
PROT SERPL-MCNC: 7.9 G/DL (ref 6–8.4)

## 2023-12-18 LAB — PATHOLOGIST INTERPRETATION SPE: NORMAL

## 2023-12-20 ENCOUNTER — PATIENT MESSAGE (OUTPATIENT)
Dept: GENETICS | Facility: CLINIC | Age: 58
End: 2023-12-20
Payer: COMMERCIAL

## 2023-12-27 ENCOUNTER — TELEPHONE (OUTPATIENT)
Dept: INFUSION THERAPY | Facility: HOSPITAL | Age: 58
End: 2023-12-27
Payer: COMMERCIAL

## 2023-12-27 NOTE — TELEPHONE ENCOUNTER
Spoke with Mr. Carreno via phone due to noticing that he had not read my [employer] Message on 12.20.23. I informed him that due to [facility] not accepting his insurance he was not approved to receive his infusions. I also encouraged that he speak with a  or his Insurance provider to determine which [facility] location would covered. Mr. Carreno stated understanding.   Infusion was notified.

## 2024-02-06 ENCOUNTER — LAB VISIT (OUTPATIENT)
Dept: LAB | Facility: HOSPITAL | Age: 59
End: 2024-02-06
Attending: INTERNAL MEDICINE
Payer: COMMERCIAL

## 2024-02-06 ENCOUNTER — OFFICE VISIT (OUTPATIENT)
Dept: HEMATOLOGY/ONCOLOGY | Facility: CLINIC | Age: 59
End: 2024-02-06
Payer: COMMERCIAL

## 2024-02-06 VITALS
SYSTOLIC BLOOD PRESSURE: 104 MMHG | HEIGHT: 67 IN | DIASTOLIC BLOOD PRESSURE: 62 MMHG | HEART RATE: 75 BPM | TEMPERATURE: 98 F | OXYGEN SATURATION: 98 % | WEIGHT: 195.19 LBS | BODY MASS INDEX: 30.64 KG/M2

## 2024-02-06 DIAGNOSIS — K55.20 ANGIODYSPLASIA OF GASTROINTESTINAL TRACT: ICD-10-CM

## 2024-02-06 DIAGNOSIS — D50.0 IRON DEFICIENCY ANEMIA DUE TO CHRONIC BLOOD LOSS: ICD-10-CM

## 2024-02-06 DIAGNOSIS — D47.2 MGUS (MONOCLONAL GAMMOPATHY OF UNKNOWN SIGNIFICANCE): Primary | ICD-10-CM

## 2024-02-06 DIAGNOSIS — D64.9 SYMPTOMATIC ANEMIA: ICD-10-CM

## 2024-02-06 LAB
ALBUMIN SERPL BCP-MCNC: 3.7 G/DL (ref 3.5–5.2)
ALP SERPL-CCNC: 40 U/L (ref 55–135)
ALT SERPL W/O P-5'-P-CCNC: 13 U/L (ref 10–44)
ANION GAP SERPL CALC-SCNC: 7 MMOL/L (ref 8–16)
AST SERPL-CCNC: 12 U/L (ref 10–40)
BASOPHILS # BLD AUTO: 0.05 K/UL (ref 0–0.2)
BASOPHILS NFR BLD: 0.9 % (ref 0–1.9)
BILIRUB SERPL-MCNC: 0.4 MG/DL (ref 0.1–1)
BUN SERPL-MCNC: 16 MG/DL (ref 6–20)
CALCIUM SERPL-MCNC: 8.4 MG/DL (ref 8.7–10.5)
CHLORIDE SERPL-SCNC: 107 MMOL/L (ref 95–110)
CO2 SERPL-SCNC: 23 MMOL/L (ref 23–29)
CREAT SERPL-MCNC: 0.8 MG/DL (ref 0.5–1.4)
DIFFERENTIAL METHOD BLD: ABNORMAL
EOSINOPHIL # BLD AUTO: 0.2 K/UL (ref 0–0.5)
EOSINOPHIL NFR BLD: 3.4 % (ref 0–8)
ERYTHROCYTE [DISTWIDTH] IN BLOOD BY AUTOMATED COUNT: 17.3 % (ref 11.5–14.5)
EST. GFR  (NO RACE VARIABLE): >60 ML/MIN/1.73 M^2
GLUCOSE SERPL-MCNC: 92 MG/DL (ref 70–110)
HCT VFR BLD AUTO: 27.2 % (ref 40–54)
HGB BLD-MCNC: 8.2 G/DL (ref 14–18)
IMM GRANULOCYTES # BLD AUTO: 0.02 K/UL (ref 0–0.04)
IMM GRANULOCYTES NFR BLD AUTO: 0.4 % (ref 0–0.5)
LYMPHOCYTES # BLD AUTO: 1.7 K/UL (ref 1–4.8)
LYMPHOCYTES NFR BLD: 31.1 % (ref 18–48)
MCH RBC QN AUTO: 24.7 PG (ref 27–31)
MCHC RBC AUTO-ENTMCNC: 30.1 G/DL (ref 32–36)
MCV RBC AUTO: 82 FL (ref 82–98)
MONOCYTES # BLD AUTO: 0.5 K/UL (ref 0.3–1)
MONOCYTES NFR BLD: 9.8 % (ref 4–15)
NEUTROPHILS # BLD AUTO: 2.9 K/UL (ref 1.8–7.7)
NEUTROPHILS NFR BLD: 54.4 % (ref 38–73)
NRBC BLD-RTO: 0 /100 WBC
PLATELET # BLD AUTO: 302 K/UL (ref 150–450)
PMV BLD AUTO: 9.4 FL (ref 9.2–12.9)
POTASSIUM SERPL-SCNC: 4 MMOL/L (ref 3.5–5.1)
PROT SERPL-MCNC: 8 G/DL (ref 6–8.4)
RBC # BLD AUTO: 3.32 M/UL (ref 4.6–6.2)
SODIUM SERPL-SCNC: 137 MMOL/L (ref 136–145)
WBC # BLD AUTO: 5.3 K/UL (ref 3.9–12.7)

## 2024-02-06 PROCEDURE — 85025 COMPLETE CBC W/AUTO DIFF WBC: CPT | Performed by: INTERNAL MEDICINE

## 2024-02-06 PROCEDURE — 1160F RVW MEDS BY RX/DR IN RCRD: CPT | Mod: CPTII,S$GLB,, | Performed by: INTERNAL MEDICINE

## 2024-02-06 PROCEDURE — 1159F MED LIST DOCD IN RCRD: CPT | Mod: CPTII,S$GLB,, | Performed by: INTERNAL MEDICINE

## 2024-02-06 PROCEDURE — 3078F DIAST BP <80 MM HG: CPT | Mod: CPTII,S$GLB,, | Performed by: INTERNAL MEDICINE

## 2024-02-06 PROCEDURE — 3008F BODY MASS INDEX DOCD: CPT | Mod: CPTII,S$GLB,, | Performed by: INTERNAL MEDICINE

## 2024-02-06 PROCEDURE — 36415 COLL VENOUS BLD VENIPUNCTURE: CPT | Performed by: INTERNAL MEDICINE

## 2024-02-06 PROCEDURE — 3074F SYST BP LT 130 MM HG: CPT | Mod: CPTII,S$GLB,, | Performed by: INTERNAL MEDICINE

## 2024-02-06 PROCEDURE — 80053 COMPREHEN METABOLIC PANEL: CPT | Performed by: INTERNAL MEDICINE

## 2024-02-06 PROCEDURE — 99999 PR PBB SHADOW E&M-EST. PATIENT-LVL III: CPT | Mod: PBBFAC,,, | Performed by: INTERNAL MEDICINE

## 2024-02-06 PROCEDURE — 99215 OFFICE O/P EST HI 40 MIN: CPT | Mod: 25,S$GLB,, | Performed by: INTERNAL MEDICINE

## 2024-02-06 RX ORDER — HEPARIN 100 UNIT/ML
500 SYRINGE INTRAVENOUS
Status: CANCELLED | OUTPATIENT
Start: 2024-02-13

## 2024-02-06 RX ORDER — SODIUM CHLORIDE 0.9 % (FLUSH) 0.9 %
10 SYRINGE (ML) INJECTION
Status: CANCELLED | OUTPATIENT
Start: 2024-02-13

## 2024-02-06 NOTE — PROGRESS NOTES
Subjective:       Patient ID: Shawn Carreno is a 58 y.o. male.    Chief Complaint: Results and Anemia    HPI:  58-year-old male with recurrent iron deficiency anemia extensive GI evaluation has been on Venofer every 2 weeks along with Sandostatin monthly basis in addition patient has monoclonal gammopathy of undetermined significance last bone marrow performed in February of 2023 patient returns has had delay in retreatment because of insurance issues beginning of 2024 ECOG status 1    Past Medical History:   Diagnosis Date    Multiple myeloma not having achieved remission 10/12/2022     History reviewed. No pertinent family history.  Social History     Socioeconomic History    Marital status: Single   Tobacco Use    Smoking status: Never    Smokeless tobacco: Never   Substance and Sexual Activity    Alcohol use: Never    Drug use: Never    Sexual activity: Not Currently     Past Surgical History:   Procedure Laterality Date    CHOLECYSTECTOMY 2020    COLONOSCOPY N/A 1/5/2022    Procedure: COLONOSCOPY;  Surgeon: Hannah Alvarado MD;  Location: [facility];  Service: Endoscopy;  Laterality: N/A;    ESOPHAGOGASTRODUODENOSCOPY N/A 1/5/2022    Procedure: EGD (ESOPHAGOGASTRODUODENOSCOPY);  Surgeon: Hannah Alvarado MD;  Location: [facility];  Service: Endoscopy;  Laterality: N/A;    ESOPHAGOGASTRODUODENOSCOPY N/A 4/6/2022    Procedure: EGD (ESOPHAGOGASTRODUODENOSCOPY) with push;  Surgeon: Vira Castaneda MD;  Location: [facility];  Service: Endoscopy;  Laterality: N/A;    INTRALUMINAL GASTROINTESTINAL TRACT IMAGING VIA CAPSULE N/A 1/26/2022    Procedure: IMAGING PROCEDURE, GI TRACT, INTRALUMINAL, VIA CAPSULE;  Surgeon: Baltazar Chaves RN;  Location: [facility];  Service: Endoscopy;  Laterality: N/A;    INTRALUMINAL GASTROINTESTINAL TRACT IMAGING VIA CAPSULE N/A 2/22/2022    Procedure: IMAGING PROCEDURE, GI TRACT, INTRALUMINAL, VIA CAPSULE;  Surgeon: Baltazar Chaves RN;  Location: [facility];  Service:  Endoscopy;  Laterality: N/A;       Labs:  Lab Results   Component Value Date    WBC 5.30 02/06/2024    HGB 8.2 (L) 02/06/2024    HCT 27.2 (L) 02/06/2024    MCV 82 02/06/2024     02/06/2024     BMP  Lab Results   Component Value Date     12/14/2023    K 4.0 12/14/2023     12/14/2023    CO2 26 12/14/2023    BUN 16 12/14/2023    CREATININE 0.9 12/14/2023    CALCIUM 8.9 12/14/2023    ANIONGAP 8 12/14/2023    ESTGFRAFRICA >60 01/06/2022    EGFRNONAA >60 01/06/2022     Lab Results   Component Value Date    ALT 20 12/14/2023    AST 24 12/14/2023    ALKPHOS 48 (L) 12/14/2023    BILITOT 0.4 12/14/2023       Lab Results   Component Value Date    IRON 26 (L) 12/14/2023    TIBC 425 12/14/2023    FERRITIN 32 12/14/2023     Lab Results   Component Value Date    BTXBAVUX78 283 09/15/2022     Lab Results   Component Value Date    FOLATE 12.2 09/15/2022     Lab Results   Component Value Date    TSH 2.047 09/15/2022         Review of Systems   Constitutional:  Positive for fatigue. Negative for activity change, appetite change, chills, diaphoresis, fever and unexpected weight change.   HENT:  Negative for congestion, dental problem, drooling, ear discharge, ear pain, facial swelling, hearing loss, mouth sores, nosebleeds, postnasal drip, rhinorrhea, sinus pressure, sneezing, sore throat, tinnitus, trouble swallowing and voice change.    Eyes:  Negative for photophobia, pain, discharge, redness, itching and visual disturbance.   Respiratory:  Negative for apnea, cough, choking, chest tightness, shortness of breath, wheezing and stridor.    Cardiovascular:  Negative for chest pain, palpitations and leg swelling.   Gastrointestinal:  Negative for abdominal distention, abdominal pain, anal bleeding, blood in stool, constipation, diarrhea, nausea, rectal pain and vomiting.   Endocrine: Negative for cold intolerance, heat intolerance, polydipsia, polyphagia and polyuria.   Genitourinary:  Negative for decreased urine  volume, difficulty urinating, dysuria, enuresis, flank pain, frequency, genital sores, hematuria, penile discharge, penile pain, penile swelling, scrotal swelling, testicular pain and urgency.   Musculoskeletal:  Negative for arthralgias, back pain, gait problem, joint swelling, myalgias, neck pain and neck stiffness.   Skin:  Negative for color change, pallor, rash and wound.   Allergic/Immunologic: Negative for environmental allergies, food allergies and immunocompromised state.   Neurological:  Positive for weakness. Negative for dizziness, tremors, seizures, syncope, facial asymmetry, speech difficulty, light-headedness, numbness and headaches.   Hematological:  Negative for adenopathy. Does not bruise/bleed easily.   Psychiatric/Behavioral:  Negative for agitation, behavioral problems, confusion, decreased concentration, dysphoric mood, hallucinations, self-injury, sleep disturbance and suicidal ideas. The patient is not nervous/anxious and is not hyperactive.      Objective:      Physical Exam  Vitals reviewed.   Constitutional:       General: He is not in acute distress.     Appearance: He is well-developed. He is not diaphoretic.   HENT:      Head: Normocephalic.      Right Ear: External ear normal.      Left Ear: External ear normal.      Nose: Nose normal.      Right Sinus: No maxillary sinus tenderness or frontal sinus tenderness.      Left Sinus: No maxillary sinus tenderness or frontal sinus tenderness.      Mouth/Throat:      Pharynx: No oropharyngeal exudate.   Eyes:      General: Lids are normal. No scleral icterus.        Right eye: No discharge.         Left eye: No discharge.      Extraocular Movements:      Right eye: Normal extraocular motion.      Left eye: Normal extraocular motion.      Conjunctiva/sclera:      Right eye: Right conjunctiva is not injected. No hemorrhage.     Left eye: Left conjunctiva is not injected. No hemorrhage.     Pupils: Pupils are equal, round, and reactive to light.    Neck:      Thyroid: No thyromegaly.      Vascular: No JVD.      Trachea: No tracheal deviation.   Cardiovascular:      Rate and Rhythm: Normal rate.   Pulmonary:      Effort: Pulmonary effort is normal. No respiratory distress.      Breath sounds: No stridor.   Abdominal:      General: Bowel sounds are normal.      Palpations: Abdomen is soft. There is no hepatomegaly, splenomegaly or mass.      Tenderness: There is no abdominal tenderness.   Musculoskeletal:         General: No tenderness. Normal range of motion.      Cervical back: Normal range of motion and neck supple.   Lymphadenopathy:      Head:      Right side of head: No posterior auricular or occipital adenopathy.      Left side of head: No posterior auricular or occipital adenopathy.      Cervical: No cervical adenopathy.      Right cervical: No superficial, deep or posterior cervical adenopathy.     Left cervical: No superficial, deep or posterior cervical adenopathy.      Upper Body:      Right upper body: No supraclavicular adenopathy.      Left upper body: No supraclavicular adenopathy.   Skin:     General: Skin is dry.      Findings: No erythema or rash.      Nails: There is no clubbing.   Neurological:      Mental Status: He is alert and oriented to person, place, and time.      Cranial Nerves: No cranial nerve deficit.      Motor: Weakness present.      Coordination: Coordination normal.   Psychiatric:         Behavior: Behavior normal.         Thought Content: Thought content normal.         Judgment: Judgment normal.           Assessment:      1. MGUS (monoclonal gammopathy of unknown significance)    2. Iron deficiency anemia due to chronic blood loss    3. Angiodysplasia of gastrointestinal tract    4. Symptomatic anemia           Med Onc Chart Routing      Follow up with physician . Return to clinic to see me 1 week after bone marrow is completed and laboratory for CBC CMP SPEP free light chain beta 2 microglobulin have been drawn along with  iron status   Follow up with [facility]    Infusion scheduling note    Injection scheduling note Venofer q.2 weeks Sandostatin q.month   Labs   Scheduling:  Preferred lab:  Lab interval:  When bone marrow is done please draw CBC CMP serum iron TIBC ferritin serum protein electrophoresis beta 2 microglobulin serum free light chain   Imaging    Pharmacy appointment    Other referrals         Patient needs CT bone marrow biopsy done          Plan:     Patient has had insurance issues but seems to be settled now would recommend continuing Venofer every 2 weeks.  Sandostatin on a monthly basis.  Would recommend bone marrow done in see me afterwards for review.  Along with CBC CMP SPEP free light chain beta 2 microglobulin on day of bone marrow drawn so that we can see the results in relationship to his bone marrow.  I believe the patient has 2 issues going on recurrent GI blood loss which has been handled with extensive GI evaluation Sandostatin.  Would also recommend that I believe the patient's MGUS his progressing with slow increase in free light chain ratio as well as MGUS.        Kris Cesar Jr, MD FACP

## 2024-02-14 ENCOUNTER — LAB VISIT (OUTPATIENT)
Dept: LAB | Facility: HOSPITAL | Age: 59
End: 2024-02-14
Attending: INTERNAL MEDICINE
Payer: COMMERCIAL

## 2024-02-14 ENCOUNTER — INFUSION (OUTPATIENT)
Dept: INFUSION THERAPY | Facility: HOSPITAL | Age: 59
End: 2024-02-14
Attending: INTERNAL MEDICINE
Payer: COMMERCIAL

## 2024-02-14 VITALS
SYSTOLIC BLOOD PRESSURE: 135 MMHG | HEART RATE: 87 BPM | OXYGEN SATURATION: 98 % | TEMPERATURE: 98 F | RESPIRATION RATE: 16 BRPM | DIASTOLIC BLOOD PRESSURE: 61 MMHG

## 2024-02-14 DIAGNOSIS — K55.20 ANGIODYSPLASIA OF GASTROINTESTINAL TRACT: ICD-10-CM

## 2024-02-14 DIAGNOSIS — D50.0 IRON DEFICIENCY ANEMIA DUE TO CHRONIC BLOOD LOSS: Primary | ICD-10-CM

## 2024-02-14 DIAGNOSIS — D50.0 IRON DEFICIENCY ANEMIA DUE TO CHRONIC BLOOD LOSS: ICD-10-CM

## 2024-02-14 DIAGNOSIS — D47.2 MGUS (MONOCLONAL GAMMOPATHY OF UNKNOWN SIGNIFICANCE): ICD-10-CM

## 2024-02-14 DIAGNOSIS — D47.2 MGUS (MONOCLONAL GAMMOPATHY OF UNKNOWN SIGNIFICANCE): Primary | ICD-10-CM

## 2024-02-14 DIAGNOSIS — D64.9 SYMPTOMATIC ANEMIA: ICD-10-CM

## 2024-02-14 LAB
ALBUMIN SERPL BCP-MCNC: 3.7 G/DL (ref 3.5–5.2)
ALP SERPL-CCNC: 41 U/L (ref 55–135)
ALT SERPL W/O P-5'-P-CCNC: 12 U/L (ref 10–44)
ANION GAP SERPL CALC-SCNC: 8 MMOL/L (ref 8–16)
AST SERPL-CCNC: 13 U/L (ref 10–40)
BASOPHILS # BLD AUTO: 0.03 K/UL (ref 0–0.2)
BASOPHILS NFR BLD: 0.6 % (ref 0–1.9)
BILIRUB SERPL-MCNC: 0.3 MG/DL (ref 0.1–1)
BUN SERPL-MCNC: 20 MG/DL (ref 6–20)
CALCIUM SERPL-MCNC: 8.6 MG/DL (ref 8.7–10.5)
CHLORIDE SERPL-SCNC: 109 MMOL/L (ref 95–110)
CO2 SERPL-SCNC: 23 MMOL/L (ref 23–29)
CREAT SERPL-MCNC: 0.8 MG/DL (ref 0.5–1.4)
DIFFERENTIAL METHOD BLD: ABNORMAL
EOSINOPHIL # BLD AUTO: 0.2 K/UL (ref 0–0.5)
EOSINOPHIL NFR BLD: 3.9 % (ref 0–8)
ERYTHROCYTE [DISTWIDTH] IN BLOOD BY AUTOMATED COUNT: 17.8 % (ref 11.5–14.5)
EST. GFR  (NO RACE VARIABLE): >60 ML/MIN/1.73 M^2
FERRITIN SERPL-MCNC: 3 NG/ML (ref 20–300)
GLUCOSE SERPL-MCNC: 109 MG/DL (ref 70–110)
HCT VFR BLD AUTO: 22.8 % (ref 40–54)
HGB BLD-MCNC: 6.7 G/DL (ref 14–18)
IMM GRANULOCYTES # BLD AUTO: 0.02 K/UL (ref 0–0.04)
IMM GRANULOCYTES NFR BLD AUTO: 0.4 % (ref 0–0.5)
IRON SERPL-MCNC: 19 UG/DL (ref 45–160)
LYMPHOCYTES # BLD AUTO: 1.6 K/UL (ref 1–4.8)
LYMPHOCYTES NFR BLD: 30.5 % (ref 18–48)
MCH RBC QN AUTO: 24 PG (ref 27–31)
MCHC RBC AUTO-ENTMCNC: 29.4 G/DL (ref 32–36)
MCV RBC AUTO: 82 FL (ref 82–98)
MONOCYTES # BLD AUTO: 0.5 K/UL (ref 0.3–1)
MONOCYTES NFR BLD: 9.2 % (ref 4–15)
NEUTROPHILS # BLD AUTO: 3 K/UL (ref 1.8–7.7)
NEUTROPHILS NFR BLD: 55.4 % (ref 38–73)
NRBC BLD-RTO: 0 /100 WBC
PLATELET # BLD AUTO: 232 K/UL (ref 150–450)
PMV BLD AUTO: 9.5 FL (ref 9.2–12.9)
POTASSIUM SERPL-SCNC: 3.7 MMOL/L (ref 3.5–5.1)
PROT SERPL-MCNC: 8.1 G/DL (ref 6–8.4)
RBC # BLD AUTO: 2.79 M/UL (ref 4.6–6.2)
SATURATED IRON: 4 % (ref 20–50)
SODIUM SERPL-SCNC: 140 MMOL/L (ref 136–145)
TOTAL IRON BINDING CAPACITY: 474 UG/DL (ref 250–450)
TRANSFERRIN SERPL-MCNC: 320 MG/DL (ref 200–375)
WBC # BLD AUTO: 5.34 K/UL (ref 3.9–12.7)

## 2024-02-14 PROCEDURE — 84165 PROTEIN E-PHORESIS SERUM: CPT | Performed by: INTERNAL MEDICINE

## 2024-02-14 PROCEDURE — 36415 COLL VENOUS BLD VENIPUNCTURE: CPT | Performed by: INTERNAL MEDICINE

## 2024-02-14 PROCEDURE — 82232 ASSAY OF BETA-2 PROTEIN: CPT | Performed by: INTERNAL MEDICINE

## 2024-02-14 PROCEDURE — 83540 ASSAY OF IRON: CPT | Performed by: INTERNAL MEDICINE

## 2024-02-14 PROCEDURE — 84165 PROTEIN E-PHORESIS SERUM: CPT | Mod: 26,,, | Performed by: PATHOLOGY

## 2024-02-14 PROCEDURE — 96372 THER/PROPH/DIAG INJ SC/IM: CPT | Mod: 59

## 2024-02-14 PROCEDURE — 82728 ASSAY OF FERRITIN: CPT | Performed by: INTERNAL MEDICINE

## 2024-02-14 PROCEDURE — 85025 COMPLETE CBC W/AUTO DIFF WBC: CPT | Performed by: INTERNAL MEDICINE

## 2024-02-14 PROCEDURE — 83521 IG LIGHT CHAINS FREE EACH: CPT | Mod: 59 | Performed by: INTERNAL MEDICINE

## 2024-02-14 PROCEDURE — 63600175 PHARM REV CODE 636 W HCPCS: Performed by: INTERNAL MEDICINE

## 2024-02-14 PROCEDURE — 80053 COMPREHEN METABOLIC PANEL: CPT | Performed by: INTERNAL MEDICINE

## 2024-02-14 PROCEDURE — 96374 THER/PROPH/DIAG INJ IV PUSH: CPT

## 2024-02-14 RX ORDER — SODIUM CHLORIDE 0.9 % (FLUSH) 0.9 %
10 SYRINGE (ML) INJECTION
Status: CANCELLED | OUTPATIENT
Start: 2024-02-20

## 2024-02-14 RX ORDER — HEPARIN 100 UNIT/ML
500 SYRINGE INTRAVENOUS
Status: CANCELLED | OUTPATIENT
Start: 2024-02-20

## 2024-02-14 RX ADMIN — OCTREOTIDE ACETATE 20 MG: KIT at 04:02

## 2024-02-14 RX ADMIN — IRON SUCROSE 200 MG: 20 INJECTION, SOLUTION INTRAVENOUS at 03:02

## 2024-02-14 NOTE — PROGRESS NOTES
Subjective:       Patient ID: Shawn Carreno is a 58 y.o. male.    Chief Complaint: Anemia and MGUS    Primary Oncologist/Hematologist: Dr. Cesar    HPI: Mr. Carreno is a 58 year old male who is following up for his MGUS and iron def anemia. He has had extensive GI workup. He has been getting venofer and sandostatin q 2 weeks, last one 2/14/24.   MGUS Hx: last bone marrow in 2/2024. More recently his FLC has been increasing. Plan for repeat bone marrow bx    Today: He states he has been fatigued and sob with exertion or even with walking. He denies any occult blood but states stools are dark. He denies any headaches, fevers, illnesses, night sweats, v/d/c. He does have some abdominal discomfort, epigastric region, relieved with water intake. He states his appetite is good.     Social History     Socioeconomic History    Marital status: Single   Tobacco Use    Smoking status: Never    Smokeless tobacco: Never   Substance and Sexual Activity    Alcohol use: Never    Drug use: Never    Sexual activity: Not Currently     Social Determinants of Health     Financial Resource Strain: Medium Risk (2/15/2024)    Overall Financial Resource Strain (CARDIA)     Difficulty of Paying Living Expenses: Somewhat hard   Food Insecurity: No Food Insecurity (2/15/2024)    Hunger Vital Sign     Worried About Running Out of Food in the Last Year: Never true     Ran Out of Food in the Last Year: Never true   Transportation Needs: No Transportation Needs (2/15/2024)    PRAPARE - Transportation     Lack of Transportation (Medical): No     Lack of Transportation (Non-Medical): No   Physical Activity: Sufficiently Active (2/15/2024)    Exercise Vital Sign     Days of Exercise per Week: 3 days     Minutes of Exercise per Session: 60 min   Stress: No Stress Concern Present (2/15/2024)    [place] [employer] - Occupational Stress Questionnaire     Feeling of Stress : Not at all   Social Connections: Unknown (2/15/2024)     Social Connection and Isolation Panel [NHANES]     Frequency of Communication with Friends and Family: More than three times a week     Frequency of Social Gatherings with Friends and Family: More than three times a week     Active Member of Clubs or Organizations: Yes     Attends Club or Organization Meetings: More than 4 times per year     Marital Status:    Housing Stability: High Risk (2/15/2024)    Housing Stability Vital Sign     Unable to Pay for Housing in the Last Year: Yes     Number of Places Lived in the Last Year: 1     Unstable Housing in the Last Year: No       Past Medical History:   Diagnosis Date    Multiple myeloma not having achieved remission 10/12/2022       No family history on file.    Past Surgical History:   Procedure Laterality Date    CHOLECYSTECTOMY  2020    COLONOSCOPY N/A 1/5/2022    Procedure: COLONOSCOPY;  Surgeon: Hannah Alvarado MD;  Location: [facility];  Service: Endoscopy;  Laterality: N/A;    ESOPHAGOGASTRODUODENOSCOPY N/A 1/5/2022    Procedure: EGD (ESOPHAGOGASTRODUODENOSCOPY);  Surgeon: Hannah Alvarado MD;  Location: [facility];  Service: Endoscopy;  Laterality: N/A;    ESOPHAGOGASTRODUODENOSCOPY N/A 4/6/2022    Procedure: EGD (ESOPHAGOGASTRODUODENOSCOPY) with push;  Surgeon: Vira Castaneda MD;  Location: [facility];  Service: Endoscopy;  Laterality: N/A;    INTRALUMINAL GASTROINTESTINAL TRACT IMAGING VIA CAPSULE N/A 1/26/2022    Procedure: IMAGING PROCEDURE, GI TRACT, INTRALUMINAL, VIA CAPSULE;  Surgeon: Baltazar Chaves RN;  Location: [facility];  Service: Endoscopy;  Laterality: N/A;    INTRALUMINAL GASTROINTESTINAL TRACT IMAGING VIA CAPSULE N/A 2/22/2022    Procedure: IMAGING PROCEDURE, GI TRACT, INTRALUMINAL, VIA CAPSULE;  Surgeon: Baltazar Chaves RN;  Location: [facility];  Service: Endoscopy;  Laterality: N/A;       Review of Systems   Constitutional:  Positive for fatigue. Negative for activity change, appetite change, chills, diaphoresis, fever  and unexpected weight change.   HENT:  Negative for congestion and nosebleeds.    Respiratory:  Positive for shortness of breath. Negative for cough.    Cardiovascular:  Negative for chest pain and leg swelling.   Gastrointestinal:  Positive for nausea. Negative for abdominal pain, anal bleeding, blood in stool, constipation, diarrhea and vomiting.   Genitourinary:  Negative for hematuria.   Skin:  Negative for color change and pallor.   Neurological:  Negative for dizziness, weakness, light-headedness and headaches.           Objective:   There were no vitals filed for this visit.    Physical Exam  Constitutional:       General: He is not in acute distress.     Appearance: He is not ill-appearing, toxic-appearing or diaphoretic.   Neurological:      Mental Status: He is alert.   Psychiatric:         Mood and Affect: Mood normal.          Physical exam limited due to video visit    Labs/Results:  Lab Results   Component Value Date    WBC 5.34 02/14/2024    RBC 2.79 (L) 02/14/2024    HGB 6.7 (L) 02/14/2024    HCT 22.8 (L) 02/14/2024    MCV 82 02/14/2024    MCH 24.0 (L) 02/14/2024    MCHC 29.4 (L) 02/14/2024    RDW 17.8 (H) 02/14/2024     02/14/2024    MPV 9.5 02/14/2024    GRAN 3.0 02/14/2024    GRAN 55.4 02/14/2024    LYMPH 1.6 02/14/2024    LYMPH 30.5 02/14/2024    MONO 0.5 02/14/2024    MONO 9.2 02/14/2024    EOS 0.2 02/14/2024    BASO 0.03 02/14/2024    EOSINOPHIL 3.9 02/14/2024    BASOPHIL 0.6 02/14/2024       CMP  Sodium   Date Value Ref Range Status   02/14/2024 140 136 - 145 mmol/L Final     Potassium   Date Value Ref Range Status   02/14/2024 3.7 3.5 - 5.1 mmol/L Final     Chloride   Date Value Ref Range Status   02/14/2024 109 95 - 110 mmol/L Final     CO2   Date Value Ref Range Status   02/14/2024 23 23 - 29 mmol/L Final     Glucose   Date Value Ref Range Status   02/14/2024 109 70 - 110 mg/dL Final     BUN   Date Value Ref Range Status   02/14/2024 20 6 - 20 mg/dL Final     Creatinine   Date Value  Ref Range Status   02/14/2024 0.8 0.5 - 1.4 mg/dL Final     Calcium   Date Value Ref Range Status   02/14/2024 8.6 (L) 8.7 - 10.5 mg/dL Final     Total Protein   Date Value Ref Range Status   02/14/2024 8.1 6.0 - 8.4 g/dL Final     Albumin   Date Value Ref Range Status   02/14/2024 3.7 3.5 - 5.2 g/dL Final     Total Bilirubin   Date Value Ref Range Status   02/14/2024 0.3 0.1 - 1.0 mg/dL Final     Comment:     For infants and newborns, interpretation of results should be based  on gestational age, weight and in agreement with clinical  observations.    Premature Infant recommended reference ranges:  Up to 24 hours.............<8.0 mg/dL  Up to 48 hours............<12.0 mg/dL  3-5 days..................<15.0 mg/dL  6-29 days.................<15.0 mg/dL       Alkaline Phosphatase   Date Value Ref Range Status   02/14/2024 41 (L) 55 - 135 U/L Final     AST   Date Value Ref Range Status   02/14/2024 13 10 - 40 U/L Final     ALT   Date Value Ref Range Status   02/14/2024 12 10 - 44 U/L Final     Anion Gap   Date Value Ref Range Status   02/14/2024 8 8 - 16 mmol/L Final     eGFR   Date Value Ref Range Status   02/14/2024 >60 >60 mL/min/1.73 m^2 Final      Latest Reference Range & Units 02/14/24 14:27   Iron 45 - 160 ug/dL 19 (L)   TIBC 250 - 450 ug/dL 474 (H)   Saturated Iron 20 - 50 % 4 (L)   Transferrin 200 - 375 mg/dL 320   Ferritin 20.0 - 300.0 ng/mL 3 (L)      Latest Reference Range & Units 02/14/24 14:27   Beta-2 Microglobulin 0.0 - 2.5 ug/mL 1.9     Assessment:     Problem List Items Addressed This Visit          Oncology    Symptomatic anemia    Iron deficiency anemia due to chronic blood loss    MGUS (monoclonal gammopathy of unknown significance)       GI    Angiodysplasia of gastrointestinal tract - Primary     Plan:     Angiodysplasia of gastrointestinal tract, Iron deficiency anemia due to chronic blood loss, Symptomatic anemia  --continue with sandostatin q 2 weeks, last one 2/14/24  --continue with venofer  q 2 weeks, last one 2/14/24  --blood transfusions PRN  --hgb: 6.7  --1UPRBC today     MGUS (monoclonal gammopathy of unknown significance)  --previous bone marrow bx in 2/2024  --plan for repeat bone marrow bx due to increases in FLC ratio and MGUS    Follow-Up: t/s today and 1UPRBC today. Needs bone marrow scheduled and orders    Heide Anaya PA-C  Hematology Oncology    The patient location is: home  The chief complaint leading to consultation is: anemia  Visit type:  Synchronous audio video   Face to Face time with patient:15  minutes of total time spent on the encounter, which includes face to face time and non-face to face time preparing to see the patient (eg, review of tests), Obtaining and/or reviewing separately obtained history, Documenting clinical information in the electronic or other health record, Independently interpreting results (not separately reported) and communicating results to the patient/family/caregiver, or Care coordination (not separately reported).   Each patient to whom he or she provides medical services by telemedicine is:  (1) informed of the relationship between the provider and patient and the respective role of any other health care provider with respect to management of the patient; and (2) notified that he or she may decline to receive medical services     Route Chart for Scheduling    Med Onc Chart Routing      Follow up with physician . Needs bnoe marrow bx scheduled and follow up with Dr. Cesar   Follow up with [facility] . 1 week with cbc prior for possible blood transfusion   Infusion scheduling note   t/s today and 1UPRBC   Injection scheduling note    Labs CBC   Scheduling:  Preferred lab:  Lab interval:     Imaging   Needs bone marrow bx scheduled   Pharmacy appointment    Other referrals                    Supportive Plan Information  OP OCTREOTIDE 20 MG   Kris Cesar MD   Upcoming Treatment Dates - OP OCTREOTIDE 20 MG    3/5/2024       Medications       octreotide  (SANDOSTATIN LAR) injection 20 mg  4/2/2024       Medications       octreotide (SANDOSTATIN LAR) injection 20 mg  4/30/2024       Medications       octreotide (SANDOSTATIN LAR) injection 20 mg  5/28/2024       Medications       octreotide (SANDOSTATIN LAR) injection 20 mg    Therapy Plan Information  Flushes  sodium chloride 0.9% 250 mL flush bag  Intravenous, 1 time a week  sodium chloride 0.9% flush 10 mL  10 mL, Intravenous, 1 time a week  heparin, porcine (PF) 100 unit/mL injection flush 500 Units  500 Units, Intravenous, 1 time a week  alteplase injection 2 mg  2 mg, Intra-Catheter, 1 time a week  Medications  iron sucrose injection 200 mg  200 mg, Intravenous, Every visit

## 2024-02-14 NOTE — H&P (VIEW-ONLY)
Subjective:       Patient ID: Shawn Carreno is a 58 y.o. male.    Chief Complaint: Anemia and MGUS    Primary Oncologist/Hematologist: Dr. Cesar    HPI: Mr. Carreno is a 58 year old male who is following up for his MGUS and iron def anemia. He has had extensive GI workup. He has been getting venofer and sandostatin q 2 weeks, last one 2/14/24.   MGUS Hx: last bone marrow in 2/2024. More recently his FLC has been increasing. Plan for repeat bone marrow bx    Today: He states he has been fatigued and sob with exertion or even with walking. He denies any occult blood but states stools are dark. He denies any headaches, fevers, illnesses, night sweats, v/d/c. He does have some abdominal discomfort, epigastric region, relieved with water intake. He states his appetite is good.     Social History     Socioeconomic History    Marital status: Single   Tobacco Use    Smoking status: Never    Smokeless tobacco: Never   Substance and Sexual Activity    Alcohol use: Never    Drug use: Never    Sexual activity: Not Currently     Social Determinants of Health     Financial Resource Strain: Medium Risk (2/15/2024)    Overall Financial Resource Strain (CARDIA)     Difficulty of Paying Living Expenses: Somewhat hard   Food Insecurity: No Food Insecurity (2/15/2024)    Hunger Vital Sign     Worried About Running Out of Food in the Last Year: Never true     Ran Out of Food in the Last Year: Never true   Transportation Needs: No Transportation Needs (2/15/2024)    PRAPARE - Transportation     Lack of Transportation (Medical): No     Lack of Transportation (Non-Medical): No   Physical Activity: Sufficiently Active (2/15/2024)    Exercise Vital Sign     Days of Exercise per Week: 3 days     Minutes of Exercise per Session: 60 min   Stress: No Stress Concern Present (2/15/2024)    [employer] [employer] - Occupational Stress Questionnaire     Feeling of Stress : Not at all   Social Connections: Unknown (2/15/2024)     Social Connection and Isolation Panel [NHANES]     Frequency of Communication with Friends and Family: More than three times a week     Frequency of Social Gatherings with Friends and Family: More than three times a week     Active Member of Clubs or Organizations: Yes     Attends Club or Organization Meetings: More than 4 times per year     Marital Status:    Housing Stability: High Risk (2/15/2024)    Housing Stability Vital Sign     Unable to Pay for Housing in the Last Year: Yes     Number of Places Lived in the Last Year: 1     Unstable Housing in the Last Year: No       Past Medical History:   Diagnosis Date    Multiple myeloma not having achieved remission 10/12/2022       No family history on file.    Past Surgical History:   Procedure Laterality Date    CHOLECYSTECTOMY  2020    COLONOSCOPY N/A 1/5/2022    Procedure: COLONOSCOPY;  Surgeon: Hannah Alvarado MD;  Location: [facility];  Service: Endoscopy;  Laterality: N/A;    ESOPHAGOGASTRODUODENOSCOPY N/A 1/5/2022    Procedure: EGD (ESOPHAGOGASTRODUODENOSCOPY);  Surgeon: Hannah Alvarado MD;  Location: [facility];  Service: Endoscopy;  Laterality: N/A;    ESOPHAGOGASTRODUODENOSCOPY N/A 4/6/2022    Procedure: EGD (ESOPHAGOGASTRODUODENOSCOPY) with push;  Surgeon: Vira Castaneda MD;  Location: [facility];  Service: Endoscopy;  Laterality: N/A;    INTRALUMINAL GASTROINTESTINAL TRACT IMAGING VIA CAPSULE N/A 1/26/2022    Procedure: IMAGING PROCEDURE, GI TRACT, INTRALUMINAL, VIA CAPSULE;  Surgeon: Baltazar Chaves RN;  Location: [facility];  Service: Endoscopy;  Laterality: N/A;    INTRALUMINAL GASTROINTESTINAL TRACT IMAGING VIA CAPSULE N/A 2/22/2022    Procedure: IMAGING PROCEDURE, GI TRACT, INTRALUMINAL, VIA CAPSULE;  Surgeon: Baltazar Chaves RN;  Location: [facility];  Service: Endoscopy;  Laterality: N/A;       Review of Systems   Constitutional:  Positive for fatigue. Negative for activity change, appetite change, chills, diaphoresis, fever  and unexpected weight change.   HENT:  Negative for congestion and nosebleeds.    Respiratory:  Positive for shortness of breath. Negative for cough.    Cardiovascular:  Negative for chest pain and leg swelling.   Gastrointestinal:  Positive for nausea. Negative for abdominal pain, anal bleeding, blood in stool, constipation, diarrhea and vomiting.   Genitourinary:  Negative for hematuria.   Skin:  Negative for color change and pallor.   Neurological:  Negative for dizziness, weakness, light-headedness and headaches.           Objective:   There were no vitals filed for this visit.    Physical Exam  Constitutional:       General: He is not in acute distress.     Appearance: He is not ill-appearing, toxic-appearing or diaphoretic.   Neurological:      Mental Status: He is alert.   Psychiatric:         Mood and Affect: Mood normal.          Physical exam limited due to video visit    Labs/Results:  Lab Results   Component Value Date    WBC 5.34 02/14/2024    RBC 2.79 (L) 02/14/2024    HGB 6.7 (L) 02/14/2024    HCT 22.8 (L) 02/14/2024    MCV 82 02/14/2024    MCH 24.0 (L) 02/14/2024    MCHC 29.4 (L) 02/14/2024    RDW 17.8 (H) 02/14/2024     02/14/2024    MPV 9.5 02/14/2024    GRAN 3.0 02/14/2024    GRAN 55.4 02/14/2024    LYMPH 1.6 02/14/2024    LYMPH 30.5 02/14/2024    MONO 0.5 02/14/2024    MONO 9.2 02/14/2024    EOS 0.2 02/14/2024    BASO 0.03 02/14/2024    EOSINOPHIL 3.9 02/14/2024    BASOPHIL 0.6 02/14/2024       CMP  Sodium   Date Value Ref Range Status   02/14/2024 140 136 - 145 mmol/L Final     Potassium   Date Value Ref Range Status   02/14/2024 3.7 3.5 - 5.1 mmol/L Final     Chloride   Date Value Ref Range Status   02/14/2024 109 95 - 110 mmol/L Final     CO2   Date Value Ref Range Status   02/14/2024 23 23 - 29 mmol/L Final     Glucose   Date Value Ref Range Status   02/14/2024 109 70 - 110 mg/dL Final     BUN   Date Value Ref Range Status   02/14/2024 20 6 - 20 mg/dL Final     Creatinine   Date Value  Ref Range Status   02/14/2024 0.8 0.5 - 1.4 mg/dL Final     Calcium   Date Value Ref Range Status   02/14/2024 8.6 (L) 8.7 - 10.5 mg/dL Final     Total Protein   Date Value Ref Range Status   02/14/2024 8.1 6.0 - 8.4 g/dL Final     Albumin   Date Value Ref Range Status   02/14/2024 3.7 3.5 - 5.2 g/dL Final     Total Bilirubin   Date Value Ref Range Status   02/14/2024 0.3 0.1 - 1.0 mg/dL Final     Comment:     For infants and newborns, interpretation of results should be based  on gestational age, weight and in agreement with clinical  observations.    Premature Infant recommended reference ranges:  Up to 24 hours.............<8.0 mg/dL  Up to 48 hours............<12.0 mg/dL  3-5 days..................<15.0 mg/dL  6-29 days.................<15.0 mg/dL       Alkaline Phosphatase   Date Value Ref Range Status   02/14/2024 41 (L) 55 - 135 U/L Final     AST   Date Value Ref Range Status   02/14/2024 13 10 - 40 U/L Final     ALT   Date Value Ref Range Status   02/14/2024 12 10 - 44 U/L Final     Anion Gap   Date Value Ref Range Status   02/14/2024 8 8 - 16 mmol/L Final     eGFR   Date Value Ref Range Status   02/14/2024 >60 >60 mL/min/1.73 m^2 Final      Latest Reference Range & Units 02/14/24 14:27   Iron 45 - 160 ug/dL 19 (L)   TIBC 250 - 450 ug/dL 474 (H)   Saturated Iron 20 - 50 % 4 (L)   Transferrin 200 - 375 mg/dL 320   Ferritin 20.0 - 300.0 ng/mL 3 (L)      Latest Reference Range & Units 02/14/24 14:27   Beta-2 Microglobulin 0.0 - 2.5 ug/mL 1.9     Assessment:     Problem List Items Addressed This Visit          Oncology    Symptomatic anemia    Iron deficiency anemia due to chronic blood loss    MGUS (monoclonal gammopathy of unknown significance)       GI    Angiodysplasia of gastrointestinal tract - Primary     Plan:     Angiodysplasia of gastrointestinal tract, Iron deficiency anemia due to chronic blood loss, Symptomatic anemia  --continue with sandostatin q 2 weeks, last one 2/14/24  --continue with venofer  q 2 weeks, last one 2/14/24  --blood transfusions PRN  --hgb: 6.7  --1UPRBC today     MGUS (monoclonal gammopathy of unknown significance)  --previous bone marrow bx in 2/2024  --plan for repeat bone marrow bx due to increases in FLC ratio and MGUS    Follow-Up: t/s today and 1UPRBC today. Needs bone marrow scheduled and orders    Heide Anaya PA-C  Hematology Oncology    The patient location is: home  The chief complaint leading to consultation is: anemia  Visit type:  Synchronous audio video   Face to Face time with patient:15  minutes of total time spent on the encounter, which includes face to face time and non-face to face time preparing to see the patient (eg, review of tests), Obtaining and/or reviewing separately obtained history, Documenting clinical information in the electronic or other health record, Independently interpreting results (not separately reported) and communicating results to the patient/family/caregiver, or Care coordination (not separately reported).   Each patient to whom he or she provides medical services by telemedicine is:  (1) informed of the relationship between the provider and patient and the respective role of any other health care provider with respect to management of the patient; and (2) notified that he or she may decline to receive medical services     Route Chart for Scheduling    Med Onc Chart Routing      Follow up with physician . Needs bnoe marrow bx scheduled and follow up with Dr. Cesar   Follow up with [facility] . 1 week with cbc prior for possible blood transfusion   Infusion scheduling note   t/s today and 1UPRBC   Injection scheduling note    Labs CBC   Scheduling:  Preferred lab:  Lab interval:     Imaging   Needs bone marrow bx scheduled   Pharmacy appointment    Other referrals                    Supportive Plan Information  OP OCTREOTIDE 20 MG   Kris Cesar MD   Upcoming Treatment Dates - OP OCTREOTIDE 20 MG    3/5/2024       Medications       octreotide  (SANDOSTATIN LAR) injection 20 mg  4/2/2024       Medications       octreotide (SANDOSTATIN LAR) injection 20 mg  4/30/2024       Medications       octreotide (SANDOSTATIN LAR) injection 20 mg  5/28/2024       Medications       octreotide (SANDOSTATIN LAR) injection 20 mg    Therapy Plan Information  Flushes  sodium chloride 0.9% 250 mL flush bag  Intravenous, 1 time a week  sodium chloride 0.9% flush 10 mL  10 mL, Intravenous, 1 time a week  heparin, porcine (PF) 100 unit/mL injection flush 500 Units  500 Units, Intravenous, 1 time a week  alteplase injection 2 mg  2 mg, Intra-Catheter, 1 time a week  Medications  iron sucrose injection 200 mg  200 mg, Intravenous, Every visit

## 2024-02-15 ENCOUNTER — OFFICE VISIT (OUTPATIENT)
Dept: HEMATOLOGY/ONCOLOGY | Facility: CLINIC | Age: 59
End: 2024-02-15
Payer: COMMERCIAL

## 2024-02-15 ENCOUNTER — INFUSION (OUTPATIENT)
Dept: INFUSION THERAPY | Facility: HOSPITAL | Age: 59
End: 2024-02-15
Attending: INTERNAL MEDICINE
Payer: COMMERCIAL

## 2024-02-15 ENCOUNTER — LAB VISIT (OUTPATIENT)
Dept: LAB | Facility: HOSPITAL | Age: 59
End: 2024-02-15
Attending: INTERNAL MEDICINE
Payer: COMMERCIAL

## 2024-02-15 VITALS
SYSTOLIC BLOOD PRESSURE: 154 MMHG | TEMPERATURE: 97 F | HEART RATE: 71 BPM | DIASTOLIC BLOOD PRESSURE: 60 MMHG | OXYGEN SATURATION: 100 % | RESPIRATION RATE: 18 BRPM

## 2024-02-15 DIAGNOSIS — D64.9 SYMPTOMATIC ANEMIA: ICD-10-CM

## 2024-02-15 DIAGNOSIS — D47.2 MGUS (MONOCLONAL GAMMOPATHY OF UNKNOWN SIGNIFICANCE): ICD-10-CM

## 2024-02-15 DIAGNOSIS — K55.20 ANGIODYSPLASIA OF GASTROINTESTINAL TRACT: Primary | ICD-10-CM

## 2024-02-15 DIAGNOSIS — D50.0 IRON DEFICIENCY ANEMIA DUE TO CHRONIC BLOOD LOSS: ICD-10-CM

## 2024-02-15 LAB
ABO + RH BLD: NORMAL
ALBUMIN SERPL ELPH-MCNC: 3.94 G/DL (ref 3.35–5.55)
ALPHA1 GLOB SERPL ELPH-MCNC: 0.29 G/DL (ref 0.17–0.41)
ALPHA2 GLOB SERPL ELPH-MCNC: 0.51 G/DL (ref 0.43–0.99)
B-GLOBULIN SERPL ELPH-MCNC: 0.66 G/DL (ref 0.5–1.1)
B2 MICROGLOB SERPL-MCNC: 1.9 UG/ML (ref 0–2.5)
BLD GP AB SCN CELLS X3 SERPL QL: NORMAL
BLD PROD TYP BPU: NORMAL
BLOOD UNIT EXPIRATION DATE: NORMAL
BLOOD UNIT TYPE CODE: 5100
BLOOD UNIT TYPE: NORMAL
CODING SYSTEM: NORMAL
CROSSMATCH INTERPRETATION: NORMAL
DISPENSE STATUS: NORMAL
GAMMA GLOB SERPL ELPH-MCNC: 2.2 G/DL (ref 0.67–1.58)
KAPPA LC SER QL IA: 4.96 MG/DL (ref 0.33–1.94)
KAPPA LC/LAMBDA SER IA: 4.35 (ref 0.26–1.65)
LAMBDA LC SER QL IA: 1.14 MG/DL (ref 0.57–2.63)
NUM UNITS TRANS PACKED RBC: NORMAL
PATHOLOGIST INTERPRETATION SPE: NORMAL
PROT SERPL-MCNC: 7.6 G/DL (ref 6–8.4)
SPECIMEN OUTDATE: NORMAL

## 2024-02-15 PROCEDURE — P9040 RBC LEUKOREDUCED IRRADIATED: HCPCS

## 2024-02-15 PROCEDURE — 36430 TRANSFUSION BLD/BLD COMPNT: CPT

## 2024-02-15 PROCEDURE — 36415 COLL VENOUS BLD VENIPUNCTURE: CPT

## 2024-02-15 PROCEDURE — 86920 COMPATIBILITY TEST SPIN: CPT

## 2024-02-15 PROCEDURE — 86850 RBC ANTIBODY SCREEN: CPT

## 2024-02-15 PROCEDURE — 99214 OFFICE O/P EST MOD 30 MIN: CPT | Mod: 95,,,

## 2024-02-15 RX ORDER — HYDROCODONE BITARTRATE AND ACETAMINOPHEN 500; 5 MG/1; MG/1
TABLET ORAL ONCE
Status: CANCELLED | OUTPATIENT
Start: 2024-02-15 | End: 2024-02-15

## 2024-02-15 RX ORDER — HYDROCODONE BITARTRATE AND ACETAMINOPHEN 500; 5 MG/1; MG/1
TABLET ORAL ONCE
Status: DISCONTINUED | OUTPATIENT
Start: 2024-02-15 | End: 2024-02-15 | Stop reason: HOSPADM

## 2024-02-15 NOTE — PLAN OF CARE
Discussed plan of care with pt. Addressed any and ongoing concerns. Pt denies   Problem: Adult Inpatient Plan of Care  Goal: Plan of Care Review  Outcome: Ongoing, Progressing  Goal: Patient-Specific Goal (Individualized)  Outcome: Ongoing, Progressing  Flowsheets (Taken 2/15/2024 1115)  Anxieties, Fears or Concerns: Denies  Individualized Care Needs: Reclined position, pillow ,warm blanket and snacks offered  Goal: Absence of Hospital-Acquired Illness or Injury  Outcome: Ongoing, Progressing  Intervention: Identify and Manage Fall Risk  Flowsheets (Taken 2/15/2024 1115)  Safety Promotion/Fall Prevention: in recliner, wheels locked  Intervention: Prevent Infection  Flowsheets (Taken 2/15/2024 1115)  Infection Prevention:   equipment surfaces disinfected   hand hygiene promoted   personal protective equipment utilized  Goal: Optimal Comfort and Wellbeing  Outcome: Ongoing, Progressing  Intervention: Provide Person-Centered Care  Flowsheets (Taken 2/15/2024 1115)  Trust Relationship/Rapport:   questions encouraged   care explained   choices provided   reassurance provided   emotional support provided   thoughts/feelings acknowledged   empathic listening provided   questions answered

## 2024-02-19 ENCOUNTER — TELEPHONE (OUTPATIENT)
Dept: RADIOLOGY | Facility: HOSPITAL | Age: 59
End: 2024-02-19
Payer: COMMERCIAL

## 2024-02-19 NOTE — TELEPHONE ENCOUNTER
Interventional Radiology  Scheduled 10:30AM bone marrow biopsy for 2/20/24 w/patient.  Instructed pt. to arrive by 9:00AM at hospital off [facility] [address], he must have a ride and NPO after 4:00AM the morning of the procedure.  Pt. denies taking ASA and other blood thinners, NSAIDS, fish oil, or GLP-1/GIP agonists.  Patient verbalized understanding of all instructions.

## 2024-02-20 ENCOUNTER — HOSPITAL ENCOUNTER (OUTPATIENT)
Dept: RADIOLOGY | Facility: HOSPITAL | Age: 59
Discharge: HOME | End: 2024-02-20
Attending: INTERNAL MEDICINE
Payer: COMMERCIAL

## 2024-02-20 VITALS
WEIGHT: 190 LBS | OXYGEN SATURATION: 99 % | BODY MASS INDEX: 29.82 KG/M2 | HEIGHT: 67 IN | DIASTOLIC BLOOD PRESSURE: 61 MMHG | HEART RATE: 60 BPM | RESPIRATION RATE: 16 BRPM | SYSTOLIC BLOOD PRESSURE: 105 MMHG

## 2024-02-20 DIAGNOSIS — D47.2 MGUS (MONOCLONAL GAMMOPATHY OF UNKNOWN SIGNIFICANCE): ICD-10-CM

## 2024-02-20 LAB
INR PPP: 1 (ref 0.8–1.2)
PROTHROMBIN TIME: 11.1 SEC (ref 9–12.5)

## 2024-02-20 PROCEDURE — 77012 CT SCAN FOR NEEDLE BIOPSY: CPT | Mod: TC

## 2024-02-20 PROCEDURE — 88313 SPECIAL STAINS GROUP 2: CPT | Performed by: PATHOLOGY

## 2024-02-20 PROCEDURE — 88184 FLOWCYTOMETRY/ TC 1 MARKER: CPT | Mod: 91 | Performed by: INTERNAL MEDICINE

## 2024-02-20 PROCEDURE — 88342 IMHCHEM/IMCYTCHM 1ST ANTB: CPT | Mod: 26,59,, | Performed by: PATHOLOGY

## 2024-02-20 PROCEDURE — 88342 IMHCHEM/IMCYTCHM 1ST ANTB: CPT | Performed by: PATHOLOGY

## 2024-02-20 PROCEDURE — 88305 TISSUE EXAM BY PATHOLOGIST: CPT | Mod: 26,,, | Performed by: PATHOLOGY

## 2024-02-20 PROCEDURE — 88189 FLOWCYTOMETRY/READ 16 & >: CPT | Mod: ,,, | Performed by: PATHOLOGY

## 2024-02-20 PROCEDURE — 85610 PROTHROMBIN TIME: CPT | Performed by: STUDENT IN AN ORGANIZED HEALTH CARE EDUCATION/TRAINING PROGRAM

## 2024-02-20 PROCEDURE — 88274 CYTOGENETICS 25-99: CPT | Mod: 59 | Performed by: INTERNAL MEDICINE

## 2024-02-20 PROCEDURE — 63600175 PHARM REV CODE 636 W HCPCS: Performed by: STUDENT IN AN ORGANIZED HEALTH CARE EDUCATION/TRAINING PROGRAM

## 2024-02-20 PROCEDURE — 88185 FLOWCYTOMETRY/TC ADD-ON: CPT | Mod: 91 | Performed by: INTERNAL MEDICINE

## 2024-02-20 PROCEDURE — 88184 FLOWCYTOMETRY/ TC 1 MARKER: CPT | Performed by: PATHOLOGY

## 2024-02-20 PROCEDURE — 88311 DECALCIFY TISSUE: CPT | Performed by: PATHOLOGY

## 2024-02-20 PROCEDURE — 88185 FLOWCYTOMETRY/TC ADD-ON: CPT | Mod: 59 | Performed by: PATHOLOGY

## 2024-02-20 PROCEDURE — 88305 TISSUE EXAM BY PATHOLOGIST: CPT | Performed by: PATHOLOGY

## 2024-02-20 PROCEDURE — 88274 CYTOGENETICS 25-99: CPT | Performed by: INTERNAL MEDICINE

## 2024-02-20 PROCEDURE — 88313 SPECIAL STAINS GROUP 2: CPT | Mod: 26,,, | Performed by: PATHOLOGY

## 2024-02-20 PROCEDURE — C1830 POWER BONE MARROW BX NEEDLE: HCPCS

## 2024-02-20 PROCEDURE — 77012 CT SCAN FOR NEEDLE BIOPSY: CPT | Mod: 26,,, | Performed by: STUDENT IN AN ORGANIZED HEALTH CARE EDUCATION/TRAINING PROGRAM

## 2024-02-20 PROCEDURE — 25000003 PHARM REV CODE 250: Performed by: STUDENT IN AN ORGANIZED HEALTH CARE EDUCATION/TRAINING PROGRAM

## 2024-02-20 PROCEDURE — 88271 CYTOGENETICS DNA PROBE: CPT | Mod: 59 | Performed by: INTERNAL MEDICINE

## 2024-02-20 PROCEDURE — 38221 DX BONE MARROW BIOPSIES: CPT | Mod: RT,,, | Performed by: STUDENT IN AN ORGANIZED HEALTH CARE EDUCATION/TRAINING PROGRAM

## 2024-02-20 PROCEDURE — 85097 BONE MARROW INTERPRETATION: CPT | Mod: ,,, | Performed by: PATHOLOGY

## 2024-02-20 RX ORDER — FENTANYL CITRATE 50 UG/ML
INJECTION, SOLUTION INTRAMUSCULAR; INTRAVENOUS CODE/TRAUMA/SEDATION MEDICATION
Status: COMPLETED | OUTPATIENT
Start: 2024-02-20 | End: 2024-02-20

## 2024-02-20 RX ORDER — MIDAZOLAM HYDROCHLORIDE 1 MG/ML
INJECTION INTRAMUSCULAR; INTRAVENOUS CODE/TRAUMA/SEDATION MEDICATION
Status: COMPLETED | OUTPATIENT
Start: 2024-02-20 | End: 2024-02-20

## 2024-02-20 RX ORDER — LIDOCAINE HYDROCHLORIDE 10 MG/ML
INJECTION INFILTRATION; PERINEURAL CODE/TRAUMA/SEDATION MEDICATION
Status: COMPLETED | OUTPATIENT
Start: 2024-02-20 | End: 2024-02-20

## 2024-02-20 RX ADMIN — LIDOCAINE HYDROCHLORIDE 5 ML: 10 INJECTION, SOLUTION INFILTRATION; PERINEURAL at 10:02

## 2024-02-20 RX ADMIN — MIDAZOLAM HYDROCHLORIDE 1 MG: 1 INJECTION, SOLUTION INTRAMUSCULAR; INTRAVENOUS at 10:02

## 2024-02-20 RX ADMIN — FENTANYL CITRATE 50 MCG: 50 INJECTION, SOLUTION INTRAMUSCULAR; INTRAVENOUS at 10:02

## 2024-02-20 NOTE — PLAN OF CARE
Band aid to lower back, C/D/I with no bleeding/redness/swelling noted. VSS, NADN, and pt meets criteria for discharge. Discharge instructions given to and reviewed with pt, and pt verbalized understanding of all. Pt discharged to home, taken out via wheelchair and driven home by daughter.

## 2024-02-20 NOTE — DISCHARGE INSTRUCTIONS
Please return to ER if any of these symptoms occur:  Fever over 101 degrees,  Any purulent drainage from site (pus, yellow or has foul odor), or any redness or swelling to site  Bleeding from the puncture site not controlled, If bleeding occurs at site hold pressure for 5 mins.  If bleeding continues go to ER  Pain not controlled with Aleve or Tylenol,     No driving for 24 hours after procedure due to sedation given during procedure.      Do not submerge in standing water for 2 days after biopsy but you may shower.     May change bandage if it becomes soiled and bandage may be removed in 2 days.     Rest for the next couple of days.     Resume home medications and diet     Biopsy results will be with Dr. Cesar in 5-7 days, please follow up with him for results and any other questions or concerns that you may have.

## 2024-02-20 NOTE — DISCHARGE SUMMARY
[facility])  Discharge Note  Short Stay    CT Biopsy Bone Marrow (xpd)      OUTCOME: Patient tolerated treatment/procedure well without complication and is now ready for discharge.    DISPOSITION: Home or Self Care    FINAL DIAGNOSIS:  <principal problem not specified>    FOLLOWUP: In clinic    DISCHARGE INSTRUCTIONS:  No discharge procedures on file.      Clinical Reference Documents Added to Patient Instructions         Document    BONE MARROW ASPIRATION OR BIOPSY (ENGLISH)    PROCEDURAL SEDATION, ADULT ED (ENGLISH)            TIME SPENT ON DISCHARGE: 15 minutes    Pre Op Diagnosis: MGUS     Post Op Diagnosis: same     Procedure:  Bone marrow biopsy     Procedure performed by: Isaias LOZANO, Armando GARCÍA     Written Informed Consent Obtained: Yes     Specimen Removed:  yes     Estimated Blood Loss:  minimal     Findings: Local anesthesia and moderate sedation were used.     The patient tolerated the procedure well and there were no complications.      Disposition:  F/U in clinic    Discharge instructions:  Light activity for 24 hours.  Remove band aid in 24 hours.  No baths (showers are appropriate).    F/U with ordering physician    Sterile technique was performed in the right iliac, lidocaine was used as a local anesthetic.  Multiple samples taken percutaneously from the right iliac bone.  Pt tolerated the procedure well without immediate complications.  Please see radiologist report for details. F/u with PCP and/or ordering physician.

## 2024-02-20 NOTE — PLAN OF CARE
Received patient from procedure via stretcher. VSS. Breathing even and unlabored. No complaints of pain, nausea, or headache. Procedure to bone marrow, bandage CDI. Family updated with patients status. Patient resting on stretcher.

## 2024-02-21 ENCOUNTER — OFFICE VISIT (OUTPATIENT)
Dept: HEMATOLOGY/ONCOLOGY | Facility: CLINIC | Age: 59
End: 2024-02-21
Payer: COMMERCIAL

## 2024-02-21 ENCOUNTER — LAB VISIT (OUTPATIENT)
Dept: LAB | Facility: HOSPITAL | Age: 59
End: 2024-02-21
Attending: INTERNAL MEDICINE
Payer: COMMERCIAL

## 2024-02-21 DIAGNOSIS — K55.20 ANGIODYSPLASIA OF GASTROINTESTINAL TRACT: ICD-10-CM

## 2024-02-21 DIAGNOSIS — D50.0 IRON DEFICIENCY ANEMIA DUE TO CHRONIC BLOOD LOSS: Primary | ICD-10-CM

## 2024-02-21 DIAGNOSIS — D49.89 PLASMA CELL NEOPLASM: ICD-10-CM

## 2024-02-21 DIAGNOSIS — D64.9 SYMPTOMATIC ANEMIA: ICD-10-CM

## 2024-02-21 DIAGNOSIS — D47.2 MGUS (MONOCLONAL GAMMOPATHY OF UNKNOWN SIGNIFICANCE): ICD-10-CM

## 2024-02-21 LAB
ALBUMIN SERPL BCP-MCNC: 3.7 G/DL (ref 3.5–5.2)
ALP SERPL-CCNC: 42 U/L (ref 55–135)
ALT SERPL W/O P-5'-P-CCNC: 11 U/L (ref 10–44)
ANION GAP SERPL CALC-SCNC: 6 MMOL/L (ref 8–16)
AST SERPL-CCNC: 13 U/L (ref 10–40)
BASOPHILS # BLD AUTO: 0.03 K/UL (ref 0–0.2)
BASOPHILS NFR BLD: 0.7 % (ref 0–1.9)
BILIRUB SERPL-MCNC: 0.5 MG/DL (ref 0.1–1)
BUN SERPL-MCNC: 15 MG/DL (ref 6–20)
CALCIUM SERPL-MCNC: 8.4 MG/DL (ref 8.7–10.5)
CHLORIDE SERPL-SCNC: 108 MMOL/L (ref 95–110)
CO2 SERPL-SCNC: 25 MMOL/L (ref 23–29)
CREAT SERPL-MCNC: 0.8 MG/DL (ref 0.5–1.4)
DIFFERENTIAL METHOD BLD: ABNORMAL
EOSINOPHIL # BLD AUTO: 0.2 K/UL (ref 0–0.5)
EOSINOPHIL NFR BLD: 3.6 % (ref 0–8)
ERYTHROCYTE [DISTWIDTH] IN BLOOD BY AUTOMATED COUNT: 19.7 % (ref 11.5–14.5)
EST. GFR  (NO RACE VARIABLE): >60 ML/MIN/1.73 M^2
GLUCOSE SERPL-MCNC: 104 MG/DL (ref 70–110)
HCT VFR BLD AUTO: 29.3 % (ref 40–54)
HGB BLD-MCNC: 8.5 G/DL (ref 14–18)
IMM GRANULOCYTES # BLD AUTO: 0.01 K/UL (ref 0–0.04)
IMM GRANULOCYTES NFR BLD AUTO: 0.2 % (ref 0–0.5)
LYMPHOCYTES # BLD AUTO: 1.1 K/UL (ref 1–4.8)
LYMPHOCYTES NFR BLD: 26.8 % (ref 18–48)
MCH RBC QN AUTO: 24.6 PG (ref 27–31)
MCHC RBC AUTO-ENTMCNC: 29 G/DL (ref 32–36)
MCV RBC AUTO: 85 FL (ref 82–98)
MONOCYTES # BLD AUTO: 0.4 K/UL (ref 0.3–1)
MONOCYTES NFR BLD: 9 % (ref 4–15)
NEUTROPHILS # BLD AUTO: 2.5 K/UL (ref 1.8–7.7)
NEUTROPHILS NFR BLD: 59.7 % (ref 38–73)
NRBC BLD-RTO: 1 /100 WBC
PLATELET # BLD AUTO: 222 K/UL (ref 150–450)
PMV BLD AUTO: 10.1 FL (ref 9.2–12.9)
POTASSIUM SERPL-SCNC: 3.9 MMOL/L (ref 3.5–5.1)
PROT SERPL-MCNC: 8 G/DL (ref 6–8.4)
RBC # BLD AUTO: 3.46 M/UL (ref 4.6–6.2)
SODIUM SERPL-SCNC: 139 MMOL/L (ref 136–145)
WBC # BLD AUTO: 4.22 K/UL (ref 3.9–12.7)

## 2024-02-21 PROCEDURE — 1160F RVW MEDS BY RX/DR IN RCRD: CPT | Mod: CPTII,95,, | Performed by: INTERNAL MEDICINE

## 2024-02-21 PROCEDURE — 1159F MED LIST DOCD IN RCRD: CPT | Mod: CPTII,95,, | Performed by: INTERNAL MEDICINE

## 2024-02-21 PROCEDURE — 36415 COLL VENOUS BLD VENIPUNCTURE: CPT | Performed by: INTERNAL MEDICINE

## 2024-02-21 PROCEDURE — 99214 OFFICE O/P EST MOD 30 MIN: CPT | Mod: 95,,, | Performed by: INTERNAL MEDICINE

## 2024-02-21 PROCEDURE — 80053 COMPREHEN METABOLIC PANEL: CPT | Performed by: INTERNAL MEDICINE

## 2024-02-21 PROCEDURE — 85025 COMPLETE CBC W/AUTO DIFF WBC: CPT | Performed by: INTERNAL MEDICINE

## 2024-02-21 RX ORDER — HEPARIN 100 UNIT/ML
500 SYRINGE INTRAVENOUS
Status: CANCELLED | OUTPATIENT
Start: 2024-02-28

## 2024-02-21 RX ORDER — SODIUM CHLORIDE 0.9 % (FLUSH) 0.9 %
10 SYRINGE (ML) INJECTION
Status: CANCELLED | OUTPATIENT
Start: 2024-02-28

## 2024-02-21 NOTE — PROGRESS NOTES
Subjective:       Patient ID: Shawn Carreno is a 58 y.o. male.    Chief Complaint: Results and Anemia    HPI:  58-year-old male with recurrent iron deficiency anemia continues with Venofer in sent to statin.  Patient had bone marrow done yesterday awaiting for results with monoclonal gammopathy of undetermined significance.    Past Medical History:   Diagnosis Date    Multiple myeloma not having achieved remission 10/12/2022     History reviewed. No pertinent family history.  Social History     Socioeconomic History    Marital status: Single   Tobacco Use    Smoking status: Never    Smokeless tobacco: Never   Substance and Sexual Activity    Alcohol use: Never    Drug use: Never    Sexual activity: Not Currently     Social Determinants of Health     Financial Resource Strain: Medium Risk (2/15/2024)    Overall Financial Resource Strain (CARDIA)     Difficulty of Paying Living Expenses: Somewhat hard   Food Insecurity: No Food Insecurity (2/15/2024)    Hunger Vital Sign     Worried About Running Out of Food in the Last Year: Never true     Ran Out of Food in the Last Year: Never true   Transportation Needs: No Transportation Needs (2/15/2024)    PRAPARE - Transportation     Lack of Transportation (Medical): No     Lack of Transportation (Non-Medical): No   Physical Activity: Sufficiently Active (2/15/2024)    Exercise Vital Sign     Days of Exercise per Week: 3 days     Minutes of Exercise per Session: 60 min   Stress: No Stress Concern Present (2/15/2024)    [employer] [place] [employer] - Occupational Stress Questionnaire     Feeling of Stress : Not at all   Social Connections: Unknown (2/15/2024)    Social Connection and Isolation Panel [NHANES]     Frequency of Communication with Friends and Family: More than three times a week     Frequency of Social Gatherings with Friends and Family: More than three times a week     Active Member of Clubs or Organizations: Yes     Attends Club or Organization  Meetings: More than 4 times per year     Marital Status:    Housing Stability: High Risk (2/15/2024)    Housing Stability Vital Sign     Unable to Pay for Housing in the Last Year: Yes     Number of Places Lived in the Last Year: 1     Unstable Housing in the Last Year: No     Past Surgical History:   Procedure Laterality Date    CHOLECYSTECTOMY  2020    COLONOSCOPY N/A 1/5/2022    Procedure: COLONOSCOPY;  Surgeon: Hannah Alvarado MD;  Location: [facility];  Service: Endoscopy;  Laterality: N/A;    ESOPHAGOGASTRODUODENOSCOPY N/A 1/5/2022    Procedure: EGD (ESOPHAGOGASTRODUODENOSCOPY);  Surgeon: Hannah Alvarado MD;  Location: [facility];  Service: Endoscopy;  Laterality: N/A;    ESOPHAGOGASTRODUODENOSCOPY N/A 4/6/2022    Procedure: EGD (ESOPHAGOGASTRODUODENOSCOPY) with push;  Surgeon: Vira Castaneda MD;  Location: [facility];  Service: Endoscopy;  Laterality: N/A;    INTRALUMINAL GASTROINTESTINAL TRACT IMAGING VIA CAPSULE N/A 1/26/2022    Procedure: IMAGING PROCEDURE, GI TRACT, INTRALUMINAL, VIA CAPSULE;  Surgeon: Baltazar Chaves RN;  Location: [facility];  Service: Endoscopy;  Laterality: N/A;    INTRALUMINAL GASTROINTESTINAL TRACT IMAGING VIA CAPSULE N/A 2/22/2022    Procedure: IMAGING PROCEDURE, GI TRACT, INTRALUMINAL, VIA CAPSULE;  Surgeon: Baltazar Chaves RN;  Location: [facility];  Service: Endoscopy;  Laterality: N/A;       Labs:  Lab Results   Component Value Date    WBC 4.22 02/21/2024    HGB 8.5 (L) 02/21/2024    HCT 29.3 (L) 02/21/2024    MCV 85 02/21/2024     02/21/2024     BMP  Lab Results   Component Value Date     02/21/2024    K 3.9 02/21/2024     02/21/2024    CO2 25 02/21/2024    BUN 15 02/21/2024    CREATININE 0.8 02/21/2024    CALCIUM 8.4 (L) 02/21/2024    ANIONGAP 6 (L) 02/21/2024    ESTGFRAFRICA >60 01/06/2022    EGFRNONAA >60 01/06/2022     Lab Results   Component Value Date    ALT 11 02/21/2024    AST 13 02/21/2024    ALKPHOS 42 (L) 02/21/2024    BILITOT  0.5 02/21/2024       Lab Results   Component Value Date    IRON 19 (L) 02/14/2024    TIBC 474 (H) 02/14/2024    FERRITIN 3 (L) 02/14/2024     Lab Results   Component Value Date    HAJFWGSG87 283 09/15/2022     Lab Results   Component Value Date    FOLATE 12.2 09/15/2022     Lab Results   Component Value Date    TSH 2.047 09/15/2022         Review of Systems   Constitutional:  Negative for activity change, appetite change, chills, diaphoresis, fatigue, fever and unexpected weight change.   HENT:  Negative for congestion, dental problem, drooling, ear discharge, ear pain, facial swelling, hearing loss, mouth sores, nosebleeds, postnasal drip, rhinorrhea, sinus pressure, sneezing, sore throat, tinnitus, trouble swallowing and voice change.    Eyes:  Negative for photophobia, pain, discharge, redness, itching and visual disturbance.   Respiratory:  Negative for apnea, cough, choking, chest tightness, shortness of breath, wheezing and stridor.    Cardiovascular:  Negative for chest pain, palpitations and leg swelling.   Gastrointestinal:  Negative for abdominal distention, abdominal pain, anal bleeding, blood in stool, constipation, diarrhea, nausea, rectal pain and vomiting.   Endocrine: Negative for cold intolerance, heat intolerance, polydipsia, polyphagia and polyuria.   Genitourinary:  Negative for decreased urine volume, difficulty urinating, dysuria, enuresis, flank pain, frequency, genital sores, hematuria, penile discharge, penile pain, penile swelling, scrotal swelling, testicular pain and urgency.   Musculoskeletal:  Negative for arthralgias, back pain, gait problem, joint swelling, myalgias, neck pain and neck stiffness.   Skin:  Negative for color change, pallor, rash and wound.   Allergic/Immunologic: Negative for environmental allergies, food allergies and immunocompromised state.   Neurological:  Negative for dizziness, tremors, seizures, syncope, facial asymmetry, speech difficulty, weakness,  light-headedness, numbness and headaches.   Hematological:  Negative for adenopathy. Does not bruise/bleed easily.   Psychiatric/Behavioral:  Negative for agitation, behavioral problems, confusion, decreased concentration, dysphoric mood, hallucinations, self-injury, sleep disturbance and suicidal ideas. The patient is not nervous/anxious and is not hyperactive.        Objective:      Physical Exam  Constitutional:       Appearance: Normal appearance.             Assessment:      1. Iron deficiency anemia due to chronic blood loss    2. Symptomatic anemia    3. MGUS (monoclonal gammopathy of unknown significance)    4. Angiodysplasia of gastrointestinal tract           Med Onc Chart Routing      Follow up with physician . Return in 2 weeks to see me with CBC CMP iron status.  Reviewed bone marrow and give Sandostatin at that time   Follow up with [facility]    Infusion scheduling note    Injection scheduling note Would like to see about getting Venofer this week as well as next week and return in 2 weeks for next dose of Sandostatin lab   Labs    Imaging    Pharmacy appointment    Other referrals                   Plan:     The patient location is:  Home  The chief complaint leading to consultation is:  Anemia  Visit type: audiovisual    Face to Face time with patient: 25 minutes of total time spent on the encounter, which includes face to face time and non-face to face time preparing to see the patient (eg, review of tests), Obtaining and/or reviewing separately obtained history, Documenting clinical information in the electronic or other health record, Independently interpreting results (not separately reported) and communicating results to the patient/family/caregiver, or Care coordination (not separately reported).         Each patient to whom he or she provides medical services by telemedicine is:  (1) informed of the relationship between the physician and patient and the respective role of any other health care  provider with respect to management of the patient; and (2) notified that he or she may decline to receive medical services by telemedicine and may withdraw from such care at any time.    Notes:     Reviewed information.  At this time would recommend Venofer dose this week and again next week return in 2 weeks will review bone marrow at that time with CBC CMP and iron TIBC and ferritin prior possible    Kris Cesar Jr, MD FACP

## 2024-02-22 ENCOUNTER — TELEPHONE (OUTPATIENT)
Dept: INFUSION THERAPY | Facility: HOSPITAL | Age: 59
End: 2024-02-22
Payer: COMMERCIAL

## 2024-02-22 LAB
BODY SITE - BONE MARROW: NORMAL
CLINICAL DIAGNOSIS - BONE MARROW: NORMAL
COMMENT: NORMAL
FINAL PATHOLOGIC DIAGNOSIS: NORMAL
FLOW CYTOMETRY ANTIBODIES ANALYZED - BONE MARROW: NORMAL
FLOW CYTOMETRY COMMENT - BONE MARROW: NORMAL
FLOW CYTOMETRY INTERPRETATION - BONE MARROW: NORMAL
GROSS: NORMAL
Lab: NORMAL
MICROSCOPIC EXAM: NORMAL
PCPDS FINAL DIAGNOSIS: NORMAL
PCPDS PRE-ANALYSIS PRE-SORT: NORMAL

## 2024-02-22 NOTE — TELEPHONE ENCOUNTER
----- Message from Yary Ruiz LPN sent at 2/21/2024  2:05 PM CST -----    ----- Message -----  From: Kris Cesar MD  Sent: 2/21/2024   9:45 AM CST  To: Arsenio MALHOTRA Staff    Would like to see if patient can get Venofer dose this week again in next week and then I will see back in 2 weeks with CBC CMP iron TIBC and ferritin prior and will review bone marrow at that time for next dose of Sandostatin and possible Venofer

## 2024-02-27 ENCOUNTER — INFUSION (OUTPATIENT)
Dept: INFUSION THERAPY | Facility: HOSPITAL | Age: 59
End: 2024-02-27
Attending: INTERNAL MEDICINE
Payer: COMMERCIAL

## 2024-02-27 VITALS
DIASTOLIC BLOOD PRESSURE: 71 MMHG | HEART RATE: 61 BPM | RESPIRATION RATE: 16 BRPM | TEMPERATURE: 98 F | OXYGEN SATURATION: 98 % | SYSTOLIC BLOOD PRESSURE: 121 MMHG

## 2024-02-27 DIAGNOSIS — D50.0 IRON DEFICIENCY ANEMIA DUE TO CHRONIC BLOOD LOSS: Primary | ICD-10-CM

## 2024-02-27 LAB
GENETICIST REVIEW: NORMAL
PLASMA CELL PROLIF RELEASED BY: NORMAL
PLASMA CELL PROLIF RESULT SUMMARY: NORMAL
PLASMA CELL PROLIF RESULT TABLE: NORMAL
REASON FOR REFERRAL, PLASMA CELL PROLIF (PCPD), FISH: NORMAL
REF LAB TEST METHOD: NORMAL
RESULTS, PLASMA CELL PROLIF (PCPD), FISH: NORMAL
SERVICE CMNT-IMP: NORMAL
SERVICE CMNT-IMP: NORMAL
SPECIMEN SOURCE: NORMAL
SPECIMEN, PLASMA CELL PROLIF (PCPD), FISH: NORMAL

## 2024-02-27 PROCEDURE — A4216 STERILE WATER/SALINE, 10 ML: HCPCS | Performed by: INTERNAL MEDICINE

## 2024-02-27 PROCEDURE — 96374 THER/PROPH/DIAG INJ IV PUSH: CPT

## 2024-02-27 PROCEDURE — 25000003 PHARM REV CODE 250: Performed by: INTERNAL MEDICINE

## 2024-02-27 PROCEDURE — 63600175 PHARM REV CODE 636 W HCPCS: Performed by: INTERNAL MEDICINE

## 2024-02-27 RX ORDER — HEPARIN 100 UNIT/ML
500 SYRINGE INTRAVENOUS
Status: CANCELLED | OUTPATIENT
Start: 2024-03-05

## 2024-02-27 RX ORDER — SODIUM CHLORIDE 0.9 % (FLUSH) 0.9 %
10 SYRINGE (ML) INJECTION
Status: DISCONTINUED | OUTPATIENT
Start: 2024-02-27 | End: 2024-02-27 | Stop reason: HOSPADM

## 2024-02-27 RX ORDER — SODIUM CHLORIDE 0.9 % (FLUSH) 0.9 %
10 SYRINGE (ML) INJECTION
Status: CANCELLED | OUTPATIENT
Start: 2024-03-05

## 2024-02-27 RX ADMIN — IRON SUCROSE 200 MG: 20 INJECTION, SOLUTION INTRAVENOUS at 07:02

## 2024-02-27 RX ADMIN — Medication 10 ML: at 07:02

## 2024-02-27 NOTE — PLAN OF CARE
Problem: Adult Inpatient Plan of Care  Goal: Plan of Care Review  Outcome: Ongoing, Progressing  Flowsheets (Taken 2/27/2024 0651)  Plan of Care Reviewed With: patient  Goal: Optimal Comfort and Wellbeing  Outcome: Ongoing, Progressing  Intervention: Provide Person-Centered Care  Flowsheets (Taken 2/27/2024 0651)  Trust Relationship/Rapport:   care explained   questions encouraged   choices provided   reassurance provided   emotional support provided   thoughts/feelings acknowledged   empathic listening provided   questions answered

## 2024-03-04 ENCOUNTER — TELEPHONE (OUTPATIENT)
Dept: INFUSION THERAPY | Facility: HOSPITAL | Age: 59
End: 2024-03-04
Payer: COMMERCIAL

## 2024-03-04 NOTE — TELEPHONE ENCOUNTER
Spoke with  Ev regarding his denied Venofer that is scheduled for tomorrow. He sated that he was approved [facility] assistance for his treatments. Currently reaching out to Fin Starr, Macarena Ruggiero for verification.

## 2024-03-05 ENCOUNTER — INFUSION (OUTPATIENT)
Dept: INFUSION THERAPY | Facility: HOSPITAL | Age: 59
End: 2024-03-05
Attending: INTERNAL MEDICINE
Payer: COMMERCIAL

## 2024-03-05 VITALS
TEMPERATURE: 98 F | SYSTOLIC BLOOD PRESSURE: 129 MMHG | HEART RATE: 71 BPM | OXYGEN SATURATION: 100 % | DIASTOLIC BLOOD PRESSURE: 71 MMHG | RESPIRATION RATE: 18 BRPM

## 2024-03-05 DIAGNOSIS — D50.0 IRON DEFICIENCY ANEMIA DUE TO CHRONIC BLOOD LOSS: Primary | ICD-10-CM

## 2024-03-05 PROCEDURE — 25000003 PHARM REV CODE 250: Performed by: INTERNAL MEDICINE

## 2024-03-05 PROCEDURE — 63600175 PHARM REV CODE 636 W HCPCS: Performed by: INTERNAL MEDICINE

## 2024-03-05 PROCEDURE — 96374 THER/PROPH/DIAG INJ IV PUSH: CPT

## 2024-03-05 PROCEDURE — A4216 STERILE WATER/SALINE, 10 ML: HCPCS | Performed by: INTERNAL MEDICINE

## 2024-03-05 RX ORDER — SODIUM CHLORIDE 0.9 % (FLUSH) 0.9 %
10 SYRINGE (ML) INJECTION
Status: DISCONTINUED | OUTPATIENT
Start: 2024-03-05 | End: 2024-03-05 | Stop reason: HOSPADM

## 2024-03-05 RX ORDER — SODIUM CHLORIDE 0.9 % (FLUSH) 0.9 %
10 SYRINGE (ML) INJECTION
Status: CANCELLED | OUTPATIENT
Start: 2024-03-12

## 2024-03-05 RX ORDER — HEPARIN 100 UNIT/ML
500 SYRINGE INTRAVENOUS
Status: CANCELLED | OUTPATIENT
Start: 2024-03-12

## 2024-03-05 RX ADMIN — Medication 10 ML: at 08:03

## 2024-03-05 RX ADMIN — IRON SUCROSE 200 MG: 20 INJECTION, SOLUTION INTRAVENOUS at 08:03

## 2024-03-05 NOTE — DISCHARGE INSTRUCTIONS
.[facility]  [address] [address]  [address]  [phone] phone     [phone] fax  Hours of Operation: Monday- Friday 8:00am- 5:00pm  After hours phone  [phone]  Hematology / Oncology Physicians on call    Dr. Arsenio Hankins        Nurse Practitioners:    Annie Garcia, KRISTIE Griffith, KRISTIE Moreno, KRISTIE Stevenson, KRISTIE Anaya, PA      Please don't hesitate to call if you have any concerns.

## 2024-03-05 NOTE — PLAN OF CARE
Patient reports no complaints or concerns at this time. Tolerated infusion well with no adverse reactions. Patient to return on Friday for Sandostatin injection.

## 2024-03-07 ENCOUNTER — LAB VISIT (OUTPATIENT)
Dept: LAB | Facility: HOSPITAL | Age: 59
End: 2024-03-07
Attending: INTERNAL MEDICINE
Payer: COMMERCIAL

## 2024-03-07 DIAGNOSIS — D50.0 IRON DEFICIENCY ANEMIA DUE TO CHRONIC BLOOD LOSS: ICD-10-CM

## 2024-03-07 DIAGNOSIS — D47.2 MGUS (MONOCLONAL GAMMOPATHY OF UNKNOWN SIGNIFICANCE): ICD-10-CM

## 2024-03-07 LAB
ALBUMIN SERPL BCP-MCNC: 3.6 G/DL (ref 3.5–5.2)
ALP SERPL-CCNC: 43 U/L (ref 55–135)
ALT SERPL W/O P-5'-P-CCNC: 15 U/L (ref 10–44)
ANION GAP SERPL CALC-SCNC: 6 MMOL/L (ref 8–16)
AST SERPL-CCNC: 17 U/L (ref 10–40)
BASOPHILS # BLD AUTO: 0.05 K/UL (ref 0–0.2)
BASOPHILS NFR BLD: 0.9 % (ref 0–1.9)
BILIRUB SERPL-MCNC: 0.4 MG/DL (ref 0.1–1)
BUN SERPL-MCNC: 16 MG/DL (ref 6–20)
CALCIUM SERPL-MCNC: 8.5 MG/DL (ref 8.7–10.5)
CHLORIDE SERPL-SCNC: 109 MMOL/L (ref 95–110)
CO2 SERPL-SCNC: 23 MMOL/L (ref 23–29)
CREAT SERPL-MCNC: 0.8 MG/DL (ref 0.5–1.4)
DIFFERENTIAL METHOD BLD: ABNORMAL
EOSINOPHIL # BLD AUTO: 0.2 K/UL (ref 0–0.5)
EOSINOPHIL NFR BLD: 3.3 % (ref 0–8)
ERYTHROCYTE [DISTWIDTH] IN BLOOD BY AUTOMATED COUNT: 21.7 % (ref 11.5–14.5)
EST. GFR  (NO RACE VARIABLE): >60 ML/MIN/1.73 M^2
FERRITIN SERPL-MCNC: 125 NG/ML (ref 20–300)
GLUCOSE SERPL-MCNC: 92 MG/DL (ref 70–110)
HCT VFR BLD AUTO: 28.4 % (ref 40–54)
HGB BLD-MCNC: 8.1 G/DL (ref 14–18)
IMM GRANULOCYTES # BLD AUTO: 0.05 K/UL (ref 0–0.04)
IMM GRANULOCYTES NFR BLD AUTO: 0.9 % (ref 0–0.5)
IRON SERPL-MCNC: 61 UG/DL (ref 45–160)
LYMPHOCYTES # BLD AUTO: 1.7 K/UL (ref 1–4.8)
LYMPHOCYTES NFR BLD: 29.9 % (ref 18–48)
MCH RBC QN AUTO: 24.2 PG (ref 27–31)
MCHC RBC AUTO-ENTMCNC: 28.5 G/DL (ref 32–36)
MCV RBC AUTO: 85 FL (ref 82–98)
MONOCYTES # BLD AUTO: 0.5 K/UL (ref 0.3–1)
MONOCYTES NFR BLD: 8.6 % (ref 4–15)
NEUTROPHILS # BLD AUTO: 3.2 K/UL (ref 1.8–7.7)
NEUTROPHILS NFR BLD: 56.4 % (ref 38–73)
NRBC BLD-RTO: 0 /100 WBC
PLATELET # BLD AUTO: 317 K/UL (ref 150–450)
PMV BLD AUTO: 9.9 FL (ref 9.2–12.9)
POTASSIUM SERPL-SCNC: 3.7 MMOL/L (ref 3.5–5.1)
PROT SERPL-MCNC: 7.7 G/DL (ref 6–8.4)
RBC # BLD AUTO: 3.35 M/UL (ref 4.6–6.2)
SATURATED IRON: 14 % (ref 20–50)
SODIUM SERPL-SCNC: 138 MMOL/L (ref 136–145)
TOTAL IRON BINDING CAPACITY: 422 UG/DL (ref 250–450)
TRANSFERRIN SERPL-MCNC: 285 MG/DL (ref 200–375)
WBC # BLD AUTO: 5.72 K/UL (ref 3.9–12.7)

## 2024-03-07 PROCEDURE — 80053 COMPREHEN METABOLIC PANEL: CPT | Performed by: INTERNAL MEDICINE

## 2024-03-07 PROCEDURE — 82728 ASSAY OF FERRITIN: CPT | Performed by: INTERNAL MEDICINE

## 2024-03-07 PROCEDURE — 83540 ASSAY OF IRON: CPT | Performed by: INTERNAL MEDICINE

## 2024-03-07 PROCEDURE — 36415 COLL VENOUS BLD VENIPUNCTURE: CPT | Performed by: INTERNAL MEDICINE

## 2024-03-07 PROCEDURE — 85025 COMPLETE CBC W/AUTO DIFF WBC: CPT | Performed by: INTERNAL MEDICINE

## 2024-03-08 ENCOUNTER — OFFICE VISIT (OUTPATIENT)
Dept: HEMATOLOGY/ONCOLOGY | Facility: CLINIC | Age: 59
End: 2024-03-08
Payer: COMMERCIAL

## 2024-03-08 ENCOUNTER — INFUSION (OUTPATIENT)
Dept: INFUSION THERAPY | Facility: HOSPITAL | Age: 59
End: 2024-03-08
Attending: INTERNAL MEDICINE
Payer: COMMERCIAL

## 2024-03-08 VITALS
SYSTOLIC BLOOD PRESSURE: 110 MMHG | TEMPERATURE: 98 F | HEART RATE: 77 BPM | OXYGEN SATURATION: 98 % | DIASTOLIC BLOOD PRESSURE: 58 MMHG | RESPIRATION RATE: 16 BRPM

## 2024-03-08 VITALS
BODY MASS INDEX: 31.33 KG/M2 | HEIGHT: 67 IN | TEMPERATURE: 98 F | HEART RATE: 76 BPM | OXYGEN SATURATION: 98 % | DIASTOLIC BLOOD PRESSURE: 49 MMHG | WEIGHT: 199.63 LBS | SYSTOLIC BLOOD PRESSURE: 101 MMHG

## 2024-03-08 DIAGNOSIS — D50.0 IRON DEFICIENCY ANEMIA DUE TO CHRONIC BLOOD LOSS: Primary | ICD-10-CM

## 2024-03-08 DIAGNOSIS — K55.20 ANGIODYSPLASIA OF GASTROINTESTINAL TRACT: ICD-10-CM

## 2024-03-08 DIAGNOSIS — D47.2 SMOLDERING MULTIPLE MYELOMA: ICD-10-CM

## 2024-03-08 DIAGNOSIS — R53.82 CHRONIC FATIGUE: ICD-10-CM

## 2024-03-08 DIAGNOSIS — D84.821 IMMUNODEFICIENCY DUE TO CHEMOTHERAPY: ICD-10-CM

## 2024-03-08 DIAGNOSIS — T45.1X5A IMMUNODEFICIENCY DUE TO CHEMOTHERAPY: ICD-10-CM

## 2024-03-08 DIAGNOSIS — Z79.899 IMMUNODEFICIENCY DUE TO CHEMOTHERAPY: ICD-10-CM

## 2024-03-08 DIAGNOSIS — D64.9 SYMPTOMATIC ANEMIA: ICD-10-CM

## 2024-03-08 PROCEDURE — 3078F DIAST BP <80 MM HG: CPT | Mod: CPTII,S$GLB,, | Performed by: INTERNAL MEDICINE

## 2024-03-08 PROCEDURE — 3074F SYST BP LT 130 MM HG: CPT | Mod: CPTII,S$GLB,, | Performed by: INTERNAL MEDICINE

## 2024-03-08 PROCEDURE — 1160F RVW MEDS BY RX/DR IN RCRD: CPT | Mod: CPTII,S$GLB,, | Performed by: INTERNAL MEDICINE

## 2024-03-08 PROCEDURE — 3008F BODY MASS INDEX DOCD: CPT | Mod: CPTII,S$GLB,, | Performed by: INTERNAL MEDICINE

## 2024-03-08 PROCEDURE — 99215 OFFICE O/P EST HI 40 MIN: CPT | Mod: 25,S$GLB,, | Performed by: INTERNAL MEDICINE

## 2024-03-08 PROCEDURE — 63600175 PHARM REV CODE 636 W HCPCS: Performed by: INTERNAL MEDICINE

## 2024-03-08 PROCEDURE — 1159F MED LIST DOCD IN RCRD: CPT | Mod: CPTII,S$GLB,, | Performed by: INTERNAL MEDICINE

## 2024-03-08 PROCEDURE — 99999 PR PBB SHADOW E&M-EST. PATIENT-LVL III: CPT | Mod: PBBFAC,,, | Performed by: INTERNAL MEDICINE

## 2024-03-08 PROCEDURE — 96374 THER/PROPH/DIAG INJ IV PUSH: CPT

## 2024-03-08 RX ORDER — HEPARIN 100 UNIT/ML
500 SYRINGE INTRAVENOUS
Status: CANCELLED | OUTPATIENT
Start: 2024-03-12

## 2024-03-08 RX ORDER — HEPARIN 100 UNIT/ML
500 SYRINGE INTRAVENOUS
OUTPATIENT
Start: 2024-03-12

## 2024-03-08 RX ADMIN — IRON SUCROSE 200 MG: 20 INJECTION, SOLUTION INTRAVENOUS at 10:03

## 2024-03-08 NOTE — NURSING
Sandostatin not given d/t dose being early. Pt will get sandostatin on 3/19 with next scheduled venofer IVP. Pt verbalized understanding.

## 2024-03-08 NOTE — PROGRESS NOTES
Subjective:       Patient ID: Shawn Carreno is a 58 y.o. male.    Chief Complaint: Results and Anemia    HPI:  58-year-old male history of recurrent iron deficiency anemia secondary angiodysplasia.  In addition patient has smoldering myeloma with recent bone marrow demonstrating 20 24% plasma cells essentially stable.  Patient continues on Sandostatin on a monthly basis Venofer every 2 weeks.  ECOG status 1    Past Medical History:   Diagnosis Date    Multiple myeloma not having achieved remission 10/12/2022     History reviewed. No pertinent family history.  Social History     Socioeconomic History    Marital status: Single   Tobacco Use    Smoking status: Never    Smokeless tobacco: Never   Substance and Sexual Activity    Alcohol use: Never    Drug use: Never    Sexual activity: Not Currently     Social Determinants of Health     Financial Resource Strain: Medium Risk (2/15/2024)    Overall Financial Resource Strain (CARDIA)     Difficulty of Paying Living Expenses: Somewhat hard   Food Insecurity: No Food Insecurity (2/15/2024)    Hunger Vital Sign     Worried About Running Out of Food in the Last Year: Never true     Ran Out of Food in the Last Year: Never true   Transportation Needs: No Transportation Needs (2/15/2024)    PRAPARE - Transportation     Lack of Transportation (Medical): No     Lack of Transportation (Non-Medical): No   Physical Activity: Sufficiently Active (2/15/2024)    Exercise Vital Sign     Days of Exercise per Week: 3 days     Minutes of Exercise per Session: 60 min   Stress: No Stress Concern Present (2/15/2024)    [employer] - Occupational Stress Questionnaire     Feeling of Stress : Not at all   Social Connections: Unknown (2/15/2024)    Social Connection and Isolation Panel [NHANES]     Frequency of Communication with Friends and Family: More than three times a week     Frequency of Social Gatherings with Friends and Family: More than three times a week      Active Member of Clubs or Organizations: Yes     Attends Club or Organization Meetings: More than 4 times per year     Marital Status:    Housing Stability: High Risk (2/15/2024)    Housing Stability Vital Sign     Unable to Pay for Housing in the Last Year: Yes     Number of Places Lived in the Last Year: 1     Unstable Housing in the Last Year: No     Past Surgical History:   Procedure Laterality Date    CHOLECYSTECTOMY  2020    COLONOSCOPY N/A 1/5/2022    Procedure: COLONOSCOPY;  Surgeon: Hannah Alvarado MD;  Location: [facility];  Service: Endoscopy;  Laterality: N/A;    ESOPHAGOGASTRODUODENOSCOPY N/A 1/5/2022    Procedure: EGD (ESOPHAGOGASTRODUODENOSCOPY);  Surgeon: Hannah Alvarado MD;  Location: [facility];  Service: Endoscopy;  Laterality: N/A;    ESOPHAGOGASTRODUODENOSCOPY N/A 4/6/2022    Procedure: EGD (ESOPHAGOGASTRODUODENOSCOPY) with push;  Surgeon: Vira Castaneda MD;  Location: [facility];  Service: Endoscopy;  Laterality: N/A;    INTRALUMINAL GASTROINTESTINAL TRACT IMAGING VIA CAPSULE N/A 1/26/2022    Procedure: IMAGING PROCEDURE, GI TRACT, INTRALUMINAL, VIA CAPSULE;  Surgeon: Baltazar Chaves RN;  Location: [facility];  Service: Endoscopy;  Laterality: N/A;    INTRALUMINAL GASTROINTESTINAL TRACT IMAGING VIA CAPSULE N/A 2/22/2022    Procedure: IMAGING PROCEDURE, GI TRACT, INTRALUMINAL, VIA CAPSULE;  Surgeon: Baltazar Chaves RN;  Location: [facility];  Service: Endoscopy;  Laterality: N/A;       Labs:  Lab Results   Component Value Date    WBC 5.72 03/07/2024    HGB 8.1 (L) 03/07/2024    HCT 28.4 (L) 03/07/2024    MCV 85 03/07/2024     03/07/2024     BMP  Lab Results   Component Value Date     03/07/2024    K 3.7 03/07/2024     03/07/2024    CO2 23 03/07/2024    BUN 16 03/07/2024    CREATININE 0.8 03/07/2024    CALCIUM 8.5 (L) 03/07/2024    ANIONGAP 6 (L) 03/07/2024    ESTGFRAFRICA >60 01/06/2022    EGFRNONAA >60 01/06/2022     Lab Results   Component Value Date     ALT 15 03/07/2024    AST 17 03/07/2024    ALKPHOS 43 (L) 03/07/2024    BILITOT 0.4 03/07/2024       Lab Results   Component Value Date    IRON 61 03/07/2024    TIBC 422 03/07/2024    FERRITIN 125 03/07/2024     Lab Results   Component Value Date    CNEGLAFG19 283 09/15/2022     Lab Results   Component Value Date    FOLATE 12.2 09/15/2022     Lab Results   Component Value Date    TSH 2.047 09/15/2022         Review of Systems   Constitutional:  Positive for fatigue. Negative for activity change, appetite change, chills, diaphoresis, fever and unexpected weight change.   HENT:  Negative for congestion, dental problem, drooling, ear discharge, ear pain, facial swelling, hearing loss, mouth sores, nosebleeds, postnasal drip, rhinorrhea, sinus pressure, sneezing, sore throat, tinnitus, trouble swallowing and voice change.    Eyes:  Negative for photophobia, pain, discharge, redness, itching and visual disturbance.   Respiratory:  Negative for apnea, cough, choking, chest tightness, shortness of breath, wheezing and stridor.    Cardiovascular:  Negative for chest pain, palpitations and leg swelling.   Gastrointestinal:  Negative for abdominal distention, abdominal pain, anal bleeding, blood in stool, constipation, diarrhea, nausea, rectal pain and vomiting.   Endocrine: Negative for cold intolerance, heat intolerance, polydipsia, polyphagia and polyuria.   Genitourinary:  Negative for decreased urine volume, difficulty urinating, dysuria, enuresis, flank pain, frequency, genital sores, hematuria, penile discharge, penile pain, penile swelling, scrotal swelling, testicular pain and urgency.   Musculoskeletal:  Negative for arthralgias, back pain, gait problem, joint swelling, myalgias, neck pain and neck stiffness.   Skin:  Negative for color change, pallor, rash and wound.   Allergic/Immunologic: Negative for environmental allergies, food allergies and immunocompromised state.   Neurological:  Positive for weakness.  Negative for dizziness, tremors, seizures, syncope, facial asymmetry, speech difficulty, light-headedness, numbness and headaches.   Hematological:  Negative for adenopathy. Does not bruise/bleed easily.   Psychiatric/Behavioral:  Negative for agitation, behavioral problems, confusion, decreased concentration, dysphoric mood, hallucinations, self-injury, sleep disturbance and suicidal ideas. The patient is not nervous/anxious and is not hyperactive.        Objective:      Physical Exam  Vitals reviewed.   Constitutional:       General: He is not in acute distress.     Appearance: He is well-developed. He is obese. He is not diaphoretic.   HENT:      Head: Normocephalic.      Right Ear: External ear normal.      Left Ear: External ear normal.      Nose: Nose normal.      Right Sinus: No maxillary sinus tenderness or frontal sinus tenderness.      Left Sinus: No maxillary sinus tenderness or frontal sinus tenderness.      Mouth/Throat:      Pharynx: No oropharyngeal exudate.   Eyes:      General: Lids are normal. No scleral icterus.        Right eye: No discharge.         Left eye: No discharge.      Extraocular Movements:      Right eye: Normal extraocular motion.      Left eye: Normal extraocular motion.      Conjunctiva/sclera:      Right eye: Right conjunctiva is not injected. No hemorrhage.     Left eye: Left conjunctiva is not injected. No hemorrhage.     Pupils: Pupils are equal, round, and reactive to light.   Neck:      Thyroid: No thyromegaly.      Vascular: No JVD.      Trachea: No tracheal deviation.   Cardiovascular:      Rate and Rhythm: Normal rate.   Pulmonary:      Effort: Pulmonary effort is normal. No respiratory distress.      Breath sounds: No stridor.   Abdominal:      General: Bowel sounds are normal.      Palpations: Abdomen is soft. There is no hepatomegaly, splenomegaly or mass.      Tenderness: There is no abdominal tenderness.   Musculoskeletal:         General: No tenderness. Normal range  of motion.      Cervical back: Normal range of motion and neck supple.   Lymphadenopathy:      Head:      Right side of head: No posterior auricular or occipital adenopathy.      Left side of head: No posterior auricular or occipital adenopathy.      Cervical: No cervical adenopathy.      Right cervical: No superficial, deep or posterior cervical adenopathy.     Left cervical: No superficial, deep or posterior cervical adenopathy.      Upper Body:      Right upper body: No supraclavicular adenopathy.      Left upper body: No supraclavicular adenopathy.   Skin:     General: Skin is dry.      Findings: No erythema or rash.      Nails: There is no clubbing.   Neurological:      Mental Status: He is alert and oriented to person, place, and time.      Cranial Nerves: No cranial nerve deficit.      Coordination: Coordination normal.   Psychiatric:         Behavior: Behavior normal.         Thought Content: Thought content normal.         Judgment: Judgment normal.             Assessment:      1. Iron deficiency anemia due to chronic blood loss    2. Immunodeficiency due to chemotherapy    3. Symptomatic anemia    4. Angiodysplasia of gastrointestinal tract    5. Chronic fatigue           Med Onc Chart Routing      Follow up with physician . Return in 2 months to see me with CBC CMP serum iron TIBC ferritin SPEP and free light chain prior   Follow up with ELVIA . Return to clinic in can be seen by [facility] in 1 month with CBC CMP serum iron TIBC and ferritin prior   Infusion scheduling note    Injection scheduling note Venofer every 2 weeks Sandostatin monthly   Labs    Imaging    Pharmacy appointment    Other referrals                   Plan:     Extensive conversation reviewed information with him.  Patient has angiodysplasias with recurrent iron deficiency appears to be managed best with Osiel Venofer every 2 weeks Sandostatin on a monthly basis.  Can be seen by APAP in 1 month with CBC CMP and iron status prior see me in 2  months with CBC CMP iron status in SPEP free light chain prior results of bone marrow hindered tumor still with 20% plasma cells in marrow but no clear evidence of progression discussed implications has been seen by         Kris Cesar Jr, MD FACP

## 2024-03-19 ENCOUNTER — TELEPHONE (OUTPATIENT)
Dept: INFUSION THERAPY | Facility: HOSPITAL | Age: 59
End: 2024-03-19
Payer: COMMERCIAL

## 2024-03-19 NOTE — TELEPHONE ENCOUNTER
Called pt regarding 0900 appt with infusion.  Left message for him to call back to come later today or reschedule.

## 2024-04-02 ENCOUNTER — TELEPHONE (OUTPATIENT)
Dept: HEMATOLOGY/ONCOLOGY | Facility: CLINIC | Age: 59
End: 2024-04-02
Payer: COMMERCIAL

## 2024-04-02 NOTE — TELEPHONE ENCOUNTER
Left message on voicemail to call office back to reschedule missed appointment today with Heide PEREZ.

## 2025-07-22 NOTE — PLAN OF CARE
Problem: Adult Inpatient Plan of Care  Goal: Plan of Care Review  Outcome: Ongoing, Progressing  Flowsheets (Taken 5/1/2023 0827)  Plan of Care Reviewed With: patient  Goal: Patient-Specific Goal (Individualized)  Outcome: Ongoing, Progressing  Flowsheets (Taken 5/1/2023 0827)  Anxieties, Fears or Concerns: denies  Individualized Care Needs: feet elevated     Problem: Anemia  Goal: Anemia Symptom Improvement  Outcome: Ongoing, Progressing  Intervention: Monitor and Manage Anemia  Flowsheets (Taken 5/1/2023 0827)  Safety Promotion/Fall Prevention:   assistive device/personal item within reach   Fall Risk reviewed with patient/family   in recliner, wheels locked   instructed to call staff for mobility  Fatigue Management: frequent rest breaks encouraged      No